# Patient Record
Sex: MALE | Race: WHITE | NOT HISPANIC OR LATINO | URBAN - METROPOLITAN AREA
[De-identification: names, ages, dates, MRNs, and addresses within clinical notes are randomized per-mention and may not be internally consistent; named-entity substitution may affect disease eponyms.]

---

## 2022-01-31 ENCOUNTER — INPATIENT (INPATIENT)
Facility: HOSPITAL | Age: 67
LOS: 6 days | Discharge: NURSING, REHAB OR LONG-TERM CARE | DRG: 177 | End: 2022-02-07
Attending: INTERNAL MEDICINE | Admitting: INTERNAL MEDICINE
Payer: MEDICARE

## 2022-01-31 VITALS
RESPIRATION RATE: 18 BRPM | TEMPERATURE: 99 F | HEIGHT: 74 IN | OXYGEN SATURATION: 90 % | SYSTOLIC BLOOD PRESSURE: 105 MMHG | DIASTOLIC BLOOD PRESSURE: 63 MMHG | WEIGHT: 223.11 LBS | HEART RATE: 84 BPM

## 2022-01-31 DIAGNOSIS — U07.1 COVID-19: ICD-10-CM

## 2022-01-31 DIAGNOSIS — N40.0 BENIGN PROSTATIC HYPERPLASIA WITHOUT LOWER URINARY TRACT SYMPTOMS: ICD-10-CM

## 2022-01-31 DIAGNOSIS — I73.9 PERIPHERAL VASCULAR DISEASE, UNSPECIFIED: ICD-10-CM

## 2022-01-31 DIAGNOSIS — J96.01 ACUTE RESPIRATORY FAILURE WITH HYPOXIA: ICD-10-CM

## 2022-01-31 DIAGNOSIS — J44.9 CHRONIC OBSTRUCTIVE PULMONARY DISEASE, UNSPECIFIED: ICD-10-CM

## 2022-01-31 DIAGNOSIS — I48.20 CHRONIC ATRIAL FIBRILLATION, UNSPECIFIED: ICD-10-CM

## 2022-01-31 DIAGNOSIS — E87.1 HYPO-OSMOLALITY AND HYPONATREMIA: ICD-10-CM

## 2022-01-31 DIAGNOSIS — E11.9 TYPE 2 DIABETES MELLITUS WITHOUT COMPLICATIONS: ICD-10-CM

## 2022-01-31 DIAGNOSIS — Z29.9 ENCOUNTER FOR PROPHYLACTIC MEASURES, UNSPECIFIED: ICD-10-CM

## 2022-01-31 DIAGNOSIS — K21.9 GASTRO-ESOPHAGEAL REFLUX DISEASE WITHOUT ESOPHAGITIS: ICD-10-CM

## 2022-01-31 LAB
ALBUMIN SERPL ELPH-MCNC: 2.9 G/DL — LOW (ref 3.5–5)
ALP SERPL-CCNC: 84 U/L — SIGNIFICANT CHANGE UP (ref 40–120)
ALT FLD-CCNC: 14 U/L DA — SIGNIFICANT CHANGE UP (ref 10–60)
ANION GAP SERPL CALC-SCNC: 0 MMOL/L — LOW (ref 5–17)
ANION GAP SERPL CALC-SCNC: 0 MMOL/L — LOW (ref 5–17)
ANION GAP SERPL CALC-SCNC: 2 MMOL/L — LOW (ref 5–17)
AST SERPL-CCNC: 26 U/L — SIGNIFICANT CHANGE UP (ref 10–40)
BASE EXCESS BLDV CALC-SCNC: 10.3 MMOL/L — SIGNIFICANT CHANGE UP
BASOPHILS # BLD AUTO: 0 K/UL — SIGNIFICANT CHANGE UP (ref 0–0.2)
BASOPHILS # BLD AUTO: 0.01 K/UL — SIGNIFICANT CHANGE UP (ref 0–0.2)
BASOPHILS NFR BLD AUTO: 0 % — SIGNIFICANT CHANGE UP (ref 0–2)
BASOPHILS NFR BLD AUTO: 0.2 % — SIGNIFICANT CHANGE UP (ref 0–2)
BILIRUB SERPL-MCNC: 0.4 MG/DL — SIGNIFICANT CHANGE UP (ref 0.2–1.2)
BLOOD GAS COMMENTS, VENOUS: SIGNIFICANT CHANGE UP
BUN SERPL-MCNC: 20 MG/DL — HIGH (ref 7–18)
BUN SERPL-MCNC: 20 MG/DL — HIGH (ref 7–18)
BUN SERPL-MCNC: 22 MG/DL — HIGH (ref 7–18)
CALCIUM SERPL-MCNC: 7.6 MG/DL — LOW (ref 8.4–10.5)
CALCIUM SERPL-MCNC: 8.3 MG/DL — LOW (ref 8.4–10.5)
CALCIUM SERPL-MCNC: 9 MG/DL — SIGNIFICANT CHANGE UP (ref 8.4–10.5)
CHLORIDE SERPL-SCNC: 101 MMOL/L — SIGNIFICANT CHANGE UP (ref 96–108)
CHLORIDE SERPL-SCNC: 102 MMOL/L — SIGNIFICANT CHANGE UP (ref 96–108)
CHLORIDE SERPL-SCNC: 96 MMOL/L — SIGNIFICANT CHANGE UP (ref 96–108)
CK SERPL-CCNC: 47 U/L — SIGNIFICANT CHANGE UP (ref 35–232)
CO2 SERPL-SCNC: 31 MMOL/L — SIGNIFICANT CHANGE UP (ref 22–31)
CO2 SERPL-SCNC: 34 MMOL/L — HIGH (ref 22–31)
CO2 SERPL-SCNC: 35 MMOL/L — HIGH (ref 22–31)
CREAT SERPL-MCNC: 0.9 MG/DL — SIGNIFICANT CHANGE UP (ref 0.5–1.3)
CREAT SERPL-MCNC: 1.01 MG/DL — SIGNIFICANT CHANGE UP (ref 0.5–1.3)
CREAT SERPL-MCNC: 1.15 MG/DL — SIGNIFICANT CHANGE UP (ref 0.5–1.3)
D DIMER BLD IA.RAPID-MCNC: 409 NG/ML DDU — HIGH
EOSINOPHIL # BLD AUTO: 0 K/UL — SIGNIFICANT CHANGE UP (ref 0–0.5)
EOSINOPHIL # BLD AUTO: 0.14 K/UL — SIGNIFICANT CHANGE UP (ref 0–0.5)
EOSINOPHIL NFR BLD AUTO: 0 % — SIGNIFICANT CHANGE UP (ref 0–6)
EOSINOPHIL NFR BLD AUTO: 2.2 % — SIGNIFICANT CHANGE UP (ref 0–6)
GLUCOSE SERPL-MCNC: 152 MG/DL — HIGH (ref 70–99)
GLUCOSE SERPL-MCNC: 156 MG/DL — HIGH (ref 70–99)
GLUCOSE SERPL-MCNC: 172 MG/DL — HIGH (ref 70–99)
HCO3 BLDV-SCNC: 35 MMOL/L — HIGH (ref 22–29)
HCT VFR BLD CALC: 56.3 % — HIGH (ref 39–50)
HCT VFR BLD CALC: 57.2 % — CRITICAL HIGH (ref 39–50)
HGB BLD-MCNC: 17.7 G/DL — HIGH (ref 13–17)
HGB BLD-MCNC: 18.1 G/DL — HIGH (ref 13–17)
IMM GRANULOCYTES NFR BLD AUTO: 0.5 % — SIGNIFICANT CHANGE UP (ref 0–1.5)
INR BLD: 1.87 RATIO — HIGH (ref 0.88–1.16)
LDH SERPL L TO P-CCNC: 269 U/L — HIGH (ref 120–225)
LYMPHOCYTES # BLD AUTO: 0.47 K/UL — LOW (ref 1–3.3)
LYMPHOCYTES # BLD AUTO: 0.5 K/UL — LOW (ref 1–3.3)
LYMPHOCYTES # BLD AUTO: 7 % — LOW (ref 13–44)
LYMPHOCYTES # BLD AUTO: 7.7 % — LOW (ref 13–44)
MANUAL SMEAR VERIFICATION: SIGNIFICANT CHANGE UP
MCHC RBC-ENTMCNC: 28.1 PG — SIGNIFICANT CHANGE UP (ref 27–34)
MCHC RBC-ENTMCNC: 28.6 PG — SIGNIFICANT CHANGE UP (ref 27–34)
MCHC RBC-ENTMCNC: 30.9 GM/DL — LOW (ref 32–36)
MCHC RBC-ENTMCNC: 32.1 GM/DL — SIGNIFICANT CHANGE UP (ref 32–36)
MCV RBC AUTO: 89.1 FL — SIGNIFICANT CHANGE UP (ref 80–100)
MCV RBC AUTO: 90.8 FL — SIGNIFICANT CHANGE UP (ref 80–100)
MONOCYTES # BLD AUTO: 0.25 K/UL — SIGNIFICANT CHANGE UP (ref 0–0.9)
MONOCYTES # BLD AUTO: 0.34 K/UL — SIGNIFICANT CHANGE UP (ref 0–0.9)
MONOCYTES NFR BLD AUTO: 3.9 % — SIGNIFICANT CHANGE UP (ref 2–14)
MONOCYTES NFR BLD AUTO: 5 % — SIGNIFICANT CHANGE UP (ref 2–14)
NEUTROPHILS # BLD AUTO: 5.54 K/UL — SIGNIFICANT CHANGE UP (ref 1.8–7.4)
NEUTROPHILS # BLD AUTO: 5.96 K/UL — SIGNIFICANT CHANGE UP (ref 1.8–7.4)
NEUTROPHILS NFR BLD AUTO: 85.5 % — HIGH (ref 43–77)
NEUTROPHILS NFR BLD AUTO: 88 % — HIGH (ref 43–77)
NRBC # BLD: 0 /100 WBCS — SIGNIFICANT CHANGE UP (ref 0–0)
NRBC # BLD: 0 /100 — SIGNIFICANT CHANGE UP (ref 0–0)
NT-PROBNP SERPL-SCNC: 193 PG/ML — HIGH (ref 0–125)
OSMOLALITY SERPL: 298 MOSMOL/KG — SIGNIFICANT CHANGE UP (ref 280–301)
PCO2 BLDV: 46 MMHG — SIGNIFICANT CHANGE UP (ref 42–55)
PH BLDV: 7.49 — HIGH (ref 7.32–7.43)
PLAT MORPH BLD: NORMAL — SIGNIFICANT CHANGE UP
PLATELET # BLD AUTO: 154 K/UL — SIGNIFICANT CHANGE UP (ref 150–400)
PLATELET # BLD AUTO: 154 K/UL — SIGNIFICANT CHANGE UP (ref 150–400)
PO2 BLDV: 159 MMHG — SIGNIFICANT CHANGE UP
POTASSIUM SERPL-MCNC: 4.8 MMOL/L — SIGNIFICANT CHANGE UP (ref 3.5–5.3)
POTASSIUM SERPL-MCNC: 6 MMOL/L — HIGH (ref 3.5–5.3)
POTASSIUM SERPL-MCNC: SIGNIFICANT CHANGE UP MMOL/L (ref 3.5–5.3)
POTASSIUM SERPL-SCNC: 4.8 MMOL/L — SIGNIFICANT CHANGE UP (ref 3.5–5.3)
POTASSIUM SERPL-SCNC: 6 MMOL/L — HIGH (ref 3.5–5.3)
POTASSIUM SERPL-SCNC: SIGNIFICANT CHANGE UP MMOL/L (ref 3.5–5.3)
PROT SERPL-MCNC: 9.2 G/DL — HIGH (ref 6–8.3)
PROTHROM AB SERPL-ACNC: 21.6 SEC — HIGH (ref 10.6–13.6)
RBC # BLD: 6.3 M/UL — HIGH (ref 4.2–5.8)
RBC # BLD: 6.32 M/UL — HIGH (ref 4.2–5.8)
RBC # FLD: 14.8 % — HIGH (ref 10.3–14.5)
RBC # FLD: 15.1 % — HIGH (ref 10.3–14.5)
RBC BLD AUTO: NORMAL — SIGNIFICANT CHANGE UP
SAO2 % BLDV: 99.4 % — SIGNIFICANT CHANGE UP
SARS-COV-2 RNA SPEC QL NAA+PROBE: DETECTED
SODIUM SERPL-SCNC: 132 MMOL/L — LOW (ref 135–145)
SODIUM SERPL-SCNC: 133 MMOL/L — LOW (ref 135–145)
SODIUM SERPL-SCNC: 136 MMOL/L — SIGNIFICANT CHANGE UP (ref 135–145)
TROPONIN I, HIGH SENSITIVITY RESULT: 11.4 NG/L — SIGNIFICANT CHANGE UP
WBC # BLD: 6.47 K/UL — SIGNIFICANT CHANGE UP (ref 3.8–10.5)
WBC # BLD: 6.77 K/UL — SIGNIFICANT CHANGE UP (ref 3.8–10.5)
WBC # FLD AUTO: 6.47 K/UL — SIGNIFICANT CHANGE UP (ref 3.8–10.5)
WBC # FLD AUTO: 6.77 K/UL — SIGNIFICANT CHANGE UP (ref 3.8–10.5)

## 2022-01-31 PROCEDURE — 93010 ELECTROCARDIOGRAM REPORT: CPT

## 2022-01-31 PROCEDURE — 99285 EMERGENCY DEPT VISIT HI MDM: CPT

## 2022-01-31 PROCEDURE — 71045 X-RAY EXAM CHEST 1 VIEW: CPT | Mod: 26

## 2022-01-31 RX ORDER — SODIUM CHLORIDE 9 MG/ML
1000 INJECTION INTRAMUSCULAR; INTRAVENOUS; SUBCUTANEOUS
Refills: 0 | Status: DISCONTINUED | OUTPATIENT
Start: 2022-01-31 | End: 2022-02-01

## 2022-01-31 RX ORDER — DEXAMETHASONE 0.5 MG/5ML
6 ELIXIR ORAL DAILY
Refills: 0 | Status: DISCONTINUED | OUTPATIENT
Start: 2022-01-31 | End: 2022-02-07

## 2022-01-31 RX ORDER — BACLOFEN 100 %
5 POWDER (GRAM) MISCELLANEOUS
Refills: 0 | Status: DISCONTINUED | OUTPATIENT
Start: 2022-01-31 | End: 2022-02-07

## 2022-01-31 RX ORDER — REMDESIVIR 5 MG/ML
200 INJECTION INTRAVENOUS EVERY 24 HOURS
Refills: 0 | Status: COMPLETED | OUTPATIENT
Start: 2022-01-31 | End: 2022-02-01

## 2022-01-31 RX ORDER — METOPROLOL TARTRATE 50 MG
25 TABLET ORAL
Refills: 0 | Status: DISCONTINUED | OUTPATIENT
Start: 2022-01-31 | End: 2022-02-05

## 2022-01-31 RX ORDER — TAMSULOSIN HYDROCHLORIDE 0.4 MG/1
0.4 CAPSULE ORAL AT BEDTIME
Refills: 0 | Status: DISCONTINUED | OUTPATIENT
Start: 2022-01-31 | End: 2022-02-07

## 2022-01-31 RX ORDER — TIOTROPIUM BROMIDE 18 UG/1
1 CAPSULE ORAL; RESPIRATORY (INHALATION) DAILY
Refills: 0 | Status: DISCONTINUED | OUTPATIENT
Start: 2022-01-31 | End: 2022-01-31

## 2022-01-31 RX ORDER — OXYCODONE AND ACETAMINOPHEN 5; 325 MG/1; MG/1
1 TABLET ORAL ONCE
Refills: 0 | Status: DISCONTINUED | OUTPATIENT
Start: 2022-01-31 | End: 2022-01-31

## 2022-01-31 RX ORDER — ENOXAPARIN SODIUM 100 MG/ML
100 INJECTION SUBCUTANEOUS
Refills: 0 | Status: DISCONTINUED | OUTPATIENT
Start: 2022-01-31 | End: 2022-02-07

## 2022-01-31 RX ORDER — FAMOTIDINE 10 MG/ML
20 INJECTION INTRAVENOUS
Refills: 0 | Status: DISCONTINUED | OUTPATIENT
Start: 2022-01-31 | End: 2022-02-07

## 2022-01-31 RX ORDER — THIAMINE MONONITRATE (VIT B1) 100 MG
100 TABLET ORAL DAILY
Refills: 0 | Status: DISCONTINUED | OUTPATIENT
Start: 2022-01-31 | End: 2022-02-07

## 2022-01-31 RX ORDER — POLYETHYLENE GLYCOL 3350 17 G/17G
17 POWDER, FOR SOLUTION ORAL DAILY
Refills: 0 | Status: DISCONTINUED | OUTPATIENT
Start: 2022-01-31 | End: 2022-02-07

## 2022-01-31 RX ORDER — NIFEDIPINE 30 MG
60 TABLET, EXTENDED RELEASE 24 HR ORAL DAILY
Refills: 0 | Status: DISCONTINUED | OUTPATIENT
Start: 2022-01-31 | End: 2022-02-05

## 2022-01-31 RX ORDER — REMDESIVIR 5 MG/ML
INJECTION INTRAVENOUS
Refills: 0 | Status: COMPLETED | OUTPATIENT
Start: 2022-01-31 | End: 2022-02-05

## 2022-01-31 RX ORDER — ALBUTEROL 90 UG/1
2 AEROSOL, METERED ORAL EVERY 6 HOURS
Refills: 0 | Status: DISCONTINUED | OUTPATIENT
Start: 2022-01-31 | End: 2022-02-07

## 2022-01-31 RX ORDER — ATORVASTATIN CALCIUM 80 MG/1
10 TABLET, FILM COATED ORAL AT BEDTIME
Refills: 0 | Status: DISCONTINUED | OUTPATIENT
Start: 2022-01-31 | End: 2022-02-07

## 2022-01-31 RX ORDER — CLOPIDOGREL BISULFATE 75 MG/1
75 TABLET, FILM COATED ORAL DAILY
Refills: 0 | Status: DISCONTINUED | OUTPATIENT
Start: 2022-01-31 | End: 2022-02-07

## 2022-01-31 RX ORDER — SENNA PLUS 8.6 MG/1
2 TABLET ORAL AT BEDTIME
Refills: 0 | Status: DISCONTINUED | OUTPATIENT
Start: 2022-01-31 | End: 2022-02-07

## 2022-01-31 RX ORDER — FUROSEMIDE 40 MG
40 TABLET ORAL DAILY
Refills: 0 | Status: DISCONTINUED | OUTPATIENT
Start: 2022-01-31 | End: 2022-02-04

## 2022-01-31 RX ORDER — APIXABAN 2.5 MG/1
5 TABLET, FILM COATED ORAL EVERY 12 HOURS
Refills: 0 | Status: DISCONTINUED | OUTPATIENT
Start: 2022-01-31 | End: 2022-01-31

## 2022-01-31 RX ADMIN — TAMSULOSIN HYDROCHLORIDE 0.4 MILLIGRAM(S): 0.4 CAPSULE ORAL at 23:34

## 2022-01-31 RX ADMIN — SODIUM CHLORIDE 100 MILLILITER(S): 9 INJECTION INTRAMUSCULAR; INTRAVENOUS; SUBCUTANEOUS at 23:34

## 2022-01-31 RX ADMIN — ATORVASTATIN CALCIUM 10 MILLIGRAM(S): 80 TABLET, FILM COATED ORAL at 23:32

## 2022-01-31 RX ADMIN — Medication 100 MILLIGRAM(S): at 18:56

## 2022-01-31 RX ADMIN — OXYCODONE AND ACETAMINOPHEN 1 TABLET(S): 5; 325 TABLET ORAL at 22:33

## 2022-01-31 RX ADMIN — OXYCODONE AND ACETAMINOPHEN 1 TABLET(S): 5; 325 TABLET ORAL at 18:57

## 2022-01-31 NOTE — H&P ADULT - PROBLEM SELECTOR PLAN 5
Pt with Hx of COPD on atrovent PRN at home  c/w PRN inhalation treatment Pt with Hx of COPD on atrovent PRN at home  Albuterol prn

## 2022-01-31 NOTE — ED PROVIDER NOTE - OBJECTIVE STATEMENT
67 y.o male with history of COPD and diabetes presents with low O2 in the setting of a covid infection. Patient was covid vaccinated + booster. He was diagnosed with covid in the past week and is now complaining of cough and fever. He was sent into the ED from his nursing home for an O2 saturation of 85% @ room air. 67 y.o male with history of COPD and diabetes presents with low O2 in the setting of a covid infection. Patient was covid vaccinated + booster. He was diagnosed with covid in the past week and is now complaining of cough and fever. He was sent into the ED from his nursing home for an O2 saturation of 85% @ room air. Received 60 mg of prednisone today at NH.

## 2022-01-31 NOTE — H&P ADULT - PROBLEM SELECTOR PLAN 4
Pt with chronic AF on eliquis and metoprolol  EKG, show sinus tach with PVCs  c/w home meds Pt with chronic AF on eliquis and metoprolol  EKG, show sinus tach with PVCs  Hold eliquis in light of COVID PNA  FD lovenox

## 2022-01-31 NOTE — H&P ADULT - HISTORY OF PRESENT ILLNESS
Pt is a 66 yo vaccinated M from [facility] with PMHx of DM, HTN, HLD, AF on eliquis, PVD presenting with hypoxia. Patient tested +ve for COVID three days ago, since then has experienced progressively worsening shortness of breath, productive cough, and fever. According to NH papers, patient was saturating 76% on RA. In the ED patient saturating 90% on 6L NC. Patient received 60mg of prednisone at NH. Patient also complains of chronic leg pain. He denies chest pain, N/V/D, headache, hemoptysis.

## 2022-01-31 NOTE — H&P ADULT - PROBLEM SELECTOR PLAN 3
Pt with Na 132, baseline 135  With increased Hbg, Hct  Patient appears dry  s/p 1L bolus in ED    NS @ 100 cc/h

## 2022-01-31 NOTE — H&P ADULT - NSHPPHYSICALEXAM_GEN_ALL_CORE
Vital Signs Last 24 Hrs  T(C): 36.4 (31 Jan 2022 16:34), Max: 37.4 (31 Jan 2022 13:54)  T(F): 97.5 (31 Jan 2022 16:34), Max: 99.3 (31 Jan 2022 13:54)  HR: 76 (31 Jan 2022 16:34) (76 - 84)  BP: 115/78 (31 Jan 2022 16:34) (105/63 - 115/78)  BP(mean): --  RR: 16 (31 Jan 2022 16:34) (16 - 18)  SpO2: 97% (31 Jan 2022 16:34) (90% - 97%)    GENERAL: NAD, speaks in full sentences, no signs of respiratory distress  HEAD:  Atraumatic, Normocephalic  EYES: EOMI, PERRLA, conjunctiva and sclera clear  NECK: Supple, No JVD  CHEST/LUNG: decreased breath sounds and mild crackles, mild wheezing b/l; No accessory muscles used  HEART: Regular rate and rhythm; No murmurs;   ABDOMEN: Soft, Nontender, Nondistended; Bowel sounds present; No guarding  EXTREMITIES:  2+ Peripheral Pulses, Dark discoloration of b/l LE  PSYCH:  Normal Affect  NEUROLOGY: non-focal, AAO X 3. Strength is 5/5. no sensory loss.  SKIN: No rashes or lesions Vital Signs Last 24 Hrs  T(C): 36.4 (31 Jan 2022 16:34), Max: 37.4 (31 Jan 2022 13:54)  T(F): 97.5 (31 Jan 2022 16:34), Max: 99.3 (31 Jan 2022 13:54)  HR: 76 (31 Jan 2022 16:34) (76 - 84)  BP: 115/78 (31 Jan 2022 16:34) (105/63 - 115/78)  BP(mean): --  RR: 16 (31 Jan 2022 16:34) (16 - 18)  SpO2: 97% (31 Jan 2022 16:34) (90% - 97%)    GENERAL: NAD, speaks in full sentences, no signs of respiratory distress  HEAD:  Atraumatic, Normocephalic  EYES: EOMI, PERRLA, conjunctiva and sclera clear  NECK: Supple, No JVD  CHEST/LUNG: decreased breath sounds and mild crackles, mild wheezing b/l; No accessory muscles used  HEART: Regular rate and rhythm; No murmurs;   ABDOMEN: Soft, Nontender, Nondistended; Bowel sounds present; No guarding  EXTREMITIES:  2+ Peripheral Pulses, Dark discoloration of b/l LE, no edema, no erythema  PSYCH:  Normal Affect  NEUROLOGY: non-focal, AAO X 3. Strength is 5/5. no sensory loss.  SKIN: No rashes or lesions

## 2022-01-31 NOTE — ED ADULT NURSE NOTE - HAVE YOU HAD A FIRST COVID-19 BOOSTER?
Anesthesia Post Evaluation    Patient: Cesario Bailey    Procedure(s) Performed: Procedure(s) (LRB):  LAPAROTOMY, EXPLORATORY (Bilateral)  REMOVAL, DRAIN (N/A)    Final Anesthesia Type: general  Patient location during evaluation: ICU  Patient participation: Yes- Able to Participate  Level of consciousness: awake and alert, awake and oriented  Post-procedure vital signs: reviewed and stable  Pain management: adequate  Airway patency: patent  PONV status at discharge: No PONV  Anesthetic complications: no      Cardiovascular status: blood pressure returned to baseline, stable and hemodynamically stable  Respiratory status: unassisted, spontaneous ventilation and room air  Hydration status: euvolemic  Follow-up not needed.          Vitals Value Taken Time   /68 5/1/2019 10:17 AM   Temp 36.4 °C (97.6 °F) 5/1/2019 10:00 AM   Pulse 66 5/1/2019 10:22 AM   Resp 17 5/1/2019 10:22 AM   SpO2 98 % 5/1/2019 10:22 AM   Vitals shown include unvalidated device data.      Event Time     Out of Recovery 05/01/2019 10:50:41          Pain/Madiha Score: Pain Rating Prior to Med Admin: 4 (5/1/2019 10:12 AM)  Pain Rating Post Med Admin: 4 (5/1/2019 10:12 AM)  Madiha Score: 10 (5/1/2019 10:00 AM)        
Yes

## 2022-01-31 NOTE — ED PROVIDER NOTE - CLINICAL SUMMARY MEDICAL DECISION MAKING FREE TEXT BOX
67 y.o male presents with fever, cough, and hypoxia in the setting of a covid infection. Hypoxic to 85% on RA and sating low 90s on 6L, but with no respiratory distress. Patient received 60 Prednisone at Nursing Home. Will admit for further care.

## 2022-01-31 NOTE — ED PROVIDER NOTE - CARE PLAN
Principal Discharge DX:	2019 novel coronavirus disease (COVID-19)  Secondary Diagnosis:	Acute respiratory failure with hypoxia   1

## 2022-01-31 NOTE — H&P ADULT - ASSESSMENT
Pt is a 68 yo vaccinated M from [facility] with PMHx of DM, HTN, HLD, AF on eliquis, PVD presenting with hypoxia. Found to be COVID +ve, saturating 90% on 6L in the ED. Pt is a 66 yo vaccinated M from [facility] with PMHx of DM, HTN, HLD, AF on eliquis, PVD presenting with hypoxia. Found to be COVID +ve, saturating 90% on 6L in the ED. Admitted for AHRF, will start dexamethasone, remdezivir

## 2022-01-31 NOTE — H&P ADULT - NSICDXPASTMEDICALHX_GEN_ALL_CORE_FT
PAST MEDICAL HISTORY:  BPH (benign prostatic hyperplasia)     Chronic atrial fibrillation     COPD (chronic obstructive pulmonary disease)     Diabetes     GERD (gastroesophageal reflux disease)     MDD (major depressive disorder)     PVD (peripheral vascular disease)

## 2022-01-31 NOTE — ED ADULT NURSE NOTE - OBJECTIVE STATEMENT
difficulty breathing with thick sputum x4-5days denies nausea/vomiting. patient sent from nursing home for difficulty breathing with low O2 sat. Patient on 3L nasal cannula, O2 sat 90% patient reports thick sputum x4-5days denies nausea/vomiting.

## 2022-01-31 NOTE — H&P ADULT - PROBLEM SELECTOR PLAN 1
Patient tested +ve for COVID 3 days prior to admission with progressively worsening SOB and cough  Sating 76% at NH, 90% on 6L in ED  CXR shows b/l infiltrates  AB.49 | 46 | 159 | 35  AST/ALT wnl  EKG shows sinus tach    Dexamethasone  Remdezivir x 5 days  Trend COVID markers  Hold eliquis, FD lovenox Patient tested +ve for COVID 3 days prior to admission with progressively worsening SOB and cough  Sating 76% at NH, 90% on 6L in ED  CXR shows b/l infiltrates  AB.49 | 46 | 159 | 35  AST/ALT wnl  Trop -ve, EKG shows sinus tach    Dexamethasone 6mg x 10 days  Remdezivir x 5 days  c/w O2 supplementation  Trend COVID markers  Hold eliquis, FD lovenox Patient tested +ve for COVID 3 days prior to admission with progressively worsening SOB and cough  Vaccinated x 3  Sating 76% at NH, 90% on 6L in ED  CXR shows b/l infiltrates  AB.49 | 46 | 159 | 35  AST/ALT wnl  Trop -ve, EKG shows sinus tach    Dexamethasone 6mg x 10 days  Remdezivir x 5 days  c/w O2 supplementation  Trend COVID markers  Hold eliquis, FD lovenox

## 2022-02-01 LAB
A1C WITH ESTIMATED AVERAGE GLUCOSE RESULT: 6.7 % — HIGH (ref 4–5.6)
ALBUMIN SERPL ELPH-MCNC: 2.2 G/DL — LOW (ref 3.5–5)
ALP SERPL-CCNC: 68 U/L — SIGNIFICANT CHANGE UP (ref 40–120)
ALT FLD-CCNC: 11 U/L DA — SIGNIFICANT CHANGE UP (ref 10–60)
ANION GAP SERPL CALC-SCNC: 3 MMOL/L — LOW (ref 5–17)
APPEARANCE UR: CLEAR — SIGNIFICANT CHANGE UP
AST SERPL-CCNC: 14 U/L — SIGNIFICANT CHANGE UP (ref 10–40)
BILIRUB SERPL-MCNC: 0.2 MG/DL — SIGNIFICANT CHANGE UP (ref 0.2–1.2)
BILIRUB UR-MCNC: NEGATIVE — SIGNIFICANT CHANGE UP
BUN SERPL-MCNC: 18 MG/DL — SIGNIFICANT CHANGE UP (ref 7–18)
CALCIUM SERPL-MCNC: 7.8 MG/DL — LOW (ref 8.4–10.5)
CHLORIDE SERPL-SCNC: 103 MMOL/L — SIGNIFICANT CHANGE UP (ref 96–108)
CK SERPL-CCNC: 37 U/L — SIGNIFICANT CHANGE UP (ref 35–232)
CO2 SERPL-SCNC: 32 MMOL/L — HIGH (ref 22–31)
COLOR SPEC: YELLOW — SIGNIFICANT CHANGE UP
CREAT SERPL-MCNC: 0.69 MG/DL — SIGNIFICANT CHANGE UP (ref 0.5–1.3)
CRP SERPL-MCNC: 141 MG/L — HIGH
DIFF PNL FLD: NEGATIVE — SIGNIFICANT CHANGE UP
EPI CELLS # UR: ABNORMAL /HPF
ESTIMATED AVERAGE GLUCOSE: 146 MG/DL — HIGH (ref 68–114)
FERRITIN SERPL-MCNC: 51 NG/ML — SIGNIFICANT CHANGE UP (ref 30–400)
FERRITIN SERPL-MCNC: 57 NG/ML — SIGNIFICANT CHANGE UP (ref 30–400)
GLUCOSE BLDC GLUCOMTR-MCNC: 156 MG/DL — HIGH (ref 70–99)
GLUCOSE SERPL-MCNC: 119 MG/DL — HIGH (ref 70–99)
GLUCOSE UR QL: NEGATIVE — SIGNIFICANT CHANGE UP
HCT VFR BLD CALC: 52.4 % — HIGH (ref 39–50)
HCV AB S/CO SERPL IA: 0.11 S/CO — SIGNIFICANT CHANGE UP (ref 0–0.99)
HCV AB SERPL-IMP: SIGNIFICANT CHANGE UP
HGB BLD-MCNC: 16.5 G/DL — SIGNIFICANT CHANGE UP (ref 13–17)
INR BLD: 1.43 RATIO — HIGH (ref 0.88–1.16)
KETONES UR-MCNC: NEGATIVE — SIGNIFICANT CHANGE UP
LDH SERPL L TO P-CCNC: 164 U/L — SIGNIFICANT CHANGE UP (ref 120–225)
LEUKOCYTE ESTERASE UR-ACNC: NEGATIVE — SIGNIFICANT CHANGE UP
MCHC RBC-ENTMCNC: 28.4 PG — SIGNIFICANT CHANGE UP (ref 27–34)
MCHC RBC-ENTMCNC: 31.5 GM/DL — LOW (ref 32–36)
MCV RBC AUTO: 90.3 FL — SIGNIFICANT CHANGE UP (ref 80–100)
NITRITE UR-MCNC: NEGATIVE — SIGNIFICANT CHANGE UP
NRBC # BLD: 0 /100 WBCS — SIGNIFICANT CHANGE UP (ref 0–0)
PH UR: 5 — SIGNIFICANT CHANGE UP (ref 5–8)
PLATELET # BLD AUTO: 130 K/UL — LOW (ref 150–400)
POTASSIUM SERPL-MCNC: 4.7 MMOL/L — SIGNIFICANT CHANGE UP (ref 3.5–5.3)
POTASSIUM SERPL-SCNC: 4.7 MMOL/L — SIGNIFICANT CHANGE UP (ref 3.5–5.3)
PROCALCITONIN SERPL-MCNC: 0.03 NG/ML — SIGNIFICANT CHANGE UP (ref 0.02–0.1)
PROCALCITONIN SERPL-MCNC: 0.04 NG/ML — SIGNIFICANT CHANGE UP (ref 0.02–0.1)
PROCALCITONIN SERPL-MCNC: 0.05 NG/ML — SIGNIFICANT CHANGE UP (ref 0.02–0.1)
PROT SERPL-MCNC: 8 G/DL — SIGNIFICANT CHANGE UP (ref 6–8.3)
PROT UR-MCNC: 30 MG/DL
PROTHROM AB SERPL-ACNC: 16.7 SEC — HIGH (ref 10.6–13.6)
RBC # BLD: 5.8 M/UL — SIGNIFICANT CHANGE UP (ref 4.2–5.8)
RBC # FLD: 14.8 % — HIGH (ref 10.3–14.5)
RBC CASTS # UR COMP ASSIST: SIGNIFICANT CHANGE UP /HPF (ref 0–2)
SODIUM SERPL-SCNC: 138 MMOL/L — SIGNIFICANT CHANGE UP (ref 135–145)
SP GR SPEC: 1.02 — SIGNIFICANT CHANGE UP (ref 1.01–1.02)
UROBILINOGEN FLD QL: NEGATIVE — SIGNIFICANT CHANGE UP
WBC # BLD: 4.81 K/UL — SIGNIFICANT CHANGE UP (ref 3.8–10.5)
WBC # FLD AUTO: 4.81 K/UL — SIGNIFICANT CHANGE UP (ref 3.8–10.5)
WBC UR QL: SIGNIFICANT CHANGE UP /HPF (ref 0–5)

## 2022-02-01 RX ORDER — DEXTROSE 50 % IN WATER 50 %
15 SYRINGE (ML) INTRAVENOUS ONCE
Refills: 0 | Status: DISCONTINUED | OUTPATIENT
Start: 2022-02-01 | End: 2022-02-07

## 2022-02-01 RX ORDER — DEXTROSE 50 % IN WATER 50 %
12.5 SYRINGE (ML) INTRAVENOUS ONCE
Refills: 0 | Status: DISCONTINUED | OUTPATIENT
Start: 2022-02-01 | End: 2022-02-07

## 2022-02-01 RX ORDER — SODIUM CHLORIDE 9 MG/ML
1000 INJECTION, SOLUTION INTRAVENOUS
Refills: 0 | Status: DISCONTINUED | OUTPATIENT
Start: 2022-02-01 | End: 2022-02-07

## 2022-02-01 RX ORDER — GLUCAGON INJECTION, SOLUTION 0.5 MG/.1ML
1 INJECTION, SOLUTION SUBCUTANEOUS ONCE
Refills: 0 | Status: DISCONTINUED | OUTPATIENT
Start: 2022-02-01 | End: 2022-02-07

## 2022-02-01 RX ORDER — DEXTROSE 50 % IN WATER 50 %
25 SYRINGE (ML) INTRAVENOUS ONCE
Refills: 0 | Status: DISCONTINUED | OUTPATIENT
Start: 2022-02-01 | End: 2022-02-07

## 2022-02-01 RX ORDER — INSULIN LISPRO 100/ML
VIAL (ML) SUBCUTANEOUS
Refills: 0 | Status: DISCONTINUED | OUTPATIENT
Start: 2022-02-01 | End: 2022-02-07

## 2022-02-01 RX ORDER — REMDESIVIR 5 MG/ML
100 INJECTION INTRAVENOUS EVERY 24 HOURS
Refills: 0 | Status: COMPLETED | OUTPATIENT
Start: 2022-02-02 | End: 2022-02-05

## 2022-02-01 RX ORDER — OXYCODONE AND ACETAMINOPHEN 5; 325 MG/1; MG/1
1 TABLET ORAL EVERY 4 HOURS
Refills: 0 | Status: DISCONTINUED | OUTPATIENT
Start: 2022-02-01 | End: 2022-02-07

## 2022-02-01 RX ADMIN — Medication 25 MILLIGRAM(S): at 06:22

## 2022-02-01 RX ADMIN — FAMOTIDINE 20 MILLIGRAM(S): 10 INJECTION INTRAVENOUS at 18:27

## 2022-02-01 RX ADMIN — ATORVASTATIN CALCIUM 10 MILLIGRAM(S): 80 TABLET, FILM COATED ORAL at 22:49

## 2022-02-01 RX ADMIN — OXYCODONE AND ACETAMINOPHEN 1 TABLET(S): 5; 325 TABLET ORAL at 06:22

## 2022-02-01 RX ADMIN — FAMOTIDINE 20 MILLIGRAM(S): 10 INJECTION INTRAVENOUS at 06:22

## 2022-02-01 RX ADMIN — REMDESIVIR 500 MILLIGRAM(S): 5 INJECTION INTRAVENOUS at 00:04

## 2022-02-01 RX ADMIN — Medication 25 MILLIGRAM(S): at 18:27

## 2022-02-01 RX ADMIN — OXYCODONE AND ACETAMINOPHEN 1 TABLET(S): 5; 325 TABLET ORAL at 13:45

## 2022-02-01 RX ADMIN — SENNA PLUS 2 TABLET(S): 8.6 TABLET ORAL at 22:49

## 2022-02-01 RX ADMIN — Medication 60 MILLIGRAM(S): at 06:22

## 2022-02-01 RX ADMIN — ENOXAPARIN SODIUM 100 MILLIGRAM(S): 100 INJECTION SUBCUTANEOUS at 18:27

## 2022-02-01 RX ADMIN — TAMSULOSIN HYDROCHLORIDE 0.4 MILLIGRAM(S): 0.4 CAPSULE ORAL at 22:49

## 2022-02-01 RX ADMIN — OXYCODONE AND ACETAMINOPHEN 1 TABLET(S): 5; 325 TABLET ORAL at 06:04

## 2022-02-01 RX ADMIN — Medication 100 MILLIGRAM(S): at 12:45

## 2022-02-01 RX ADMIN — Medication 5 MILLIGRAM(S): at 12:44

## 2022-02-01 RX ADMIN — Medication 100 MILLIGRAM(S): at 22:49

## 2022-02-01 RX ADMIN — OXYCODONE AND ACETAMINOPHEN 1 TABLET(S): 5; 325 TABLET ORAL at 19:10

## 2022-02-01 RX ADMIN — OXYCODONE AND ACETAMINOPHEN 1 TABLET(S): 5; 325 TABLET ORAL at 18:40

## 2022-02-01 RX ADMIN — Medication 100 MILLIGRAM(S): at 06:22

## 2022-02-01 RX ADMIN — Medication 5 MILLIGRAM(S): at 18:39

## 2022-02-01 RX ADMIN — Medication 100 MILLIGRAM(S): at 15:09

## 2022-02-01 RX ADMIN — OXYCODONE AND ACETAMINOPHEN 1 TABLET(S): 5; 325 TABLET ORAL at 22:49

## 2022-02-01 RX ADMIN — Medication 40 MILLIGRAM(S): at 06:22

## 2022-02-01 RX ADMIN — Medication 6 MILLIGRAM(S): at 06:21

## 2022-02-01 RX ADMIN — Medication 100 MILLIGRAM(S): at 00:03

## 2022-02-01 RX ADMIN — OXYCODONE AND ACETAMINOPHEN 1 TABLET(S): 5; 325 TABLET ORAL at 01:33

## 2022-02-01 RX ADMIN — CLOPIDOGREL BISULFATE 75 MILLIGRAM(S): 75 TABLET, FILM COATED ORAL at 12:44

## 2022-02-01 RX ADMIN — OXYCODONE AND ACETAMINOPHEN 1 TABLET(S): 5; 325 TABLET ORAL at 12:45

## 2022-02-01 RX ADMIN — POLYETHYLENE GLYCOL 3350 17 GRAM(S): 17 POWDER, FOR SOLUTION ORAL at 12:45

## 2022-02-01 RX ADMIN — ENOXAPARIN SODIUM 100 MILLIGRAM(S): 100 INJECTION SUBCUTANEOUS at 06:22

## 2022-02-01 NOTE — PATIENT PROFILE ADULT - FALL HARM RISK - HARM RISK INTERVENTIONS

## 2022-02-01 NOTE — PROGRESS NOTE ADULT - PROBLEM SELECTOR PLAN 9
Hx of BPH, with hx of retention requiring yuan  - 2/1 Bladder trkb=[id number].  Pt straight cathed.  Refusing yuan at this time  - Continue to monitor urine output.   - C/w Flomax Hx of BPH, with hx of retention requiring yuan  - 2/1 Bladder yfrr=9931qm.  Pt straight cathed.  Refusing yuan at this time  - Continue to monitor urine output.   - C/w Flomax  - 2/1 UA negative.  Urine culture pending-f/u results

## 2022-02-01 NOTE — PROGRESS NOTE ADULT - PROBLEM SELECTOR PLAN 2
- C/w Remdesivir D1/5  - C/w DexamethasoneD1/10   - C/w oxygen supplementation  - Monitor oxygen saturation and sari oxygen as tolerated - C/w Remdesivir, D1/5  - C/w Dexamethasone, D1/10   - C/w oxygen supplementation  - Monitor oxygen saturation and sari oxygen as tolerated - C/w Remdesivir, D1/5  - C/w Dexamethasone, D1/10   - C/w oxygen supplementation  - Monitor oxygen saturation and sari oxygen as tolerated  - Inflammatory markers noted today  - Continue to monitor inflammatory markers q3days

## 2022-02-01 NOTE — PROGRESS NOTE ADULT - SUBJECTIVE AND OBJECTIVE BOX
NP Note discussed with  primary attending    Patient is a 67y old  Male who presents with a chief complaint of Hypoxia (2022 18:30)      INTERVAL HPI/OVERNIGHT EVENTS: Pt denies HA or dizziness.  Reports SOB, but states he's breathing a little bit better.  Reports he's has not urinated since 10AM on Mon, .  Reports he feels the urge to urinate but cannot.  Reports he does not want a yuan.  Requests straight cath.  States, "this happened before and they straight cathed me and then I was able to go on my own."     MEDICATIONS  (STANDING):  atorvastatin 10 milliGRAM(s) Oral at bedtime  baclofen 5 milliGRAM(s) Oral two times a day  clopidogrel Tablet 75 milliGRAM(s) Oral daily  dexAMETHasone     Tablet 6 milliGRAM(s) Oral daily  dextrose 40% Gel 15 Gram(s) Oral once  dextrose 5%. 1000 milliLiter(s) (50 mL/Hr) IV Continuous <Continuous>  dextrose 5%. 1000 milliLiter(s) (100 mL/Hr) IV Continuous <Continuous>  dextrose 50% Injectable 25 Gram(s) IV Push once  dextrose 50% Injectable 12.5 Gram(s) IV Push once  dextrose 50% Injectable 25 Gram(s) IV Push once  enoxaparin Injectable 100 milliGRAM(s) SubCutaneous two times a day  famotidine    Tablet 20 milliGRAM(s) Oral two times a day  furosemide    Tablet 40 milliGRAM(s) Oral daily  glucagon  Injectable 1 milliGRAM(s) IntraMuscular once  insulin lispro (ADMELOG) corrective regimen sliding scale   SubCutaneous three times a day before meals  metoprolol tartrate 25 milliGRAM(s) Oral two times a day  NIFEdipine XL 60 milliGRAM(s) Oral daily  polyethylene glycol 3350 17 Gram(s) Oral daily  pregabalin 100 milliGRAM(s) Oral three times a day  remdesivir  IVPB   IV Intermittent   senna 2 Tablet(s) Oral at bedtime  tamsulosin 0.4 milliGRAM(s) Oral at bedtime  thiamine 100 milliGRAM(s) Oral daily    MEDICATIONS  (PRN):  ALBUTerol    90 MICROgram(s) HFA Inhaler 2 Puff(s) Inhalation every 6 hours PRN Shortness of Breath  oxycodone    5 mG/acetaminophen 325 mG 1 Tablet(s) Oral every 4 hours PRN Moderate Pain (4 - 6)      __________________________________________________  REVIEW OF SYSTEMS:    CONSTITUTIONAL: No fever  EYES: No acute visual disturbances  NECK: No pain or stiffness  RESPIRATORY: No cough; (+) Shortness of breath  CARDIOVASCULAR: No chest pain, no palpitations  GASTROINTESTINAL: No pain. No nausea or vomiting.  No diarrhea   NEUROLOGICAL: No headache or numbness, no tremors  MUSCULOSKELETAL: No joint pain, no muscle pain  GENITOURINARY: No dysuria, no frequency, no hesitancy  PSYCHIATRY: No depression , no anxiety  ALL OTHER  ROS negative        Vital Signs Last 24 Hrs  T(C): 36.7 (2022 12:59), Max: 36.7 (2022 12:59)  T(F): 98 (2022 12:59), Max: 98 (2022 12:59)  HR: 65 (2022 08:00) (65 - 81)  BP: 115/69 (2022 12:59) (110/68 - 141/68)  RR: 17 (2022 12:59) (16 - 18)  SpO2: 95% (2022 12:59) (92% - 97%)    ________________________________________________  PHYSICAL EXAM:  GENERAL: NAD  HEENT: Normocephalic;  conjunctivae and sclerae clear; moist mucous membranes;   NECK : supple  CHEST/LUNG: Decreased air movement  HEART: S1 S2  regular; no murmurs, gallops or rubs  ABDOMEN: Soft, Nontender, (+) Distended; Bowel sounds present x 4 quad  EXTREMITIES: No cyanosis; no edema; no calf tenderness  SKIN: Warm and dry; no rash  NERVOUS SYSTEM:  Awake and alert; Oriented  to place, person and time ; no new deficits    _________________________________________________  LABS:                        16.5   4.81  )-----------( 130      ( 2022 05:53 )             52.4         138  |  103  |  18  ----------------------------<  119<H>  4.7   |  32<H>  |  0.69    Ca    7.8<L>      2022 05:53    TPro  8.0  /  Alb  2.2<L>  /  TBili  0.2  /  DBili  x   /  AST  14  /  ALT  11  /  AlkPhos  68      PT/INR - ( 2022 05:53 )   PT: 16.7 sec;   INR: 1.43 ratio           Urinalysis Basic - ( 2022 12:05 )    Color: Yellow / Appearance: Clear / S.020 / pH: x  Gluc: x / Ketone: Negative  / Bili: Negative / Urobili: Negative   Blood: x / Protein: 30 mg/dL / Nitrite: Negative   Leuk Esterase: Negative / RBC: 0-2 /HPF / WBC 0-2 /HPF   Sq Epi: x / Non Sq Epi: Occasional /HPF / Bacteria: x      CAPILLARY BLOOD GLUCOSE      POCT Blood Glucose.: 155 mg/dL (2022 15:49)  POCT Blood Glucose.: 160 mg/dL (2022 15:44)        RADIOLOGY & ADDITIONAL TESTS:    Imaging Personally Reviewed:  YES    Consultant(s) Notes Reviewed:   YES    Care Discussed with Consultants :     Plan of care was discussed with patient and /or primary care giver; all questions and concerns were addressed and care was aligned with patient's wishes.     NP Note discussed with  primary attending    Patient is a 67y old  Male who presents with a chief complaint of Hypoxia (2022 18:30)      INTERVAL HPI/OVERNIGHT EVENTS: Pt denies HA or dizziness.  Reports SOB, but states he's breathing a little bit better.  Reports he has not urinated since 10AM on Mon, .  Reports he feels the urge to urinate but cannot.  Reports he does not want a yuan.  Requests straight cath.  States, "this happened before and they straight cathed me and then I was able to go on my own."     MEDICATIONS  (STANDING):  atorvastatin 10 milliGRAM(s) Oral at bedtime  baclofen 5 milliGRAM(s) Oral two times a day  clopidogrel Tablet 75 milliGRAM(s) Oral daily  dexAMETHasone     Tablet 6 milliGRAM(s) Oral daily  dextrose 40% Gel 15 Gram(s) Oral once  dextrose 5%. 1000 milliLiter(s) (50 mL/Hr) IV Continuous <Continuous>  dextrose 5%. 1000 milliLiter(s) (100 mL/Hr) IV Continuous <Continuous>  dextrose 50% Injectable 25 Gram(s) IV Push once  dextrose 50% Injectable 12.5 Gram(s) IV Push once  dextrose 50% Injectable 25 Gram(s) IV Push once  enoxaparin Injectable 100 milliGRAM(s) SubCutaneous two times a day  famotidine    Tablet 20 milliGRAM(s) Oral two times a day  furosemide    Tablet 40 milliGRAM(s) Oral daily  glucagon  Injectable 1 milliGRAM(s) IntraMuscular once  insulin lispro (ADMELOG) corrective regimen sliding scale   SubCutaneous three times a day before meals  metoprolol tartrate 25 milliGRAM(s) Oral two times a day  NIFEdipine XL 60 milliGRAM(s) Oral daily  polyethylene glycol 3350 17 Gram(s) Oral daily  pregabalin 100 milliGRAM(s) Oral three times a day  remdesivir  IVPB   IV Intermittent   senna 2 Tablet(s) Oral at bedtime  tamsulosin 0.4 milliGRAM(s) Oral at bedtime  thiamine 100 milliGRAM(s) Oral daily    MEDICATIONS  (PRN):  ALBUTerol    90 MICROgram(s) HFA Inhaler 2 Puff(s) Inhalation every 6 hours PRN Shortness of Breath  oxycodone    5 mG/acetaminophen 325 mG 1 Tablet(s) Oral every 4 hours PRN Moderate Pain (4 - 6)      __________________________________________________  REVIEW OF SYSTEMS:    CONSTITUTIONAL: No fever  EYES: No acute visual disturbances  NECK: No pain or stiffness  RESPIRATORY: No cough; (+) Shortness of breath  CARDIOVASCULAR: No chest pain, no palpitations  GASTROINTESTINAL: No pain. No nausea or vomiting.  No diarrhea   NEUROLOGICAL: No headache or numbness, no tremors  MUSCULOSKELETAL: No joint pain, no muscle pain  GENITOURINARY: No dysuria, no frequency, no hesitancy  PSYCHIATRY: No depression , no anxiety  ALL OTHER  ROS negative        Vital Signs Last 24 Hrs  T(C): 36.7 (2022 12:59), Max: 36.7 (2022 12:59)  T(F): 98 (2022 12:59), Max: 98 (2022 12:59)  HR: 65 (2022 08:00) (65 - 81)  BP: 115/69 (2022 12:59) (110/68 - 141/68)  RR: 17 (2022 12:59) (16 - 18)  SpO2: 95% (2022 12:59) (92% - 97%)    ________________________________________________  PHYSICAL EXAM:  GENERAL: NAD  HEENT: Normocephalic;  conjunctivae and sclerae clear; moist mucous membranes;   NECK : supple  CHEST/LUNG: Decreased air movement  HEART: S1 S2  regular; no murmurs, gallops or rubs  ABDOMEN: Soft, Nontender, (+) Distended; Bowel sounds present x 4 quad  EXTREMITIES: No cyanosis; no edema; no calf tenderness  SKIN: Warm and dry; no rash  NERVOUS SYSTEM:  Awake and alert; Oriented  to place, person and time ; no new deficits    _________________________________________________  LABS:                        16.5   4.81  )-----------( 130      ( 2022 05:53 )             52.4         138  |  103  |  18  ----------------------------<  119<H>  4.7   |  32<H>  |  0.69    Ca    7.8<L>      2022 05:53    TPro  8.0  /  Alb  2.2<L>  /  TBili  0.2  /  DBili  x   /  AST  14  /  ALT  11  /  AlkPhos  68      PT/INR - ( 2022 05:53 )   PT: 16.7 sec;   INR: 1.43 ratio           Urinalysis Basic - ( 2022 12:05 )    Color: Yellow / Appearance: Clear / S.020 / pH: x  Gluc: x / Ketone: Negative  / Bili: Negative / Urobili: Negative   Blood: x / Protein: 30 mg/dL / Nitrite: Negative   Leuk Esterase: Negative / RBC: 0-2 /HPF / WBC 0-2 /HPF   Sq Epi: x / Non Sq Epi: Occasional /HPF / Bacteria: x      CAPILLARY BLOOD GLUCOSE      POCT Blood Glucose.: 155 mg/dL (2022 15:49)  POCT Blood Glucose.: 160 mg/dL (2022 15:44)        RADIOLOGY & ADDITIONAL TESTS:    Imaging Personally Reviewed:  YES    Consultant(s) Notes Reviewed:   YES    Care Discussed with Consultants :     Plan of care was discussed with patient and /or primary care giver; all questions and concerns were addressed and care was aligned with patient's wishes.

## 2022-02-01 NOTE — PROGRESS NOTE ADULT - PROBLEM SELECTOR PLAN 1
Patient tested +ve for COVID 3 days prior to admission with progressively worsening SOB and cough  Vaccinated x 3  - 1/31 CXR bilateral infiltrates-scattered mild infiltration in the right mid lower lung field and slight   left base infiltrate  - Currently on 4L NC , saturating 95%  - C/w oxygen supplementation   - Monitor oxygen saturation and sari oxygen as tolerated

## 2022-02-01 NOTE — PATIENT PROFILE ADULT - BRAND OF COVID-19 VACCINATION
patient doesn't remember what date he took the booster, only remember is sometimes last year. and he also doesn't know which brand he took/Pfizer dose 1, 2, and 3

## 2022-02-01 NOTE — PROGRESS NOTE ADULT - PROBLEM SELECTOR PLAN 4
H/o AFib home med: Eliquis and Metoprolol  EKG, show sinus tach with PVCs  - Holding Eliquis since on full dose Lovenox

## 2022-02-01 NOTE — PATIENT PROFILE ADULT - NSPROGENOTHERPROVIDER_GEN_A_NUR
24 y/o male with the above documented history and HPI who on exam appears well and comfortable. VSs noted, neck supple, breathing c/ ease extremities s/ asymmetry, skin s/ rash and neurologically intact. Appropriate screening studies obtained. There is nothing clinically evident to suggest any emergent process. There is no clinical indication for any additional emergent diagnostic investigation or intervention. The patient has been discharged with appropriate instruction, results and follow-up.
none

## 2022-02-02 LAB
ANION GAP SERPL CALC-SCNC: 4 MMOL/L — LOW (ref 5–17)
BUN SERPL-MCNC: 22 MG/DL — HIGH (ref 7–18)
CALCIUM SERPL-MCNC: 8.2 MG/DL — LOW (ref 8.4–10.5)
CHLORIDE SERPL-SCNC: 105 MMOL/L — SIGNIFICANT CHANGE UP (ref 96–108)
CO2 SERPL-SCNC: 30 MMOL/L — SIGNIFICANT CHANGE UP (ref 22–31)
CREAT SERPL-MCNC: 0.64 MG/DL — SIGNIFICANT CHANGE UP (ref 0.5–1.3)
CULTURE RESULTS: NO GROWTH — SIGNIFICANT CHANGE UP
GLUCOSE BLDC GLUCOMTR-MCNC: 105 MG/DL — HIGH (ref 70–99)
GLUCOSE BLDC GLUCOMTR-MCNC: 158 MG/DL — HIGH (ref 70–99)
GLUCOSE BLDC GLUCOMTR-MCNC: 201 MG/DL — HIGH (ref 70–99)
GLUCOSE BLDC GLUCOMTR-MCNC: 208 MG/DL — HIGH (ref 70–99)
GLUCOSE SERPL-MCNC: 114 MG/DL — HIGH (ref 70–99)
HCT VFR BLD CALC: 51.5 % — HIGH (ref 39–50)
HGB BLD-MCNC: 16.3 G/DL — SIGNIFICANT CHANGE UP (ref 13–17)
MCHC RBC-ENTMCNC: 28.2 PG — SIGNIFICANT CHANGE UP (ref 27–34)
MCHC RBC-ENTMCNC: 31.7 GM/DL — LOW (ref 32–36)
MCV RBC AUTO: 89.1 FL — SIGNIFICANT CHANGE UP (ref 80–100)
MRSA PCR RESULT.: SIGNIFICANT CHANGE UP
NRBC # BLD: 0 /100 WBCS — SIGNIFICANT CHANGE UP (ref 0–0)
PLATELET # BLD AUTO: 173 K/UL — SIGNIFICANT CHANGE UP (ref 150–400)
POTASSIUM SERPL-MCNC: 4.6 MMOL/L — SIGNIFICANT CHANGE UP (ref 3.5–5.3)
POTASSIUM SERPL-SCNC: 4.6 MMOL/L — SIGNIFICANT CHANGE UP (ref 3.5–5.3)
RBC # BLD: 5.78 M/UL — SIGNIFICANT CHANGE UP (ref 4.2–5.8)
RBC # FLD: 14.5 % — SIGNIFICANT CHANGE UP (ref 10.3–14.5)
S AUREUS DNA NOSE QL NAA+PROBE: SIGNIFICANT CHANGE UP
SODIUM SERPL-SCNC: 139 MMOL/L — SIGNIFICANT CHANGE UP (ref 135–145)
SPECIMEN SOURCE: SIGNIFICANT CHANGE UP
WBC # BLD: 6.27 K/UL — SIGNIFICANT CHANGE UP (ref 3.8–10.5)
WBC # FLD AUTO: 6.27 K/UL — SIGNIFICANT CHANGE UP (ref 3.8–10.5)

## 2022-02-02 RX ADMIN — FAMOTIDINE 20 MILLIGRAM(S): 10 INJECTION INTRAVENOUS at 07:04

## 2022-02-02 RX ADMIN — Medication 2: at 17:49

## 2022-02-02 RX ADMIN — ENOXAPARIN SODIUM 100 MILLIGRAM(S): 100 INJECTION SUBCUTANEOUS at 17:49

## 2022-02-02 RX ADMIN — Medication 100 MILLIGRAM(S): at 13:18

## 2022-02-02 RX ADMIN — Medication 100 MILLIGRAM(S): at 07:04

## 2022-02-02 RX ADMIN — SENNA PLUS 2 TABLET(S): 8.6 TABLET ORAL at 21:38

## 2022-02-02 RX ADMIN — TAMSULOSIN HYDROCHLORIDE 0.4 MILLIGRAM(S): 0.4 CAPSULE ORAL at 21:38

## 2022-02-02 RX ADMIN — Medication 25 MILLIGRAM(S): at 17:49

## 2022-02-02 RX ADMIN — OXYCODONE AND ACETAMINOPHEN 1 TABLET(S): 5; 325 TABLET ORAL at 18:22

## 2022-02-02 RX ADMIN — Medication 5 MILLIGRAM(S): at 07:05

## 2022-02-02 RX ADMIN — OXYCODONE AND ACETAMINOPHEN 1 TABLET(S): 5; 325 TABLET ORAL at 21:38

## 2022-02-02 RX ADMIN — Medication 5 MILLIGRAM(S): at 17:49

## 2022-02-02 RX ADMIN — OXYCODONE AND ACETAMINOPHEN 1 TABLET(S): 5; 325 TABLET ORAL at 14:06

## 2022-02-02 RX ADMIN — OXYCODONE AND ACETAMINOPHEN 1 TABLET(S): 5; 325 TABLET ORAL at 13:36

## 2022-02-02 RX ADMIN — Medication 40 MILLIGRAM(S): at 07:05

## 2022-02-02 RX ADMIN — REMDESIVIR 500 MILLIGRAM(S): 5 INJECTION INTRAVENOUS at 02:48

## 2022-02-02 RX ADMIN — CLOPIDOGREL BISULFATE 75 MILLIGRAM(S): 75 TABLET, FILM COATED ORAL at 12:46

## 2022-02-02 RX ADMIN — OXYCODONE AND ACETAMINOPHEN 1 TABLET(S): 5; 325 TABLET ORAL at 17:52

## 2022-02-02 RX ADMIN — FAMOTIDINE 20 MILLIGRAM(S): 10 INJECTION INTRAVENOUS at 17:49

## 2022-02-02 RX ADMIN — OXYCODONE AND ACETAMINOPHEN 1 TABLET(S): 5; 325 TABLET ORAL at 22:52

## 2022-02-02 RX ADMIN — OXYCODONE AND ACETAMINOPHEN 1 TABLET(S): 5; 325 TABLET ORAL at 07:06

## 2022-02-02 RX ADMIN — Medication 100 MILLIGRAM(S): at 21:39

## 2022-02-02 RX ADMIN — Medication 6 MILLIGRAM(S): at 07:05

## 2022-02-02 RX ADMIN — ATORVASTATIN CALCIUM 10 MILLIGRAM(S): 80 TABLET, FILM COATED ORAL at 21:38

## 2022-02-02 RX ADMIN — ENOXAPARIN SODIUM 100 MILLIGRAM(S): 100 INJECTION SUBCUTANEOUS at 07:05

## 2022-02-02 RX ADMIN — OXYCODONE AND ACETAMINOPHEN 1 TABLET(S): 5; 325 TABLET ORAL at 03:34

## 2022-02-02 NOTE — PROGRESS NOTE ADULT - PROBLEM SELECTOR PLAN 9
Pt with Hx of BPH, with hx of retention requiring yuan  No active complaints  c/w flomax Pt with Hx of BPH  c/w flomax

## 2022-02-02 NOTE — PROGRESS NOTE ADULT - PROBLEM SELECTOR PLAN 10
Plan: RISK                                                Points  [] Previous VTE                                           3  [] Thrombophilia                                       2  [] Lower limb paralysis                              2   [] Current Cancer                                       2   [x] Immobilization > 24 hrs                        1  [] ICU/CCU stay > 24 hours                      1  [x] Age > 60                                                  1    Lovenox for DVT  Pepcid for GI

## 2022-02-02 NOTE — PROGRESS NOTE ADULT - PROBLEM SELECTOR PLAN 1
pt p/w initial covid + on 1/28 with sob and cough, vaccinated x3  Sating 76% at NH, 90% on 6L in ED  CXR shows b/l infiltrates  Dexamethasone 6mg x 10 days until 2/10  Remdezivir x 5 days until 2/5  currently on 4L NC maintaining oxygen saturation 92-93%  Trend COVID markers  eliquis on hold   continue full dose lovenox 100 mg BID

## 2022-02-02 NOTE — PROGRESS NOTE ADULT - PROBLEM SELECTOR PLAN 4
Pt with chronic AF on eliquis and metoprolol  continue metoprolol  heart rate controlled  continue FD lovenox

## 2022-02-02 NOTE — PROGRESS NOTE ADULT - SUBJECTIVE AND OBJECTIVE BOX
Patient is a 67y old  Male who presents with a chief complaint of Hypoxia (2022 15:15)    OVERNIGHT EVENTS: no acute changes    REVIEW OF SYSTEMS:  CONSTITUTIONAL: No fever, chills  NECK: No pain or stiffness  RESPIRATORY: No cough, SOB  CARDIOVASCULAR: No chest pain, palpitations  GASTROINTESTINAL: No abdominal pain. No nausea, vomiting, or diarrhea  GENITOURINARY: No dysuria  NEUROLOGICAL: No HA  SKIN: No itching, burning, rashes, or lesions   MUSCULOSKELETAL: No joint pain or swelling; No muscle, back, or extremity pain    T(C): 35.9 (22 @ 14:29), Max: 36.3 (22 @ 20:59)  HR: 61 (22 @ 14:29) (61 - 79)  BP: 118/74 (22 @ 14:29) (99/64 - 118/74)  RR: 18 (22 @ 14:29) (18 - 19)  SpO2: 92% (22 @ 14:29) (92% - 95%)  Wt(kg): --Vital Signs Last 24 Hrs  T(C): 35.9 (2022 14:29), Max: 36.3 (2022 20:59)  T(F): 96.7 (2022 14:29), Max: 97.3 (2022 20:59)  HR: 61 (2022 14:29) (61 - 79)  BP: 118/74 (2022 14:29) (99/64 - 118/74)  BP(mean): --  RR: 18 (2022 14:29) (18 - 19)  SpO2: 92% (2022 14:29) (92% - 95%)    MEDICATIONS  (STANDING):  atorvastatin 10 milliGRAM(s) Oral at bedtime  baclofen 5 milliGRAM(s) Oral two times a day  clopidogrel Tablet 75 milliGRAM(s) Oral daily  dexAMETHasone     Tablet 6 milliGRAM(s) Oral daily  dextrose 40% Gel 15 Gram(s) Oral once  dextrose 5%. 1000 milliLiter(s) (50 mL/Hr) IV Continuous <Continuous>  dextrose 5%. 1000 milliLiter(s) (100 mL/Hr) IV Continuous <Continuous>  dextrose 50% Injectable 25 Gram(s) IV Push once  dextrose 50% Injectable 12.5 Gram(s) IV Push once  dextrose 50% Injectable 25 Gram(s) IV Push once  enoxaparin Injectable 100 milliGRAM(s) SubCutaneous two times a day  famotidine    Tablet 20 milliGRAM(s) Oral two times a day  furosemide    Tablet 40 milliGRAM(s) Oral daily  glucagon  Injectable 1 milliGRAM(s) IntraMuscular once  insulin lispro (ADMELOG) corrective regimen sliding scale   SubCutaneous three times a day before meals  metoprolol tartrate 25 milliGRAM(s) Oral two times a day  NIFEdipine XL 60 milliGRAM(s) Oral daily  polyethylene glycol 3350 17 Gram(s) Oral daily  pregabalin 100 milliGRAM(s) Oral three times a day  remdesivir  IVPB   IV Intermittent   remdesivir  IVPB 100 milliGRAM(s) IV Intermittent every 24 hours  senna 2 Tablet(s) Oral at bedtime  tamsulosin 0.4 milliGRAM(s) Oral at bedtime  thiamine 100 milliGRAM(s) Oral daily    MEDICATIONS  (PRN):  ALBUTerol    90 MICROgram(s) HFA Inhaler 2 Puff(s) Inhalation every 6 hours PRN Shortness of Breath  oxycodone    5 mG/acetaminophen 325 mG 1 Tablet(s) Oral every 4 hours PRN Moderate Pain (4 - 6)    PHYSICAL EXAM:  GENERAL: well-appearing, NAD  EYES: clear conjunctiva  ENMT: Moist mucous membranes  NECK: Supple, No JVD  CHEST/LUNG: +nasal canalClear to auscultation bilaterally; No rales, rhonchi, wheezing, or rubs  HEART: S1, S2, Regular rate and rhythm  ABDOMEN: Soft, Nontender, Nondistended; Bowel sounds present  NEURO: Alert & Oriented X3  EXTREMITIES: No LE edema, no calf tenderness  LYMPH: No lymphadenopathy noted  SKIN: No rashes or lesions    Consultant(s) Notes Reviewed:  [x ] YES  [ ] NO  Care Discussed with Consultants/Other Providers [ x] YES  [ ] NO    LABS:                        16.3   6.27  )-----------( 173      ( 2022 06:48 )             51.5     02-02    139  |  105  |  22<H>  ----------------------------<  114<H>  4.6   |  30  |  0.64    Ca    8.2<L>      2022 06:48    TPro  8.0  /  Alb  2.2<L>  /  TBili  0.2  /  DBili  x   /  AST  14  /  ALT  11  /  AlkPhos  68      PT/INR - ( 2022 05:53 )   PT: 16.7 sec;   INR: 1.43 ratio           CAPILLARY BLOOD GLUCOSE      POCT Blood Glucose.: 158 mg/dL (2022 12:16)  POCT Blood Glucose.: 105 mg/dL (2022 08:15)  POCT Blood Glucose.: 156 mg/dL (2022 21:08)        Urinalysis Basic - ( 2022 12:05 )    Color: Yellow / Appearance: Clear / S.020 / pH: x  Gluc: x / Ketone: Negative  / Bili: Negative / Urobili: Negative   Blood: x / Protein: 30 mg/dL / Nitrite: Negative   Leuk Esterase: Negative / RBC: 0-2 /HPF / WBC 0-2 /HPF   Sq Epi: x / Non Sq Epi: Occasional /HPF / Bacteria: x        RADIOLOGY & ADDITIONAL TESTS:    Imaging Personally Reviewed:  [ ] YES  [ ] NO   Patient is a 67y old  Male who presents with a chief complaint of Hypoxia (2022 15:15)    OVERNIGHT EVENTS: no acute changes    REVIEW OF SYSTEMS:  CONSTITUTIONAL: No fever, chills  NECK: No pain or stiffness  RESPIRATORY: No cough, SOB  CARDIOVASCULAR: No chest pain, palpitations  GASTROINTESTINAL: No abdominal pain. No nausea, vomiting, or diarrhea  GENITOURINARY: No dysuria  NEUROLOGICAL: No HA  SKIN: No itching, burning, rashes, or lesions   MUSCULOSKELETAL: No joint pain or swelling; No muscle, back, or extremity pain    T(C): 35.9 (22 @ 14:29), Max: 36.3 (22 @ 20:59)  HR: 61 (22 @ 14:29) (61 - 79)  BP: 118/74 (22 @ 14:29) (99/64 - 118/74)  RR: 18 (22 @ 14:29) (18 - 19)  SpO2: 92% (22 @ 14:29) (92% - 95%)  Wt(kg): --Vital Signs Last 24 Hrs  T(C): 35.9 (2022 14:29), Max: 36.3 (2022 20:59)  T(F): 96.7 (2022 14:29), Max: 97.3 (2022 20:59)  HR: 61 (2022 14:29) (61 - 79)  BP: 118/74 (2022 14:29) (99/64 - 118/74)  BP(mean): --  RR: 18 (2022 14:29) (18 - 19)  SpO2: 92% (2022 14:29) (92% - 95%)    MEDICATIONS  (STANDING):  atorvastatin 10 milliGRAM(s) Oral at bedtime  baclofen 5 milliGRAM(s) Oral two times a day  clopidogrel Tablet 75 milliGRAM(s) Oral daily  dexAMETHasone     Tablet 6 milliGRAM(s) Oral daily  dextrose 40% Gel 15 Gram(s) Oral once  dextrose 5%. 1000 milliLiter(s) (50 mL/Hr) IV Continuous <Continuous>  dextrose 5%. 1000 milliLiter(s) (100 mL/Hr) IV Continuous <Continuous>  dextrose 50% Injectable 25 Gram(s) IV Push once  dextrose 50% Injectable 12.5 Gram(s) IV Push once  dextrose 50% Injectable 25 Gram(s) IV Push once  enoxaparin Injectable 100 milliGRAM(s) SubCutaneous two times a day  famotidine    Tablet 20 milliGRAM(s) Oral two times a day  furosemide    Tablet 40 milliGRAM(s) Oral daily  glucagon  Injectable 1 milliGRAM(s) IntraMuscular once  insulin lispro (ADMELOG) corrective regimen sliding scale   SubCutaneous three times a day before meals  metoprolol tartrate 25 milliGRAM(s) Oral two times a day  NIFEdipine XL 60 milliGRAM(s) Oral daily  polyethylene glycol 3350 17 Gram(s) Oral daily  pregabalin 100 milliGRAM(s) Oral three times a day  remdesivir  IVPB   IV Intermittent   remdesivir  IVPB 100 milliGRAM(s) IV Intermittent every 24 hours  senna 2 Tablet(s) Oral at bedtime  tamsulosin 0.4 milliGRAM(s) Oral at bedtime  thiamine 100 milliGRAM(s) Oral daily    MEDICATIONS  (PRN):  ALBUTerol    90 MICROgram(s) HFA Inhaler 2 Puff(s) Inhalation every 6 hours PRN Shortness of Breath  oxycodone    5 mG/acetaminophen 325 mG 1 Tablet(s) Oral every 4 hours PRN Moderate Pain (4 - 6)    PHYSICAL EXAM:  GENERAL: well-appearing, NAD  EYES: clear conjunctiva  ENMT: Moist mucous membranes  NECK: Supple, No JVD  CHEST/LUNG: +nasal canal. Clear to auscultation bilaterally; No rales, rhonchi, wheezing, or rubs  HEART: S1, S2, Regular rate and rhythm  ABDOMEN: Soft, Nontender, Nondistended; Bowel sounds present  NEURO: Alert & Oriented X3  EXTREMITIES: No LE edema, no calf tenderness  SKIN: No rashes or lesions    Consultant(s) Notes Reviewed:  [x ] YES  [ ] NO  Care Discussed with Consultants/Other Providers [ x] YES  [ ] NO    LABS:                        16.3   6.27  )-----------( 173      ( 2022 06:48 )             51.5     02-02    139  |  105  |  22<H>  ----------------------------<  114<H>  4.6   |  30  |  0.64    Ca    8.2<L>      2022 06:48    TPro  8.0  /  Alb  2.2<L>  /  TBili  0.2  /  DBili  x   /  AST  14  /  ALT  11  /  AlkPhos  68      PT/INR - ( 2022 05:53 )   PT: 16.7 sec;   INR: 1.43 ratio      < from: Xray Chest 1 View- PORTABLE-Urgent (22 @ 15:21) >    ACC: [id number] EXAM:  XR CHEST PORTABLE URGENT 1V                          PROCEDURE DATE:  2022          INTERPRETATION:  AP semierect chest on 2022 at 3:15 PM.   Patient is short of breath with cough and fever.    COMPARISON: None available.    Heart magnified by technique.    Scattered mild infiltration in the right mid lower lung field and slight   left base infiltrate are seen. Covid pneumonia not excluded.    IMPRESSION: Bilateral infiltrates as above.    --- End of Report ---            FREDDY KIRK MD; Attending Radiologist  This document has been electronically signed. 2022  3:24PM    < end of copied text >       CAPILLARY BLOOD GLUCOSE  POCT Blood Glucose.: 158 mg/dL (2022 12:16)  POCT Blood Glucose.: 105 mg/dL (2022 08:15)  POCT Blood Glucose.: 156 mg/dL (2022 21:08)    Urinalysis Basic - ( 2022 12:05 )  Color: Yellow / Appearance: Clear / S.020 / pH: x  Gluc: x / Ketone: Negative  / Bili: Negative / Urobili: Negative   Blood: x / Protein: 30 mg/dL / Nitrite: Negative   Leuk Esterase: Negative / RBC: 0-2 /HPF / WBC 0-2 /HPF   Sq Epi: x / Non Sq Epi: Occasional /HPF / Bacteria: x    RADIOLOGY & ADDITIONAL TESTS:      Imaging Personally Reviewed:  [ ] YES  [ ] NO

## 2022-02-02 NOTE — ADVANCED PRACTICE NURSE CONSULT - RECOMMEDATIONS
-Clean all wounds with normal saline and apply skin prep to the surrounding skin  -Frequent toileting  -Apply a Foam dressing to the Coccyx area Q 72hrs PRN  -Elevate/float the patients heels using heel protectors and reposition the patient Q 2hrs using wedges or pillows

## 2022-02-03 ENCOUNTER — TRANSCRIPTION ENCOUNTER (OUTPATIENT)
Age: 67
End: 2022-02-03

## 2022-02-03 DIAGNOSIS — Z02.9 ENCOUNTER FOR ADMINISTRATIVE EXAMINATIONS, UNSPECIFIED: ICD-10-CM

## 2022-02-03 LAB
ALBUMIN SERPL ELPH-MCNC: 2.3 G/DL — LOW (ref 3.5–5)
ALP SERPL-CCNC: 55 U/L — SIGNIFICANT CHANGE UP (ref 40–120)
ALT FLD-CCNC: 16 U/L DA — SIGNIFICANT CHANGE UP (ref 10–60)
ANION GAP SERPL CALC-SCNC: 2 MMOL/L — LOW (ref 5–17)
APTT BLD: 41.4 SEC — HIGH (ref 27.5–35.5)
AST SERPL-CCNC: 17 U/L — SIGNIFICANT CHANGE UP (ref 10–40)
BILIRUB SERPL-MCNC: 0.3 MG/DL — SIGNIFICANT CHANGE UP (ref 0.2–1.2)
BUN SERPL-MCNC: 18 MG/DL — SIGNIFICANT CHANGE UP (ref 7–18)
CALCIUM SERPL-MCNC: 8.3 MG/DL — LOW (ref 8.4–10.5)
CHLORIDE SERPL-SCNC: 104 MMOL/L — SIGNIFICANT CHANGE UP (ref 96–108)
CO2 SERPL-SCNC: 32 MMOL/L — HIGH (ref 22–31)
CREAT SERPL-MCNC: 0.62 MG/DL — SIGNIFICANT CHANGE UP (ref 0.5–1.3)
D DIMER BLD IA.RAPID-MCNC: <150 NG/ML DDU — SIGNIFICANT CHANGE UP
GLUCOSE BLDC GLUCOMTR-MCNC: 104 MG/DL — HIGH (ref 70–99)
GLUCOSE BLDC GLUCOMTR-MCNC: 123 MG/DL — HIGH (ref 70–99)
GLUCOSE BLDC GLUCOMTR-MCNC: 140 MG/DL — HIGH (ref 70–99)
GLUCOSE BLDC GLUCOMTR-MCNC: 143 MG/DL — HIGH (ref 70–99)
GLUCOSE SERPL-MCNC: 108 MG/DL — HIGH (ref 70–99)
HCT VFR BLD CALC: 50.9 % — HIGH (ref 39–50)
HGB BLD-MCNC: 16.1 G/DL — SIGNIFICANT CHANGE UP (ref 13–17)
INR BLD: 1.22 RATIO — HIGH (ref 0.88–1.16)
MCHC RBC-ENTMCNC: 28.1 PG — SIGNIFICANT CHANGE UP (ref 27–34)
MCHC RBC-ENTMCNC: 31.6 GM/DL — LOW (ref 32–36)
MCV RBC AUTO: 89 FL — SIGNIFICANT CHANGE UP (ref 80–100)
NRBC # BLD: 0 /100 WBCS — SIGNIFICANT CHANGE UP (ref 0–0)
PLATELET # BLD AUTO: 185 K/UL — SIGNIFICANT CHANGE UP (ref 150–400)
POTASSIUM SERPL-MCNC: 4.1 MMOL/L — SIGNIFICANT CHANGE UP (ref 3.5–5.3)
POTASSIUM SERPL-SCNC: 4.1 MMOL/L — SIGNIFICANT CHANGE UP (ref 3.5–5.3)
PROT SERPL-MCNC: 7 G/DL — SIGNIFICANT CHANGE UP (ref 6–8.3)
PROTHROM AB SERPL-ACNC: 14.4 SEC — HIGH (ref 10.6–13.6)
RBC # BLD: 5.72 M/UL — SIGNIFICANT CHANGE UP (ref 4.2–5.8)
RBC # FLD: 14.7 % — HIGH (ref 10.3–14.5)
SODIUM SERPL-SCNC: 138 MMOL/L — SIGNIFICANT CHANGE UP (ref 135–145)
WBC # BLD: 6.29 K/UL — SIGNIFICANT CHANGE UP (ref 3.8–10.5)
WBC # FLD AUTO: 6.29 K/UL — SIGNIFICANT CHANGE UP (ref 3.8–10.5)

## 2022-02-03 RX ADMIN — Medication 100 MILLIGRAM(S): at 23:05

## 2022-02-03 RX ADMIN — OXYCODONE AND ACETAMINOPHEN 1 TABLET(S): 5; 325 TABLET ORAL at 23:05

## 2022-02-03 RX ADMIN — Medication 100 MILLIGRAM(S): at 12:02

## 2022-02-03 RX ADMIN — OXYCODONE AND ACETAMINOPHEN 1 TABLET(S): 5; 325 TABLET ORAL at 12:02

## 2022-02-03 RX ADMIN — OXYCODONE AND ACETAMINOPHEN 1 TABLET(S): 5; 325 TABLET ORAL at 06:41

## 2022-02-03 RX ADMIN — Medication 40 MILLIGRAM(S): at 06:42

## 2022-02-03 RX ADMIN — OXYCODONE AND ACETAMINOPHEN 1 TABLET(S): 5; 325 TABLET ORAL at 18:11

## 2022-02-03 RX ADMIN — SENNA PLUS 2 TABLET(S): 8.6 TABLET ORAL at 23:08

## 2022-02-03 RX ADMIN — Medication 5 MILLIGRAM(S): at 06:42

## 2022-02-03 RX ADMIN — Medication 5 MILLIGRAM(S): at 18:02

## 2022-02-03 RX ADMIN — FAMOTIDINE 20 MILLIGRAM(S): 10 INJECTION INTRAVENOUS at 06:42

## 2022-02-03 RX ADMIN — OXYCODONE AND ACETAMINOPHEN 1 TABLET(S): 5; 325 TABLET ORAL at 19:11

## 2022-02-03 RX ADMIN — TAMSULOSIN HYDROCHLORIDE 0.4 MILLIGRAM(S): 0.4 CAPSULE ORAL at 23:22

## 2022-02-03 RX ADMIN — POLYETHYLENE GLYCOL 3350 17 GRAM(S): 17 POWDER, FOR SOLUTION ORAL at 12:03

## 2022-02-03 RX ADMIN — OXYCODONE AND ACETAMINOPHEN 1 TABLET(S): 5; 325 TABLET ORAL at 04:39

## 2022-02-03 RX ADMIN — Medication 6 MILLIGRAM(S): at 06:42

## 2022-02-03 RX ADMIN — ENOXAPARIN SODIUM 100 MILLIGRAM(S): 100 INJECTION SUBCUTANEOUS at 18:38

## 2022-02-03 RX ADMIN — FAMOTIDINE 20 MILLIGRAM(S): 10 INJECTION INTRAVENOUS at 18:03

## 2022-02-03 RX ADMIN — Medication 100 MILLIGRAM(S): at 13:27

## 2022-02-03 RX ADMIN — REMDESIVIR 500 MILLIGRAM(S): 5 INJECTION INTRAVENOUS at 00:14

## 2022-02-03 RX ADMIN — Medication 100 MILLIGRAM(S): at 06:42

## 2022-02-03 RX ADMIN — OXYCODONE AND ACETAMINOPHEN 1 TABLET(S): 5; 325 TABLET ORAL at 13:02

## 2022-02-03 RX ADMIN — ATORVASTATIN CALCIUM 10 MILLIGRAM(S): 80 TABLET, FILM COATED ORAL at 23:23

## 2022-02-03 RX ADMIN — ENOXAPARIN SODIUM 100 MILLIGRAM(S): 100 INJECTION SUBCUTANEOUS at 06:43

## 2022-02-03 RX ADMIN — CLOPIDOGREL BISULFATE 75 MILLIGRAM(S): 75 TABLET, FILM COATED ORAL at 12:02

## 2022-02-03 NOTE — CONSULT NOTE ADULT - SUBJECTIVE AND OBJECTIVE BOX
PULMONARY CONSULT NOTE      IERNE MALHOTRA  MRN-[id number]    Patient is a 67y old  Male who presents with a chief complaint of Hypoxia (2022 16:38)  Hx chart lab and xrays reviewed  Pt examined,    HISTORY OF PRESENT ILLNESS: Pt is a 68 yo vaccinated M from [facility] with PMHx of DM, HTN, HLD, AF on eliquis, PVD presenting with hypoxia. Patient tested +ve for COVID three days ago, since then has experienced progressively worsening shortness of breath, productive cough, and fever. According to NH papers, patient was saturating 76% on RA. In the ED patient saturating 90% on 6L NC. Patient received 60mg of prednisone at NH. Patient also complains of chronic leg pain. He denies chest pain, N/V/D, headache, hemoptysis.    Home Medications:  atorvastatin 10 mg oral tablet: 1 tab(s) orally once a day (2022 18:57)  Atrovent 500 mcg/2.5 mL inhalation solution: 2.5 milliliter(s) inhaled 4 times a day, As Needed for SOB (2022 18:57)  baclofen 5 mg oral tablet: 1 tab(s) orally 2 times a day (2022 18:57)  clopidogrel 75 mg oral tablet: 1 tab(s) orally once a day (2022 18:57)  Eliquis 5 mg oral tablet: 1 tab(s) orally 2 times a day (2022 18:57)  Flomax 0.4 mg oral capsule: 1 cap(s) orally once a day (2022 18:57)  Hydrocort 2.5% topical cream: Apply topically to affected area 2 times a day (2022 18:57)  Lasix 40 mg oral tablet: 1 tab(s) orally once a day (2022 18:57)  Lyrica 100 mg oral capsule: 1 cap(s) orally 3 times a day (2022 18:57)  Metoprolol Tartrate 25 mg oral tablet: 1 tab(s) orally 2 times a day (2022 18:57)  MiraLax oral powder for reconstitution: 17 gram(s) orally once a day, As Needed (2022 18:57)  NIFEdipine 60 mg oral tablet, extended release: 1 tab(s) orally once a day (2022 18:57)  Pepcid 20 mg oral tablet: 1 tab(s) orally 2 times a day (2022 18:57)  Percocet 5/325 oral tablet: 1 tab(s) orally every 4 hours (2022 18:57)  Salonpas 0.025%-1.25% topical film: Apply topically to both legs once a day (at bedtime) (2022 18:57)  senna 8.6 mg oral tablet: 1 tab(s) orally once a day (at bedtime) (2022 18:57)  thiamine 100 mg oral tablet: 1 tab(s) orally once a day (2022 18:57)      MEDICATIONS  (STANDING):  atorvastatin 10 milliGRAM(s) Oral at bedtime  baclofen 5 milliGRAM(s) Oral two times a day  clopidogrel Tablet 75 milliGRAM(s) Oral daily  dexAMETHasone     Tablet 6 milliGRAM(s) Oral daily  glucagon  Injectable 1 milliGRAM(s) IntraMuscular once  insulin lispro (ADMELOG) corrective regimen sliding scale   SubCutaneous three times a day before meals  metoprolol tartrate 25 milliGRAM(s) Oral two times a day  NIFEdipine XL 60 milliGRAM(s) Oral daily  polyethylene glycol 3350 17 Gram(s) Oral daily  pregabalin 100 milliGRAM(s) Oral three times a day  remdesivir  IVPB   IV Intermittent   remdesivir  IVPB 100 milliGRAM(s) IV Intermittent every 24 hours  senna 2 Tablet(s) Oral at bedtime  tamsulosin 0.4 milliGRAM(s) Oral at bedtime  thiamine 100 milliGRAM(s) Oral daily      Allergies    morphine (Unknown)  penicillin (Unknown)  shellfish (Unknown)    Intolerances        PAST MEDICAL & SURGICAL HISTORY:  COPD (chronic obstructive pulmonary disease)  Diabetes  PVD (peripheral vascular disease)  Chronic atrial fibrillation  GERD (gastroesophageal reflux disease)  MDD (major depressive disorder)  BPH (benign prostatic hyperplasia)        FAMILY HISTORY:  HTN --   DM --  IHD--   Asthma--  COPD --    SOCIAL HISTORY SMOKING --   ETOH --    DRUGS--    REVIEW OF SYSTEMS:  CONSTITUTIONAL: No fever, weight loss, or fatigue   EYES: No eye pain, visual disturbances, or discharge  ENT:  No difficulty hearing, tinnitus, vertigo; No sinus or throat pain  NECK: No pain or stiffness   RESPIRATORY:  cough++   wheezing--   chills--   hemoptysis --   Shortness of Breath-  CARDIOVASCULAR: No chest pain, palpitations, passing out, dizziness, or leg swelling  GASTROINTESTINAL: No abdominal or epigastric pain. No nausea, vomiting,   GENITOURINARY: No dysuria, frequency, hematuria, or incontinence  SKIN: No itching, burning, rashes, or lesions   LYMPH Nodes: No enlarged glands  ENDOCRINE: No heat or cold intolerance; No hair loss  MUSCULOSKELETAL: No joint pain or swelling; No muscle, back, or extremity pain  ALLERGY AND IMMUNOLOGIC: No hives or eczema      Vital Signs Last 24 Hrs  T(C): 35.1 (2022 05:20), Max: 35.9 (2022 14:29)  T(F): 95.2 (2022 05:20), Max: 96.7 (2022 14:29)  HR: 50 (2022 09:00) (47 - 70)  BP: 130/76 (2022 05:20) (118/74 - 130/76)  BP(mean): --  RR: 18 (2022 09:00) (18 - 19)  SpO2: 93% (2022 09:00) (92% - 99%)  I&O's Detail    2022 07:01  -  2022 07:00  --------------------------------------------------------  IN:  Total IN: 0 mL    OUT:    Voided (mL): 900 mL  Total OUT: 900 mL    Total NET: -900 mL          PHYSICAL EXAMINATION:    GENERAL: The patient is a well-developed, well-nourished in no apparent distress.   SKIN: No rashes ecchymoses or cyanosis  HEENT: Head is normocephalic and atraumatic. Extraocular muscles are intact. Mucous membranes are moist.   Neck supple LN not felt, JVP not increased  Thyroid not enlarged  Lymphatic: No lymphadenopathy  Cardiovascular:  S1 S2  heard ,RSR , JVP not increased , systolic  murmur at apex, No  gallop or rub  Respiratory:  Symmetrical chest wall movements Breathing vesicular , Percussion note normal no dulness   with   rales at basis, Rt >> Lt, no  wheeze  ABDOMEN:  Soft, Non-tender, obese,  No  hepatosplenomegaly ,BS positive		  Extremities: Normal range of motion, No clubbing, cyanosis or edema , No calf tenderness  Vascular: Peripheral pulses palpable 1+ bilaterally  CNS: Alert and responsive, sleepy  Mood and affect appropriate  Cranial nerves intact  sensory intact  motor Power5/5,   DTR 2+  Babinski neg        LABS:                        16.1   6.29  )-----------( 185      ( 2022 06:17 )             50.9         138  |  104  |  18  ----------------------------<  108<H>  4.1   |  32<H>  |  0.62    Ca    8.3<L>      2022 06:17    TPro  7.0  /  Alb  2.3<L>  /  TBili  0.3  /  DBili  x   /  AST  17  /  ALT  16  /  AlkPhos  55      PT/INR - ( 2022 06:17 )   PT: 14.4 sec;   INR: 1.22 ratio         PTT - ( 2022 06:17 )  PTT:41.4 sec  Urinalysis Basic - ( 2022 12:05 )    Color: Yellow / Appearance: Clear / S.020 / pH: x  Gluc: x / Ketone: Negative  / Bili: Negative / Urobili: Negative   Blood: x / Protein: 30 mg/dL / Nitrite: Negative   Leuk Esterase: Negative / RBC: 0-2 /HPF / WBC 0-2 /HPF   Sq Epi: x / Non Sq Epi: Occasional /HPF / Bacteria: x              D-Dimer Assay, Quantitative: <150 ng/mL DDU (22 @ 06:17)    Serum Pro-Brain Natriuretic Peptide: 193 pg/mL (22 @ 15:50)      Procalcitonin, Serum: 0.04 ng/mL (22 @ 09:07)  Procalcitonin, Serum: 0.03 ng/mL (22 @ 05:19)  Procalcitonin, Serum: 0.05 ng/mL (22 @ 00:33)    TSH  MICROBIOLOGY:    Culture - Urine (collected 22 @ 18:35)  Source: Clean Catch Clean Catch (Midstream)  Final Report (22 @ 21:39):    No growth        RADIOLOGY & ADDITIONAL STUDIES:    CXR:< from: Xray Chest 1 View- PORTABLE-Urgent (22 @ 15:21) >  Heart magnified by technique.    Scattered mild infiltration in the right mid lower lung field and slight   left base infiltrate are seen. Covid pneumonia not excluded.          ekg; NSR, RAD,   PULMONARY CONSULT NOTE      IRENE MALHOTRA  MRN-[id number]    Patient is a 67y old  Male who presents with a chief complaint of Hypoxia (2022 16:38)  Hx chart lab and xrays reviewed  Pt examined,    HISTORY OF PRESENT ILLNESS: Pt is a 68 yo vaccinated M from  [facility] with PMHx of DM, HTN, HLD, AF on  eliquis, PVD presenting with hypoxia. Patient tested +ve for COVID three days ago, since then has experienced progressively worsening shortness of breath, productive cough, and fever. According to NH papers, patient was saturating 76% on RA. In the ED patient saturating 90% on 6L NC. Patient received 60mg of prednisone at NH. Patient also complains of chronic leg pain. He denies chest pain, N/V/D, headache, hemoptysis.        Home Medications:  atorvastatin 10 mg oral tablet: 1 tab(s) orally once a day (2022 18:57)  Atrovent 500 mcg/2.5 mL inhalation solution: 2.5 milliliter(s) inhaled 4 times a day, As Needed for SOB (2022 18:57)  baclofen 5 mg oral tablet: 1 tab(s) orally 2 times a day (2022 18:57)  clopidogrel 75 mg oral tablet: 1 tab(s) orally once a day (2022 18:57)  Eliquis 5 mg oral tablet: 1 tab(s) orally 2 times a day (2022 18:57)  Flomax 0.4 mg oral capsule: 1 cap(s) orally once a day (2022 18:57)  Hydrocort 2.5% topical cream: Apply topically to affected area 2 times a day (2022 18:57)  Lasix 40 mg oral tablet: 1 tab(s) orally once a day (2022 18:57)  Lyrica 100 mg oral capsule: 1 cap(s) orally 3 times a day (2022 18:57)  Metoprolol Tartrate 25 mg oral tablet: 1 tab(s) orally 2 times a day (2022 18:57)  MiraLax oral powder for reconstitution: 17 gram(s) orally once a day, As Needed (2022 18:57)  NIFEdipine 60 mg oral tablet, extended release: 1 tab(s) orally once a day (2022 18:57)  Pepcid 20 mg oral tablet: 1 tab(s) orally 2 times a day (2022 18:57)  Percocet 5/325 oral tablet: 1 tab(s) orally every 4 hours (2022 18:57)  Salonpas 0.025%-1.25% topical film: Apply topically to both legs once a day (at bedtime) (2022 18:57)  senna 8.6 mg oral tablet: 1 tab(s) orally once a day (at bedtime) (2022 18:57)  thiamine 100 mg oral tablet: 1 tab(s) orally once a day (2022 18:57)      MEDICATIONS  (STANDING):  atorvastatin 10 milliGRAM(s) Oral at bedtime  baclofen 5 milliGRAM(s) Oral two times a day  clopidogrel Tablet 75 milliGRAM(s) Oral daily  dexAMETHasone     Tablet 6 milliGRAM(s) Oral daily  glucagon  Injectable 1 milliGRAM(s) IntraMuscular once  insulin lispro (ADMELOG) corrective regimen sliding scale   SubCutaneous three times a day before meals  metoprolol tartrate 25 milliGRAM(s) Oral two times a day  NIFEdipine XL 60 milliGRAM(s) Oral daily  polyethylene glycol 3350 17 Gram(s) Oral daily  remdesivir  IVPB 100 milliGRAM(s) IV Intermittent every 24 hours  senna 2 Tablet(s) Oral at bedtime  tamsulosin 0.4 milliGRAM(s) Oral at bedtime  thiamine 100 milliGRAM(s) Oral daily      Allergies    morphine (Unknown)  penicillin (Unknown)  shellfish (Unknown)    Intolerances        PAST MEDICAL & SURGICAL HISTORY:  COPD (chronic obstructive pulmonary disease)  Diabetes  PVD (peripheral vascular disease)  Chronic atrial fibrillation  GERD (gastroesophageal reflux disease)  MDD (major depressive disorder)  BPH (benign prostatic hyperplasia)        FAMILY HISTORY:  HTN --   DM --  IHD--   Asthma--  COPD --    SOCIAL HISTORY SMOKING 40 pack years, now 3/day   ETOHsocial   DRUGS--    REVIEW OF SYSTEMS:  CONSTITUTIONAL: No fever, weight loss, or fatigue   EYES: No eye pain, visual disturbances, or discharge  ENT:  No difficulty hearing, tinnitus, vertigo; No sinus or throat pain  NECK: No pain or stiffness   RESPIRATORY:  cough++   wheezing--   chills--   hemoptysis --   Shortness of Breath--  CARDIOVASCULAR: No chest pain, palpitations, passing out, dizziness, or leg swelling  GASTROINTESTINAL: No abdominal or epigastric pain. No nausea, vomiting,   GENITOURINARY: No dysuria, frequency, hematuria, or incontinence  SKIN: No itching, burning, rashes, or lesions   LYMPH Nodes: No enlarged glands  ENDOCRINE: No heat or cold intolerance; No hair loss  MUSCULOSKELETAL: No joint pain or swelling; No muscle, back, or extremity pain  ALLERGY AND IMMUNOLOGIC: No hives or eczema      Vital Signs Last 24 Hrs  T(C): 35.1 (2022 05:20), Max: 35.9 (2022 14:29)  T(F): 95.2 (2022 05:20), Max: 96.7 (2022 14:29)  HR: 50 (2022 09:00) (47 - 70)  BP: 130/76 (2022 05:20) (118/74 - 130/76)  BP(mean): --  RR: 18 (2022 09:00) (18 - 19)  SpO2: 93% (2022 09:00) (92% - 99%)  I&O's Detail    2022 07:01  -  2022 07:00  --------------------------------------------------------  IN:  Total IN: 0 mL    OUT:    Voided (mL): 900 mL  Total OUT: 900 mL    Total NET: -900 mL          PHYSICAL EXAMINATION:    GENERAL: The patient is a well-developed, well-nourished in no apparent distress.   SKIN: No rashes ecchymoses or cyanosis  HEENT: Head is normocephalic and atraumatic. Extraocular muscles are intact. Mucous membranes are moist.   Neck supple LN not felt, JVP not increased  Thyroid not enlarged  Lymphatic: No lymphadenopathy  Cardiovascular:  S1 S2  heard ,RSR , JVP not increased , systolic  murmur at apex, No  gallop or rub  Respiratory:  Symmetrical chest wall movements Breathing vesicular , Percussion note normal no dulness   with   rales at basis, Rt >> Lt, no  wheeze  ABDOMEN:  Soft, Non-tender, obese,  No  hepatosplenomegaly ,BS positive		  Extremities: Normal range of motion, No clubbing, cyanosis or edema , No calf tenderness  Vascular: Peripheral pulses palpable 1+ bilaterally  CNS: Alert and responsive, sleepy  Mood and affect appropriate  Cranial nerves intact  sensory intact  motor Power5/5,   DTR 2+  Babinski neg        LABS:                        16.1   6.29  )-----------( 185      ( 2022 06:17 )             50.9         138  |  104  |  18  ----------------------------<  108<H>  4.1   |  32<H>  |  0.62    Ca    8.3<L>      2022 06:17    TPro  7.0  /  Alb  2.3<L>  /  TBili  0.3  /  DBili  x   /  AST  17  /  ALT  16  /  AlkPhos  55      PT/INR - ( 2022 06:17 )   PT: 14.4 sec;   INR: 1.22 ratio         PTT - ( 2022 06:17 )  PTT:41.4 sec  Urinalysis Basic - ( 2022 12:05 )    Color: Yellow / Appearance: Clear / S.020 / pH: x  Gluc: x / Ketone: Negative  / Bili: Negative / Urobili: Negative   Blood: x / Protein: 30 mg/dL / Nitrite: Negative   Leuk Esterase: Negative / RBC: 0-2 /HPF / WBC 0-2 /HPF   Sq Epi: x / Non Sq Epi: Occasional /HPF / Bacteria: x              D-Dimer Assay, Quantitative: <150 ng/mL DDU (22 @ 06:17)    Serum Pro-Brain Natriuretic Peptide: 193 pg/mL (22 @ 15:50)      Procalcitonin, Serum: 0.04 ng/mL (22 @ 09:07)  Procalcitonin, Serum: 0.03 ng/mL (22 @ 05:19)  Procalcitonin, Serum: 0.05 ng/mL (22 @ 00:33)      MICROBIOLOGY:    Culture - Urine (collected 22 @ 18:35)  Source: Clean Catch Clean Catch (Midstream)  Final Report (22 @ 21:39):   No growth        RADIOLOGY & ADDITIONAL STUDIES:    CXR:< from: Xray Chest 1 View- PORTABLE-Urgent (22 @ 15:21) >  Heart magnified by technique.    Scattered mild infiltration in the right mid lower lung field and slight   left base infiltrate are seen. Covid pneumonia not excluded.          ekg; NSR, RAD,

## 2022-02-03 NOTE — DISCHARGE NOTE PROVIDER - CONDITIONS AT DISCHARGE
Case has been discussed w/ Attending Dr. Sharma directing pt's care and medically cleared for discharge.

## 2022-02-03 NOTE — DISCHARGE NOTE PROVIDER - HOSPITAL COURSE
68 y/o male, vaccinated, from [facility] with PMHx of DM, HTN, HLD, AF on eliquis, PVD presenting with hypoxia. Pt initially tested positive on 1/28 and since then has experienced progressively worsening shortness of breath, productive cough, and fever. According to NH papers, patient was saturating 76% on RA. In the ED patient saturating 90% on 6L NC. CXr with bilat infiltrates. Admitted for acute hypoxic respiratory failure 2/2 covid pna, on remdesivir and decadron. Pulm following    -----updated 3/3/2022------------------- Patient is a 67 year old male, vaccinated, from [facility] with PMHx of DM, HTN, HLD, AF on eliquis, PVD. COVID positive since 1/28. Patient presented with hypoxia, experienced progressively worsening shortness of breath, productive cough, and fever. According to NH papers, patient was saturating 76% on RA. In the ED patient saturating 90% on 6L NC. CXray with bilat infiltrates. Patient admitted to medicine for acute hypoxic respiratory failure secondary to COVID Pneumonia, Patient started on Remdesivir( 1/31, 2/2-2/5) and dexamethasone( 1/31-2/10/2022). Pulmonology Dr Ocampo consulted. Recommended Outpatient PFT in 3 weeks.  Follow up X ray on 2/4/2022 revealed no acute changes. Patient with a history of Afib , on Eliquis at home , Eliquis held as patient on full dose of Lovenox.  On admission noted to be hyponatremic , Given 1 L fluid bolus in the ER. Hyponatremia resolved. c/o Urinary retention( Hx of BPH) , bladder scan showed 1000 cc, refused yuan , s/p straight cath, on Flomax.     Given patient's improved clinical status and current hemodynamic stability, decision was made to discharge.  Please refer to patient's complete medical chart with documents for a full hospital course, for this is only a brief summary.    INCOMPLETE   Patient is a 67 year old male, vaccinated, from [facility] with PMHx of DM, HTN, HLD, AF on eliquis, PVD. COVID positive since 1/28. Patient presented with hypoxia, experienced progressively worsening shortness of breath, productive cough, and fever. According to NH papers, patient was saturating 76% on RA. In the ED patient saturating 90% on 6L NC. CXray with bilat infiltrates. Patient admitted to medicine for acute hypoxic respiratory failure secondary to COVID Pneumonia, Patient started on Remdesivir( 1/31, 2/2-2/5) and dexamethasone( 1/31-2/10/2022). Pulmonology Dr Ocampo consulted. Recommended Outpatient PFT in 3 weeks.  Follow up X ray on 2/4/2022 revealed no acute changes. Patient with a history of Afib , on Eliquis at home , Eliquis held as patient on full dose of Lovenox.  On admission noted to be hyponatremic , Given 1 L fluid bolus in the ER. Hyponatremia resolved. c/o Urinary retention( Hx of BPH) , bladder scan showed 1000 cc, refused yuan , s/p straight cath, on Flomax. This  got resolved and since then voiding freely.    Given patient's improved clinical status and current hemodynamic stability, decision was made to discharge.  Please refer to patient's complete medical chart with documents for a full hospital course, for this is only a brief summary.    INCOMPLETE   Patient is a 67 year old male, vaccinated, from [facility] with PMHx of DM, HTN, HLD, AF on eliquis, PVD. COVID positive since 1/28. Patient presented with hypoxia, experienced progressively worsening shortness of breath, productive cough, and fever. According to NH papers, patient was saturating 76% on RA. In the ED patient saturating 90% on 6L NC. CXray with bilat infiltrates. Patient admitted to medicine for acute hypoxic respiratory failure secondary to COVID Pneumonia, Patient started on Remdesivir( 1/31, 2/2-2/5) and dexamethasone( 1/31-2/10/2022). Pulmonology Dr Ocampo consulted. Recommended Outpatient PFT in 3 weeks.  Follow up X ray on 2/4/2022 revealed no acute changes. Patient with a history of Afib , on Eliquis at home , Eliquis held as patient on full dose of Lovenox.  On admission noted to be hyponatremic , Given 1 L fluid bolus in the ER. Hyponatremia resolved. c/o Urinary retention( Hx of BPH) , bladder scan showed 1000 cc, refused yuan , s/p straight cath, on Flomax. This  got resolved and since then voiding freely.    Given patient's improved clinical status and current hemodynamic stability, decision was made to discharge.  Please refer to patient's complete medical chart with documents for a full hospital course, for this is only a brief summary.

## 2022-02-03 NOTE — PROGRESS NOTE ADULT - PROBLEM SELECTOR PLAN 4
Pt with chronic AF on eliquis and metoprolol, rated controlled  C/w metoprolol  ON full dose Lovenox

## 2022-02-03 NOTE — PROGRESS NOTE ADULT - PROBLEM SELECTOR PLAN 1
pt p/w initial covid + on 1/28 with sob and cough, vaccinated x3  Saturating 93% 2L now  CXR shows b/l infiltrates  C/w Dexamethasone 6mg x 10 days until 2/10  C/w Remdezivir x 5 days until 2/5  C/w full dose lovenox 100 mg BID  Dimmer less than 150  Repeat chest x-ray today  Pulm Dr Ocampo Consulted

## 2022-02-03 NOTE — DISCHARGE NOTE PROVIDER - NSDCMRMEDTOKEN_GEN_ALL_CORE_FT
atorvastatin 10 mg oral tablet: 1 tab(s) orally once a day  Atrovent 500 mcg/2.5 mL inhalation solution: 2.5 milliliter(s) inhaled 4 times a day, As Needed for SOB  baclofen 5 mg oral tablet: 1 tab(s) orally 2 times a day  clopidogrel 75 mg oral tablet: 1 tab(s) orally once a day  Eliquis 5 mg oral tablet: 1 tab(s) orally 2 times a day  Flomax 0.4 mg oral capsule: 1 cap(s) orally once a day  Hydrocort 2.5% topical cream: Apply topically to affected area 2 times a day  Lasix 40 mg oral tablet: 1 tab(s) orally once a day  Lyrica 100 mg oral capsule: 1 cap(s) orally 3 times a day  Metoprolol Tartrate 25 mg oral tablet: 1 tab(s) orally 2 times a day  MiraLax oral powder for reconstitution: 17 gram(s) orally once a day, As Needed  NIFEdipine 60 mg oral tablet, extended release: 1 tab(s) orally once a day  Pepcid 20 mg oral tablet: 1 tab(s) orally 2 times a day  Percocet 5/325 oral tablet: 1 tab(s) orally every 4 hours  Salonpas 0.025%-1.25% topical film: Apply topically to both legs once a day (at bedtime)  senna 8.6 mg oral tablet: 1 tab(s) orally once a day (at bedtime)  thiamine 100 mg oral tablet: 1 tab(s) orally once a day   albuterol 90 mcg/inh inhalation aerosol: 2 puff(s) inhaled every 6 hours, As needed, Shortness of Breath  atorvastatin 10 mg oral tablet: 1 tab(s) orally once a day  Atrovent 500 mcg/2.5 mL inhalation solution: 2.5 milliliter(s) inhaled 4 times a day, As Needed for SOB- Not administered in hospital 2/2 COVID status.  baclofen 5 mg oral tablet: 1 tab(s) orally 2 times a day  dexamethasone 6 mg oral tablet: 1 tab(s) orally once a day- to complete on 2/10/2022.   Eliquis 5 mg oral tablet: 1 tab(s) orally 2 times a day  Flomax 0.4 mg oral capsule: 1 cap(s) orally once a day  insulin lispro 100 units/mL injectable solution: injectable 3 times a day - Sliding scale   Lyrica 100 mg oral capsule: 1 cap(s) orally 3 times a day  Metoprolol Tartrate 25 mg oral tablet: 1 tab(s) orally 2 times a day  MiraLax oral powder for reconstitution: 17 gram(s) orally once a day, As Needed  NIFEdipine 60 mg oral tablet, extended release: 1 tab(s) orally once a day  Pepcid 20 mg oral tablet: 1 tab(s) orally 2 times a day  Percocet 5/325 oral tablet: 1 tab(s) orally every 4 hours  senna 8.6 mg oral tablet: 1 tab(s) orally once a day (at bedtime)  thiamine 100 mg oral tablet: 1 tab(s) orally once a day

## 2022-02-03 NOTE — DISCHARGE NOTE PROVIDER - CARE PROVIDER_API CALL
Nasir Sharma)  Cardiology  [address] [place] [address]  Phone: [phone]  Fax: [phone]  Follow Up Time: 1 week   Nasir Sharma)  Cardiology  [address] [place] [address]  Phone: [phone]  Fax: [phone]  Follow Up Time: 1 week    Flavio Ocampo)  Internal Medicine; Pulmonary Disease  [address] [place] [address]  Phone: [phone]  Fax: [phone]  Follow Up Time: 1 week

## 2022-02-03 NOTE — DISCHARGE NOTE PROVIDER - PROVIDER TOKENS
PROVIDER:[TOKEN:[[id number]],FOLLOWUP:[1 week]] PROVIDER:[TOKEN:[[id number]],FOLLOWUP:[1 week]],PROVIDER:[TOKEN:[[id number]],FOLLOWUP:[1 week]]

## 2022-02-03 NOTE — CONSULT NOTE ADULT - ASSESSMENT
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

## 2022-02-03 NOTE — CONSULT NOTE ADULT - TIME BILLING
I have examined pt personally Hx chart lab and xrays reviewed and pt discussed with NP and Dr Sharma

## 2022-02-03 NOTE — PROGRESS NOTE ADULT - SUBJECTIVE AND OBJECTIVE BOX
Patient is a 67y old  Male who presents with a chief complaint of Hypoxia (2022 16:38)      INTERVAL HPI/OVERNIGHT EVENTS: NO acute events overnight    REVIEW OF SYSTEMS:  CONSTITUTIONAL: No fever, chills  ENMT:  No difficulty hearing, no change in vision  NECK: No pain or stiffness  RESPIRATORY: No cough, SOB  CARDIOVASCULAR: No chest pain, palpitations  GASTROINTESTINAL: No abdominal pain. No nausea, vomiting, or diarrhea  GENITOURINARY: No dysuria  NEUROLOGICAL: No HA  SKIN: No itching, burning, rashes, or lesions   LYMPH NODES: No enlarged glands  ENDOCRINE: No heat or cold intolerance; No hair loss  MUSCULOSKELETAL: No joint pain or swelling; No muscle, back, or extremity pain  PSYCHIATRIC: No depression, anxiety  HEME/LYMPH: No easy bruising, or bleeding gums    T(C): 35.1 (22 @ 05:20), Max: 35.9 (22 @ 14:29)  HR: 50 (22 @ 09:00) (47 - 70)  BP: 130/76 (22 @ 05:20) (118/74 - 130/76)  RR: 18 (22 @ 09:00) (18 - 19)  SpO2: 93% (22 @ 09:00) (92% - 99%)  Wt(kg): --Vital Signs Last 24 Hrs  T(C): 35.1 (2022 05:20), Max: 35.9 (2022 14:29)  T(F): 95.2 (2022 05:20), Max: 96.7 (2022 14:29)  HR: 50 (2022 09:00) (47 - 70)  BP: 130/76 (2022 05:20) (118/74 - 130/76)  BP(mean): --  RR: 18 (2022 09:00) (18 - 19)  SpO2: 93% (2022 09:00) (92% - 99%)    PHYSICAL EXAM:  GENERAL: NAD  EYES: clear conjunctiva; EOMI  ENMT: Moist mucous membranes  NECK: Supple, No JVD, Normal thyroid  CHEST/LUNG: diminished breath sounds  bilat  HEART: S1, S2, Regular rate  ABDOMEN: Soft, Nontender, Nondistended; Bowel sounds present  NEURO: Alert & Oriented X3  EXTREMITIES: No LE edema, no calf tenderness  SKIN: No rashes or lesions    Consultant(s) Notes Reviewed:  [x ] YES  [ ] NO  Care Discussed with Consultants/Other Providers [ x] YES  [ ] NO    LABS:                        16.1   6.29  )-----------( 185      ( 2022 06:17 )             50.9     -    138  |  104  |  18  ----------------------------<  108<H>  4.1   |  32<H>  |  0.62    Ca    8.3<L>      2022 06:17    TPro  7.0  /  Alb  2.3<L>  /  TBili  0.3  /  DBili  x   /  AST  17  /  ALT  16  /  AlkPhos  55  02-03    PT/INR - ( 2022 06:17 )   PT: 14.4 sec;   INR: 1.22 ratio         PTT - ( 2022 06:17 )  PTT:41.4 sec  CAPILLARY BLOOD GLUCOSE      POCT Blood Glucose.: 104 mg/dL (2022 07:36)  POCT Blood Glucose.: 201 mg/dL (2022 21:19)  POCT Blood Glucose.: 208 mg/dL (2022 17:38)  POCT Blood Glucose.: 158 mg/dL (2022 12:16)        Urinalysis Basic - ( 2022 12:05 )    Color: Yellow / Appearance: Clear / S.020 / pH: x  Gluc: x / Ketone: Negative  / Bili: Negative / Urobili: Negative   Blood: x / Protein: 30 mg/dL / Nitrite: Negative   Leuk Esterase: Negative / RBC: 0-2 /HPF / WBC 0-2 /HPF   Sq Epi: x / Non Sq Epi: Occasional /HPF / Bacteria: x        RADIOLOGY & ADDITIONAL TESTS:    Imaging Personally Reviewed:  [x] YES  [ ] NO  < from: Xray Chest 1 View- PORTABLE-Urgent (22 @ 15:21) >    ACC: [id number] EXAM:  XR CHEST PORTABLE URGENT 1V                          PROCEDURE DATE:  2022          INTERPRETATION:  AP semierect chest on 2022 at 3:15 PM.   Patient is short of breath with cough and fever.    COMPARISON: None available.    Heart magnified by technique.    Scattered mild infiltration in the right mid lower lung field and slight   left base infiltrate are seen. Covid pneumonia not excluded.    IMPRESSION: Bilateral infiltrates as above.    --- End of Report ---            FREDDY KIRK MD; Attending Radiologist  This document has been electronically signed. 2022  3:24PM    < end of copied text >

## 2022-02-03 NOTE — DISCHARGE NOTE PROVIDER - NSDCCPCAREPLAN_GEN_ALL_CORE_FT
PRINCIPAL DISCHARGE DIAGNOSIS  Diagnosis: Acute respiratory failure with hypoxia  Assessment and Plan of Treatment: You were found to have pneumonia due to Covid 19. You required oxygen. You were treated with remdesivir and dexamethisone.  Please maintain quarentine for 14 days. Seek immediate medical attention if you develop shortness of breath. See additional instructions below.        SECONDARY DISCHARGE DIAGNOSES  Diagnosis: Pneumonia due to COVID-19 virus  Assessment and Plan of Treatment: CORONAVIRUS INSTRUCTIONS:   Based on your current clinical status and stability, it has been determined that you no longer need hospitalization and can recover while remaining in self-quarantine at home. You should follow the prevention steps below until a healthcare provider or local or state health department says you can return to your normal activities.   1. You should restrict activities outside your home, except for getting medical care.   2. Do not go to work, school, or public areas.   3. Avoid using public transportation, ride-sharing, or taxis.   4. Separate yourself from other people and animals in your home as much as possible.  When you are around other people (e.g., sharing a room or vehicle) you should wear a facemask.  5. Wash your hands often with soap and water for at least 20 seconds, especially after blowing your nose, coughing, or sneezing; going to the bathroom; and before eating or preparing food.  6. Cover your mouth and nose with a tissue when you cough or sneeze. Throw used tissues in a lined trash can. Immediately wash your hands with soap and water for at least 20 seconds  7. High touch surfaces include counters, tabletops, doorknobs, bathroom fixtures, toilets, phones, keyboards, tablets, and bedside tables.  8. Avoid sharing dishes, drinking glasses, cups, eating utensils, towels, or bedding with other people or pets in your home. After using these items, they should be washed thoroughly with soap and water.  You are strongly advised to seek prompt medical attention if your illness worsens or you develop new symptoms like fever or difficulty breathing.   Please call  [phone] to speak with your discharging physician if you have any questions.      Diagnosis: Chronic atrial fibrillation  Assessment and Plan of Treatment: Atrial fibrillation is the most common heart rhythm problem & has the risk of stroke & heart attack  It helps if you control your blood pressure, not drink more than 1-2 alcohol drinks per day, cut down on caffeine, getting treatment for over active thyroid gland, & getting exercise  Call your doctor if you feel your heart racing or beating unusually, chest tightness or pain, lightheaded, faint, shortness of breath especially with exercise  It is important to take your heart medication as prescribed  You may be on anticoagulation which is very important to take as directed - you may need blood work to monitor drug levels      Diagnosis: COPD (chronic obstructive pulmonary disease)  Assessment and Plan of Treatment: Call your Health Care provider upon arrival home to make a follow up appointment within one week.  Take all inhalers as prescribed by your Health Care Provider.  Take steroids as prescribed by your Health Care Provider.  If your cough increases infrequency and severity and/or you have shortness of breath or increased shortness of breath call your Health Care Provider.  If you develop fever, chills, night sweats, malaise, and/or change in mental status call your Health care Provider.  Nutrition is very important.  Eat small frequent meals.  Increase your activity as tolerated.  Do not stay in bed all day      Diagnosis: Acute respiratory failure with hypoxia  Assessment and Plan of Treatment:      PRINCIPAL DISCHARGE DIAGNOSIS  Diagnosis: Acute respiratory failure with hypoxia  Assessment and Plan of Treatment: You were found to have pneumonia due to Covid 19. You required oxygen. You were treated with remdesivir and dexamethasone.  Your chest X ray showed bilateral infiltrates . You required oxygen supplementation via nasal canula, you were able to be weaned off successfully. Dr Ocampo from pulmonology was consulted. You are recommended to do a PFT as an outpatient in 3 weeks ....        SECONDARY DISCHARGE DIAGNOSES  Diagnosis: Pneumonia due to COVID-19 virus  Assessment and Plan of Treatment: CORONAVIRUS INSTRUCTIONS:   Based on your current clinical status and stability, it has been determined that you no longer need hospitalization and can recover while remaining in self-quarantine at home. You should follow the prevention steps below until a healthcare provider or local or state health department says you can return to your normal activities.   1. You should restrict activities outside your home, except for getting medical care.   2. Do not go to work, school, or public areas.   3. Avoid using public transportation, ride-sharing, or taxis.   4. Separate yourself from other people and animals in your home as much as possible.  When you are around other people (e.g., sharing a room or vehicle) you should wear a facemask.  5. Wash your hands often with soap and water for at least 20 seconds, especially after blowing your nose, coughing, or sneezing; going to the bathroom; and before eating or preparing food.  6. Cover your mouth and nose with a tissue when you cough or sneeze. Throw used tissues in a lined trash can. Immediately wash your hands with soap and water for at least 20 seconds  7. High touch surfaces include counters, tabletops, doorknobs, bathroom fixtures, toilets, phones, keyboards, tablets, and bedside tables.  8. Avoid sharing dishes, drinking glasses, cups, eating utensils, towels, or bedding with other people or pets in your home. After using these items, they should be washed thoroughly with soap and water.  You are strongly advised to seek prompt medical attention if your illness worsens or you develop new symptoms like fever or difficulty breathing.   Please call   to speak with your discharging physician if you have any questions.      Diagnosis: Acute respiratory failure with hypoxia  Assessment and Plan of Treatment:     Diagnosis: COPD (chronic obstructive pulmonary disease)  Assessment and Plan of Treatment: Call your Health Care provider upon arrival home to make a follow up appointment within one week.  Take all inhalers as prescribed by your Health Care Provider.  Take steroids as prescribed by your Health Care Provider.  If your cough increases infrequency and severity and/or you have shortness of breath or increased shortness of breath call your Health Care Provider.  If you develop fever, chills, night sweats, malaise, and/or change in mental status call your Health care Provider.  Nutrition is very important.  Eat small frequent meals.  Increase your activity as tolerated.  Do not stay in bed all day      Diagnosis: Chronic atrial fibrillation  Assessment and Plan of Treatment: When you were in the hospital Elikaceyis was held as you were being treated with full dose of Lovenox  Atrial fibrillation is the most common heart rhythm problem & has the risk of stroke & heart attack  It helps if you control your blood pressure, not drink more than 1-2 alcohol drinks per day, cut down on caffeine, getting treatment for over active thyroid gland, & getting exercise  Call your doctor if you feel your heart racing or beating unusually, chest tightness or pain, lightheaded, faint, shortness of breath especially with exercise  It is important to take your heart medication as prescribed  You may be on anticoagulation which is very important to take as directed - you may need blood work to monitor drug levels      Diagnosis: BPH (benign prostatic hyperplasia)  Assessment and Plan of Treatment: You had complaints of unable to urinate. A bladder scan showed you were retaining >1000 ml of urine , you refused yuan, you were straight cath'd and continued Flomax medication   You have an enlarged prostate gland which gets bigger as men get older - it is a very common problem and has nothing to do with prostate cancer  Call your doctor if you are urinating more frequently, have trouble starting to urinate, have weak stream, urine leaking or dribbling, and feeling as though bladder is not empty after urination  Your doctor will monitor your prostate with a rectal exam as well as urine or blood testing  You can help yourself by reducing the amount of fluid you drink before going to bed, limiting the amount of alcohol & caffeine you drink   Avoid cold & allergy medication that contain decongestants or antihistamines which make BPH symptoms worse  You can also "double void" by waiting a moment after urinating & trying again  Take your medication as prescribed - one medication helps to relax the muscle around the urethra and the other medication you may take prevents the prostate from growing more or even shrinking the prostate       PRINCIPAL DISCHARGE DIAGNOSIS  Diagnosis: Acute respiratory failure with hypoxia  Assessment and Plan of Treatment: You were found to have pneumonia due to Covid 19. You required oxygen. You were treated with remdesivir and dexamethasone.  Your chest X ray showed bilateral infiltrates . You required oxygen supplementation via nasal canula, you were able to be weaned off successfully. Dr Ocampo from pulmonology was consulted. You are recommended to do a PFT as an outpatient in 3 weeks ....        SECONDARY DISCHARGE DIAGNOSES  Diagnosis: Acute respiratory failure with hypoxia  Assessment and Plan of Treatment:     Diagnosis: Pneumonia due to COVID-19 virus  Assessment and Plan of Treatment: CORONAVIRUS INSTRUCTIONS:   Based on your current clinical status and stability, it has been determined that you no longer need hospitalization and can recover while remaining in self-quarantine at home. You should follow the prevention steps below until a healthcare provider or local or state health department says you can return to your normal activities.   1. You should restrict activities outside your home, except for getting medical care.   2. Do not go to work, school, or public areas.   3. Avoid using public transportation, ride-sharing, or taxis.   4. Separate yourself from other people and animals in your home as much as possible.  When you are around other people (e.g., sharing a room or vehicle) you should wear a facemask.  5. Wash your hands often with soap and water for at least 20 seconds, especially after blowing your nose, coughing, or sneezing; going to the bathroom; and before eating or preparing food.  6. Cover your mouth and nose with a tissue when you cough or sneeze. Throw used tissues in a lined trash can. Immediately wash your hands with soap and water for at least 20 seconds  7. High touch surfaces include counters, tabletops, doorknobs, bathroom fixtures, toilets, phones, keyboards, tablets, and bedside tables.  8. Avoid sharing dishes, drinking glasses, cups, eating utensils, towels, or bedding with other people or pets in your home. After using these items, they should be washed thoroughly with soap and water.  You are strongly advised to seek prompt medical attention if your illness worsens or you develop new symptoms like fever or difficulty breathing.   Please call  [phone] to speak with your discharging physician if you have any questions.      Diagnosis: COPD (chronic obstructive pulmonary disease)  Assessment and Plan of Treatment: Call your Health Care provider upon arrival home to make a follow up appointment within one week.  Take all inhalers as prescribed by your Health Care Provider.  Take steroids as prescribed by your Health Care Provider.  If your cough increases infrequency and severity and/or you have shortness of breath or increased shortness of breath call your Health Care Provider.  If you develop fever, chills, night sweats, malaise, and/or change in mental status call your Health care Provider.  Nutrition is very important.  Eat small frequent meals.  Increase your activity as tolerated.  Do not stay in bed all day      Diagnosis: Chronic atrial fibrillation  Assessment and Plan of Treatment: Continue taking your Eliquis as instructed and report any changes in your condition suggesting of bleeding.  Report symptoms such as black or bloody stool, bloody nose or bloody urine, headaches with nausea or vomiting, visual changes.  Atrial fibrillation is the most common heart rhythm problem & has the risk of stroke & heart attack  It helps if you control your blood pressure, not drink more than 1-2 alcohol drinks per day, cut down on caffeine, getting treatment for over active thyroid gland, & getting exercise  Call your doctor if you feel your heart racing or beating unusually, chest tightness or pain, lightheaded, faint, shortness of breath especially with exercise  It is important to take your heart medication as prescribed      Diagnosis: BPH (benign prostatic hyperplasia)  Assessment and Plan of Treatment: You had complaints of unable to urinate. A bladder scan showed you were retaining >1000 ml of urine , you refused yuan, you were straight cath'd and continued Flomax medication   You have an enlarged prostate gland which gets bigger as men get older - it is a very common problem and has nothing to do with prostate cancer  Call your doctor if you are urinating more frequently, have trouble starting to urinate, have weak stream, urine leaking or dribbling, and feeling as though bladder is not empty after urination  Your doctor will monitor your prostate with a rectal exam as well as urine or blood testing  You can help yourself by reducing the amount of fluid you drink before going to bed, limiting the amount of alcohol & caffeine you drink   Avoid cold & allergy medication that contain decongestants or antihistamines which make BPH symptoms worse  You can also "double void" by waiting a moment after urinating & trying again  Take your medication as prescribed - one medication helps to relax the muscle around the urethra and the other medication you may take prevents the prostate from growing more or even shrinking the prostate

## 2022-02-04 LAB
ALBUMIN SERPL ELPH-MCNC: 2.4 G/DL — LOW (ref 3.5–5)
ALP SERPL-CCNC: 54 U/L — SIGNIFICANT CHANGE UP (ref 40–120)
ALT FLD-CCNC: 36 U/L DA — SIGNIFICANT CHANGE UP (ref 10–60)
ANION GAP SERPL CALC-SCNC: 5 MMOL/L — SIGNIFICANT CHANGE UP (ref 5–17)
AST SERPL-CCNC: 32 U/L — SIGNIFICANT CHANGE UP (ref 10–40)
BILIRUB SERPL-MCNC: 0.4 MG/DL — SIGNIFICANT CHANGE UP (ref 0.2–1.2)
BUN SERPL-MCNC: 20 MG/DL — HIGH (ref 7–18)
CALCIUM SERPL-MCNC: 8.3 MG/DL — LOW (ref 8.4–10.5)
CHLORIDE SERPL-SCNC: 103 MMOL/L — SIGNIFICANT CHANGE UP (ref 96–108)
CO2 SERPL-SCNC: 30 MMOL/L — SIGNIFICANT CHANGE UP (ref 22–31)
CREAT SERPL-MCNC: 0.66 MG/DL — SIGNIFICANT CHANGE UP (ref 0.5–1.3)
D DIMER BLD IA.RAPID-MCNC: <150 NG/ML DDU — SIGNIFICANT CHANGE UP
GLUCOSE BLDC GLUCOMTR-MCNC: 111 MG/DL — HIGH (ref 70–99)
GLUCOSE BLDC GLUCOMTR-MCNC: 111 MG/DL — HIGH (ref 70–99)
GLUCOSE BLDC GLUCOMTR-MCNC: 126 MG/DL — HIGH (ref 70–99)
GLUCOSE BLDC GLUCOMTR-MCNC: 154 MG/DL — HIGH (ref 70–99)
GLUCOSE BLDC GLUCOMTR-MCNC: 191 MG/DL — HIGH (ref 70–99)
GLUCOSE SERPL-MCNC: 100 MG/DL — HIGH (ref 70–99)
HCT VFR BLD CALC: 50 % — SIGNIFICANT CHANGE UP (ref 39–50)
HGB BLD-MCNC: 16.2 G/DL — SIGNIFICANT CHANGE UP (ref 13–17)
MCHC RBC-ENTMCNC: 28.4 PG — SIGNIFICANT CHANGE UP (ref 27–34)
MCHC RBC-ENTMCNC: 32.4 GM/DL — SIGNIFICANT CHANGE UP (ref 32–36)
MCV RBC AUTO: 87.6 FL — SIGNIFICANT CHANGE UP (ref 80–100)
NRBC # BLD: 0 /100 WBCS — SIGNIFICANT CHANGE UP (ref 0–0)
PLATELET # BLD AUTO: 170 K/UL — SIGNIFICANT CHANGE UP (ref 150–400)
POTASSIUM SERPL-MCNC: 4.3 MMOL/L — SIGNIFICANT CHANGE UP (ref 3.5–5.3)
POTASSIUM SERPL-SCNC: 4.3 MMOL/L — SIGNIFICANT CHANGE UP (ref 3.5–5.3)
PROT SERPL-MCNC: 7.1 G/DL — SIGNIFICANT CHANGE UP (ref 6–8.3)
RBC # BLD: 5.71 M/UL — SIGNIFICANT CHANGE UP (ref 4.2–5.8)
RBC # FLD: 14.9 % — HIGH (ref 10.3–14.5)
SODIUM SERPL-SCNC: 138 MMOL/L — SIGNIFICANT CHANGE UP (ref 135–145)
WBC # BLD: 5.05 K/UL — SIGNIFICANT CHANGE UP (ref 3.8–10.5)
WBC # FLD AUTO: 5.05 K/UL — SIGNIFICANT CHANGE UP (ref 3.8–10.5)

## 2022-02-04 PROCEDURE — 71045 X-RAY EXAM CHEST 1 VIEW: CPT | Mod: 26

## 2022-02-04 RX ORDER — ACETAMINOPHEN 500 MG
650 TABLET ORAL EVERY 6 HOURS
Refills: 0 | Status: DISCONTINUED | OUTPATIENT
Start: 2022-02-04 | End: 2022-02-07

## 2022-02-04 RX ADMIN — OXYCODONE AND ACETAMINOPHEN 1 TABLET(S): 5; 325 TABLET ORAL at 04:34

## 2022-02-04 RX ADMIN — Medication 5 MILLIGRAM(S): at 06:07

## 2022-02-04 RX ADMIN — CLOPIDOGREL BISULFATE 75 MILLIGRAM(S): 75 TABLET, FILM COATED ORAL at 12:31

## 2022-02-04 RX ADMIN — OXYCODONE AND ACETAMINOPHEN 1 TABLET(S): 5; 325 TABLET ORAL at 17:53

## 2022-02-04 RX ADMIN — FAMOTIDINE 20 MILLIGRAM(S): 10 INJECTION INTRAVENOUS at 06:08

## 2022-02-04 RX ADMIN — ENOXAPARIN SODIUM 100 MILLIGRAM(S): 100 INJECTION SUBCUTANEOUS at 06:09

## 2022-02-04 RX ADMIN — OXYCODONE AND ACETAMINOPHEN 1 TABLET(S): 5; 325 TABLET ORAL at 00:46

## 2022-02-04 RX ADMIN — FAMOTIDINE 20 MILLIGRAM(S): 10 INJECTION INTRAVENOUS at 17:39

## 2022-02-04 RX ADMIN — Medication 6 MILLIGRAM(S): at 06:29

## 2022-02-04 RX ADMIN — OXYCODONE AND ACETAMINOPHEN 1 TABLET(S): 5; 325 TABLET ORAL at 10:07

## 2022-02-04 RX ADMIN — SENNA PLUS 2 TABLET(S): 8.6 TABLET ORAL at 22:19

## 2022-02-04 RX ADMIN — OXYCODONE AND ACETAMINOPHEN 1 TABLET(S): 5; 325 TABLET ORAL at 22:19

## 2022-02-04 RX ADMIN — Medication 1: at 12:03

## 2022-02-04 RX ADMIN — Medication 100 MILLIGRAM(S): at 12:31

## 2022-02-04 RX ADMIN — Medication 25 MILLIGRAM(S): at 17:34

## 2022-02-04 RX ADMIN — OXYCODONE AND ACETAMINOPHEN 1 TABLET(S): 5; 325 TABLET ORAL at 05:29

## 2022-02-04 RX ADMIN — REMDESIVIR 500 MILLIGRAM(S): 5 INJECTION INTRAVENOUS at 00:47

## 2022-02-04 RX ADMIN — OXYCODONE AND ACETAMINOPHEN 1 TABLET(S): 5; 325 TABLET ORAL at 22:56

## 2022-02-04 RX ADMIN — Medication 5 MILLIGRAM(S): at 17:39

## 2022-02-04 RX ADMIN — Medication 100 MILLIGRAM(S): at 13:34

## 2022-02-04 RX ADMIN — Medication 100 MILLIGRAM(S): at 22:19

## 2022-02-04 RX ADMIN — Medication 40 MILLIGRAM(S): at 06:08

## 2022-02-04 RX ADMIN — Medication 100 MILLIGRAM(S): at 06:08

## 2022-02-04 RX ADMIN — ENOXAPARIN SODIUM 100 MILLIGRAM(S): 100 INJECTION SUBCUTANEOUS at 17:33

## 2022-02-04 RX ADMIN — OXYCODONE AND ACETAMINOPHEN 1 TABLET(S): 5; 325 TABLET ORAL at 18:31

## 2022-02-04 RX ADMIN — ATORVASTATIN CALCIUM 10 MILLIGRAM(S): 80 TABLET, FILM COATED ORAL at 22:19

## 2022-02-04 RX ADMIN — TAMSULOSIN HYDROCHLORIDE 0.4 MILLIGRAM(S): 0.4 CAPSULE ORAL at 22:19

## 2022-02-04 RX ADMIN — OXYCODONE AND ACETAMINOPHEN 1 TABLET(S): 5; 325 TABLET ORAL at 11:08

## 2022-02-04 RX ADMIN — POLYETHYLENE GLYCOL 3350 17 GRAM(S): 17 POWDER, FOR SOLUTION ORAL at 12:31

## 2022-02-04 NOTE — PROGRESS NOTE ADULT - SUBJECTIVE AND OBJECTIVE BOX
NP Note discussed with  Primary Attending    Patient is a 67y old  Male who presents with a chief complaint of Hypoxia (04 Feb 2022 13:31)      INTERVAL HPI/OVERNIGHT EVENTS: Patient seen and examined at bedside.     MEDICATIONS  (STANDING):  atorvastatin 10 milliGRAM(s) Oral at bedtime  baclofen 5 milliGRAM(s) Oral two times a day  clopidogrel Tablet 75 milliGRAM(s) Oral daily  dexAMETHasone     Tablet 6 milliGRAM(s) Oral daily  dextrose 40% Gel 15 Gram(s) Oral once  dextrose 5%. 1000 milliLiter(s) (50 mL/Hr) IV Continuous <Continuous>  dextrose 5%. 1000 milliLiter(s) (100 mL/Hr) IV Continuous <Continuous>  dextrose 50% Injectable 25 Gram(s) IV Push once  dextrose 50% Injectable 12.5 Gram(s) IV Push once  dextrose 50% Injectable 25 Gram(s) IV Push once  enoxaparin Injectable 100 milliGRAM(s) SubCutaneous two times a day  famotidine    Tablet 20 milliGRAM(s) Oral two times a day  glucagon  Injectable 1 milliGRAM(s) IntraMuscular once  insulin lispro (ADMELOG) corrective regimen sliding scale   SubCutaneous three times a day before meals  metoprolol tartrate 25 milliGRAM(s) Oral two times a day  NIFEdipine XL 60 milliGRAM(s) Oral daily  polyethylene glycol 3350 17 Gram(s) Oral daily  pregabalin 100 milliGRAM(s) Oral three times a day  remdesivir  IVPB   IV Intermittent   remdesivir  IVPB 100 milliGRAM(s) IV Intermittent every 24 hours  senna 2 Tablet(s) Oral at bedtime  tamsulosin 0.4 milliGRAM(s) Oral at bedtime  thiamine 100 milliGRAM(s) Oral daily    MEDICATIONS  (PRN):  acetaminophen     Tablet .. 650 milliGRAM(s) Oral every 6 hours PRN Temp greater or equal to 38C (100.4F), Mild Pain (1 - 3)  ALBUTerol    90 MICROgram(s) HFA Inhaler 2 Puff(s) Inhalation every 6 hours PRN Shortness of Breath  oxycodone    5 mG/acetaminophen 325 mG 1 Tablet(s) Oral every 4 hours PRN Moderate Pain (4 - 6)      __________________________________________________  REVIEW OF SYSTEMS:    CONSTITUTIONAL: No fever,   EYES: no acute visual disturbances  NECK: No pain or stiffness  RESPIRATORY: No cough; No shortness of breath  CARDIOVASCULAR: No chest pain, no palpitations  GASTROINTESTINAL: No pain. No nausea or vomiting; No diarrhea   NEUROLOGICAL: No headache or numbness, no tremors  MUSCULOSKELETAL: upper and lower extremities neuropathic pain, No joint pain, no muscle pain  GENITOURINARY: no dysuria, no frequency, no hesitancy  PSYCHIATRY: no depression , no anxiety  ALL OTHER  ROS negative        Vital Signs Last 24 Hrs  T(C): 36.5 (04 Feb 2022 11:54), Max: 36.5 (04 Feb 2022 11:54)  T(F): 97.7 (04 Feb 2022 11:54), Max: 97.7 (04 Feb 2022 11:54)  HR: 56 (04 Feb 2022 11:54) (55 - 56)  BP: 122/72 (04 Feb 2022 11:54) (114/68 - 134/85)  BP(mean): --  RR: 18 (04 Feb 2022 11:54) (18 - 18)  SpO2: 94% (04 Feb 2022 11:54) (94% - 96%)    ________________________________________________  PHYSICAL EXAM:  GENERAL: NAD  HEENT: Normocephalic;  conjunctivae and sclerae clear; moist mucous membranes;   NECK : supple  CHEST/LUNG: clear not adventitious breath sounds  HEART: S1 S2  regular; no murmurs, gallops or rubs  ABDOMEN: Soft, Nontender, Nondistended; Bowel sounds present  EXTREMITIES: no cyanosis; no edema; no calf tenderness  SKIN: warm and dry; no rash  NERVOUS SYSTEM:  Awake and alert; Oriented  to place, person and time ; no new deficits    _________________________________________________  LABS:                        16.2   5.05  )-----------( 170      ( 04 Feb 2022 06:58 )             50.0     02-04    138  |  103  |  20<H>  ----------------------------<  100<H>  4.3   |  30  |  0.66    Ca    8.3<L>      04 Feb 2022 06:58    TPro  7.1  /  Alb  2.4<L>  /  TBili  0.4  /  DBili  x   /  AST  32  /  ALT  36  /  AlkPhos  54  02-04    PT/INR - ( 03 Feb 2022 06:17 )   PT: 14.4 sec;   INR: 1.22 ratio         PTT - ( 03 Feb 2022 06:17 )  PTT:41.4 sec    CAPILLARY BLOOD GLUCOSE      POCT Blood Glucose.: 111 mg/dL (04 Feb 2022 17:23)  POCT Blood Glucose.: 154 mg/dL (04 Feb 2022 11:31)  POCT Blood Glucose.: 111 mg/dL (04 Feb 2022 08:10)  POCT Blood Glucose.: 140 mg/dL (03 Feb 2022 20:35)    RADIOLOGY & ADDITIONAL TESTS:  < from: Xray Chest 1 View- PORTABLE-Routine (Xray Chest 1 View- PORTABLE-Routine .) (02.04.22 @ 09:24) >    ACC: [id number] EXAM:  XR CHEST PORTABLE ROUTINE 1V                          PROCEDURE DATE:  02/04/2022          INTERPRETATION:  Short of breath.    AP chest. Prior dated 1/31/2022.    IMPRESSION: No change heart mediastinum. Bibasilar increased markings   similar to prior. Prominent left hilum . consider chest CT for additional   evaluation. No pleural effusion or pneumothorax.    --- End of Report ---    < end of copied text >    Imaging  Reviewed:  YES    Consultant(s) Notes Reviewed:   YES      Plan of care was discussed with patient and /or primary care giver; all questions and concerns were addressed

## 2022-02-04 NOTE — PROGRESS NOTE ADULT - SUBJECTIVE AND OBJECTIVE BOX
PULMONARY  progress note    IRENE MALHOTRA  [id number]    Patient is a 67y old  Male who presents with a chief complaint of Hypoxia (03 Feb 2022 18:29)  Feels better, Admits to smoking 1PPPd x 40 yrs , lately 3-4 cigarettes / day      MEDICATIONS  (STANDING):  atorvastatin 10 milliGRAM(s) Oral at bedtime  baclofen 5 milliGRAM(s) Oral two times a day  clopidogrel Tablet 75 milliGRAM(s) Oral daily  dexAMETHasone     Tablet 6 milliGRAM(s) Oral daily  glucagon  Injectable 1 milliGRAM(s) IntraMuscular once  insulin lispro (ADMELOG) corrective regimen sliding scale   SubCutaneous three times a day before meals  metoprolol tartrate 25 milliGRAM(s) Oral two times a day  NIFEdipine XL 60 milliGRAM(s) Oral daily  polyethylene glycol 3350 17 Gram(s) Oral daily  pregabalin 100 milliGRAM(s) Oral three times a day  remdesivir  IVPB   IV Intermittent   remdesivir  IVPB 100 milliGRAM(s) IV Intermittent every 24 hours  senna 2 Tablet(s) Oral at bedtime  tamsulosin 0.4 milliGRAM(s) Oral at bedtime  thiamine 100 milliGRAM(s) Oral daily    Allergies    morphine (Unknown)  penicillin (Unknown)  shellfish (Unknown)            PAST MEDICAL & SURGICAL HISTORY:  COPD (chronic obstructive pulmonary disease)    Diabetes    PVD (peripheral vascular disease)    Chronic atrial fibrillation    GERD (gastroesophageal reflux disease)    MDD (major depressive disorder)    BPH (benign prostatic hyperplasia)             REVIEW OF SYSTEMS:  CONSTITUTIONAL: No fever, weight loss, or fatigue   EYES: No eye pain, visual disturbances, or discharge  ENT:  No difficulty hearing, tinnitus, vertigo; No sinus or throat pain  NECK: No pain or stiffness or nodes  RESPIRATORY:  cough --  wheezing --  chills--   hemoptysis--  Shortness of Breath--  CARDIOVASCULAR: No chest pain, palpitations, passing out, dizziness, or leg swelling  GASTROINTESTINAL: No abdominal or epigastric pain. No nausea, vomiting,   SKIN: No itching, burning, rashes, or lesions   LYMPH Nodes: No enlarged glands  ENDOCRINE: No heat or cold intolerance; No hair loss  MUSCULOSKELETAL: No joint pain or swelling; No muscle, back, or extremity pain  PSYCHIATRIC: No depression, anxiety, mood swings, or difficulty sleeping  HEME/LYMPH: No easy bruising, or bleeding gums  ALLERGY AND IMMUNOLOGIC: No hives or eczema        Vital Signs Last 24 Hrs  T(C): 36.2 (04 Feb 2022 05:10), Max: 36.4 (03 Feb 2022 14:32)  T(F): 97.1 (04 Feb 2022 05:10), Max: 97.6 (03 Feb 2022 14:32)  HR: 55 (04 Feb 2022 05:10) (50 - 55)  BP: 134/85 (04 Feb 2022 05:10) (114/68 - 134/85)  BP(mean): --  RR: 18 (04 Feb 2022 05:10) (18 - 18)  SpO2: 96% (04 Feb 2022 05:10) (94% - 96%)  I&O's Detail      PHYSICAL EXAMINATION:    GENERAL: The patient is a well-developed, well-nourished in no apparent distress.   SKIN no rash ecchymoses or bruises  HEENT: Head is normocephalic and atraumatic  EVELYN , Extraocular muscles are intact. Mucous membranes  moist.   Neck supple ,No LN felt JVP not increased  Thyroid not enlarged  Cardiovascular:  S1 S2 heard, RSR, No JVD , systolic  murmur at apex, No gallop or rub  Respiratory: Chest wall symmetrical with good air entry ,Percussion note normal,    Lungs vesicular breathing with  fine rt basilar  rales , no  wheeze	  ABDOMEN:  Soft, Non-tender,  no hepatomegaly or splenomegaly BS positive	  Extremities: Normal range of motion, No clubbing, cyanosis or edema  Vascular: Peripheral pulses palpable 2+ bilaterally  CNS:  Alert and oriented x 3   Cranial nerves intact  sensory intact  motor power 5/5  dtr 2+  Babinski neg        LABS:                        16.2   5.05  )-----------( 170      ( 04 Feb 2022 06:58 )             50.0     02-04    138  |  103  |  20<H>  ----------------------------<  100<H>  4.3   |  30  |  0.66    Ca    8.3<L>      04 Feb 2022 06:58    TPro  7.1  /  Alb  2.4<L>  /  TBili  0.4  /  DBili  x   /  AST  32  /  ALT  36  /  AlkPhos  54  02-04    PT/INR - ( 03 Feb 2022 06:17 )   PT: 14.4 sec;   INR: 1.22 ratio         PTT - ( 03 Feb 2022 06:17 )  PTT:41.4 sec      D-Dimer Assay, Quantitative: <150 ng/mL DDU (02-04-22 @ 06:58)    Ferritin, Serum: 57 ng/mL (02-01-22 @ 04:43)  Ferritin, Serum: 51 ng/mL (02-01-22 @ 00:31)      MICROBIOLOGY:    Culture - Urine (collected 02-01-22 @ 18:35)  Source: Clean Catch Clean Catch (Midstream)  Final Report (02-02-22 @ 21:39):    No growth

## 2022-02-04 NOTE — DIETITIAN INITIAL EVALUATION ADULT. - PROBLEM SELECTOR PLAN 4
Pt with chronic AF on eliquis and metoprolol  EKG, show sinus tach with PVCs  Hold eliquis in light of COVID PNA  FD lovenox

## 2022-02-04 NOTE — DIETITIAN INITIAL EVALUATION ADULT. - PERTINENT MEDS FT
MEDICATIONS:  acetaminophen     Tablet .. 650 every 6 hours PRN  ALBUTerol    90 MICROgram(s) HFA Inhaler 2 every 6 hours PRN  atorvastatin 10 at bedtime  baclofen 5 two times a day  clopidogrel Tablet 75 daily  dexAMETHasone     Tablet 6 daily  dextrose 40% Gel 15 once  dextrose 5%. 1000 <Continuous>  dextrose 5%. 1000 <Continuous>  dextrose 50% Injectable 25 once  dextrose 50% Injectable 12.5 once  dextrose 50% Injectable 25 once  enoxaparin Injectable 100 two times a day  famotidine    Tablet 20 two times a day  glucagon  Injectable 1 once  insulin lispro (ADMELOG) corrective regimen sliding scale  three times a day before meals  metoprolol tartrate 25 two times a day  NIFEdipine XL 60 daily  oxycodone    5 mG/acetaminophen 325 mG 1 every 4 hours PRN  polyethylene glycol 3350 17 daily  pregabalin 100 three times a day  remdesivir  IVPB 100 every 24 hours  remdesivir  IVPB    senna 2 at bedtime  tamsulosin 0.4 at bedtime  thiamine 100 daily

## 2022-02-04 NOTE — PROGRESS NOTE ADULT - PROBLEM SELECTOR PLAN 4
Pt with chronic AF on eliquis and metoprolol, rated controlled  C/w metoprolol  ON full dose Lovenox chronic AF on eliquis and metoprolol, rated controlled  continue metoprolol and Lovenox

## 2022-02-04 NOTE — DIETITIAN INITIAL EVALUATION ADULT. - PROBLEM SELECTOR PLAN 1
Patient tested +ve for COVID 3 days prior to admission with progressively worsening SOB and cough  Vaccinated x 3  Sating 76% at NH, 90% on 6L in ED  CXR shows b/l infiltrates  AB.49 | 46 | 159 | 35  AST/ALT wnl  Trop -ve, EKG shows sinus tach    Dexamethasone 6mg x 10 days  Remdezivir x 5 days  c/w O2 supplementation  Trend COVID markers  Hold eliquis, FD lovenox

## 2022-02-04 NOTE — PROGRESS NOTE ADULT - PROBLEM SELECTOR PLAN 7
Pt with Hx of DM, not on meds  HgA1C 6.7  fingerstick ACHS  insulin sliding scale Pt with Hx of DM, not on meds  HgA1C 6.7  ACCU check ACHS  insulin sliding scale

## 2022-02-04 NOTE — PROGRESS NOTE ADULT - PROBLEM SELECTOR PLAN 5
Pt with Hx of COPD on atrovent PRN at home  C/w Albuterol prn  C/w oxygen supplementation Hx of COPD on Atrovent PRN at home  not in exacerbation  C/w Albuterol prn  C/w oxygen supplementation

## 2022-02-04 NOTE — PROGRESS NOTE ADULT - PROBLEM SELECTOR PLAN 1
pt p/w initial covid + on 1/28 with sob and cough, vaccinated x3  Saturating 93% 2L now  CXR shows b/l infiltrates  C/w Dexamethasone 6mg x 10 days until 2/10  C/w Remdezivir x 5 days until 2/5  C/w full dose lovenox 100 mg BID  Dimmer less than 150  Repeat chest x-ray today  Pulm Dr Ocampo Consulted pt p/w initial covid + on 1/28 with sob and cough, vaccinated x3  SpO2 96% 2L NC  CXR shows b/l infiltrates  C/w Dexamethasone 6mg x 10 days until 2/10  C/w Remdezivir x 5 days until 2/5  C/w full dose lovenox 100 mg BID  Dimmer less than 150  Repeat chest x-ray today  Pulm Dr Ocampo Consulted

## 2022-02-04 NOTE — DIETITIAN INITIAL EVALUATION ADULT. - OTHER INFO
Pt  is on Airborne Isolation. Pt with Covid +. Observed Pt via Glass Door, D/W Pt via Phone. Pt reports Good Appetite.  Food Choices obtained. F & N Dept Notified. Per Pt Ht 6 feet 2 inches.  Lb. Pt is Allergic to Sea food. Pt Requesting for  extra food Offered Nutrient dense snacks likes sandwiches  , he agreed to try.. Pt denies H/O DM. A1c 6.7. Pt is On diabetic diet . Pt is from NH Diet  2-3 gm na Low fat Low chol Diet.

## 2022-02-04 NOTE — PROGRESS NOTE ADULT - PROBLEM SELECTOR PLAN 11
F/u chest ray  Pending clinical improvement.  trend inflammatory marker  F/u covid PCR on 2/6 COVID PCR on 2/6  LTC [facility] possible monday

## 2022-02-04 NOTE — DIETITIAN INITIAL EVALUATION ADULT. - PERTINENT LABORATORY DATA
02-04 Na138 mmol/L Glu 100 mg/dL<H> K+ 4.3 mmol/L Cr  0.66 mg/dL BUN 20 mg/dL<H>     02-04 Alb 2.4 g/dL<L>        02-01-22 @ 08:57 HgbA1C 6.7 [4.0 - 5.6]

## 2022-02-05 LAB
GLUCOSE BLDC GLUCOMTR-MCNC: 113 MG/DL — HIGH (ref 70–99)
GLUCOSE BLDC GLUCOMTR-MCNC: 154 MG/DL — HIGH (ref 70–99)
GLUCOSE BLDC GLUCOMTR-MCNC: 155 MG/DL — HIGH (ref 70–99)
GLUCOSE BLDC GLUCOMTR-MCNC: 167 MG/DL — HIGH (ref 70–99)

## 2022-02-05 RX ORDER — METOPROLOL TARTRATE 50 MG
25 TABLET ORAL
Refills: 0 | Status: DISCONTINUED | OUTPATIENT
Start: 2022-02-05 | End: 2022-02-07

## 2022-02-05 RX ORDER — NIFEDIPINE 30 MG
60 TABLET, EXTENDED RELEASE 24 HR ORAL DAILY
Refills: 0 | Status: DISCONTINUED | OUTPATIENT
Start: 2022-02-05 | End: 2022-02-07

## 2022-02-05 RX ORDER — REMDESIVIR 5 MG/ML
100 INJECTION INTRAVENOUS EVERY 24 HOURS
Refills: 0 | Status: COMPLETED | OUTPATIENT
Start: 2022-02-05 | End: 2022-02-05

## 2022-02-05 RX ADMIN — TAMSULOSIN HYDROCHLORIDE 0.4 MILLIGRAM(S): 0.4 CAPSULE ORAL at 21:03

## 2022-02-05 RX ADMIN — OXYCODONE AND ACETAMINOPHEN 1 TABLET(S): 5; 325 TABLET ORAL at 02:35

## 2022-02-05 RX ADMIN — ATORVASTATIN CALCIUM 10 MILLIGRAM(S): 80 TABLET, FILM COATED ORAL at 21:03

## 2022-02-05 RX ADMIN — Medication 100 MILLIGRAM(S): at 21:02

## 2022-02-05 RX ADMIN — FAMOTIDINE 20 MILLIGRAM(S): 10 INJECTION INTRAVENOUS at 06:31

## 2022-02-05 RX ADMIN — Medication 100 MILLIGRAM(S): at 12:06

## 2022-02-05 RX ADMIN — CLOPIDOGREL BISULFATE 75 MILLIGRAM(S): 75 TABLET, FILM COATED ORAL at 12:06

## 2022-02-05 RX ADMIN — REMDESIVIR 500 MILLIGRAM(S): 5 INJECTION INTRAVENOUS at 20:02

## 2022-02-05 RX ADMIN — Medication 5 MILLIGRAM(S): at 06:32

## 2022-02-05 RX ADMIN — Medication 100 MILLIGRAM(S): at 14:46

## 2022-02-05 RX ADMIN — SENNA PLUS 2 TABLET(S): 8.6 TABLET ORAL at 21:02

## 2022-02-05 RX ADMIN — ENOXAPARIN SODIUM 100 MILLIGRAM(S): 100 INJECTION SUBCUTANEOUS at 06:33

## 2022-02-05 RX ADMIN — ENOXAPARIN SODIUM 100 MILLIGRAM(S): 100 INJECTION SUBCUTANEOUS at 18:23

## 2022-02-05 RX ADMIN — OXYCODONE AND ACETAMINOPHEN 1 TABLET(S): 5; 325 TABLET ORAL at 03:00

## 2022-02-05 RX ADMIN — OXYCODONE AND ACETAMINOPHEN 1 TABLET(S): 5; 325 TABLET ORAL at 21:02

## 2022-02-05 RX ADMIN — OXYCODONE AND ACETAMINOPHEN 1 TABLET(S): 5; 325 TABLET ORAL at 13:30

## 2022-02-05 RX ADMIN — Medication 100 MILLIGRAM(S): at 06:37

## 2022-02-05 RX ADMIN — OXYCODONE AND ACETAMINOPHEN 1 TABLET(S): 5; 325 TABLET ORAL at 20:25

## 2022-02-05 RX ADMIN — Medication 5 MILLIGRAM(S): at 18:24

## 2022-02-05 RX ADMIN — Medication 1: at 12:23

## 2022-02-05 RX ADMIN — Medication 6 MILLIGRAM(S): at 06:32

## 2022-02-05 RX ADMIN — FAMOTIDINE 20 MILLIGRAM(S): 10 INJECTION INTRAVENOUS at 18:24

## 2022-02-05 RX ADMIN — OXYCODONE AND ACETAMINOPHEN 1 TABLET(S): 5; 325 TABLET ORAL at 12:26

## 2022-02-05 RX ADMIN — Medication 1: at 17:32

## 2022-02-05 NOTE — CHART NOTE - NSCHARTNOTEFT_GEN_A_CORE
Patient without IV access but needs to receive last dose of Remdesivir. Once patient amenable to placement of PIV, attempted x3 to place ultrasound guided peripheral IV without success. Contacting ICU for assistance with placement of IV.
Assessment:   67yMalePatient is a 67y old  Male who presents with a chief complaint of Hypoxia (04 Feb 2022 18:22)      Factors impacting intake: [ ] none [ ] nausea  [ ] vomiting [ ] diarrhea [ ] constipation  [ ]chewing problems [ ] swallowing issues  [x ] other: asked by diet tech To see patient concerning diabetic diet. unable to interview patient face to face as has covid-19. attempted to contact patient On extension in room but No reply . patient seen by dietitian On (2/4/22) and food preferences were taken. will follow as needed.   Diet Presciption: Diet, Regular:   Consistent Carbohydrate {Evening Snacks}  DASH/TLC {Sodium & Cholesterol Restricted} (01-31-22 @ 19:50)    Intake:     Current Weight: Weight (kg): 101.2 (01-31 @ 14:11)  % Weight Change    Pertinent Medications: MEDICATIONS  (STANDING):  atorvastatin 10 milliGRAM(s) Oral at bedtime  baclofen 5 milliGRAM(s) Oral two times a day  clopidogrel Tablet 75 milliGRAM(s) Oral daily  dexAMETHasone     Tablet 6 milliGRAM(s) Oral daily  dextrose 40% Gel 15 Gram(s) Oral once  dextrose 5%. 1000 milliLiter(s) (50 mL/Hr) IV Continuous <Continuous>  dextrose 5%. 1000 milliLiter(s) (100 mL/Hr) IV Continuous <Continuous>  dextrose 50% Injectable 25 Gram(s) IV Push once  dextrose 50% Injectable 12.5 Gram(s) IV Push once  dextrose 50% Injectable 25 Gram(s) IV Push once  enoxaparin Injectable 100 milliGRAM(s) SubCutaneous two times a day  famotidine    Tablet 20 milliGRAM(s) Oral two times a day  glucagon  Injectable 1 milliGRAM(s) IntraMuscular once  insulin lispro (ADMELOG) corrective regimen sliding scale   SubCutaneous three times a day before meals  metoprolol tartrate 25 milliGRAM(s) Oral two times a day  NIFEdipine XL 60 milliGRAM(s) Oral daily  polyethylene glycol 3350 17 Gram(s) Oral daily  pregabalin 100 milliGRAM(s) Oral three times a day  senna 2 Tablet(s) Oral at bedtime  tamsulosin 0.4 milliGRAM(s) Oral at bedtime  thiamine 100 milliGRAM(s) Oral daily    MEDICATIONS  (PRN):  acetaminophen     Tablet .. 650 milliGRAM(s) Oral every 6 hours PRN Temp greater or equal to 38C (100.4F), Mild Pain (1 - 3)  ALBUTerol    90 MICROgram(s) HFA Inhaler 2 Puff(s) Inhalation every 6 hours PRN Shortness of Breath  oxycodone    5 mG/acetaminophen 325 mG 1 Tablet(s) Oral every 4 hours PRN Moderate Pain (4 - 6)    Pertinent Labs: 02-04 Na138 mmol/L Glu 100 mg/dL<H> K+ 4.3 mmol/L Cr  0.66 mg/dL BUN 20 mg/dL<H> 02-04 Alb 2.4 g/dL<L>     CAPILLARY BLOOD GLUCOSE      POCT Blood Glucose.: 167 mg/dL (05 Feb 2022 11:54)  POCT Blood Glucose.: 113 mg/dL (05 Feb 2022 08:21)  POCT Blood Glucose.: 126 mg/dL (04 Feb 2022 22:25)  POCT Blood Glucose.: 111 mg/dL (04 Feb 2022 17:23)    Skin:     Estimated Needs:   [ ] no change since previous assessment  [ ] recalculated:     Previous Nutrition Diagnosis:   [ ] Inadequate Energy Intake [ ]Inadequate Oral Intake [ ] Excessive Energy Intake   [ ] Underweight [ ] Increased Nutrient Needs [ ] Overweight/Obesity   [ ] Altered GI Function [ ] Unintended Weight Loss [ ] Food & Nutrition Related Knowledge Deficit [ ] Malnutrition     Nutrition Diagnosis is [ ] ongoing  [ ] resolved [ ] not applicable     New Nutrition Diagnosis: [ ] not applicable       Interventions:   Recommend  [ ] Change Diet To:  [ ] Nutrition Supplement  [ ] Nutrition Support  [ ] Other:     Monitoring and Evaluation:   [ ] PO intake [ x ] Tolerance to diet prescription [ x ] weights [ x ] labs[ x ] follow up per protocol  [ ] other:

## 2022-02-05 NOTE — PROCEDURE NOTE - ADDITIONAL PROCEDURE DETAILS
20g 6cm extended dwell IV placed under ultrasound guidance. Confirmed via ultrasound and bubble study.

## 2022-02-05 NOTE — PROGRESS NOTE ADULT - PROBLEM SELECTOR PLAN 5
Hx of COPD on Atrovent PRN at home  not in exacerbation  C/w Albuterol prn  C/w oxygen supplementation

## 2022-02-05 NOTE — PROGRESS NOTE ADULT - PROBLEM SELECTOR PLAN 1
pt p/w initial covid + on 1/28 with sob and cough, vaccinated x3  SpO2 96% 2L NC  CXR shows b/l infiltrates  C/w Dexamethasone 6mg x 10 days until 2/10  C/w Remdezivir x 5 days until 2/5  C/w full dose lovenox 100 mg BID  Dimmer less than 150  Repeat chest x-ray today  Pulm Dr Ocampo Consulted

## 2022-02-05 NOTE — PROGRESS NOTE ADULT - SUBJECTIVE AND OBJECTIVE BOX
DATE OF SERVICE:  2/5/2022  Patient was seen and examined on  2/5/2022   .Interim events noted.Consultant notes ,Labs,Telemetry reviewed by me    PRESENTING CC: hypoxia    HPI and HOSPITAL COURSE: HPI:  Pt is a 68 yo vaccinated M from [facility] with PMHx of DM, HTN, HLD, AF on eliquis, PVD presenting with hypoxia. Patient tested +ve for COVID three days ago, since then has experienced progressively worsening shortness of breath, productive cough, and fever. According to NH papers, patient was saturating 76% on RA. In the ED patient saturating 90% on 6L NC. Patient received 60mg of prednisone at NH. Patient also complains of chronic leg pain. He denies chest pain, N/V/D, headache, hemoptysis. (31 Jan 2022 18:30)      INTERIM EVENTS:Patient seen at bedside interim events noted.      PMH -reviewed admission note, no change since admission  Heart Failure: Acute [ ] Chronic [ ] Acute on Chronic [ ] Diastolic [ ] Systolic [ ] Combined Systolic and Diastolic[ ]  DILCIA[ ]  ATN[ ]  CKD I [ ] CKDII [ ] CKD III [ ] CKD IV [ ] CKD V [ ] ESRD[ ]  HTN[ ] CVA[ ] DM[ ] COPD[ ] COVID[ ] AF[ ]  PPM[ ] ICD[ ]    MEDICATIONS  (STANDING):  atorvastatin 10 milliGRAM(s) Oral at bedtime  baclofen 5 milliGRAM(s) Oral two times a day  clopidogrel Tablet 75 milliGRAM(s) Oral daily  dexAMETHasone     Tablet 6 milliGRAM(s) Oral daily  dextrose 40% Gel 15 Gram(s) Oral once  dextrose 5%. 1000 milliLiter(s) (50 mL/Hr) IV Continuous <Continuous>  dextrose 5%. 1000 milliLiter(s) (100 mL/Hr) IV Continuous <Continuous>  dextrose 50% Injectable 25 Gram(s) IV Push once  dextrose 50% Injectable 12.5 Gram(s) IV Push once  dextrose 50% Injectable 25 Gram(s) IV Push once  enoxaparin Injectable 100 milliGRAM(s) SubCutaneous two times a day  famotidine    Tablet 20 milliGRAM(s) Oral two times a day  glucagon  Injectable 1 milliGRAM(s) IntraMuscular once  insulin lispro (ADMELOG) corrective regimen sliding scale   SubCutaneous three times a day before meals  metoprolol tartrate 25 milliGRAM(s) Oral two times a day  NIFEdipine XL 60 milliGRAM(s) Oral daily  polyethylene glycol 3350 17 Gram(s) Oral daily  pregabalin 100 milliGRAM(s) Oral three times a day  senna 2 Tablet(s) Oral at bedtime  tamsulosin 0.4 milliGRAM(s) Oral at bedtime  thiamine 100 milliGRAM(s) Oral daily    MEDICATIONS  (PRN):  acetaminophen     Tablet .. 650 milliGRAM(s) Oral every 6 hours PRN Temp greater or equal to 38C (100.4F), Mild Pain (1 - 3)  ALBUTerol    90 MICROgram(s) HFA Inhaler 2 Puff(s) Inhalation every 6 hours PRN Shortness of Breath  oxycodone    5 mG/acetaminophen 325 mG 1 Tablet(s) Oral every 4 hours PRN Moderate Pain (4 - 6)            REVIEW OF SYSTEMS:  Constitutional: [ ] fever, [ ]weight loss,  [ ]fatigue  Eyes: [ ] visual changes  Respiratory: [ ]shortness of breath;  [ ] cough, [ ]wheezing, [ ]chills, [ ]hemoptysis  Cardiovascular: [ ] chest pain, [ ]palpitations, [ ]dizziness,  [ ]leg swelling[ ]orthopnea[ ]PND  Gastrointestinal: [ ] abdominal pain, [ ]nausea, [ ]vomiting,  [ ]diarrhea [ ]Constipation [ ]Melena  Genitourinary: [ ] dysuria, [ ] hematuria [ ]Cardoza  Neurologic: [ ] headaches [ ] tremors[ ]weakness [ ]Paralysis Right[ ] Left[ ]  Skin: [ ] itching, [ ]burning, [ ] rashes  Endocrine: [ ] heat or cold intolerance  Musculoskeletal: [ ] joint pain or swelling; [ ] muscle, back, or extremity pain  Psychiatric: [ ] depression, [ ]anxiety, [ ]mood swings, or [ ]difficulty sleeping  Hematologic: [ ] easy bruising, [ ] bleeding gums    [ ] All remaining systems negative except as per above.   [ ]Unable to obtain.  [x] No change in ROS since admission      Vital Signs Last 24 Hrs  T(C): 36.2 (05 Feb 2022 14:10), Max: 36.5 (04 Feb 2022 21:55)  T(F): 97.1 (05 Feb 2022 14:10), Max: 97.7 (04 Feb 2022 21:55)  HR: 50 (05 Feb 2022 18:36) (50 - 58)  BP: 124/75 (05 Feb 2022 18:36) (114/68 - 131/70)  BP(mean): --  RR: 22 (05 Feb 2022 14:10) (19 - 22)  SpO2: 97% (05 Feb 2022 14:10) (95% - 97%)  I&O's Summary      PHYSICAL EXAM:  General: No acute distress BMI-  HEENT: EOMI, PERRL  Neck: Supple, [ ] JVD  Lungs: Equal air entry bilaterally; [ ] rales [ ] wheezing [ ] rhonchi  Heart: Regular rate and rhythm; [ ] murmur   /6 [ ] systolic [ ] diastolic [ ] radiation[ ] rubs [ ]  gallops  Abdomen: Nontender, bowel sounds present  Extremities: No clubbing, cyanosis, [ ] edema [ ]Pulses  equal and intact  Nervous system:  Alert & Oriented X3, no focal deficits  Psychiatric: Normal affect  Skin: No rashes or lesions    LABS:  02-04    138  |  103  |  20<H>  ----------------------------<  100<H>  4.3   |  30  |  0.66    Ca    8.3<L>      04 Feb 2022 06:58    TPro  7.1  /  Alb  2.4<L>  /  TBili  0.4  /  DBili  x   /  AST  32  /  ALT  36  /  AlkPhos  54  02-04    Creatinine Trend: 0.66<--, 0.62<--, 0.64<--, 0.69<--, 1.01<--, 0.90<--                        16.2   5.05  )-----------( 170      ( 04 Feb 2022 06:58 )             50.0         Cardiac Enzymes:

## 2022-02-06 LAB
ALBUMIN SERPL ELPH-MCNC: 2.4 G/DL — LOW (ref 3.5–5)
ALP SERPL-CCNC: 54 U/L — SIGNIFICANT CHANGE UP (ref 40–120)
ALT FLD-CCNC: 29 U/L DA — SIGNIFICANT CHANGE UP (ref 10–60)
ANION GAP SERPL CALC-SCNC: 5 MMOL/L — SIGNIFICANT CHANGE UP (ref 5–17)
AST SERPL-CCNC: 15 U/L — SIGNIFICANT CHANGE UP (ref 10–40)
BILIRUB SERPL-MCNC: 0.3 MG/DL — SIGNIFICANT CHANGE UP (ref 0.2–1.2)
BUN SERPL-MCNC: 17 MG/DL — SIGNIFICANT CHANGE UP (ref 7–18)
CALCIUM SERPL-MCNC: 8.2 MG/DL — LOW (ref 8.4–10.5)
CHLORIDE SERPL-SCNC: 103 MMOL/L — SIGNIFICANT CHANGE UP (ref 96–108)
CO2 SERPL-SCNC: 29 MMOL/L — SIGNIFICANT CHANGE UP (ref 22–31)
CREAT SERPL-MCNC: 0.63 MG/DL — SIGNIFICANT CHANGE UP (ref 0.5–1.3)
GLUCOSE BLDC GLUCOMTR-MCNC: 124 MG/DL — HIGH (ref 70–99)
GLUCOSE BLDC GLUCOMTR-MCNC: 126 MG/DL — HIGH (ref 70–99)
GLUCOSE BLDC GLUCOMTR-MCNC: 134 MG/DL — HIGH (ref 70–99)
GLUCOSE BLDC GLUCOMTR-MCNC: 145 MG/DL — HIGH (ref 70–99)
GLUCOSE SERPL-MCNC: 130 MG/DL — HIGH (ref 70–99)
HCT VFR BLD CALC: 49.6 % — SIGNIFICANT CHANGE UP (ref 39–50)
HGB BLD-MCNC: 16 G/DL — SIGNIFICANT CHANGE UP (ref 13–17)
MAGNESIUM SERPL-MCNC: 1.9 MG/DL — SIGNIFICANT CHANGE UP (ref 1.6–2.6)
MCHC RBC-ENTMCNC: 28.3 PG — SIGNIFICANT CHANGE UP (ref 27–34)
MCHC RBC-ENTMCNC: 32.3 GM/DL — SIGNIFICANT CHANGE UP (ref 32–36)
MCV RBC AUTO: 87.6 FL — SIGNIFICANT CHANGE UP (ref 80–100)
NRBC # BLD: 0 /100 WBCS — SIGNIFICANT CHANGE UP (ref 0–0)
PHOSPHATE SERPL-MCNC: 3.7 MG/DL — SIGNIFICANT CHANGE UP (ref 2.5–4.5)
PLATELET # BLD AUTO: 173 K/UL — SIGNIFICANT CHANGE UP (ref 150–400)
POTASSIUM SERPL-MCNC: 4.2 MMOL/L — SIGNIFICANT CHANGE UP (ref 3.5–5.3)
POTASSIUM SERPL-SCNC: 4.2 MMOL/L — SIGNIFICANT CHANGE UP (ref 3.5–5.3)
PROT SERPL-MCNC: 6.9 G/DL — SIGNIFICANT CHANGE UP (ref 6–8.3)
RBC # BLD: 5.66 M/UL — SIGNIFICANT CHANGE UP (ref 4.2–5.8)
RBC # FLD: 14.8 % — HIGH (ref 10.3–14.5)
SARS-COV-2 RNA SPEC QL NAA+PROBE: DETECTED
SODIUM SERPL-SCNC: 137 MMOL/L — SIGNIFICANT CHANGE UP (ref 135–145)
WBC # BLD: 5.03 K/UL — SIGNIFICANT CHANGE UP (ref 3.8–10.5)
WBC # FLD AUTO: 5.03 K/UL — SIGNIFICANT CHANGE UP (ref 3.8–10.5)

## 2022-02-06 RX ADMIN — TAMSULOSIN HYDROCHLORIDE 0.4 MILLIGRAM(S): 0.4 CAPSULE ORAL at 21:44

## 2022-02-06 RX ADMIN — OXYCODONE AND ACETAMINOPHEN 1 TABLET(S): 5; 325 TABLET ORAL at 18:19

## 2022-02-06 RX ADMIN — Medication 5 MILLIGRAM(S): at 05:52

## 2022-02-06 RX ADMIN — Medication 100 MILLIGRAM(S): at 21:44

## 2022-02-06 RX ADMIN — Medication 6 MILLIGRAM(S): at 05:51

## 2022-02-06 RX ADMIN — CLOPIDOGREL BISULFATE 75 MILLIGRAM(S): 75 TABLET, FILM COATED ORAL at 13:00

## 2022-02-06 RX ADMIN — OXYCODONE AND ACETAMINOPHEN 1 TABLET(S): 5; 325 TABLET ORAL at 17:48

## 2022-02-06 RX ADMIN — OXYCODONE AND ACETAMINOPHEN 1 TABLET(S): 5; 325 TABLET ORAL at 01:49

## 2022-02-06 RX ADMIN — OXYCODONE AND ACETAMINOPHEN 1 TABLET(S): 5; 325 TABLET ORAL at 11:38

## 2022-02-06 RX ADMIN — Medication 60 MILLIGRAM(S): at 05:51

## 2022-02-06 RX ADMIN — OXYCODONE AND ACETAMINOPHEN 1 TABLET(S): 5; 325 TABLET ORAL at 02:45

## 2022-02-06 RX ADMIN — OXYCODONE AND ACETAMINOPHEN 1 TABLET(S): 5; 325 TABLET ORAL at 21:49

## 2022-02-06 RX ADMIN — FAMOTIDINE 20 MILLIGRAM(S): 10 INJECTION INTRAVENOUS at 05:52

## 2022-02-06 RX ADMIN — POLYETHYLENE GLYCOL 3350 17 GRAM(S): 17 POWDER, FOR SOLUTION ORAL at 13:00

## 2022-02-06 RX ADMIN — Medication 100 MILLIGRAM(S): at 05:51

## 2022-02-06 RX ADMIN — OXYCODONE AND ACETAMINOPHEN 1 TABLET(S): 5; 325 TABLET ORAL at 06:16

## 2022-02-06 RX ADMIN — ATORVASTATIN CALCIUM 10 MILLIGRAM(S): 80 TABLET, FILM COATED ORAL at 21:45

## 2022-02-06 RX ADMIN — FAMOTIDINE 20 MILLIGRAM(S): 10 INJECTION INTRAVENOUS at 17:49

## 2022-02-06 RX ADMIN — OXYCODONE AND ACETAMINOPHEN 1 TABLET(S): 5; 325 TABLET ORAL at 05:50

## 2022-02-06 RX ADMIN — Medication 100 MILLIGRAM(S): at 13:00

## 2022-02-06 RX ADMIN — SENNA PLUS 2 TABLET(S): 8.6 TABLET ORAL at 21:44

## 2022-02-06 RX ADMIN — Medication 5 MILLIGRAM(S): at 17:48

## 2022-02-06 RX ADMIN — OXYCODONE AND ACETAMINOPHEN 1 TABLET(S): 5; 325 TABLET ORAL at 11:08

## 2022-02-06 RX ADMIN — ENOXAPARIN SODIUM 100 MILLIGRAM(S): 100 INJECTION SUBCUTANEOUS at 17:49

## 2022-02-06 RX ADMIN — Medication 100 MILLIGRAM(S): at 13:03

## 2022-02-06 RX ADMIN — OXYCODONE AND ACETAMINOPHEN 1 TABLET(S): 5; 325 TABLET ORAL at 22:10

## 2022-02-06 RX ADMIN — ENOXAPARIN SODIUM 100 MILLIGRAM(S): 100 INJECTION SUBCUTANEOUS at 05:52

## 2022-02-06 NOTE — PROGRESS NOTE ADULT - SUBJECTIVE AND OBJECTIVE BOX
DATE OF SERVICE:  2/6/2022  Patient was seen and examined on  2/6/2022   .Interim events noted.Consultant notes ,Labs,Telemetry reviewed by me    PRESENTING CC: juan    HPI and HOSPITAL COURSE: HPI:  Pt is a 68 yo vaccinated M from [facility] with PMHx of DM, HTN, HLD, AF on eliquis, PVD presenting with hypoxia. Patient tested +ve for COVID three days ago, since then has experienced progressively worsening shortness of breath, productive cough, and fever. According to NH papers, patient was saturating 76% on RA. In the ED patient saturating 90% on 6L NC. Patient received 60mg of prednisone at NH. Patient also complains of chronic leg pain. He denies chest pain, N/V/D, headache, hemoptysis. (31 Jan 2022 18:30)      INTERIM EVENTS:Patient seen at bedside interim events noted.      PMH -reviewed admission note, no change since admission  Heart Failure: Acute [ ] Chronic [ ] Acute on Chronic [ ] Diastolic [ ] Systolic [ ] Combined Systolic and Diastolic[ ]  DILCIA[ ]  ATN[ ]  CKD I [ ] CKDII [ ] CKD III [ ] CKD IV [ ] CKD V [ ] ESRD[ ]  HTN[ ] CVA[ ] DM[ ] COPD[ ] COVID[ ] AF[ ]  PPM[ ] ICD[ ]    MEDICATIONS  (STANDING):  atorvastatin 10 milliGRAM(s) Oral at bedtime  baclofen 5 milliGRAM(s) Oral two times a day  clopidogrel Tablet 75 milliGRAM(s) Oral daily  dexAMETHasone     Tablet 6 milliGRAM(s) Oral daily  dextrose 40% Gel 15 Gram(s) Oral once  dextrose 5%. 1000 milliLiter(s) (50 mL/Hr) IV Continuous <Continuous>  dextrose 5%. 1000 milliLiter(s) (100 mL/Hr) IV Continuous <Continuous>  dextrose 50% Injectable 25 Gram(s) IV Push once  dextrose 50% Injectable 12.5 Gram(s) IV Push once  dextrose 50% Injectable 25 Gram(s) IV Push once  enoxaparin Injectable 100 milliGRAM(s) SubCutaneous two times a day  famotidine    Tablet 20 milliGRAM(s) Oral two times a day  glucagon  Injectable 1 milliGRAM(s) IntraMuscular once  insulin lispro (ADMELOG) corrective regimen sliding scale   SubCutaneous three times a day before meals  metoprolol tartrate 25 milliGRAM(s) Oral two times a day  NIFEdipine XL 60 milliGRAM(s) Oral daily  polyethylene glycol 3350 17 Gram(s) Oral daily  pregabalin 100 milliGRAM(s) Oral three times a day  senna 2 Tablet(s) Oral at bedtime  tamsulosin 0.4 milliGRAM(s) Oral at bedtime  thiamine 100 milliGRAM(s) Oral daily    MEDICATIONS  (PRN):  acetaminophen     Tablet .. 650 milliGRAM(s) Oral every 6 hours PRN Temp greater or equal to 38C (100.4F), Mild Pain (1 - 3)  ALBUTerol    90 MICROgram(s) HFA Inhaler 2 Puff(s) Inhalation every 6 hours PRN Shortness of Breath  oxycodone    5 mG/acetaminophen 325 mG 1 Tablet(s) Oral every 4 hours PRN Moderate Pain (4 - 6)            REVIEW OF SYSTEMS:  Constitutional: [ ] fever, [ ]weight loss,  [ ]fatigue  Eyes: [ ] visual changes  Respiratory: [ ]shortness of breath;  [ ] cough, [ ]wheezing, [ ]chills, [ ]hemoptysis  Cardiovascular: [ ] chest pain, [ ]palpitations, [ ]dizziness,  [ ]leg swelling[ ]orthopnea[ ]PND  Gastrointestinal: [ ] abdominal pain, [ ]nausea, [ ]vomiting,  [ ]diarrhea [ ]Constipation [ ]Melena  Genitourinary: [ ] dysuria, [ ] hematuria [ ]Cardoza  Neurologic: [ ] headaches [ ] tremors[ ]weakness [ ]Paralysis Right[ ] Left[ ]  Skin: [ ] itching, [ ]burning, [ ] rashes  Endocrine: [ ] heat or cold intolerance  Musculoskeletal: [ ] joint pain or swelling; [ ] muscle, back, or extremity pain  Psychiatric: [ ] depression, [ ]anxiety, [ ]mood swings, or [ ]difficulty sleeping  Hematologic: [ ] easy bruising, [ ] bleeding gums    [ ] All remaining systems negative except as per above.   [ ]Unable to obtain.  [x] No change in ROS since admission      Vital Signs Last 24 Hrs  T(C): 36.4 (06 Feb 2022 05:12), Max: 36.4 (06 Feb 2022 05:12)  T(F): 97.6 (06 Feb 2022 05:12), Max: 97.6 (06 Feb 2022 05:12)  HR: 51 (06 Feb 2022 05:12) (46 - 55)  BP: 138/76 (06 Feb 2022 05:12) (114/68 - 138/76)  BP(mean): --  RR: 18 (06 Feb 2022 05:12) (18 - 22)  SpO2: 94% (06 Feb 2022 05:12) (94% - 97%)  I&O's Summary      PHYSICAL EXAM:  General: No acute distress BMI-  HEENT: EOMI, PERRL  Neck: Supple, [ ] JVD  Lungs: Equal air entry bilaterally; [ ] rales [ ] wheezing [ ] rhonchi  Heart: Regular rate and rhythm; [ ] murmur   /6 [ ] systolic [ ] diastolic [ ] radiation[ ] rubs [ ]  gallops  Abdomen: Nontender, bowel sounds present  Extremities: No clubbing, cyanosis, [ ] edema [ ]Pulses  equal and intact  Nervous system:  Alert & Oriented X3, no focal deficits  Psychiatric: Normal affect  Skin: No rashes or lesions    LABS:  02-06    137  |  103  |  17  ----------------------------<  130<H>  4.2   |  29  |  0.63    Ca    8.2<L>      06 Feb 2022 06:44  Phos  3.7     02-06  Mg     1.9     02-06    TPro  6.9  /  Alb  2.4<L>  /  TBili  0.3  /  DBili  x   /  AST  15  /  ALT  29  /  AlkPhos  54  02-06    Creatinine Trend: 0.63<--, 0.66<--, 0.62<--, 0.64<--, 0.69<--, 1.01<--                        16.0   5.03  )-----------( 173      ( 06 Feb 2022 06:44 )             49.6         Cardiac Enzymes:

## 2022-02-06 NOTE — PROGRESS NOTE ADULT - SUBJECTIVE AND OBJECTIVE BOX
PULMONARY  progress note    IRENE MALHOTRA  MRN-[id number]    Patient is a 67y old  Male who presents with a chief complaint of Hypoxia (06 Feb 2022 11:01)  Feels better, no sob, Cough+      MEDICATIONS  (STANDING):  atorvastatin 10 milliGRAM(s) Oral at bedtime  baclofen 5 milliGRAM(s) Oral two times a day  clopidogrel Tablet 75 milliGRAM(s) Oral daily  dexAMETHasone     Tablet 6 milliGRAM(s) Oral daily  dextrose 40% Gel 15 Gram(s) Oral once  dextrose 5%. 1000 milliLiter(s) (50 mL/Hr) IV Continuous <Continuous>  dextrose 5%. 1000 milliLiter(s) (100 mL/Hr) IV Continuous <Continuous>  dextrose 50% Injectable 25 Gram(s) IV Push once  dextrose 50% Injectable 12.5 Gram(s) IV Push once  dextrose 50% Injectable 25 Gram(s) IV Push once  enoxaparin Injectable 100 milliGRAM(s) SubCutaneous two times a day  famotidine    Tablet 20 milliGRAM(s) Oral two times a day  glucagon  Injectable 1 milliGRAM(s) IntraMuscular once  insulin lispro (ADMELOG) corrective regimen sliding scale   SubCutaneous three times a day before meals  metoprolol tartrate 25 milliGRAM(s) Oral two times a day  NIFEdipine XL 60 milliGRAM(s) Oral daily  polyethylene glycol 3350 17 Gram(s) Oral daily  pregabalin 100 milliGRAM(s) Oral three times a day  senna 2 Tablet(s) Oral at bedtime  tamsulosin 0.4 milliGRAM(s) Oral at bedtime  thiamine 100 milliGRAM(s) Oral daily      MEDICATIONS  (PRN):  acetaminophen     Tablet .. 650 milliGRAM(s) Oral every 6 hours PRN Temp greater or equal to 38C (100.4F), Mild Pain (1 - 3)  ALBUTerol    90 MICROgram(s) HFA Inhaler 2 Puff(s) Inhalation every 6 hours PRN Shortness of Breath  oxycodone    5 mG/acetaminophen 325 mG 1 Tablet(s) Oral every 4 hours PRN Moderate Pain (4 - 6)      Allergies    morphine (Unknown)  penicillin (Unknown)  shellfish (Unknown)        PAST MEDICAL & SURGICAL HISTORY:  COPD (chronic obstructive pulmonary disease)    Diabetes    PVD (peripheral vascular disease)    Chronic atrial fibrillation    GERD (gastroesophageal reflux disease)    MDD (major depressive disorder)    BPH (benign prostatic hyperplasia)             REVIEW OF SYSTEMS:  CONSTITUTIONAL: No fever, weight loss, or fatigue   EYES: No eye pain, visual disturbances, or discharge  ENT:  No difficulty hearing, tinnitus, vertigo; No sinus or throat pain  NECK: No pain or stiffness or nodes  RESPIRATORY:  cough+   wheezing--   chills--   hemoptysis--  Shortness of Breath--  CARDIOVASCULAR: No chest pain, palpitations, passing out, dizziness, or leg swelling  GASTROINTESTINAL: No abdominal or epigastric pain. No nausea, vomiting, or hematemesis;   GENITOURINARY: No dysuria, frequency, hematuria, or incontinence  NEUROLOGICAL: No headaches, memory loss, loss of strength, numbness, or tremors  SKIN: No itching, burning, rashes, or lesions   LYMPH Nodes: No enlarged glands  ENDOCRINE: No heat or cold intolerance; No hair loss  ALLERGY AND IMMUNOLOGIC: No hives or eczema        Vital Signs Last 24 Hrs  T(C): 36.6 (06 Feb 2022 12:41), Max: 36.6 (06 Feb 2022 12:41)  T(F): 97.8 (06 Feb 2022 12:41), Max: 97.8 (06 Feb 2022 12:41)  HR: 61 (06 Feb 2022 12:41) (46 - 61)  BP: 111/66 (06 Feb 2022 12:41) (111/66 - 138/76)  BP(mean): --  RR: 18 (06 Feb 2022 12:41) (18 - 20)  SpO2: 94% (06 Feb 2022 12:41) (94% - 96%)  I&O's Detail      PHYSICAL EXAMINATION:    GENERAL: The patient is a well-developed, well-nourished in no apparent distress.   SKIN no rash ecchymoses or bruises  HEENT: Head is normocephalic and atraumatic  EVELYN , Extraocular muscles are intact.   Neck supple ,No LN felt JVP not increased  Thyroid not enlarged  Cardiovascular:  S1 S2 heard, RSR, No JVD , systolic  murmur at apex, No gallop or rub  Respiratory: Chest wall symmetrical with good air entry ,Percussion note normal,    Lungs vesicular breathing with rt basilar   rales , no   wheeze	  ABDOMEN:  Soft, Non-tender, no hepatomegaly or splenomegaly BS positive	  Extremities: Normal range of motion, No clubbing, cyanosis or edema  Vascular: Peripheral pulses palpable 2+ bilaterally  CNS:  Alert and oriented x 3   Cranial nerves intact  sensory intact  motor power5/5  dtr 2+  Babinski neg        LABS:                        16.0   5.03  )-----------( 173      ( 06 Feb 2022 06:44 )             49.6     02-06    137  |  103  |  17  ----------------------------<  130<H>  4.2   |  29  |  0.63    Ca    8.2<L>      06 Feb 2022 06:44  Phos  3.7     02-06  Mg     1.9     02-06    TPro  6.9  /  Alb  2.4<L>  /  TBili  0.3  /  DBili  x   /  AST  15  /  ALT  29  /  AlkPhos  54  02-06  Ferritin, Serum: 57 ng/mL (02-01-22 @ 04:43)  Ferritin, Serum: 51 ng/mL (02-01-22 @ 00:31)      MICROBIOLOGY:    Culture - Urine (collected 02-01-22 @ 18:35)  Source: Clean Catch Clean Catch (Midstream)  Final Report (02-02-22 @ 21:39):  No growth

## 2022-02-07 ENCOUNTER — TRANSCRIPTION ENCOUNTER (OUTPATIENT)
Age: 67
End: 2022-02-07

## 2022-02-07 VITALS — OXYGEN SATURATION: 96 % | DIASTOLIC BLOOD PRESSURE: 73 MMHG | SYSTOLIC BLOOD PRESSURE: 120 MMHG | HEART RATE: 60 BPM

## 2022-02-07 LAB
ALBUMIN SERPL ELPH-MCNC: 2.4 G/DL — LOW (ref 3.5–5)
ALP SERPL-CCNC: 51 U/L — SIGNIFICANT CHANGE UP (ref 40–120)
ALT FLD-CCNC: 29 U/L DA — SIGNIFICANT CHANGE UP (ref 10–60)
ANION GAP SERPL CALC-SCNC: 5 MMOL/L — SIGNIFICANT CHANGE UP (ref 5–17)
AST SERPL-CCNC: 16 U/L — SIGNIFICANT CHANGE UP (ref 10–40)
BASOPHILS # BLD AUTO: 0 K/UL — SIGNIFICANT CHANGE UP (ref 0–0.2)
BASOPHILS NFR BLD AUTO: 0 % — SIGNIFICANT CHANGE UP (ref 0–2)
BILIRUB SERPL-MCNC: 0.4 MG/DL — SIGNIFICANT CHANGE UP (ref 0.2–1.2)
BUN SERPL-MCNC: 16 MG/DL — SIGNIFICANT CHANGE UP (ref 7–18)
CALCIUM SERPL-MCNC: 8.4 MG/DL — SIGNIFICANT CHANGE UP (ref 8.4–10.5)
CHLORIDE SERPL-SCNC: 103 MMOL/L — SIGNIFICANT CHANGE UP (ref 96–108)
CO2 SERPL-SCNC: 26 MMOL/L — SIGNIFICANT CHANGE UP (ref 22–31)
CREAT SERPL-MCNC: 0.68 MG/DL — SIGNIFICANT CHANGE UP (ref 0.5–1.3)
EOSINOPHIL # BLD AUTO: 0.04 K/UL — SIGNIFICANT CHANGE UP (ref 0–0.5)
EOSINOPHIL NFR BLD AUTO: 0.7 % — SIGNIFICANT CHANGE UP (ref 0–6)
GLUCOSE BLDC GLUCOMTR-MCNC: 137 MG/DL — HIGH (ref 70–99)
GLUCOSE BLDC GLUCOMTR-MCNC: 144 MG/DL — HIGH (ref 70–99)
GLUCOSE SERPL-MCNC: 132 MG/DL — HIGH (ref 70–99)
HCT VFR BLD CALC: 51.1 % — HIGH (ref 39–50)
HGB BLD-MCNC: 16.7 G/DL — SIGNIFICANT CHANGE UP (ref 13–17)
IMM GRANULOCYTES NFR BLD AUTO: 0.4 % — SIGNIFICANT CHANGE UP (ref 0–1.5)
LYMPHOCYTES # BLD AUTO: 0.95 K/UL — LOW (ref 1–3.3)
LYMPHOCYTES # BLD AUTO: 17.3 % — SIGNIFICANT CHANGE UP (ref 13–44)
MAGNESIUM SERPL-MCNC: 2 MG/DL — SIGNIFICANT CHANGE UP (ref 1.6–2.6)
MCHC RBC-ENTMCNC: 28.5 PG — SIGNIFICANT CHANGE UP (ref 27–34)
MCHC RBC-ENTMCNC: 32.7 GM/DL — SIGNIFICANT CHANGE UP (ref 32–36)
MCV RBC AUTO: 87.2 FL — SIGNIFICANT CHANGE UP (ref 80–100)
MONOCYTES # BLD AUTO: 0.73 K/UL — SIGNIFICANT CHANGE UP (ref 0–0.9)
MONOCYTES NFR BLD AUTO: 13.3 % — SIGNIFICANT CHANGE UP (ref 2–14)
NEUTROPHILS # BLD AUTO: 3.74 K/UL — SIGNIFICANT CHANGE UP (ref 1.8–7.4)
NEUTROPHILS NFR BLD AUTO: 68.3 % — SIGNIFICANT CHANGE UP (ref 43–77)
NRBC # BLD: 0 /100 WBCS — SIGNIFICANT CHANGE UP (ref 0–0)
PHOSPHATE SERPL-MCNC: 3.8 MG/DL — SIGNIFICANT CHANGE UP (ref 2.5–4.5)
PLATELET # BLD AUTO: 188 K/UL — SIGNIFICANT CHANGE UP (ref 150–400)
POTASSIUM SERPL-MCNC: 4.5 MMOL/L — SIGNIFICANT CHANGE UP (ref 3.5–5.3)
POTASSIUM SERPL-SCNC: 4.5 MMOL/L — SIGNIFICANT CHANGE UP (ref 3.5–5.3)
PROT SERPL-MCNC: 7.1 G/DL — SIGNIFICANT CHANGE UP (ref 6–8.3)
RBC # BLD: 5.86 M/UL — HIGH (ref 4.2–5.8)
RBC # FLD: 15 % — HIGH (ref 10.3–14.5)
SODIUM SERPL-SCNC: 134 MMOL/L — LOW (ref 135–145)
WBC # BLD: 5.48 K/UL — SIGNIFICANT CHANGE UP (ref 3.8–10.5)
WBC # FLD AUTO: 5.48 K/UL — SIGNIFICANT CHANGE UP (ref 3.8–10.5)

## 2022-02-07 PROCEDURE — 83036 HEMOGLOBIN GLYCOSYLATED A1C: CPT

## 2022-02-07 PROCEDURE — 83735 ASSAY OF MAGNESIUM: CPT

## 2022-02-07 PROCEDURE — 84100 ASSAY OF PHOSPHORUS: CPT

## 2022-02-07 PROCEDURE — 83615 LACTATE (LD) (LDH) ENZYME: CPT

## 2022-02-07 PROCEDURE — 87641 MR-STAPH DNA AMP PROBE: CPT

## 2022-02-07 PROCEDURE — 87086 URINE CULTURE/COLONY COUNT: CPT

## 2022-02-07 PROCEDURE — 82550 ASSAY OF CK (CPK): CPT

## 2022-02-07 PROCEDURE — 93005 ELECTROCARDIOGRAM TRACING: CPT

## 2022-02-07 PROCEDURE — 99285 EMERGENCY DEPT VISIT HI MDM: CPT | Mod: 25

## 2022-02-07 PROCEDURE — 71045 X-RAY EXAM CHEST 1 VIEW: CPT

## 2022-02-07 PROCEDURE — 82728 ASSAY OF FERRITIN: CPT

## 2022-02-07 PROCEDURE — 80053 COMPREHEN METABOLIC PANEL: CPT

## 2022-02-07 PROCEDURE — 36415 COLL VENOUS BLD VENIPUNCTURE: CPT

## 2022-02-07 PROCEDURE — 80048 BASIC METABOLIC PNL TOTAL CA: CPT

## 2022-02-07 PROCEDURE — 85027 COMPLETE CBC AUTOMATED: CPT

## 2022-02-07 PROCEDURE — 87635 SARS-COV-2 COVID-19 AMP PRB: CPT

## 2022-02-07 PROCEDURE — 86140 C-REACTIVE PROTEIN: CPT

## 2022-02-07 PROCEDURE — 83930 ASSAY OF BLOOD OSMOLALITY: CPT

## 2022-02-07 PROCEDURE — 81001 URINALYSIS AUTO W/SCOPE: CPT

## 2022-02-07 PROCEDURE — 85025 COMPLETE CBC W/AUTO DIFF WBC: CPT

## 2022-02-07 PROCEDURE — 85730 THROMBOPLASTIN TIME PARTIAL: CPT

## 2022-02-07 PROCEDURE — 82962 GLUCOSE BLOOD TEST: CPT

## 2022-02-07 PROCEDURE — 85379 FIBRIN DEGRADATION QUANT: CPT

## 2022-02-07 PROCEDURE — 87640 STAPH A DNA AMP PROBE: CPT

## 2022-02-07 PROCEDURE — 83880 ASSAY OF NATRIURETIC PEPTIDE: CPT

## 2022-02-07 PROCEDURE — 84484 ASSAY OF TROPONIN QUANT: CPT

## 2022-02-07 PROCEDURE — 86803 HEPATITIS C AB TEST: CPT

## 2022-02-07 PROCEDURE — 85610 PROTHROMBIN TIME: CPT

## 2022-02-07 PROCEDURE — 82803 BLOOD GASES ANY COMBINATION: CPT

## 2022-02-07 PROCEDURE — 84145 PROCALCITONIN (PCT): CPT

## 2022-02-07 RX ORDER — ALBUTEROL 90 UG/1
2 AEROSOL, METERED ORAL
Qty: 0 | Refills: 0 | DISCHARGE
Start: 2022-02-07

## 2022-02-07 RX ORDER — INSULIN LISPRO 100/ML
0 VIAL (ML) SUBCUTANEOUS
Qty: 0 | Refills: 0 | DISCHARGE
Start: 2022-02-07

## 2022-02-07 RX ORDER — DEXAMETHASONE 0.5 MG/5ML
1 ELIXIR ORAL
Qty: 0 | Refills: 0 | DISCHARGE
Start: 2022-02-07

## 2022-02-07 RX ADMIN — Medication 100 MILLIGRAM(S): at 13:06

## 2022-02-07 RX ADMIN — OXYCODONE AND ACETAMINOPHEN 1 TABLET(S): 5; 325 TABLET ORAL at 09:24

## 2022-02-07 RX ADMIN — FAMOTIDINE 20 MILLIGRAM(S): 10 INJECTION INTRAVENOUS at 05:50

## 2022-02-07 RX ADMIN — Medication 60 MILLIGRAM(S): at 05:50

## 2022-02-07 RX ADMIN — ENOXAPARIN SODIUM 100 MILLIGRAM(S): 100 INJECTION SUBCUTANEOUS at 17:05

## 2022-02-07 RX ADMIN — POLYETHYLENE GLYCOL 3350 17 GRAM(S): 17 POWDER, FOR SOLUTION ORAL at 11:02

## 2022-02-07 RX ADMIN — OXYCODONE AND ACETAMINOPHEN 1 TABLET(S): 5; 325 TABLET ORAL at 03:28

## 2022-02-07 RX ADMIN — ENOXAPARIN SODIUM 100 MILLIGRAM(S): 100 INJECTION SUBCUTANEOUS at 05:54

## 2022-02-07 RX ADMIN — OXYCODONE AND ACETAMINOPHEN 1 TABLET(S): 5; 325 TABLET ORAL at 18:59

## 2022-02-07 RX ADMIN — CLOPIDOGREL BISULFATE 75 MILLIGRAM(S): 75 TABLET, FILM COATED ORAL at 11:02

## 2022-02-07 RX ADMIN — OXYCODONE AND ACETAMINOPHEN 1 TABLET(S): 5; 325 TABLET ORAL at 17:40

## 2022-02-07 RX ADMIN — Medication 5 MILLIGRAM(S): at 05:50

## 2022-02-07 RX ADMIN — OXYCODONE AND ACETAMINOPHEN 1 TABLET(S): 5; 325 TABLET ORAL at 04:31

## 2022-02-07 RX ADMIN — Medication 100 MILLIGRAM(S): at 05:50

## 2022-02-07 RX ADMIN — Medication 25 MILLIGRAM(S): at 17:05

## 2022-02-07 RX ADMIN — Medication 100 MILLIGRAM(S): at 11:02

## 2022-02-07 RX ADMIN — FAMOTIDINE 20 MILLIGRAM(S): 10 INJECTION INTRAVENOUS at 17:05

## 2022-02-07 RX ADMIN — Medication 5 MILLIGRAM(S): at 17:05

## 2022-02-07 RX ADMIN — Medication 6 MILLIGRAM(S): at 05:50

## 2022-02-07 RX ADMIN — OXYCODONE AND ACETAMINOPHEN 1 TABLET(S): 5; 325 TABLET ORAL at 10:30

## 2022-02-07 NOTE — PROGRESS NOTE ADULT - SUBJECTIVE AND OBJECTIVE BOX
PULMONARY  progress note    IRENE MALHOTRA  MRN-[id number]    Patient is a 67y old  Male who presents with a chief complaint of Hypoxia (06 Feb 2022 18:07)  Slight cough, no sob, Feels good   Anxious to go home      MEDICATIONS  (STANDING):  atorvastatin 10 milliGRAM(s) Oral at bedtime  baclofen 5 milliGRAM(s) Oral two times a day  clopidogrel Tablet 75 milliGRAM(s) Oral daily  dexAMETHasone     Tablet 6 milliGRAM(s) Oral daily  dextrose 40% Gel 15 Gram(s) Oral once    enoxaparin Injectable 100 milliGRAM(s) SubCutaneous two times a day  famotidine    Tablet 20 milliGRAM(s) Oral two times a day  glucagon  Injectable 1 milliGRAM(s) IntraMuscular once  insulin lispro (ADMELOG) corrective regimen sliding scale     metoprolol tartrate 25 milliGRAM(s) Oral two times a day  NIFEdipine XL 60 milliGRAM(s) Oral daily  polyethylene glycol 3350 17 Gram(s) Oral daily  pregabalin 100 milliGRAM(s) Oral three times a day  senna 2 Tablet(s) Oral at bedtime  tamsulosin 0.4 milliGRAM(s) Oral at bedtime  thiamine 100 milliGRAM(s) Oral daily          Allergies    morphine (Unknown)  penicillin (Unknown)  shellfish (Unknown)            PAST MEDICAL & SURGICAL HISTORY:  COPD (chronic obstructive pulmonary disease)  Diabetes  PVD (peripheral vascular disease)  Chronic atrial fibrillation  GERD (gastroesophageal reflux disease)  MDD (major depressive disorder)  BPH (benign prostatic hyperplasia)             REVIEW OF SYSTEMS:  CONSTITUTIONAL: No fever, weight loss, or fatigue   EYES: No eye pain, visual disturbances, or discharge  ENT:  No difficulty hearing, tinnitus, vertigo; No sinus or throat pain  NECK: No pain or stiffness or nodes  RESPIRATORY:  cough+   wheezing--   chills--   hemoptysis--  Shortness of Breath--  CARDIOVASCULAR: No chest pain, palpitations, passing out, dizziness, or leg swelling  GASTROINTESTINAL: No abdominal or epigastric pain.   GENITOURINARY: No dysuria, frequency, hematuria, or incontinence  NEUROLOGICAL: No headaches, memory loss, loss of strength, numbness, or tremors  SKIN: No itching, burning, rashes, or lesions   LYMPH Nodes: No enlarged glands  ENDOCRINE: No heat or cold intolerance; No hair loss  HEME/LYMPH: No easy bruising, or bleeding gums  ALLERGY AND IMMUNOLOGIC: No hives or eczema        Vital Signs Last 24 Hrs  T(C): 36.4 (07 Feb 2022 05:32), Max: 36.6 (06 Feb 2022 12:41)  T(F): 97.6 (07 Feb 2022 05:32), Max: 97.8 (06 Feb 2022 12:41)  HR: 58 (07 Feb 2022 05:32) (56 - 61)  BP: 102/70 (07 Feb 2022 05:32) (102/70 - 121/71)  BP(mean): --  RR: 18 (07 Feb 2022 05:32) (18 - 19)  SpO2: 95% (07 Feb 2022 05:32) (94% - 97%)  I&O's Detail    07 Feb 2022 07:01  -  07 Feb 2022 10:32  --------------------------------------------------------  IN:  Total IN: 0 mL    OUT:    Voided (mL): 300 mL  Total OUT: 300 mL    Total NET: -300 mL          PHYSICAL EXAMINATION:    GENERAL: The patient is a well-developed, well-nourished in no apparent distress.   SKIN no rash ecchymoses or bruises  HEENT: Head is normocephalic and atraumatic  EVELYN , Extraocular muscles are intact.   Neck supple ,No LN felt JVP not increased  Thyroid not enlarged  Cardiovascular:  S1 S2 heard, RSR, No JVD , systolic  murmur at apex, No gallop or rub  Respiratory: Chest wall symmetrical with good air entry ,Percussion note normal,    Lungs vesicular breathing with  rt basilar fine  rales, no    wheeze	  ABDOMEN:  Soft, Non-tender, obese,   no hepatomegaly or splenomegaly BS positive	  Extremities: Normal range of motion, No clubbing, cyanosis or edema  Vascular: Peripheral pulses palpable 2+ bilaterally  CNS:  Alert and oriented x 3   Cranial nerves intact  sensory intact  motor power 5/5  dtr 2+  Babinski neg        LABS:                        16.7   5.48  )-----------( 188      ( 07 Feb 2022 07:22 )             51.1     02-07    134<L>  |  103  |  16  ----------------------------<  132<H>  4.5   |  26  |  0.68    Ca    8.4      07 Feb 2022 07:22  Phos  3.8     02-07  Mg     2.0     02-07    TPro  7.1  /  Alb  2.4<L>  /  TBili  0.4  /  DBili  x   /  AST  16  /  ALT  29  /  AlkPhos  51  02-07    Ferritin, Serum: 57 ng/mL (02-01-22 @ 04:43)  Ferritin, Serum: 51 ng/mL (02-01-22 @ 00:31)  Covid 19 + 2 days ago    MICROBIOLOGY:    Culture - Urine (collected 02-01-22 @ 18:35)  Source: Clean Catch Clean Catch (Midstream)  Final Report (02-02-22 @ 21:39):  No growth          RADIOLOGY & ADDITIONAL STUDIES:    CXR: < from: Xray Chest 1 View- PORTABLE-Routine (Xray Chest 1 View- PORTABLE-Routine .) (02.04.22 @ 09:24) >  No change heart mediastinum. Bibasilar increased markings   similar to prior. Prominent left hilum , No pleural effusion or pneumothorax.

## 2022-02-07 NOTE — DISCHARGE NOTE NURSING/CASE MANAGEMENT/SOCIAL WORK - NSDCPEFALRISK_GEN_ALL_CORE
For information on Fall & Injury Prevention, visit: [email or web address] OR  [email or web address] OR  https://www.cdc.gov/steadi/patient.html

## 2022-02-07 NOTE — PROGRESS NOTE ADULT - SUBJECTIVE AND OBJECTIVE BOX
DATE OF SERVICE:  2/7/2022  Patient was seen and examined on  2/7/2022   .Interim events noted.Consultant notes ,Labs,Telemetry reviewed by me    PRESENTING CC: hypoxia    HPI and HOSPITAL COURSE: HPI:  Pt is a 68 yo vaccinated M from [facility] with PMHx of DM, HTN, HLD, AF on eliquis, PVD presenting with hypoxia. Patient tested +ve for COVID three days ago, since then has experienced progressively worsening shortness of breath, productive cough, and fever. According to NH papers, patient was saturating 76% on RA. In the ED patient saturating 90% on 6L NC. Patient received 60mg of prednisone at NH. Patient also complains of chronic leg pain. He denies chest pain, N/V/D, headache, hemoptysis. (31 Jan 2022 18:30)      INTERIM EVENTS:Patient seen at bedside interim events noted.      PMH -reviewed admission note, no change since admission  Heart Failure: Acute [ ] Chronic [ ] Acute on Chronic [ ] Diastolic [ ] Systolic [ ] Combined Systolic and Diastolic[ ]  DILCIA[ ]  ATN[ ]  CKD I [ ] CKDII [ ] CKD III [ ] CKD IV [ ] CKD V [ ] ESRD[ ]  HTN[ ] CVA[ ] DM[ ] COPD[ ] COVID[ ] AF[ ]  PPM[ ] ICD[ ]    MEDICATIONS  (STANDING):  atorvastatin 10 milliGRAM(s) Oral at bedtime  baclofen 5 milliGRAM(s) Oral two times a day  clopidogrel Tablet 75 milliGRAM(s) Oral daily  dexAMETHasone     Tablet 6 milliGRAM(s) Oral daily  dextrose 40% Gel 15 Gram(s) Oral once  dextrose 5%. 1000 milliLiter(s) (50 mL/Hr) IV Continuous <Continuous>  dextrose 5%. 1000 milliLiter(s) (100 mL/Hr) IV Continuous <Continuous>  dextrose 50% Injectable 25 Gram(s) IV Push once  dextrose 50% Injectable 12.5 Gram(s) IV Push once  dextrose 50% Injectable 25 Gram(s) IV Push once  enoxaparin Injectable 100 milliGRAM(s) SubCutaneous two times a day  famotidine    Tablet 20 milliGRAM(s) Oral two times a day  glucagon  Injectable 1 milliGRAM(s) IntraMuscular once  insulin lispro (ADMELOG) corrective regimen sliding scale   SubCutaneous three times a day before meals  metoprolol tartrate 25 milliGRAM(s) Oral two times a day  NIFEdipine XL 60 milliGRAM(s) Oral daily  polyethylene glycol 3350 17 Gram(s) Oral daily  pregabalin 100 milliGRAM(s) Oral three times a day  senna 2 Tablet(s) Oral at bedtime  tamsulosin 0.4 milliGRAM(s) Oral at bedtime  thiamine 100 milliGRAM(s) Oral daily    MEDICATIONS  (PRN):  acetaminophen     Tablet .. 650 milliGRAM(s) Oral every 6 hours PRN Temp greater or equal to 38C (100.4F), Mild Pain (1 - 3)  ALBUTerol    90 MICROgram(s) HFA Inhaler 2 Puff(s) Inhalation every 6 hours PRN Shortness of Breath  oxycodone    5 mG/acetaminophen 325 mG 1 Tablet(s) Oral every 4 hours PRN Moderate Pain (4 - 6)            REVIEW OF SYSTEMS:  Constitutional: [ ] fever, [ ]weight loss,  [ ]fatigue  Eyes: [ ] visual changes  Respiratory: [ ]shortness of breath;  [ ] cough, [ ]wheezing, [ ]chills, [ ]hemoptysis  Cardiovascular: [ ] chest pain, [ ]palpitations, [ ]dizziness,  [ ]leg swelling[ ]orthopnea[ ]PND  Gastrointestinal: [ ] abdominal pain, [ ]nausea, [ ]vomiting,  [ ]diarrhea [ ]Constipation [ ]Melena  Genitourinary: [ ] dysuria, [ ] hematuria [ ]Cardoza  Neurologic: [ ] headaches [ ] tremors[ ]weakness [ ]Paralysis Right[ ] Left[ ]  Skin: [ ] itching, [ ]burning, [ ] rashes  Endocrine: [ ] heat or cold intolerance  Musculoskeletal: [ ] joint pain or swelling; [ ] muscle, back, or extremity pain  Psychiatric: [ ] depression, [ ]anxiety, [ ]mood swings, or [ ]difficulty sleeping  Hematologic: [ ] easy bruising, [ ] bleeding gums    [ ] All remaining systems negative except as per above.   [ ]Unable to obtain.  [x] No change in ROS since admission      Vital Signs Last 24 Hrs  T(C): 36.1 (07 Feb 2022 12:30), Max: 36.4 (07 Feb 2022 05:32)  T(F): 97 (07 Feb 2022 12:30), Max: 97.6 (07 Feb 2022 05:32)  HR: 55 (07 Feb 2022 12:30) (55 - 58)  BP: 115/66 (07 Feb 2022 12:30) (102/70 - 121/71)  BP(mean): --  RR: 18 (07 Feb 2022 12:30) (18 - 19)  SpO2: 95% (07 Feb 2022 12:30) (95% - 97%)  I&O's Summary    07 Feb 2022 07:01  -  07 Feb 2022 17:02  --------------------------------------------------------  IN: 0 mL / OUT: 300 mL / NET: -300 mL        PHYSICAL EXAM:  General: No acute distress BMI-  HEENT: EOMI, PERRL  Neck: Supple, [ ] JVD  Lungs: Equal air entry bilaterally; [ ] rales [ ] wheezing [ ] rhonchi  Heart: Regular rate and rhythm; [ ] murmur   /6 [ ] systolic [ ] diastolic [ ] radiation[ ] rubs [ ]  gallops  Abdomen: Nontender, bowel sounds present  Extremities: No clubbing, cyanosis, [ ] edema [ ]Pulses  equal and intact  Nervous system:  Alert & Oriented X3, no focal deficits  Psychiatric: Normal affect  Skin: No rashes or lesions    LABS:  02-07    134<L>  |  103  |  16  ----------------------------<  132<H>  4.5   |  26  |  0.68    Ca    8.4      07 Feb 2022 07:22  Phos  3.8     02-07  Mg     2.0     02-07    TPro  7.1  /  Alb  2.4<L>  /  TBili  0.4  /  DBili  x   /  AST  16  /  ALT  29  /  AlkPhos  51  02-07    Creatinine Trend: 0.68<--, 0.63<--, 0.66<--, 0.62<--, 0.64<--, 0.69<--                        16.7   5.48  )-----------( 188      ( 07 Feb 2022 07:22 )             51.1         Cardiac Enzymes:

## 2022-02-07 NOTE — PROGRESS NOTE ADULT - TIME BILLING
- Review of records, telemetry, vital signs and daily labs.   - General and cardiovascular physical examination.  - Generation of cardiovascular treatment plan.  - Coordination of care.      Patient was seen and examined by me on 2/5/2022,interim events noted,labs and radiology studies reviewed.  Nasir Sharma MD,FACC.  [address] [place], [place] [id number].  [phone]
I have examined pt personally Hx chart lab and xrays reviewed and pt discussed with NP and Dr Sharma
- Review of records, telemetry, vital signs and daily labs.   - General and cardiovascular physical examination.  - Generation of cardiovascular treatment plan.  - Coordination of care.      Patient was seen and examined by me on 2/6/2022,interim events noted,labs and radiology studies reviewed.  Nasir Sharma MD,FACC.  [address] [place], [place] [id number].  [phone]
- Review of records, telemetry, vital signs and daily labs.   - General and cardiovascular physical examination.  - Generation of cardiovascular treatment plan.  - Coordination of care.      Patient was seen and examined by me on 2/7/2022,interim events noted,labs and radiology studies reviewed.  Nasir Sharma MD,FACC.  [address] [place], [place] [id number].  [phone]
- Review of records, telemetry, vital signs and daily labs.   - General and cardiovascular physical examination.  - Generation of cardiovascular treatment plan.  - Coordination of care.      Patient was seen and examined by me on 2/2/2022,interim events noted,labs and radiology studies reviewed.  Nasir Sharma MD,FACC.  [address] [place], [place] [id number].  [phone]
- Review of records, telemetry, vital signs and daily labs.   - General and cardiovascular physical examination.  - Generation of cardiovascular treatment plan.  - Coordination of care.      Patient was seen and examined by me on 2/3/2022,interim events noted,labs and radiology studies reviewed.  Nasir Sharma MD,FACC.  [address] [place], [place] [id number].  [phone]
- Review of records, telemetry, vital signs and daily labs.   - General and cardiovascular physical examination.  - Generation of cardiovascular treatment plan.  - Coordination of care.      Patient was seen and examined by me on 2/1/2022,interim events noted,labs and radiology studies reviewed.  Nasir Sharma MD,FACC.  [address] [place], [place] [id number].  [phone]
- Review of records, telemetry, vital signs and daily labs.   - General and cardiovascular physical examination.  - Generation of cardiovascular treatment plan.  - Coordination of care.      Patient was seen and examined by me on 2/4/2022,interim events noted,labs and radiology studies reviewed.  Nasir Sharma MD,FACC.  [address] [place], [place] [id number].  [phone]

## 2022-02-07 NOTE — PROGRESS NOTE ADULT - PROBLEM SELECTOR PLAN 3
resolved
Appeared dry on admission  - S/p 1L bolus & IV hydration  - IVF DC'd today  - Continue to monitor sodium and treat accordingly  - BMP in AM-f/u results
resolved

## 2022-02-07 NOTE — PROGRESS NOTE ADULT - ASSESSMENT
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
Patient is a 67 year old male, vaccinated, from [facility] with PMHx of DM, HTN, HLD, AF on eliquis, PVD. COVID positive since 1/28. Patient presented with hypoxia, experienced progressively worsening shortness of breath, productive cough, and fever. According to NH papers, patient was saturating 76% on RA. In the ED patient saturating 90% on 6L NC. CXray with bilat infiltrates. Patient admitted to medicine for acute hypoxic respiratory failure secondary to COVID Pneumonia, Patient started on Remdesivir and dexamethasone. Pulmonology consulted.    
68 y/o male, vaccinated, from [facility] with PMHx of DM, HTN, HLD, AF on eliquis, PVD presenting with hypoxia. Pt initially tested positive on 1/28 and since then has experienced progressively worsening shortness of breath, productive cough, and fever. According to NH papers, patient was saturating 76% on RA. In the ED patient saturating 90% on 6L NC. Admitted for acute hypoxic respiratory failure 2/2 covid pna, on remdesivir and decadron.   
Patient is a 67 year old male, vaccinated, from [facility] with PMHx of DM, HTN, HLD, AF on eliquis, PVD. COVID positive since 1/28. Patient presented with hypoxia, experienced progressively worsening shortness of breath, productive cough, and fever. According to NH papers, patient was saturating 76% on RA. In the ED patient saturating 90% on 6L NC. CXray with bilat infiltrates. Patient admitted to medicine for acute hypoxic respiratory failure secondary to COVID Pneumonia, Patient started on Remdesivir and dexamethasone. Pulmonology consulted.    
Patient is a 67 year old male, vaccinated, from [facility] with PMHx of DM, HTN, HLD, AF on eliquis, PVD. COVID positive since 1/28. Patient presented with hypoxia, experienced progressively worsening shortness of breath, productive cough, and fever. According to NH papers, patient was saturating 76% on RA. In the ED patient saturating 90% on 6L NC. CXray with bilat infiltrates. Patient admitted to medicine for acute hypoxic respiratory failure secondary to COVID Pneumonia, Patient started on Remdesivir and dexamethasone. Pulmonology consulted.    
Patient is a 67 year old male, vaccinated, from [facility] with PMHx of DM, HTN, HLD, AF on eliquis, PVD. COVID positive since 1/28. Patient presented with hypoxia, experienced progressively worsening shortness of breath, productive cough, and fever. According to NH papers, patient was saturating 76% on RA. In the ED patient saturating 90% on 6L NC. CXray with bilat infiltrates. Patient admitted to medicine for acute hypoxic respiratory failure secondary to COVID Pneumonia, Patient started on Remdesivir and dexamethasone. Pulmonology consulted.    
68 y/o male, vaccinated, from [facility] with PMHx of DM, HTN, HLD, AF on eliquis, PVD presenting with hypoxia. Pt initially tested positive on 1/28 and since then has experienced progressively worsening shortness of breath, productive cough, and fever. According to NH papers, patient was saturating 76% on RA. In the ED patient saturating 90% on 6L NC. CXr with bilat infiltrates. Admitted for acute hypoxic respiratory failure 2/2 covid pna, on remdesivir and decadron. Pulm following  
66yo vaccinated Male from [facility] with PMHx of DM, HTN, HLD, AF on eliquis, PVD.   Presented with hypoxia.   Admitted for Acute Hypoxic Respiratory Failure 2/2 COVID Pneumonia

## 2022-02-07 NOTE — DISCHARGE NOTE NURSING/CASE MANAGEMENT/SOCIAL WORK - NSDCPEPTCAREGIVEDUMATLIST _GEN_ALL_CORE
Diabetes/Influenza Vaccination/Coronavirus/COVID19 Diabetes/Influenza Vaccination/Apixaban/Eliquis/Coronavirus/COVID19

## 2022-02-07 NOTE — PROGRESS NOTE ADULT - PROVIDER SPECIALTY LIST ADULT
Internal Medicine
Pulmonology
Internal Medicine
Pulmonology
Pulmonology
Internal Medicine

## 2022-02-07 NOTE — DISCHARGE NOTE NURSING/CASE MANAGEMENT/SOCIAL WORK - PATIENT PORTAL LINK FT
You can access the FollowMyHealth Patient Portal offered by [facility] by registering at the following website: [email or web address]. By joining [facility]’s FollowMyHealth portal, you will also be able to view your health information using other applications (apps) compatible with our system.

## 2022-02-08 LAB — GLUCOSE BLDC GLUCOMTR-MCNC: 127 MG/DL — HIGH (ref 70–99)

## 2022-05-26 NOTE — PROCEDURE NOTE - PROCEDURE DATE TIME, MLM
76 y.o. WM Not as well as wants to be Feels like reflux is worse Will be there more constant than not Has been religiously good about diet Has even cut out ETOH Cut out chocolate There all the time and periodically got worse Also seeing cardiologist b/c had fainting spell Had heart monitor for 2 weeks Also problem moving bowels - more than difficult than prior - using stool softeners Mostly in chest -- from neck down -- only 10% in throat
05-Feb-2022 18:00

## 2022-12-08 ENCOUNTER — INPATIENT (INPATIENT)
Facility: HOSPITAL | Age: 67
LOS: 12 days | Discharge: NURSING, REHAB OR LONG-TERM CARE | DRG: 445 | End: 2022-12-21
Attending: INTERNAL MEDICINE | Admitting: INTERNAL MEDICINE
Payer: MEDICARE

## 2022-12-08 VITALS
RESPIRATION RATE: 17 BRPM | SYSTOLIC BLOOD PRESSURE: 124 MMHG | DIASTOLIC BLOOD PRESSURE: 88 MMHG | HEART RATE: 98 BPM | OXYGEN SATURATION: 98 % | WEIGHT: 125 LBS | TEMPERATURE: 98 F

## 2022-12-08 DIAGNOSIS — R79.89 OTHER SPECIFIED ABNORMAL FINDINGS OF BLOOD CHEMISTRY: ICD-10-CM

## 2022-12-08 DIAGNOSIS — R11.10 VOMITING, UNSPECIFIED: ICD-10-CM

## 2022-12-08 DIAGNOSIS — I48.20 CHRONIC ATRIAL FIBRILLATION, UNSPECIFIED: ICD-10-CM

## 2022-12-08 DIAGNOSIS — E11.9 TYPE 2 DIABETES MELLITUS WITHOUT COMPLICATIONS: ICD-10-CM

## 2022-12-08 DIAGNOSIS — I10 ESSENTIAL (PRIMARY) HYPERTENSION: ICD-10-CM

## 2022-12-08 DIAGNOSIS — Z29.9 ENCOUNTER FOR PROPHYLACTIC MEASURES, UNSPECIFIED: ICD-10-CM

## 2022-12-08 DIAGNOSIS — E78.5 HYPERLIPIDEMIA, UNSPECIFIED: ICD-10-CM

## 2022-12-08 LAB
ALBUMIN SERPL ELPH-MCNC: 3 G/DL — LOW (ref 3.5–5)
ALP SERPL-CCNC: 328 U/L — HIGH (ref 40–120)
ALT FLD-CCNC: 130 U/L DA — HIGH (ref 10–60)
ANION GAP SERPL CALC-SCNC: 9 MMOL/L — SIGNIFICANT CHANGE UP (ref 5–17)
AST SERPL-CCNC: 274 U/L — HIGH (ref 10–40)
BASOPHILS # BLD AUTO: 0.04 K/UL — SIGNIFICANT CHANGE UP (ref 0–0.2)
BASOPHILS NFR BLD AUTO: 0.5 % — SIGNIFICANT CHANGE UP (ref 0–2)
BILIRUB SERPL-MCNC: 1.2 MG/DL — SIGNIFICANT CHANGE UP (ref 0.2–1.2)
BUN SERPL-MCNC: 14 MG/DL — SIGNIFICANT CHANGE UP (ref 7–18)
CALCIUM SERPL-MCNC: 9 MG/DL — SIGNIFICANT CHANGE UP (ref 8.4–10.5)
CHLORIDE SERPL-SCNC: 94 MMOL/L — LOW (ref 96–108)
CK SERPL-CCNC: 43 U/L — SIGNIFICANT CHANGE UP (ref 35–232)
CO2 SERPL-SCNC: 33 MMOL/L — HIGH (ref 22–31)
CREAT SERPL-MCNC: 0.78 MG/DL — SIGNIFICANT CHANGE UP (ref 0.5–1.3)
EGFR: 97 ML/MIN/1.73M2 — SIGNIFICANT CHANGE UP
EOSINOPHIL # BLD AUTO: 0.01 K/UL — SIGNIFICANT CHANGE UP (ref 0–0.5)
EOSINOPHIL NFR BLD AUTO: 0.1 % — SIGNIFICANT CHANGE UP (ref 0–6)
ETHANOL SERPL-MCNC: <3 MG/DL — SIGNIFICANT CHANGE UP (ref 0–10)
GLUCOSE SERPL-MCNC: 138 MG/DL — HIGH (ref 70–99)
HCT VFR BLD CALC: 47.5 % — SIGNIFICANT CHANGE UP (ref 39–50)
HGB BLD-MCNC: 16.1 G/DL — SIGNIFICANT CHANGE UP (ref 13–17)
IMM GRANULOCYTES NFR BLD AUTO: 0.4 % — SIGNIFICANT CHANGE UP (ref 0–0.9)
LIDOCAIN IGE QN: 114 U/L — SIGNIFICANT CHANGE UP (ref 73–393)
LYMPHOCYTES # BLD AUTO: 0.84 K/UL — LOW (ref 1–3.3)
LYMPHOCYTES # BLD AUTO: 10.9 % — LOW (ref 13–44)
MAGNESIUM SERPL-MCNC: 1.4 MG/DL — LOW (ref 1.6–2.6)
MCHC RBC-ENTMCNC: 31.8 PG — SIGNIFICANT CHANGE UP (ref 27–34)
MCHC RBC-ENTMCNC: 33.9 GM/DL — SIGNIFICANT CHANGE UP (ref 32–36)
MCV RBC AUTO: 93.9 FL — SIGNIFICANT CHANGE UP (ref 80–100)
MONOCYTES # BLD AUTO: 0.6 K/UL — SIGNIFICANT CHANGE UP (ref 0–0.9)
MONOCYTES NFR BLD AUTO: 7.8 % — SIGNIFICANT CHANGE UP (ref 2–14)
NEUTROPHILS # BLD AUTO: 6.2 K/UL — SIGNIFICANT CHANGE UP (ref 1.8–7.4)
NEUTROPHILS NFR BLD AUTO: 80.3 % — HIGH (ref 43–77)
NRBC # BLD: 0 /100 WBCS — SIGNIFICANT CHANGE UP (ref 0–0)
PLATELET # BLD AUTO: 208 K/UL — SIGNIFICANT CHANGE UP (ref 150–400)
POTASSIUM SERPL-MCNC: 4.2 MMOL/L — SIGNIFICANT CHANGE UP (ref 3.5–5.3)
POTASSIUM SERPL-SCNC: 4.2 MMOL/L — SIGNIFICANT CHANGE UP (ref 3.5–5.3)
PROT SERPL-MCNC: 9 G/DL — HIGH (ref 6–8.3)
RBC # BLD: 5.06 M/UL — SIGNIFICANT CHANGE UP (ref 4.2–5.8)
RBC # FLD: 16.4 % — HIGH (ref 10.3–14.5)
SARS-COV-2 RNA SPEC QL NAA+PROBE: SIGNIFICANT CHANGE UP
SODIUM SERPL-SCNC: 136 MMOL/L — SIGNIFICANT CHANGE UP (ref 135–145)
TROPONIN I, HIGH SENSITIVITY RESULT: 27.7 NG/L — SIGNIFICANT CHANGE UP
WBC # BLD: 7.72 K/UL — SIGNIFICANT CHANGE UP (ref 3.8–10.5)
WBC # FLD AUTO: 7.72 K/UL — SIGNIFICANT CHANGE UP (ref 3.8–10.5)

## 2022-12-08 PROCEDURE — 99284 EMERGENCY DEPT VISIT MOD MDM: CPT

## 2022-12-08 PROCEDURE — 74177 CT ABD & PELVIS W/CONTRAST: CPT | Mod: 26,MA

## 2022-12-08 RX ORDER — SODIUM CHLORIDE 9 MG/ML
1000 INJECTION INTRAMUSCULAR; INTRAVENOUS; SUBCUTANEOUS ONCE
Refills: 0 | Status: COMPLETED | OUTPATIENT
Start: 2022-12-08 | End: 2022-12-08

## 2022-12-08 RX ORDER — ONDANSETRON 8 MG/1
4 TABLET, FILM COATED ORAL ONCE
Refills: 0 | Status: COMPLETED | OUTPATIENT
Start: 2022-12-08 | End: 2022-12-09

## 2022-12-08 RX ORDER — NIFEDIPINE 30 MG
60 TABLET, EXTENDED RELEASE 24 HR ORAL DAILY
Refills: 0 | Status: DISCONTINUED | OUTPATIENT
Start: 2022-12-08 | End: 2022-12-21

## 2022-12-08 RX ORDER — ATORVASTATIN CALCIUM 80 MG/1
10 TABLET, FILM COATED ORAL AT BEDTIME
Refills: 0 | Status: DISCONTINUED | OUTPATIENT
Start: 2022-12-08 | End: 2022-12-21

## 2022-12-08 RX ORDER — OXYCODONE AND ACETAMINOPHEN 5; 325 MG/1; MG/1
1 TABLET ORAL EVERY 4 HOURS
Refills: 0 | Status: DISCONTINUED | OUTPATIENT
Start: 2022-12-08 | End: 2022-12-15

## 2022-12-08 RX ORDER — OXYCODONE AND ACETAMINOPHEN 5; 325 MG/1; MG/1
1 TABLET ORAL ONCE
Refills: 0 | Status: DISCONTINUED | OUTPATIENT
Start: 2022-12-08 | End: 2022-12-08

## 2022-12-08 RX ORDER — TAMSULOSIN HYDROCHLORIDE 0.4 MG/1
0.4 CAPSULE ORAL AT BEDTIME
Refills: 0 | Status: DISCONTINUED | OUTPATIENT
Start: 2022-12-08 | End: 2022-12-21

## 2022-12-08 RX ORDER — SODIUM CHLORIDE 9 MG/ML
1000 INJECTION INTRAMUSCULAR; INTRAVENOUS; SUBCUTANEOUS
Refills: 0 | Status: DISCONTINUED | OUTPATIENT
Start: 2022-12-08 | End: 2022-12-21

## 2022-12-08 RX ORDER — ONDANSETRON 8 MG/1
4 TABLET, FILM COATED ORAL ONCE
Refills: 0 | Status: COMPLETED | OUTPATIENT
Start: 2022-12-08 | End: 2022-12-08

## 2022-12-08 RX ORDER — BACLOFEN 100 %
5 POWDER (GRAM) MISCELLANEOUS EVERY 12 HOURS
Refills: 0 | Status: DISCONTINUED | OUTPATIENT
Start: 2022-12-08 | End: 2022-12-21

## 2022-12-08 RX ORDER — POLYETHYLENE GLYCOL 3350 17 G/17G
17 POWDER, FOR SOLUTION ORAL DAILY
Refills: 0 | Status: DISCONTINUED | OUTPATIENT
Start: 2022-12-08 | End: 2022-12-21

## 2022-12-08 RX ORDER — ENOXAPARIN SODIUM 100 MG/ML
55 INJECTION SUBCUTANEOUS EVERY 12 HOURS
Refills: 0 | Status: DISCONTINUED | OUTPATIENT
Start: 2022-12-08 | End: 2022-12-13

## 2022-12-08 RX ORDER — FAMOTIDINE 10 MG/ML
20 INJECTION INTRAVENOUS
Refills: 0 | Status: DISCONTINUED | OUTPATIENT
Start: 2022-12-08 | End: 2022-12-21

## 2022-12-08 RX ORDER — METOPROLOL TARTRATE 50 MG
25 TABLET ORAL
Refills: 0 | Status: DISCONTINUED | OUTPATIENT
Start: 2022-12-08 | End: 2022-12-21

## 2022-12-08 RX ORDER — SENNA PLUS 8.6 MG/1
2 TABLET ORAL AT BEDTIME
Refills: 0 | Status: DISCONTINUED | OUTPATIENT
Start: 2022-12-08 | End: 2022-12-21

## 2022-12-08 RX ADMIN — ATORVASTATIN CALCIUM 10 MILLIGRAM(S): 80 TABLET, FILM COATED ORAL at 22:10

## 2022-12-08 RX ADMIN — SENNA PLUS 2 TABLET(S): 8.6 TABLET ORAL at 22:10

## 2022-12-08 RX ADMIN — Medication 100 MILLIGRAM(S): at 22:29

## 2022-12-08 RX ADMIN — OXYCODONE AND ACETAMINOPHEN 1 TABLET(S): 5; 325 TABLET ORAL at 14:44

## 2022-12-08 RX ADMIN — ONDANSETRON 4 MILLIGRAM(S): 8 TABLET, FILM COATED ORAL at 21:09

## 2022-12-08 RX ADMIN — SODIUM CHLORIDE 1000 MILLILITER(S): 9 INJECTION INTRAMUSCULAR; INTRAVENOUS; SUBCUTANEOUS at 12:08

## 2022-12-08 RX ADMIN — Medication 100 MILLIGRAM(S): at 16:24

## 2022-12-08 RX ADMIN — SODIUM CHLORIDE 1000 MILLILITER(S): 9 INJECTION INTRAMUSCULAR; INTRAVENOUS; SUBCUTANEOUS at 13:28

## 2022-12-08 RX ADMIN — TAMSULOSIN HYDROCHLORIDE 0.4 MILLIGRAM(S): 0.4 CAPSULE ORAL at 22:09

## 2022-12-08 RX ADMIN — ENOXAPARIN SODIUM 55 MILLIGRAM(S): 100 INJECTION SUBCUTANEOUS at 22:10

## 2022-12-08 RX ADMIN — OXYCODONE AND ACETAMINOPHEN 1 TABLET(S): 5; 325 TABLET ORAL at 22:10

## 2022-12-08 RX ADMIN — ONDANSETRON 4 MILLIGRAM(S): 8 TABLET, FILM COATED ORAL at 12:08

## 2022-12-08 NOTE — ED PROVIDER NOTE - CLINICAL SUMMARY MEDICAL DECISION MAKING FREE TEXT BOX
Patient with vomiting. Labs reveal elevated LFTs. Will do ultrasound and get fluids, antiemetics and reassess.

## 2022-12-08 NOTE — ED PROVIDER NOTE - OBJECTIVE STATEMENT
67 year old male presents to the ED from nursing home with complaints of vomiting after eating hamburger at the nursing home. Reports he cannot stop vomiting and denies any abdominal pain.

## 2022-12-08 NOTE — PATIENT PROFILE ADULT - FALL HARM RISK - HARM RISK INTERVENTIONS

## 2022-12-08 NOTE — H&P ADULT - ASSESSMENT
67 year old male, vaccinated, from [facility] with PMHx of DM, HTN, HLD, AF on eliquis, PVD, presents with vomiting, found to have elevated LFts, and CT scan findings suggestive of possible Choledocholithiasis, admitted for further management

## 2022-12-08 NOTE — H&P ADULT - PROBLEM SELECTOR PLAN 7
IMPROVE VTE Individual Risk Assessment          RISK                                                          Points  [  ] Previous VTE                                                3  [  ] Thrombophilia                                             2  [  ] Lower limb paralysis                                   2        (unable to hold up >15 seconds)    [  ] Current Cancer                                             2         (within 6 months)  [ x ] Immobilization > 24 hrs                              1  [  ] ICU/CCU stay > 24 hours                             1  [ x ] Age > 60                                                         1    IMPROVE VTE Score:         [    2     ]      On Eliquis IMPROVE VTE Individual Risk Assessment          RISK                                                          Points  [  ] Previous VTE                                                3  [  ] Thrombophilia                                             2  [  ] Lower limb paralysis                                   2        (unable to hold up >15 seconds)    [  ] Current Cancer                                             2         (within 6 months)  [ x ] Immobilization > 24 hrs                              1  [  ] ICU/CCU stay > 24 hours                             1  [ x ] Age > 60                                                         1    IMPROVE VTE Score:         [    2     ]      On Lovenox 40mg IMPROVE VTE Individual Risk Assessment          RISK                                                          Points  [  ] Previous VTE                                                3  [  ] Thrombophilia                                             2  [  ] Lower limb paralysis                                   2        (unable to hold up >15 seconds)    [  ] Current Cancer                                             2         (within 6 months)  [ x ] Immobilization > 24 hrs                              1  [  ] ICU/CCU stay > 24 hours                             1  [ x ] Age > 60                                                         1    IMPROVE VTE Score:         [    2     ]      On Lovenox

## 2022-12-08 NOTE — ED PROVIDER NOTE - PROGRESS NOTE DETAILS
Patient ct negative for surgical abd. no peritoneal on exam. po challenged patient, still vomiting, unable to tolerate po. admitted for po intolerance

## 2022-12-08 NOTE — H&P ADULT - PROBLEM SELECTOR PLAN 1
-p/w vomiting for one day, denies abdominal pain   -Afebrile, no leukocytosis  -Bili WNL, , ,   -Lipase neg  -CT A/P showed: Cholecystectomy. Dilated CBD up to 1.7 cm abrupt cut off of the distal CBD and  mild pancreatic duct dilatation.   -Concern for Choledocholithiasis vs papillary process   -c/w IVF  - Zofran PRN  -Consulted GI Dr. Villanueva

## 2022-12-08 NOTE — H&P ADULT - NSHPLABSRESULTS_GEN_ALL_CORE
LABS:                        16.1   7.72  )-----------( 208      ( 08 Dec 2022 11:56 )             47.5     12-08    136  |  94<L>  |  14  ----------------------------<  138<H>  4.2   |  33<H>  |  0.78    Ca    9.0      08 Dec 2022 11:56    TPro  9.0<H>  /  Alb  3.0<L>  /  TBili  1.2  /  DBili  x   /  AST  274<H>  /  ALT  130<H>  /  AlkPhos  328<H>  12-08        LIVER FUNCTIONS - ( 08 Dec 2022 11:56 )  Alb: 3.0 g/dL / Pro: 9.0 g/dL / ALK PHOS: 328 U/L / ALT: 130 U/L DA / AST: 274 U/L / GGT: x           Lipase, Serum: 114 U/L (12-08-22 @ 11:56)

## 2022-12-08 NOTE — H&P ADULT - NSHPREVIEWOFSYSTEMS_GEN_ALL_CORE
REVIEW OF SYSTEMS:    CONSTITUTIONAL: No weakness, fevers or chills  EYES/ENT: No visual changes;  No vertigo or throat pain   NECK: No pain or stiffness  RESPIRATORY: No cough, wheezing, hemoptysis; No shortness of breath  CARDIOVASCULAR: No chest pain or palpitations  GASTROINTESTINAL: No abdominal or epigastric pain. + nausea, vomiting, no hematemesis; No diarrhea or constipation. No melena or hematochezia.  GENITOURINARY: No dysuria, frequency or hematuria  NEUROLOGICAL: No numbness or weakness  SKIN: No itching, burning, rashes, or lesions   All other review of systems is negative unless indicated above.

## 2022-12-08 NOTE — H&P ADULT - HISTORY OF PRESENT ILLNESS
67 year old male, vaccinated, from [facility] with PMHx of DM, HTN, HLD, AF on eliquis, PVD, presents with vomiting. Patient states that last night he ate a burger, and started to vomit shortly after. Patient states he is unable to eat since then due to feeling nauseous and continues to vomit each time he tries to eat. Patient denies any recent sick contact.  Patient also denies any associated abdominal pain, fever, chills, SOB, chest pain, diarrhea, hematemesis or melena.     In the ED:  Afebrile, hemodynamically stable  No leukocytosis  Bili WNL, , ,   CT A/P showed: Cholecystectomy +Dilated CBD up to 1.7 cm abrupt cut off of the distal CBD and  mild pancreatic duct dilatation.

## 2022-12-08 NOTE — H&P ADULT - NSHPPHYSICALEXAM_GEN_ALL_CORE
PHYSICAL EXAMINATION:  GENERAL: NAD  HEAD:  Atraumatic, Normocephalic  EYES:  conjunctiva and sclera clear  NECK: Supple, No JVD, Normal thyroid  CHEST/LUNG: Clear to auscultation. Clear to percussion bilaterally; No rales, rhonchi, wheezing, or rubs  HEART: Regular rate and rhythm; No murmurs, rubs, or gallops  ABDOMEN: Soft, Nontender, Nondistended; Bowel sounds present  NERVOUS SYSTEM:  Alert & Oriented X3,    EXTREMITIES:  2+ Peripheral Pulses, No clubbing, cyanosis, or edema  SKIN: warm dry Normal rate, regular rhythm.  Heart sounds S1, S2.  No murmurs, rubs or gallops.

## 2022-12-08 NOTE — H&P ADULT - PROBLEM SELECTOR PLAN 3
pt takes metoprolol 25mg BID and Eliquis  c/w home meds pt takes metoprolol 25mg BID and Eliquis  Will start Lovenox as pt may need intervention   c/w home meds

## 2022-12-09 DIAGNOSIS — R33.9 RETENTION OF URINE, UNSPECIFIED: ICD-10-CM

## 2022-12-09 DIAGNOSIS — Z02.9 ENCOUNTER FOR ADMINISTRATIVE EXAMINATIONS, UNSPECIFIED: ICD-10-CM

## 2022-12-09 LAB
A1C WITH ESTIMATED AVERAGE GLUCOSE RESULT: 5.5 % — SIGNIFICANT CHANGE UP (ref 4–5.6)
ALBUMIN SERPL ELPH-MCNC: 2.3 G/DL — LOW (ref 3.5–5)
ALP SERPL-CCNC: 222 U/L — HIGH (ref 40–120)
ALT FLD-CCNC: 83 U/L DA — HIGH (ref 10–60)
ANION GAP SERPL CALC-SCNC: 8 MMOL/L — SIGNIFICANT CHANGE UP (ref 5–17)
APPEARANCE UR: CLEAR — SIGNIFICANT CHANGE UP
AST SERPL-CCNC: 174 U/L — HIGH (ref 10–40)
BASOPHILS # BLD AUTO: 0.03 K/UL — SIGNIFICANT CHANGE UP (ref 0–0.2)
BASOPHILS NFR BLD AUTO: 0.6 % — SIGNIFICANT CHANGE UP (ref 0–2)
BILIRUB SERPL-MCNC: 0.8 MG/DL — SIGNIFICANT CHANGE UP (ref 0.2–1.2)
BILIRUB UR-MCNC: ABNORMAL
BUN SERPL-MCNC: 8 MG/DL — SIGNIFICANT CHANGE UP (ref 7–18)
CALCIUM SERPL-MCNC: 8.1 MG/DL — LOW (ref 8.4–10.5)
CHLORIDE SERPL-SCNC: 97 MMOL/L — SIGNIFICANT CHANGE UP (ref 96–108)
CO2 SERPL-SCNC: 30 MMOL/L — SIGNIFICANT CHANGE UP (ref 22–31)
COLOR SPEC: YELLOW — SIGNIFICANT CHANGE UP
CREAT SERPL-MCNC: 0.51 MG/DL — SIGNIFICANT CHANGE UP (ref 0.5–1.3)
DIFF PNL FLD: ABNORMAL
EGFR: 110 ML/MIN/1.73M2 — SIGNIFICANT CHANGE UP
EOSINOPHIL # BLD AUTO: 0.01 K/UL — SIGNIFICANT CHANGE UP (ref 0–0.5)
EOSINOPHIL NFR BLD AUTO: 0.2 % — SIGNIFICANT CHANGE UP (ref 0–6)
ESTIMATED AVERAGE GLUCOSE: 111 MG/DL — SIGNIFICANT CHANGE UP (ref 68–114)
GLUCOSE BLDC GLUCOMTR-MCNC: 104 MG/DL — HIGH (ref 70–99)
GLUCOSE BLDC GLUCOMTR-MCNC: 113 MG/DL — HIGH (ref 70–99)
GLUCOSE BLDC GLUCOMTR-MCNC: 94 MG/DL — SIGNIFICANT CHANGE UP (ref 70–99)
GLUCOSE SERPL-MCNC: 108 MG/DL — HIGH (ref 70–99)
GLUCOSE UR QL: NEGATIVE — SIGNIFICANT CHANGE UP
HCT VFR BLD CALC: 40.3 % — SIGNIFICANT CHANGE UP (ref 39–50)
HGB BLD-MCNC: 13.3 G/DL — SIGNIFICANT CHANGE UP (ref 13–17)
IMM GRANULOCYTES NFR BLD AUTO: 0.4 % — SIGNIFICANT CHANGE UP (ref 0–0.9)
KETONES UR-MCNC: ABNORMAL
LEUKOCYTE ESTERASE UR-ACNC: ABNORMAL
LYMPHOCYTES # BLD AUTO: 1.12 K/UL — SIGNIFICANT CHANGE UP (ref 1–3.3)
LYMPHOCYTES # BLD AUTO: 21.1 % — SIGNIFICANT CHANGE UP (ref 13–44)
MAGNESIUM SERPL-MCNC: 1.5 MG/DL — LOW (ref 1.6–2.6)
MCHC RBC-ENTMCNC: 31.7 PG — SIGNIFICANT CHANGE UP (ref 27–34)
MCHC RBC-ENTMCNC: 33 GM/DL — SIGNIFICANT CHANGE UP (ref 32–36)
MCV RBC AUTO: 96.2 FL — SIGNIFICANT CHANGE UP (ref 80–100)
MONOCYTES # BLD AUTO: 0.63 K/UL — SIGNIFICANT CHANGE UP (ref 0–0.9)
MONOCYTES NFR BLD AUTO: 11.9 % — SIGNIFICANT CHANGE UP (ref 2–14)
NEUTROPHILS # BLD AUTO: 3.49 K/UL — SIGNIFICANT CHANGE UP (ref 1.8–7.4)
NEUTROPHILS NFR BLD AUTO: 65.8 % — SIGNIFICANT CHANGE UP (ref 43–77)
NITRITE UR-MCNC: NEGATIVE — SIGNIFICANT CHANGE UP
NRBC # BLD: 0 /100 WBCS — SIGNIFICANT CHANGE UP (ref 0–0)
PH UR: 6.5 — SIGNIFICANT CHANGE UP (ref 5–8)
PHOSPHATE SERPL-MCNC: 3.1 MG/DL — SIGNIFICANT CHANGE UP (ref 2.5–4.5)
PLATELET # BLD AUTO: 140 K/UL — LOW (ref 150–400)
POTASSIUM SERPL-MCNC: 3.9 MMOL/L — SIGNIFICANT CHANGE UP (ref 3.5–5.3)
POTASSIUM SERPL-SCNC: 3.9 MMOL/L — SIGNIFICANT CHANGE UP (ref 3.5–5.3)
PROT SERPL-MCNC: 6.8 G/DL — SIGNIFICANT CHANGE UP (ref 6–8.3)
PROT UR-MCNC: 30 MG/DL
RBC # BLD: 4.19 M/UL — LOW (ref 4.2–5.8)
RBC # FLD: 16.8 % — HIGH (ref 10.3–14.5)
SODIUM SERPL-SCNC: 135 MMOL/L — SIGNIFICANT CHANGE UP (ref 135–145)
SP GR SPEC: 1.01 — SIGNIFICANT CHANGE UP (ref 1.01–1.02)
UROBILINOGEN FLD QL: 8
WBC # BLD: 5.3 K/UL — SIGNIFICANT CHANGE UP (ref 3.8–10.5)
WBC # FLD AUTO: 5.3 K/UL — SIGNIFICANT CHANGE UP (ref 3.8–10.5)

## 2022-12-09 PROCEDURE — 99232 SBSQ HOSP IP/OBS MODERATE 35: CPT

## 2022-12-09 RX ORDER — GLUCAGON INJECTION, SOLUTION 0.5 MG/.1ML
1 INJECTION, SOLUTION SUBCUTANEOUS ONCE
Refills: 0 | Status: DISCONTINUED | OUTPATIENT
Start: 2022-12-09 | End: 2022-12-21

## 2022-12-09 RX ORDER — ONDANSETRON 8 MG/1
4 TABLET, FILM COATED ORAL ONCE
Refills: 0 | Status: COMPLETED | OUTPATIENT
Start: 2022-12-09 | End: 2022-12-10

## 2022-12-09 RX ORDER — SODIUM CHLORIDE 9 MG/ML
1000 INJECTION, SOLUTION INTRAVENOUS
Refills: 0 | Status: DISCONTINUED | OUTPATIENT
Start: 2022-12-09 | End: 2022-12-21

## 2022-12-09 RX ORDER — MAGNESIUM OXIDE 400 MG ORAL TABLET 241.3 MG
400 TABLET ORAL
Refills: 0 | Status: COMPLETED | OUTPATIENT
Start: 2022-12-09 | End: 2022-12-10

## 2022-12-09 RX ORDER — DEXTROSE 50 % IN WATER 50 %
25 SYRINGE (ML) INTRAVENOUS ONCE
Refills: 0 | Status: DISCONTINUED | OUTPATIENT
Start: 2022-12-09 | End: 2022-12-21

## 2022-12-09 RX ORDER — DEXTROSE 50 % IN WATER 50 %
15 SYRINGE (ML) INTRAVENOUS ONCE
Refills: 0 | Status: DISCONTINUED | OUTPATIENT
Start: 2022-12-09 | End: 2022-12-21

## 2022-12-09 RX ORDER — INSULIN LISPRO 100/ML
VIAL (ML) SUBCUTANEOUS
Refills: 0 | Status: DISCONTINUED | OUTPATIENT
Start: 2022-12-09 | End: 2022-12-21

## 2022-12-09 RX ORDER — DEXTROSE 50 % IN WATER 50 %
12.5 SYRINGE (ML) INTRAVENOUS ONCE
Refills: 0 | Status: DISCONTINUED | OUTPATIENT
Start: 2022-12-09 | End: 2022-12-21

## 2022-12-09 RX ADMIN — OXYCODONE AND ACETAMINOPHEN 1 TABLET(S): 5; 325 TABLET ORAL at 11:38

## 2022-12-09 RX ADMIN — Medication 100 MILLIGRAM(S): at 06:52

## 2022-12-09 RX ADMIN — SENNA PLUS 2 TABLET(S): 8.6 TABLET ORAL at 21:31

## 2022-12-09 RX ADMIN — ATORVASTATIN CALCIUM 10 MILLIGRAM(S): 80 TABLET, FILM COATED ORAL at 21:31

## 2022-12-09 RX ADMIN — OXYCODONE AND ACETAMINOPHEN 1 TABLET(S): 5; 325 TABLET ORAL at 22:04

## 2022-12-09 RX ADMIN — FAMOTIDINE 20 MILLIGRAM(S): 10 INJECTION INTRAVENOUS at 06:51

## 2022-12-09 RX ADMIN — OXYCODONE AND ACETAMINOPHEN 1 TABLET(S): 5; 325 TABLET ORAL at 02:58

## 2022-12-09 RX ADMIN — Medication 25 MILLIGRAM(S): at 18:20

## 2022-12-09 RX ADMIN — OXYCODONE AND ACETAMINOPHEN 1 TABLET(S): 5; 325 TABLET ORAL at 08:08

## 2022-12-09 RX ADMIN — OXYCODONE AND ACETAMINOPHEN 1 TABLET(S): 5; 325 TABLET ORAL at 06:52

## 2022-12-09 RX ADMIN — MAGNESIUM OXIDE 400 MG ORAL TABLET 400 MILLIGRAM(S): 241.3 TABLET ORAL at 13:25

## 2022-12-09 RX ADMIN — Medication 5 MILLIGRAM(S): at 06:52

## 2022-12-09 RX ADMIN — ENOXAPARIN SODIUM 55 MILLIGRAM(S): 100 INJECTION SUBCUTANEOUS at 10:36

## 2022-12-09 RX ADMIN — ONDANSETRON 4 MILLIGRAM(S): 8 TABLET, FILM COATED ORAL at 06:25

## 2022-12-09 RX ADMIN — Medication 25 MILLIGRAM(S): at 06:51

## 2022-12-09 RX ADMIN — FAMOTIDINE 20 MILLIGRAM(S): 10 INJECTION INTRAVENOUS at 18:20

## 2022-12-09 RX ADMIN — OXYCODONE AND ACETAMINOPHEN 1 TABLET(S): 5; 325 TABLET ORAL at 14:48

## 2022-12-09 RX ADMIN — TAMSULOSIN HYDROCHLORIDE 0.4 MILLIGRAM(S): 0.4 CAPSULE ORAL at 22:48

## 2022-12-09 RX ADMIN — ENOXAPARIN SODIUM 55 MILLIGRAM(S): 100 INJECTION SUBCUTANEOUS at 21:31

## 2022-12-09 RX ADMIN — OXYCODONE AND ACETAMINOPHEN 1 TABLET(S): 5; 325 TABLET ORAL at 22:00

## 2022-12-09 RX ADMIN — Medication 100 MILLIGRAM(S): at 15:35

## 2022-12-09 RX ADMIN — Medication 60 MILLIGRAM(S): at 06:52

## 2022-12-09 RX ADMIN — OXYCODONE AND ACETAMINOPHEN 1 TABLET(S): 5; 325 TABLET ORAL at 16:14

## 2022-12-09 RX ADMIN — Medication 100 MILLIGRAM(S): at 21:32

## 2022-12-09 RX ADMIN — OXYCODONE AND ACETAMINOPHEN 1 TABLET(S): 5; 325 TABLET ORAL at 10:37

## 2022-12-09 RX ADMIN — Medication 5 MILLIGRAM(S): at 18:21

## 2022-12-09 RX ADMIN — OXYCODONE AND ACETAMINOPHEN 1 TABLET(S): 5; 325 TABLET ORAL at 02:24

## 2022-12-09 RX ADMIN — OXYCODONE AND ACETAMINOPHEN 1 TABLET(S): 5; 325 TABLET ORAL at 18:20

## 2022-12-09 NOTE — CHART NOTE - NSCHARTNOTEFT_GEN_A_CORE
Paged by RN, pt with complaints of difficulty urinating. Noted with abdominal distension, bladder scan was done which revealed approximately 700 ml of urine in the bladder.     Plan:    -Straight cath x 1  -Repeat bladder scan in 6 hours

## 2022-12-09 NOTE — CONSULT NOTE ADULT - NEGATIVE OPHTHALMOLOGIC SYMPTOMS
no diplopia/no photophobia/no lacrimation R/no blurred vision L/no blurred vision R/no discharge L/no discharge R/no pain L/no pain R/no irritation L/no irritation R/no loss of vision L/no loss of vision R/no scleral injection L/no scleral injection R

## 2022-12-09 NOTE — CONSULT NOTE ADULT - ASSESSMENT
1. Abdominal pain and vomiting  2. Dilated CBD  3. R/o CBD stone  4. Dilated pancreatic duct    Suggestions:    1. Follow up LFT's  2. MRI / MRCP of abdomen  3. Protonix daily  4. Avoid NSAID  5. Zofran PRN  6. DVT prophylaxis

## 2022-12-09 NOTE — PROGRESS NOTE ADULT - TIME BILLING
- Review of records, telemetry, vital signs and daily labs.   - General and cardiovascular physical examination.  - Generation of cardiovascular treatment plan.  - Coordination of care.      Patient was seen and examined by me on 12/9/22,interim events noted,labs and radiology studies reviewed.  Nasir Sharma MD,FACC.  [address] [place], [place] [id number].  [phone]

## 2022-12-09 NOTE — PROGRESS NOTE ADULT - ASSESSMENT
Patient is a 67 year old male, vaccinated, from [facility] ([place] with PMHx of DM, HTN, HLD, AF on Eliquis, PVD, presents with vomiting, found to have elevated LFts. CT scan findings shows dilated CBD up to 1.7 cm with abrupt cut off of the distal CBD and mild pancreatic duct dilatation, suggestive of possible Choledocholithiasis, admitted for further management. GI Dr. Villanueva consulted. Of note, pt also noted with urinary retention, bladder scan x2 with urine > 500ml each time. Pt was straight cathed x2. F/U repeat bladder scan.

## 2022-12-09 NOTE — CONSULT NOTE ADULT - GENERAL
details… [As Noted in HPI] : as noted in HPI [Fever] : no fever [Chills] : no chills [Eye Pain] : no eye pain [Red Eyes] : eyes not red [Nosebleeds] : no nosebleeds [Chest Pain] : no chest pain [Shortness Of Breath] : no shortness of breath [Abdominal Pain] : no abdominal pain [Dysuria] : no dysuria [Confused] : no confusion [Suicidal] : not suicidal [Muscle Weakness] : no muscle weakness [Easy Bleeding] : no tendency for easy bleeding

## 2022-12-09 NOTE — CONSULT NOTE ADULT - NEGATIVE ENMT SYMPTOMS
no hearing difficulty/no ear pain/no tinnitus/no vertigo/no sinus symptoms/no nasal congestion/no nasal discharge/no nasal obstruction/no nose bleeds/no gum bleeding/no dry mouth/no dysphagia

## 2022-12-09 NOTE — PROGRESS NOTE ADULT - ASSESSMENT
Patient is a 67 year old male, vaccinated, from [facility] with PMHx of DM, HTN, HLD, AF on Eliquis, PVD, presents with vomiting, found to have elevated LFts. CT scan findings shows dilated CBD up to 1.7 cm with abrupt cut off of the distal CBD and mild pancreatic duct dilatation, suggestive of possible Choledocholithiasis, admitted for further management. GI Dr. Villanueva consulted. Of note, pt also noted with urinary retention, bladder scan x2 with urine > 500ml each time. Pt was straight cathed x2. F/U repeat bladder scan.

## 2022-12-09 NOTE — CONSULT NOTE ADULT - RESPIRATORY
clear to auscultation bilaterally/no wheezes/no rales/no rhonchi/no use of accessory muscles/no subcutaneous emphysema/airway patent/breath sounds equal/no dull ness or hyperresonance to percussion

## 2022-12-09 NOTE — PROGRESS NOTE ADULT - SUBJECTIVE AND OBJECTIVE BOX
NP Note discussed with  primary attending    Patient is a 67y old  Male who presents with a chief complaint of     INTERVAL HPI/OVERNIGHT EVENTS: no new complaints    MEDICATIONS  (STANDING):  atorvastatin 10 milliGRAM(s) Oral at bedtime  baclofen 5 milliGRAM(s) Oral every 12 hours  dextrose 5%. 1000 milliLiter(s) (50 mL/Hr) IV Continuous <Continuous>  dextrose 5%. 1000 milliLiter(s) (100 mL/Hr) IV Continuous <Continuous>  dextrose 50% Injectable 25 Gram(s) IV Push once  dextrose 50% Injectable 12.5 Gram(s) IV Push once  dextrose 50% Injectable 25 Gram(s) IV Push once  enoxaparin Injectable 55 milliGRAM(s) SubCutaneous every 12 hours  famotidine    Tablet 20 milliGRAM(s) Oral two times a day  glucagon  Injectable 1 milliGRAM(s) IntraMuscular once  insulin lispro (ADMELOG) corrective regimen sliding scale   SubCutaneous three times a day before meals  magnesium oxide 400 milliGRAM(s) Oral three times a day with meals  metoprolol tartrate 25 milliGRAM(s) Oral two times a day  NIFEdipine XL 60 milliGRAM(s) Oral daily  oxycodone    5 mG/acetaminophen 325 mG 1 Tablet(s) Oral every 4 hours  polyethylene glycol 3350 17 Gram(s) Oral daily  pregabalin 100 milliGRAM(s) Oral three times a day  senna 2 Tablet(s) Oral at bedtime  sodium chloride 0.9%. 1000 milliLiter(s) (80 mL/Hr) IV Continuous <Continuous>  tamsulosin 0.4 milliGRAM(s) Oral at bedtime    MEDICATIONS  (PRN):  dextrose Oral Gel 15 Gram(s) Oral once PRN Blood Glucose LESS THAN 70 milliGRAM(s)/deciliter      __________________________________________________  REVIEW OF SYSTEMS:    CONSTITUTIONAL: No fever,   EYES: no acute visual disturbances  NECK: No pain or stiffness  RESPIRATORY: No cough; No shortness of breath  CARDIOVASCULAR: No chest pain, no palpitations  GASTROINTESTINAL: No pain. No nausea or vomiting; No diarrhea   NEUROLOGICAL: No headache or numbness, no tremors  MUSCULOSKELETAL: No joint pain, no muscle pain  GENITOURINARY: no dysuria, no frequency, no hesitancy  PSYCHIATRY: no depression , no anxiety  ALL OTHER  ROS negative        Vital Signs Last 24 Hrs  T(C): 36.4 (09 Dec 2022 05:22), Max: 36.8 (08 Dec 2022 23:20)  T(F): 97.6 (09 Dec 2022 05:22), Max: 98.2 (08 Dec 2022 23:20)  HR: 84 (09 Dec 2022 05:22) (77 - 90)  BP: 134/75 (09 Dec 2022 05:22) (121/73 - 134/75)  BP(mean): --  RR: 16 (09 Dec 2022 05:22) (16 - 18)  SpO2: 100% (09 Dec 2022 05:22) (96% - 100%)    Parameters below as of 09 Dec 2022 05:22  Patient On (Oxygen Delivery Method): nasal cannula        ________________________________________________  PHYSICAL EXAM:  GENERAL: NAD  HEENT: Normocephalic;  conjunctivae and sclerae clear; moist mucous membranes;   NECK : supple  CHEST/LUNG: Clear to ausculitation bilaterally with good air entry   HEART: S1 S2  regular; no murmurs, gallops or rubs  ABDOMEN: Soft, Nontender, Nondistended; Bowel sounds present  EXTREMITIES: no cyanosis; no edema; no calf tenderness  SKIN: warm and dry; no rash  NERVOUS SYSTEM:  Awake and alert; Oriented  to place, person and time ; no new deficits    _________________________________________________  LABS:                        13.3   5.30  )-----------( 140      ( 09 Dec 2022 06:00 )             40.3         135  |  97  |  8   ----------------------------<  108<H>  3.9   |  30  |  0.51    Ca    8.1<L>      09 Dec 2022 06:00  Phos  3.1       Mg     1.5         TPro  6.8  /  Alb  2.3<L>  /  TBili  0.8  /  DBili  x   /  AST  174<H>  /  ALT  83<H>  /  AlkPhos  222<H>        Urinalysis Basic - ( 09 Dec 2022 02:46 )    Color: Yellow / Appearance: Clear / S.010 / pH: x  Gluc: x / Ketone: Small  / Bili: Small / Urobili: 8   Blood: x / Protein: 30 mg/dL / Nitrite: Negative   Leuk Esterase: Small / RBC: 2-5 /HPF / WBC 3-5 /HPF   Sq Epi: x / Non Sq Epi: Occasional /HPF / Bacteria: Trace /HPF      CAPILLARY BLOOD GLUCOSE      POCT Blood Glucose.: 113 mg/dL (09 Dec 2022 11:31)        RADIOLOGY & ADDITIONAL TESTS:  < from: CT Abdomen and Pelvis w/ IV Cont (22 @ 17:20) >    IMPRESSION:  No evidence of bowel obstruction.    Dilated CBD up to 1.7 cm which can be seen with cholecystectomy, however   there is abrupt cut off ofthe distal CBD and  mild pancreatic duct   dilatation. Choledocholithiasis or a papillary process cannot be entirely   excluded. Correlate with liver function tests. If there is clinical   concern, consider MRI MRCP with contrast for further evaluation.    Hepatic steatosis with a focal area of increased hypoattenuation which   likely represents pronounced focal fatty changes, less likely underlying   mass. This finding could also be further evaluated on MRI/MRCP as   recommended above.    Non complicated colonic diverticulosis, no diverticulitis.    < end of copied text >    Imaging Personally Reviewed:  YES    Consultant(s) Notes Reviewed:   YES  Care Discussed with Consultants :     Plan of care was discussed with patient and /or primary care giver; all questions and concerns were addressed and care was aligned with patient's wishes.

## 2022-12-09 NOTE — PROGRESS NOTE ADULT - SUBJECTIVE AND OBJECTIVE BOX
PATIENT SEEN AND EXAMINED ON :- 12/9/22  DATE OF SERVICE:   12/9/22          Interim events noted,Labs ,Radiological studies and Cardiology tests reviewed .       HOSPITAL COURSE: HPI:  67 year old male, vaccinated, from [facility] with PMHx of DM, HTN, HLD, AF on eliquis, PVD, presents with vomiting. Patient states that last night he ate a burger, and started to vomit shortly after. Patient states he is unable to eat since then due to feeling nauseous and continues to vomit each time he tries to eat. Patient denies any recent sick contact.  Patient also denies any associated abdominal pain, fever, chills, SOB, chest pain, diarrhea, hematemesis or melena.     In the ED:  Afebrile, hemodynamically stable  No leukocytosis  Bili WNL, , ,   CT A/P showed: Cholecystectomy +Dilated CBD up to 1.7 cm abrupt cut off of the distal CBD and  mild pancreatic duct dilatation.      (08 Dec 2022 19:05)      INTERIM EVENTS:Patient seen at bedside ,interim events noted.      PMH -reviewed admission note, no change since admission  HEART FAILURE: Acute[ ]Chronic[ ] Systolic[ ] Diastolic[ ] Combined Systolic and Diastolic[ ]  CAD[ ] CABG[ ] PCI[ ]  DEVICES[ ] PPM[ ] ICD[ ] ILR[ ]  ATRIAL FIBRILLATION[ ] Paroxysmal[ ] Permanent[ ] CHADS2-[  ]  DILCIA[ ] CKD1[ ] CKD2[ ] CKD3[ ] CKD4[ ] ESRD[ ]  COPD[ ] HTN[ ]   DM[ ] Type1[ ] Type 2[ ]   CVA[ ] Paresis[ ]    AMBULATION: Assisted[ ] Cane/walker[ ] Independent[ ]    MEDICATIONS  (STANDING):  atorvastatin 10 milliGRAM(s) Oral at bedtime  baclofen 5 milliGRAM(s) Oral every 12 hours  dextrose 5%. 1000 milliLiter(s) (50 mL/Hr) IV Continuous <Continuous>  dextrose 5%. 1000 milliLiter(s) (100 mL/Hr) IV Continuous <Continuous>  dextrose 50% Injectable 25 Gram(s) IV Push once  dextrose 50% Injectable 12.5 Gram(s) IV Push once  dextrose 50% Injectable 25 Gram(s) IV Push once  enoxaparin Injectable 55 milliGRAM(s) SubCutaneous every 12 hours  famotidine    Tablet 20 milliGRAM(s) Oral two times a day  glucagon  Injectable 1 milliGRAM(s) IntraMuscular once  insulin lispro (ADMELOG) corrective regimen sliding scale   SubCutaneous three times a day before meals  magnesium oxide 400 milliGRAM(s) Oral three times a day with meals  metoprolol tartrate 25 milliGRAM(s) Oral two times a day  NIFEdipine XL 60 milliGRAM(s) Oral daily  oxycodone    5 mG/acetaminophen 325 mG 1 Tablet(s) Oral every 4 hours  polyethylene glycol 3350 17 Gram(s) Oral daily  pregabalin 100 milliGRAM(s) Oral three times a day  senna 2 Tablet(s) Oral at bedtime  sodium chloride 0.9%. 1000 milliLiter(s) (80 mL/Hr) IV Continuous <Continuous>  tamsulosin 0.4 milliGRAM(s) Oral at bedtime    MEDICATIONS  (PRN):  dextrose Oral Gel 15 Gram(s) Oral once PRN Blood Glucose LESS THAN 70 milliGRAM(s)/deciliter            REVIEW OF SYSTEMS:  Constitutional: [ ] fever, [ ]weight loss,  [ ]fatigue [ ]weight gain  Eyes: [ ] visual changes  Respiratory: [ ]shortness of breath;  [ ] cough, [ ]wheezing, [ ]chills, [ ]hemoptysis  Cardiovascular: [ ] chest pain, [ ]palpitations, [ ]dizziness,  [ ]leg swelling[ ]orthopnea[ ]PND  Gastrointestinal: [ ] abdominal pain, [ ]nausea, [ ]vomiting,  [ ]diarrhea [ ]Constipation [ ]Melena  Genitourinary: [ ] dysuria, [ ] hematuria [ ]C[place]za  Neurologic: [ ] headaches [ ] tremors[ ]weakness [ ]Paralysis Right[ ] Left[ ]  Skin: [ ] itching, [ ]burning, [ ] rashes  Endocrine: [ ] heat or cold intolerance  Musculoskeletal: [ ] joint pain or swelling; [ ] muscle, back, or extremity pain  Psychiatric: [ ] depression, [ ]anxiety, [ ]mood swings, or [ ]difficulty sleeping  Hematologic: [ ] easy bruising, [ ] bleeding gums    [ ] All remaining systems negative except as per above.   [ ]Unable to obtain.  [x] No change in ROS since admission      Vital Signs Last 24 Hrs  T(C): 36.9 (09 Dec 2022 19:56), Max: 36.9 (09 Dec 2022 19:56)  T(F): 98.4 (09 Dec 2022 19:56), Max: 98.4 (09 Dec 2022 19:56)  HR: 58 (09 Dec 2022 19:56) (58 - 91)  BP: 112/69 (09 Dec 2022 19:56) (104/65 - 134/75)  BP(mean): 74 (09 Dec 2022 13:00) (74 - 74)  RR: 18 (09 Dec 2022 19:56) (16 - 18)  SpO2: 100% (09 Dec 2022 19:56) (96% - 100%)    Parameters below as of 09 Dec 2022 19:56  Patient On (Oxygen Delivery Method): nasal cannula  O2 Flow (L/min): 2    I&O's Summary    08 Dec 2022 07:01  -  09 Dec 2022 07:00  --------------------------------------------------------  IN: 0 mL / OUT: 700 mL / NET: -700 mL    09 Dec 2022 07:01  -  09 Dec 2022 20:17  --------------------------------------------------------  IN: 0 mL / OUT: 1050 mL / NET: -1050 mL        PHYSICAL EXAM:  General: No acute distress BMI-  HEENT: EOMI, PERRL  Neck: Supple, [ ] JVD  Lungs: Equal air entry bilaterally; [ ] rales [ ] wheezing [ ] rhonchi  Heart: Regular rate and rhythm; [x ] murmur   2/6 [ x] systolic [ ] diastolic [ ] radiation[ ] rubs [ ]  gallops  Abdomen: Nontender, bowel sounds present  Extremities: No clubbing, cyanosis, [ ] edema [ ]Pulses  equal and intact  Nervous system:  Alert & Oriented X3, no focal deficits  Psychiatric: Normal affect  Skin: No rashes or lesions    LABS:  12-09    135  |  97  |  8   ----------------------------<  108<H>  3.9   |  30  |  0.51    Ca    8.1<L>      09 Dec 2022 06:00  Phos  3.1     12-09  Mg     1.5     12-09    TPro  6.8  /  Alb  2.3<L>  /  TBili  0.8  /  DBili  x   /  AST  174<H>  /  ALT  83<H>  /  AlkPhos  222<H>  12-09    Creatinine Trend: 0.51<--, 0.78<--                        13.3   5.30  )-----------( 140      ( 09 Dec 2022 06:00 )             40.3

## 2022-12-09 NOTE — PROGRESS NOTE ADULT - PROBLEM SELECTOR PLAN 2
Pt noted with difficulty urinating  + Abdominal distention  Bladder scanned with 700 ml urine  Repeat bladder scan 500 ml urine  F/U with repeat bladder scan

## 2022-12-09 NOTE — CONSULT NOTE ADULT - SUBJECTIVE AND OBJECTIVE BOX
[  ] STAT REQUEST              [ X ] ROUTINE REQUEST    Patient is a 67 year old male with vomiting. GI consulted to evaluate.        HPI:  67 year old male, vaccinated, from [facility] with past medical history significant for DM, HTN, HLD, AFib on eliquis, PVD, presents with vomiting. Patient states that last night he ate a burger, and started to vomit shortly after. Patient states he is unable to eat since then due to feeling nauseous and continues to vomit each time he tries to eat. Patient denies any recent sick contact.  Patient also denies any associated abdominal pain, fever, chills, SOB, chest pain, diarrhea, hematemesis or melena.        PAIN MANAGEMENT:  Pain Scale:                0 /10  Pain Location:      Prior Colonoscopy:  Unknown    PAST MEDICAL HISTORY  COPD (chronic obstructive pulmonary disease)    Diabetes    PVD (peripheral vascular disease)    Chronic atrial fibrillation    GERD (gastroesophageal reflux disease)    MDD (major depressive disorder)    BPH (benign prostatic hyperplasia)        PAST SURGICAL HISTORY  Cholecystectomy      Allergies    morphine (Unknown)  penicillin (Unknown)  shellfish (Unknown)    Intolerances  None         MEDICATIONS  (STANDING):  atorvastatin 10 milliGRAM(s) Oral at bedtime  baclofen 5 milliGRAM(s) Oral every 12 hours  dextrose 5%. 1000 milliLiter(s) (50 mL/Hr) IV Continuous <Continuous>  dextrose 5%. 1000 milliLiter(s) (100 mL/Hr) IV Continuous <Continuous>  dextrose 50% Injectable 25 Gram(s) IV Push once  dextrose 50% Injectable 12.5 Gram(s) IV Push once  dextrose 50% Injectable 25 Gram(s) IV Push once  enoxaparin Injectable 55 milliGRAM(s) SubCutaneous every 12 hours  famotidine    Tablet 20 milliGRAM(s) Oral two times a day  glucagon  Injectable 1 milliGRAM(s) IntraMuscular once  insulin lispro (ADMELOG) corrective regimen sliding scale   SubCutaneous three times a day before meals  magnesium oxide 400 milliGRAM(s) Oral three times a day with meals  metoprolol tartrate 25 milliGRAM(s) Oral two times a day  NIFEdipine XL 60 milliGRAM(s) Oral daily  oxycodone    5 mG/acetaminophen 325 mG 1 Tablet(s) Oral every 4 hours  polyethylene glycol 3350 17 Gram(s) Oral daily  pregabalin 100 milliGRAM(s) Oral three times a day  senna 2 Tablet(s) Oral at bedtime  sodium chloride 0.9%. 1000 milliLiter(s) (80 mL/Hr) IV Continuous <Continuous>  tamsulosin 0.4 milliGRAM(s) Oral at bedtime    MEDICATIONS  (PRN):  dextrose Oral Gel 15 Gram(s) Oral once PRN Blood Glucose LESS THAN 70 milliGRAM(s)/deciliter      SOCIAL HISTORY  Advanced Directives:       [ X ] Full Code       [  ] DNR  Marital Status:         [  ] M      [ X ] S      [  ] D       [  ] W  Children:       [ X ] Yes      [  ] No  Occupation:        [  ] Employed       [ X ] Unemployed       [  ] Retired  Diet:       [X  ] Regular       [  ] PEG feeding          [  ] NG tube feeding  Drug Use:           [ X ] Patient denied          [  ] Yes  Alcohol:           [ X ] No             [  ] Yes (socially)         [  ] Yes (chronic)  Tobacco:           [  ] Yes           [ X ] No      FAMILY HISTORY  [ X ] Heart Disease            [X  ] Diabetes             [ X ] HTN             [  ] Colon Cancer             [  ] Stomach Cancer              [  ] Pancreatic Cancer      VITAL SIGNS   Vital Signs Last 24 Hrs  T(C): 36.4 (09 Dec 2022 13:00), Max: 36.8 (08 Dec 2022 23:20)  T(F): 97.5 (09 Dec 2022 13:00), Max: 98.2 (08 Dec 2022 23:20)  HR: 91 (09 Dec 2022 18:19) (60 - 91)  BP: 117/77 (09 Dec 2022 18:19) (104/65 - 134/75)  BP(mean): 74 (09 Dec 2022 13:00) (74 - 74)  RR: 17 (09 Dec 2022 13:00) (16 - 18)  SpO2: 100% (09 Dec 2022 13:00) (96% - 100%)  Parameters below as of 09 Dec 2022 13:00  Patient On (Oxygen Delivery Method): room air        CBC Full  -  ( 09 Dec 2022 06:00 )  WBC Count : 5.30 K/uL  RBC Count : 4.19 M/uL  Hemoglobin : 13.3 g/dL  Hematocrit : 40.3 %  Platelet Count - Automated : 140 K/uL  Mean Cell Volume : 96.2 fl  Mean Cell Hemoglobin : 31.7 pg  Mean Cell Hemoglobin Concentration : 33.0 gm/dL  Auto Neutrophil # : 3.49 K/uL  Auto Lymphocyte # : 1.12 K/uL  Auto Monocyte # : 0.63 K/uL  Auto Eosinophil # : 0.01 K/uL  Auto Basophil # : 0.03 K/uL  Auto Neutrophil % : 65.8 %  Auto Lymphocyte % : 21.1 %  Auto Monocyte % : 11.9 %  Auto Eosinophil % : 0.2 %  Auto Basophil % : 0.6 %      12-09    135  |  97  |  8   ----------------------------<  108<H>  3.9   |  30  |  0.51    Ca    8.1<L>      09 Dec 2022 06:00  Phos  3.1     12-09  Mg     1.5     12-09    TPro  6.8  /  Alb  2.3<L>  /  TBili  0.8  /  DBili  x   /  AST  174<H>  /  ALT  83<H>  /  AlkPhos  222<H>  12-09     Urinalysis (12.09.22 @ 02:46)   pH Urine: 6.5   Glucose Qualitative, Urine: Negative   Blood, Urine: Trace   Color: Yellow   Urine Appearance: Clear   Bilirubin: Small   Ketone - Urine: Small   Specific Gravity: 1.010   Protein, Urine: 30 mg/dL   Urobilinogen: 8   Nitrite: Negative   Leukocyte Esterase Concentration: Small      ECG    Ventricular Rate 118 BPM    Atrial Rate 118 BPM    P-R Interval 192 ms    QRS Duration 68 ms    Q-T Interval 348 ms    QTC Calculation(Bazett) 487 ms    P Axis 55 degrees    R Axis 76 degrees    T Axis 59 degrees    Diagnosis Line *** Poor data quality, interpretation may be adversely affected  Sinus tachycardia  Anteroseptal infarct , age undetermined  Nonspecific T wave abnormality  Abnormal ECG         RADIOLOGY/IMAGING                ACC: [id number] EXAM:  CT ABDOMEN AND PELVIS IC                          PROCEDURE DATE:  12/08/2022          INTERPRETATION:  CLINICAL INFORMATION: 67-year-old male patient with   vomiting    COMPARISON: None.    CONTRAST/COMPLICATIONS:  IV Contrast: Omnipaque 350  90 cc administered   10 cc discarded  Oral Contrast: NONE  Complications: None reported at time of study completion    PROCEDURE:  CT of the Abdomen and Pelvis was performed.  Sagittal and coronal reformats were performed.    FINDINGS:  LOWER CHEST: Bibasilar atelectatic changes. Coronary artery   calcifications. Aortic root calcifications. Normal-sized heart.    LIVER: Severe hepatic steatosis. Ill-defined focal hypodensity in   subcapsular location of segment 5 anteriorly measuring approximately 1.8   x 2.5 x 1.7 cm likely represents prominent focal fatty changes.  BILE DUCTS: No intrahepatic biliary duct dilatation. The CBD is dilated   to 1.8 cm with abrupt cut off of the distal CBD proximal to the papilla.  GALLBLADDER: Cholecystectomy.  SPLEEN: Within normal limits.  PANCREAS: Mild diffuse pancreatic parenchymal atrophy. Dilated proximal   pancreatic duct measuring 6 mm.  ADRENALS: Within normal limits.  KIDNEYS/URETERS: No hydronephrosis or nephroureterolithiasis. Symmetric   enhancement. No enhancing renal masses. Subcentimeter left midpole   cortical cyst.    BLADDER: Within normal limits.  REPRODUCTIVE ORGANS: Prostate within normal limits.    BOWEL: No acute appendicitis. Pancolonic diverticulosis, most prominent   in the sigmoid and descending colon without acute inflammation. No   evidence of a normal bowel wall thickening. The remainder of the colon is   collapsed. Small bowel loops of normal caliber.No bowel obstruction.  PERITONEUM: No ascites.  VESSELS: Atherosclerotic changes. Fusiform dilatation of the celiac axis.   Patent celiac axis and SMA with mild atherosclerotic changes. Patent   renal arteries. Patent hepatic veins, portal vein, SMV and splenic vein.  RETROPERITONEUM/LYMPH NODES: No lymphadenopathy.  ABDOMINAL WALL: Within normal limits.  BONES: Degenerative changes. Osteopenia.    IMPRESSION:  No evidence of bowel obstruction.    Dilated CBD up to 1.7 cm which can be seen with cholecystectomy, however   there is abrupt cut off ofthe distal CBD and  mild pancreatic duct   dilatation. Choledocholithiasis or a papillary process cannot be entirely   excluded. Correlate with liver function tests. If there is clinical   concern, consider MRI MRCP with contrast for further evaluation.    Hepatic steatosis with a focal area of increased hypoattenuation which   likely represents pronounced focal fatty changes, less likely underlying   mass. This finding could also be further evaluated on MRI/MRCP as   recommended above.    Non complicated colonic diverticulosis, no diverticulitis.

## 2022-12-10 LAB
ALBUMIN SERPL ELPH-MCNC: 2.2 G/DL — LOW (ref 3.5–5)
ALP SERPL-CCNC: 214 U/L — HIGH (ref 40–120)
ALT FLD-CCNC: 73 U/L DA — HIGH (ref 10–60)
ANION GAP SERPL CALC-SCNC: 7 MMOL/L — SIGNIFICANT CHANGE UP (ref 5–17)
AST SERPL-CCNC: 150 U/L — HIGH (ref 10–40)
BILIRUB SERPL-MCNC: 0.8 MG/DL — SIGNIFICANT CHANGE UP (ref 0.2–1.2)
BUN SERPL-MCNC: 7 MG/DL — SIGNIFICANT CHANGE UP (ref 7–18)
CALCIUM SERPL-MCNC: 8.5 MG/DL — SIGNIFICANT CHANGE UP (ref 8.4–10.5)
CHLORIDE SERPL-SCNC: 96 MMOL/L — SIGNIFICANT CHANGE UP (ref 96–108)
CO2 SERPL-SCNC: 31 MMOL/L — SIGNIFICANT CHANGE UP (ref 22–31)
CREAT SERPL-MCNC: 0.53 MG/DL — SIGNIFICANT CHANGE UP (ref 0.5–1.3)
EGFR: 109 ML/MIN/1.73M2 — SIGNIFICANT CHANGE UP
GLUCOSE BLDC GLUCOMTR-MCNC: 103 MG/DL — HIGH (ref 70–99)
GLUCOSE BLDC GLUCOMTR-MCNC: 105 MG/DL — HIGH (ref 70–99)
GLUCOSE BLDC GLUCOMTR-MCNC: 112 MG/DL — HIGH (ref 70–99)
GLUCOSE BLDC GLUCOMTR-MCNC: 93 MG/DL — SIGNIFICANT CHANGE UP (ref 70–99)
GLUCOSE SERPL-MCNC: 100 MG/DL — HIGH (ref 70–99)
HCT VFR BLD CALC: 42.9 % — SIGNIFICANT CHANGE UP (ref 39–50)
HGB BLD-MCNC: 14.3 G/DL — SIGNIFICANT CHANGE UP (ref 13–17)
MCHC RBC-ENTMCNC: 31.8 PG — SIGNIFICANT CHANGE UP (ref 27–34)
MCHC RBC-ENTMCNC: 33.3 GM/DL — SIGNIFICANT CHANGE UP (ref 32–36)
MCV RBC AUTO: 95.3 FL — SIGNIFICANT CHANGE UP (ref 80–100)
NRBC # BLD: 0 /100 WBCS — SIGNIFICANT CHANGE UP (ref 0–0)
PLATELET # BLD AUTO: 143 K/UL — LOW (ref 150–400)
POTASSIUM SERPL-MCNC: 4.2 MMOL/L — SIGNIFICANT CHANGE UP (ref 3.5–5.3)
POTASSIUM SERPL-SCNC: 4.2 MMOL/L — SIGNIFICANT CHANGE UP (ref 3.5–5.3)
PROT SERPL-MCNC: 7.2 G/DL — SIGNIFICANT CHANGE UP (ref 6–8.3)
RBC # BLD: 4.5 M/UL — SIGNIFICANT CHANGE UP (ref 4.2–5.8)
RBC # FLD: 16.4 % — HIGH (ref 10.3–14.5)
SODIUM SERPL-SCNC: 134 MMOL/L — LOW (ref 135–145)
WBC # BLD: 5.06 K/UL — SIGNIFICANT CHANGE UP (ref 3.8–10.5)
WBC # FLD AUTO: 5.06 K/UL — SIGNIFICANT CHANGE UP (ref 3.8–10.5)

## 2022-12-10 RX ORDER — ONDANSETRON 8 MG/1
4 TABLET, FILM COATED ORAL ONCE
Refills: 0 | Status: COMPLETED | OUTPATIENT
Start: 2022-12-10 | End: 2022-12-10

## 2022-12-10 RX ADMIN — OXYCODONE AND ACETAMINOPHEN 1 TABLET(S): 5; 325 TABLET ORAL at 10:07

## 2022-12-10 RX ADMIN — OXYCODONE AND ACETAMINOPHEN 1 TABLET(S): 5; 325 TABLET ORAL at 18:02

## 2022-12-10 RX ADMIN — ATORVASTATIN CALCIUM 10 MILLIGRAM(S): 80 TABLET, FILM COATED ORAL at 22:10

## 2022-12-10 RX ADMIN — MAGNESIUM OXIDE 400 MG ORAL TABLET 400 MILLIGRAM(S): 241.3 TABLET ORAL at 10:07

## 2022-12-10 RX ADMIN — FAMOTIDINE 20 MILLIGRAM(S): 10 INJECTION INTRAVENOUS at 17:17

## 2022-12-10 RX ADMIN — TAMSULOSIN HYDROCHLORIDE 0.4 MILLIGRAM(S): 0.4 CAPSULE ORAL at 22:10

## 2022-12-10 RX ADMIN — Medication 100 MILLIGRAM(S): at 13:41

## 2022-12-10 RX ADMIN — Medication 100 MILLIGRAM(S): at 06:11

## 2022-12-10 RX ADMIN — SENNA PLUS 2 TABLET(S): 8.6 TABLET ORAL at 22:10

## 2022-12-10 RX ADMIN — FAMOTIDINE 20 MILLIGRAM(S): 10 INJECTION INTRAVENOUS at 06:11

## 2022-12-10 RX ADMIN — OXYCODONE AND ACETAMINOPHEN 1 TABLET(S): 5; 325 TABLET ORAL at 22:09

## 2022-12-10 RX ADMIN — OXYCODONE AND ACETAMINOPHEN 1 TABLET(S): 5; 325 TABLET ORAL at 06:15

## 2022-12-10 RX ADMIN — OXYCODONE AND ACETAMINOPHEN 1 TABLET(S): 5; 325 TABLET ORAL at 17:17

## 2022-12-10 RX ADMIN — ENOXAPARIN SODIUM 55 MILLIGRAM(S): 100 INJECTION SUBCUTANEOUS at 22:10

## 2022-12-10 RX ADMIN — OXYCODONE AND ACETAMINOPHEN 1 TABLET(S): 5; 325 TABLET ORAL at 06:11

## 2022-12-10 RX ADMIN — ONDANSETRON 4 MILLIGRAM(S): 8 TABLET, FILM COATED ORAL at 00:12

## 2022-12-10 RX ADMIN — OXYCODONE AND ACETAMINOPHEN 1 TABLET(S): 5; 325 TABLET ORAL at 13:41

## 2022-12-10 RX ADMIN — Medication 5 MILLIGRAM(S): at 17:16

## 2022-12-10 RX ADMIN — OXYCODONE AND ACETAMINOPHEN 1 TABLET(S): 5; 325 TABLET ORAL at 03:43

## 2022-12-10 RX ADMIN — OXYCODONE AND ACETAMINOPHEN 1 TABLET(S): 5; 325 TABLET ORAL at 04:04

## 2022-12-10 RX ADMIN — Medication 100 MILLIGRAM(S): at 22:10

## 2022-12-10 RX ADMIN — Medication 60 MILLIGRAM(S): at 06:12

## 2022-12-10 RX ADMIN — OXYCODONE AND ACETAMINOPHEN 1 TABLET(S): 5; 325 TABLET ORAL at 14:25

## 2022-12-10 RX ADMIN — ENOXAPARIN SODIUM 55 MILLIGRAM(S): 100 INJECTION SUBCUTANEOUS at 10:08

## 2022-12-10 RX ADMIN — OXYCODONE AND ACETAMINOPHEN 1 TABLET(S): 5; 325 TABLET ORAL at 10:40

## 2022-12-10 RX ADMIN — Medication 25 MILLIGRAM(S): at 06:12

## 2022-12-10 RX ADMIN — ONDANSETRON 4 MILLIGRAM(S): 8 TABLET, FILM COATED ORAL at 22:09

## 2022-12-10 RX ADMIN — Medication 5 MILLIGRAM(S): at 06:11

## 2022-12-10 RX ADMIN — OXYCODONE AND ACETAMINOPHEN 1 TABLET(S): 5; 325 TABLET ORAL at 22:32

## 2022-12-10 NOTE — PROGRESS NOTE ADULT - SUBJECTIVE AND OBJECTIVE BOX
[   ] ICU                                          [   ] CCU                                      [ X  ] Medical Floor      Patient is a 67 year old male with vomiting. GI consulted to evaluate.         67 year old male, vaccinated, from [facility] with past medical history significant for DM, HTN, HLD, AFib on eliquis, PVD, presents with vomiting. Patient states that last night he ate a burger, and started to vomit shortly after. Patient states he is unable to eat since then due to feeling nauseous and continues to vomit each time he tries to eat. Patient denies any recent sick contact.  Patient also denies any associated abdominal pain, fever, chills, SOB, chest pain, diarrhea, hematemesis or melena.     Patient is comfortable. No new complaints reported, No abdominal pain, N/V, hematemesis, hematochezia, melena, fever, chills, chest pain, SOB, cough or diarrhea reported.       PAIN MANAGEMENT:  Pain Scale:                0 /10  Pain Location:      Prior Colonoscopy:  Unknown    PAST MEDICAL HISTORY  COPD (chronic obstructive pulmonary disease)    Diabetes    PVD (peripheral vascular disease)    Chronic atrial fibrillation    GERD (gastroesophageal reflux disease)    MDD (major depressive disorder)    BPH (benign prostatic hyperplasia)        PAST SURGICAL HISTORY  Cholecystectomy      Allergies    morphine (Unknown)  penicillin (Unknown)  shellfish (Unknown)    Intolerances  None         MEDICATIONS  (STANDING):  atorvastatin 10 milliGRAM(s) Oral at bedtime  baclofen 5 milliGRAM(s) Oral every 12 hours  dextrose 5%. 1000 milliLiter(s) (50 mL/Hr) IV Continuous <Continuous>  dextrose 5%. 1000 milliLiter(s) (100 mL/Hr) IV Continuous <Continuous>  dextrose 50% Injectable 25 Gram(s) IV Push once  dextrose 50% Injectable 12.5 Gram(s) IV Push once  dextrose 50% Injectable 25 Gram(s) IV Push once  enoxaparin Injectable 55 milliGRAM(s) SubCutaneous every 12 hours  famotidine    Tablet 20 milliGRAM(s) Oral two times a day  glucagon  Injectable 1 milliGRAM(s) IntraMuscular once  insulin lispro (ADMELOG) corrective regimen sliding scale   SubCutaneous three times a day before meals  magnesium oxide 400 milliGRAM(s) Oral three times a day with meals  metoprolol tartrate 25 milliGRAM(s) Oral two times a day  NIFEdipine XL 60 milliGRAM(s) Oral daily  oxycodone    5 mG/acetaminophen 325 mG 1 Tablet(s) Oral every 4 hours  polyethylene glycol 3350 17 Gram(s) Oral daily  pregabalin 100 milliGRAM(s) Oral three times a day  senna 2 Tablet(s) Oral at bedtime  sodium chloride 0.9%. 1000 milliLiter(s) (80 mL/Hr) IV Continuous <Continuous>  tamsulosin 0.4 milliGRAM(s) Oral at bedtime    MEDICATIONS  (PRN):  dextrose Oral Gel 15 Gram(s) Oral once PRN Blood Glucose LESS THAN 70 milliGRAM(s)/deciliter      SOCIAL HISTORY  Advanced Directives:       [ X ] Full Code       [  ] DNR  Marital Status:         [  ] M      [ X ] S      [  ] D       [  ] W  Children:       [ X ] Yes      [  ] No  Occupation:        [  ] Employed       [ X ] Unemployed       [  ] Retired  Diet:       [X  ] Regular       [  ] PEG feeding          [  ] NG tube feeding  Drug Use:           [ X ] Patient denied          [  ] Yes  Alcohol:           [ X ] No             [  ] Yes (socially)         [  ] Yes (chronic)  Tobacco:           [  ] Yes           [ X ] No      FAMILY HISTORY  [ X ] Heart Disease            [X  ] Diabetes             [ X ] HTN             [  ] Colon Cancer             [  ] Stomach Cancer              [  ] Pancreatic Cancer        VITALS  Vital Signs Last 24 Hrs  T(C): 36.8 (10 Dec 2022 05:20), Max: 36.9 (09 Dec 2022 19:56)  T(F): 98.2 (10 Dec 2022 05:20), Max: 98.4 (09 Dec 2022 19:56)  HR: 81 (10 Dec 2022 05:20) (58 - 91)  BP: 124/76 (10 Dec 2022 05:20) (112/69 - 124/76)   RR: 18 (10 Dec 2022 05:20) (18 - 18)  SpO2: 95% (10 Dec 2022 05:20) (95% - 100%)  Parameters below as of 10 Dec 2022 05:20  Patient On (Oxygen Delivery Method): nasal cannula  O2 Flow (L/min): 2       MEDICATIONS  (STANDING):  atorvastatin 10 milliGRAM(s) Oral at bedtime  baclofen 5 milliGRAM(s) Oral every 12 hours  dextrose 5%. 1000 milliLiter(s) (50 mL/Hr) IV Continuous <Continuous>  dextrose 5%. 1000 milliLiter(s) (100 mL/Hr) IV Continuous <Continuous>  dextrose 50% Injectable 25 Gram(s) IV Push once  dextrose 50% Injectable 12.5 Gram(s) IV Push once  dextrose 50% Injectable 25 Gram(s) IV Push once  enoxaparin Injectable 55 milliGRAM(s) SubCutaneous every 12 hours  famotidine    Tablet 20 milliGRAM(s) Oral two times a day  glucagon  Injectable 1 milliGRAM(s) IntraMuscular once  insulin lispro (ADMELOG) corrective regimen sliding scale   SubCutaneous three times a day before meals  metoprolol tartrate 25 milliGRAM(s) Oral two times a day  NIFEdipine XL 60 milliGRAM(s) Oral daily  oxycodone    5 mG/acetaminophen 325 mG 1 Tablet(s) Oral every 4 hours  polyethylene glycol 3350 17 Gram(s) Oral daily  pregabalin 100 milliGRAM(s) Oral three times a day  senna 2 Tablet(s) Oral at bedtime  sodium chloride 0.9%. 1000 milliLiter(s) (80 mL/Hr) IV Continuous <Continuous>  tamsulosin 0.4 milliGRAM(s) Oral at bedtime    MEDICATIONS  (PRN):  dextrose Oral Gel 15 Gram(s) Oral once PRN Blood Glucose LESS THAN 70 milliGRAM(s)/deciliter                            14.3   5.06  )-----------( 143      ( 10 Dec 2022 04:40 )             42.9       12-10    134<L>  |  96  |  7   ----------------------------<  100<H>  4.2   |  31  |  0.53    Ca    8.5      10 Dec 2022 04:40  Phos  3.1     12-09  Mg     1.5     12-09    TPro  7.2  /  Alb  2.2<L>  /  TBili  0.8  /  DBili  x   /  AST  150<H>  /  ALT  73<H>  /  AlkPhos  214<H>  12-10

## 2022-12-10 NOTE — PROGRESS NOTE ADULT - ASSESSMENT
Patient is a 67 year old male, vaccinated, from [facility] with PMHx of DM, HTN, HLD, AF on Eliquis, PVD, presents with vomiting, found to have elevated LFts. CT scan findings shows dilated CBD up to 1.7 cm with abrupt cut off of the distal CBD and mild pancreatic duct dilatation, suggestive of possible Choledocholithiasis, admitted for further management. GI Dr. Villanueva consulted. Of note, pt also noted with urinary retention, bladder scan x2 with urine > 500ml each time. Pt was straight cathed x2. F/U repeat bladder scan.

## 2022-12-10 NOTE — PROGRESS NOTE ADULT - TIME BILLING
- Review of records, telemetry, vital signs and daily labs.   - General and cardiovascular physical examination.  - Generation of cardiovascular treatment plan.  - Coordination of care.      Patient was seen and examined by me on 12/10/22,interim events noted,labs and radiology studies reviewed.  Nasir Sharma MD,FACC.  [address] [place], [place] [id number].  [phone]

## 2022-12-10 NOTE — PROGRESS NOTE ADULT - ASSESSMENT
1. Abdominal pain and vomiting  2. Dilated CBD  3. R/o CBD stone  4. Dilated pancreatic duct  5. Steatohepatitis    Suggestions:    1. Follow up LFT's  2. ERCP  3. Protonix daily  4. Avoid NSAID  5. Zofran PRN  6. DVT prophylaxis

## 2022-12-10 NOTE — PROGRESS NOTE ADULT - SUBJECTIVE AND OBJECTIVE BOX
PATIENT SEEN AND EXAMINED ON :- 12/10/22  DATE OF SERVICE:             Interim events noted,Labs ,Radiological studies and Cardiology tests reviewed .  12/10/22         HOSPITAL COURSE: HPI:  67 year old male, vaccinated, from [facility] with PMHx of DM, HTN, HLD, AF on eliquis, PVD, presents with vomiting. Patient states that last night he ate a burger, and started to vomit shortly after. Patient states he is unable to eat since then due to feeling nauseous and continues to vomit each time he tries to eat. Patient denies any recent sick contact.  Patient also denies any associated abdominal pain, fever, chills, SOB, chest pain, diarrhea, hematemesis or melena.     In the ED:  Afebrile, hemodynamically stable  No leukocytosis  Bili WNL, , ,   CT A/P showed: Cholecystectomy +Dilated CBD up to 1.7 cm abrupt cut off of the distal CBD and  mild pancreatic duct dilatation.      (08 Dec 2022 19:05)      INTERIM EVENTS:Patient seen at bedside ,interim events noted.      PMH -reviewed admission note, no change since admission  HEART FAILURE: Acute[ ]Chronic[ ] Systolic[ ] Diastolic[ ] Combined Systolic and Diastolic[ ]  CAD[ ] CABG[ ] PCI[ ]  DEVICES[ ] PPM[ ] ICD[ ] ILR[ ]  ATRIAL FIBRILLATION[ ] Paroxysmal[ ] Permanent[ ] CHADS2-[  ]  DILCIA[ ] CKD1[ ] CKD2[ ] CKD3[ ] CKD4[ ] ESRD[ ]  COPD[ ] HTN[ ]   DM[ ] Type1[ ] Type 2[ ]   CVA[ ] Paresis[ ]    AMBULATION: Assisted[ ] Cane/walker[ ] Independent[ ]    MEDICATIONS  (STANDING):  atorvastatin 10 milliGRAM(s) Oral at bedtime  baclofen 5 milliGRAM(s) Oral every 12 hours  dextrose 5%. 1000 milliLiter(s) (50 mL/Hr) IV Continuous <Continuous>  dextrose 5%. 1000 milliLiter(s) (100 mL/Hr) IV Continuous <Continuous>  dextrose 50% Injectable 12.5 Gram(s) IV Push once  dextrose 50% Injectable 25 Gram(s) IV Push once  dextrose 50% Injectable 25 Gram(s) IV Push once  enoxaparin Injectable 55 milliGRAM(s) SubCutaneous every 12 hours  famotidine    Tablet 20 milliGRAM(s) Oral two times a day  glucagon  Injectable 1 milliGRAM(s) IntraMuscular once  insulin lispro (ADMELOG) corrective regimen sliding scale   SubCutaneous three times a day before meals  metoprolol tartrate 25 milliGRAM(s) Oral two times a day  NIFEdipine XL 60 milliGRAM(s) Oral daily  oxycodone    5 mG/acetaminophen 325 mG 1 Tablet(s) Oral every 4 hours  polyethylene glycol 3350 17 Gram(s) Oral daily  pregabalin 100 milliGRAM(s) Oral three times a day  senna 2 Tablet(s) Oral at bedtime  sodium chloride 0.9%. 1000 milliLiter(s) (80 mL/Hr) IV Continuous <Continuous>  tamsulosin 0.4 milliGRAM(s) Oral at bedtime    MEDICATIONS  (PRN):  dextrose Oral Gel 15 Gram(s) Oral once PRN Blood Glucose LESS THAN 70 milliGRAM(s)/deciliter            REVIEW OF SYSTEMS:  Constitutional: [ ] fever, [ ]weight loss,  [ ]fatigue [ ]weight gain  Eyes: [ ] visual changes  Respiratory: [ ]shortness of breath;  [ ] cough, [ ]wheezing, [ ]chills, [ ]hemoptysis  Cardiovascular: [ ] chest pain, [ ]palpitations, [ ]dizziness,  [ ]leg swelling[ ]orthopnea[ ]PND  Gastrointestinal: [ ] abdominal pain, [ ]nausea, [ ]vomiting,  [ ]diarrhea [ ]Constipation [ ]Melena  Genitourinary: [ ] dysuria, [ ] hematuria [ ]Cardoza  Neurologic: [ ] headaches [ ] tremors[ ]weakness [ ]Paralysis Right[ ] Left[ ]  Skin: [ ] itching, [ ]burning, [ ] rashes  Endocrine: [ ] heat or cold intolerance  Musculoskeletal: [ ] joint pain or swelling; [ ] muscle, back, or extremity pain  Psychiatric: [ ] depression, [ ]anxiety, [ ]mood swings, or [ ]difficulty sleeping  Hematologic: [ ] easy bruising, [ ] bleeding gums    [ ] All remaining systems negative except as per above.   [ ]Unable to obtain.  [x] No change in ROS since admission      Vital Signs Last 24 Hrs  T(C): 36.8 (10 Dec 2022 17:12), Max: 36.9 (10 Dec 2022 13:15)  T(F): 98.3 (10 Dec 2022 17:12), Max: 98.5 (10 Dec 2022 13:15)  HR: 66 (10 Dec 2022 17:12) (66 - 81)  BP: 102/65 (10 Dec 2022 17:12) (102/65 - 124/76)  BP(mean): --  RR: 18 (10 Dec 2022 17:12) (18 - 18)  SpO2: 100% (10 Dec 2022 17:12) (95% - 100%)    Parameters below as of 10 Dec 2022 17:12  Patient On (Oxygen Delivery Method): nasal cannula  O2 Flow (L/min): 2    I&O's Summary    09 Dec 2022 07:01  -  10 Dec 2022 07:00  --------------------------------------------------------  IN: 0 mL / OUT: 1450 mL / NET: -1450 mL        PHYSICAL EXAM:  General: No acute distress BMI-  HEENT: EOMI, PERRL  Neck: Supple, [ ] JVD  Lungs: Equal air entry bilaterally; [ ] rales [ ] wheezing [ ] rhonchi  Heart: Regular rate and rhythm; [x ] murmur   2/6 [ x] systolic [ ] diastolic [ ] radiation[ ] rubs [ ]  gallops  Abdomen: Nontender, bowel sounds present  Extremities: No clubbing, cyanosis, [ ] edema [ ]Pulses  equal and intact  Nervous system:  Alert & Oriented X3, no focal deficits  Psychiatric: Normal affect  Skin: No rashes or lesions    LABS:  12-10    134<L>  |  96  |  7   ----------------------------<  100<H>  4.2   |  31  |  0.53    Ca    8.5      10 Dec 2022 04:40  Phos  3.1     12-09  Mg     1.5     12-09    TPro  7.2  /  Alb  2.2<L>  /  TBili  0.8  /  DBili  x   /  AST  150<H>  /  ALT  73<H>  /  AlkPhos  214<H>  12-10    Creatinine Trend: 0.53<--, 0.51<--, 0.78<--                        14.3   5.06  )-----------( 143      ( 10 Dec 2022 04:40 )             42.9

## 2022-12-11 LAB
ALBUMIN SERPL ELPH-MCNC: 2.5 G/DL — LOW (ref 3.5–5)
ALP SERPL-CCNC: 240 U/L — HIGH (ref 40–120)
ALT FLD-CCNC: 84 U/L DA — HIGH (ref 10–60)
ANION GAP SERPL CALC-SCNC: 10 MMOL/L — SIGNIFICANT CHANGE UP (ref 5–17)
AST SERPL-CCNC: 169 U/L — HIGH (ref 10–40)
BILIRUB SERPL-MCNC: 1.1 MG/DL — SIGNIFICANT CHANGE UP (ref 0.2–1.2)
BUN SERPL-MCNC: 10 MG/DL — SIGNIFICANT CHANGE UP (ref 7–18)
CALCIUM SERPL-MCNC: 8.6 MG/DL — SIGNIFICANT CHANGE UP (ref 8.4–10.5)
CHLORIDE SERPL-SCNC: 91 MMOL/L — LOW (ref 96–108)
CO2 SERPL-SCNC: 30 MMOL/L — SIGNIFICANT CHANGE UP (ref 22–31)
CREAT SERPL-MCNC: 0.62 MG/DL — SIGNIFICANT CHANGE UP (ref 0.5–1.3)
CULTURE RESULTS: SIGNIFICANT CHANGE UP
EGFR: 104 ML/MIN/1.73M2 — SIGNIFICANT CHANGE UP
GLUCOSE BLDC GLUCOMTR-MCNC: 100 MG/DL — HIGH (ref 70–99)
GLUCOSE BLDC GLUCOMTR-MCNC: 102 MG/DL — HIGH (ref 70–99)
GLUCOSE BLDC GLUCOMTR-MCNC: 95 MG/DL — SIGNIFICANT CHANGE UP (ref 70–99)
GLUCOSE BLDC GLUCOMTR-MCNC: 98 MG/DL — SIGNIFICANT CHANGE UP (ref 70–99)
GLUCOSE SERPL-MCNC: 104 MG/DL — HIGH (ref 70–99)
HCT VFR BLD CALC: 44.6 % — SIGNIFICANT CHANGE UP (ref 39–50)
HGB BLD-MCNC: 14.9 G/DL — SIGNIFICANT CHANGE UP (ref 13–17)
MCHC RBC-ENTMCNC: 31.7 PG — SIGNIFICANT CHANGE UP (ref 27–34)
MCHC RBC-ENTMCNC: 33.4 GM/DL — SIGNIFICANT CHANGE UP (ref 32–36)
MCV RBC AUTO: 94.9 FL — SIGNIFICANT CHANGE UP (ref 80–100)
NRBC # BLD: 0 /100 WBCS — SIGNIFICANT CHANGE UP (ref 0–0)
PLATELET # BLD AUTO: 159 K/UL — SIGNIFICANT CHANGE UP (ref 150–400)
POTASSIUM SERPL-MCNC: 4.3 MMOL/L — SIGNIFICANT CHANGE UP (ref 3.5–5.3)
POTASSIUM SERPL-SCNC: 4.3 MMOL/L — SIGNIFICANT CHANGE UP (ref 3.5–5.3)
PROT SERPL-MCNC: 7.3 G/DL — SIGNIFICANT CHANGE UP (ref 6–8.3)
RBC # BLD: 4.7 M/UL — SIGNIFICANT CHANGE UP (ref 4.2–5.8)
RBC # FLD: 16.3 % — HIGH (ref 10.3–14.5)
SODIUM SERPL-SCNC: 131 MMOL/L — LOW (ref 135–145)
SPECIMEN SOURCE: SIGNIFICANT CHANGE UP
WBC # BLD: 8.75 K/UL — SIGNIFICANT CHANGE UP (ref 3.8–10.5)
WBC # FLD AUTO: 8.75 K/UL — SIGNIFICANT CHANGE UP (ref 3.8–10.5)

## 2022-12-11 RX ORDER — ONDANSETRON 8 MG/1
4 TABLET, FILM COATED ORAL EVERY 6 HOURS
Refills: 0 | Status: DISCONTINUED | OUTPATIENT
Start: 2022-12-11 | End: 2022-12-21

## 2022-12-11 RX ADMIN — SENNA PLUS 2 TABLET(S): 8.6 TABLET ORAL at 21:08

## 2022-12-11 RX ADMIN — OXYCODONE AND ACETAMINOPHEN 1 TABLET(S): 5; 325 TABLET ORAL at 06:12

## 2022-12-11 RX ADMIN — OXYCODONE AND ACETAMINOPHEN 1 TABLET(S): 5; 325 TABLET ORAL at 02:00

## 2022-12-11 RX ADMIN — Medication 25 MILLIGRAM(S): at 17:31

## 2022-12-11 RX ADMIN — ONDANSETRON 4 MILLIGRAM(S): 8 TABLET, FILM COATED ORAL at 21:08

## 2022-12-11 RX ADMIN — ENOXAPARIN SODIUM 55 MILLIGRAM(S): 100 INJECTION SUBCUTANEOUS at 21:06

## 2022-12-11 RX ADMIN — OXYCODONE AND ACETAMINOPHEN 1 TABLET(S): 5; 325 TABLET ORAL at 14:00

## 2022-12-11 RX ADMIN — OXYCODONE AND ACETAMINOPHEN 1 TABLET(S): 5; 325 TABLET ORAL at 21:11

## 2022-12-11 RX ADMIN — ATORVASTATIN CALCIUM 10 MILLIGRAM(S): 80 TABLET, FILM COATED ORAL at 21:07

## 2022-12-11 RX ADMIN — Medication 100 MILLIGRAM(S): at 06:12

## 2022-12-11 RX ADMIN — OXYCODONE AND ACETAMINOPHEN 1 TABLET(S): 5; 325 TABLET ORAL at 10:45

## 2022-12-11 RX ADMIN — OXYCODONE AND ACETAMINOPHEN 1 TABLET(S): 5; 325 TABLET ORAL at 17:30

## 2022-12-11 RX ADMIN — OXYCODONE AND ACETAMINOPHEN 1 TABLET(S): 5; 325 TABLET ORAL at 18:00

## 2022-12-11 RX ADMIN — FAMOTIDINE 20 MILLIGRAM(S): 10 INJECTION INTRAVENOUS at 06:13

## 2022-12-11 RX ADMIN — FAMOTIDINE 20 MILLIGRAM(S): 10 INJECTION INTRAVENOUS at 17:31

## 2022-12-11 RX ADMIN — Medication 5 MILLIGRAM(S): at 06:13

## 2022-12-11 RX ADMIN — Medication 25 MILLIGRAM(S): at 06:13

## 2022-12-11 RX ADMIN — OXYCODONE AND ACETAMINOPHEN 1 TABLET(S): 5; 325 TABLET ORAL at 13:23

## 2022-12-11 RX ADMIN — ENOXAPARIN SODIUM 55 MILLIGRAM(S): 100 INJECTION SUBCUTANEOUS at 08:21

## 2022-12-11 RX ADMIN — OXYCODONE AND ACETAMINOPHEN 1 TABLET(S): 5; 325 TABLET ORAL at 10:15

## 2022-12-11 RX ADMIN — Medication 100 MILLIGRAM(S): at 21:11

## 2022-12-11 RX ADMIN — Medication 100 MILLIGRAM(S): at 13:23

## 2022-12-11 RX ADMIN — ONDANSETRON 4 MILLIGRAM(S): 8 TABLET, FILM COATED ORAL at 12:16

## 2022-12-11 RX ADMIN — OXYCODONE AND ACETAMINOPHEN 1 TABLET(S): 5; 325 TABLET ORAL at 06:00

## 2022-12-11 RX ADMIN — OXYCODONE AND ACETAMINOPHEN 1 TABLET(S): 5; 325 TABLET ORAL at 02:17

## 2022-12-11 RX ADMIN — Medication 60 MILLIGRAM(S): at 06:14

## 2022-12-11 RX ADMIN — Medication 5 MILLIGRAM(S): at 17:31

## 2022-12-11 RX ADMIN — TAMSULOSIN HYDROCHLORIDE 0.4 MILLIGRAM(S): 0.4 CAPSULE ORAL at 21:07

## 2022-12-11 NOTE — PROGRESS NOTE ADULT - ASSESSMENT
Patient is a 67 year old male, vaccinated, from [facility] with PMHx of DM, HTN, HLD, AF on Eliquis, PVD, presents with vomiting, found to have elevated LFts. CT scan findings shows dilated CBD up to 1.7 cm with abrupt cut off of the distal CBD and mild pancreatic duct dilatation, suggestive of possible Choledocholithiasis, admitted for further management. GI Dr. Villanueva consulted. Of note, pt also noted with urinary retention, bladder scan x2 with urine > 500ml each time. Pt was straight cathed x2. F/U repeat bladder scan.

## 2022-12-11 NOTE — PROGRESS NOTE ADULT - SUBJECTIVE AND OBJECTIVE BOX
[   ] ICU                                          [   ] CCU                                      [ X  ] Medical Floor        Patient is a 67 year old male with vomiting. GI consulted to evaluate.         67 year old male, vaccinated, from [facility] with past medical history significant for DM, HTN, HLD, AFib on eliquis, PVD, presents with vomiting. Patient states that last night he ate a burger, and started to vomit shortly after. Patient states he is unable to eat since then due to feeling nauseous and continues to vomit each time he tries to eat. Patient denies any recent sick contact.  Patient also denies any associated abdominal pain, fever, chills, SOB, chest pain, diarrhea, hematemesis or melena.     Patient is comfortable. No new complaints reported, No abdominal pain, N/V, hematemesis, hematochezia, melena, fever, chills, chest pain, SOB, cough or diarrhea reported.       PAIN MANAGEMENT:  Pain Scale:                0 /10  Pain Location:      Prior Colonoscopy:  Unknown    PAST MEDICAL HISTORY  COPD (chronic obstructive pulmonary disease)    Diabetes    PVD (peripheral vascular disease)    Chronic atrial fibrillation    GERD (gastroesophageal reflux disease)    MDD (major depressive disorder)    BPH (benign prostatic hyperplasia)        PAST SURGICAL HISTORY  Cholecystectomy      Allergies    morphine (Unknown)  penicillin (Unknown)  shellfish (Unknown)    Intolerances  None       SOCIAL HISTORY  Advanced Directives:       [ X ] Full Code       [  ] DNR  Marital Status:         [  ] M      [ X ] S      [  ] D       [  ] W  Children:       [ X ] Yes      [  ] No  Occupation:        [  ] Employed       [ X ] Unemployed       [  ] Retired  Diet:       [X  ] Regular       [  ] PEG feeding          [  ] NG tube feeding  Drug Use:           [ X ] Patient denied          [  ] Yes  Alcohol:           [ X ] No             [  ] Yes (socially)         [  ] Yes (chronic)  Tobacco:           [  ] Yes           [ X ] No      FAMILY HISTORY  [ X ] Heart Disease            [X  ] Diabetes             [ X ] HTN             [  ] Colon Cancer             [  ] Stomach Cancer              [  ] Pancreatic Cancer      VITALS  Vital Signs Last 24 Hrs  T(C): 37 (11 Dec 2022 05:22), Max: 37 (11 Dec 2022 05:22)  T(F): 98.6 (11 Dec 2022 05:22), Max: 98.6 (11 Dec 2022 05:22)  HR: 68 (11 Dec 2022 05:22) (66 - 68)  BP: 109/74 (11 Dec 2022 05:22) (102/65 - 113/71)   RR: 17 (11 Dec 2022 05:22) (17 - 18)  SpO2: 100% (11 Dec 2022 05:22) (98% - 100%)  Parameters below as of 11 Dec 2022 05:22  Patient On (Oxygen Delivery Method): nasal cannula  O2 Flow (L/min): 2         MEDICATIONS  (STANDING):  atorvastatin 10 milliGRAM(s) Oral at bedtime  baclofen 5 milliGRAM(s) Oral every 12 hours  dextrose 5%. 1000 milliLiter(s) (50 mL/Hr) IV Continuous <Continuous>  dextrose 5%. 1000 milliLiter(s) (100 mL/Hr) IV Continuous <Continuous>  dextrose 50% Injectable 25 Gram(s) IV Push once  dextrose 50% Injectable 12.5 Gram(s) IV Push once  dextrose 50% Injectable 25 Gram(s) IV Push once  enoxaparin Injectable 55 milliGRAM(s) SubCutaneous every 12 hours  famotidine    Tablet 20 milliGRAM(s) Oral two times a day  glucagon  Injectable 1 milliGRAM(s) IntraMuscular once  insulin lispro (ADMELOG) corrective regimen sliding scale   SubCutaneous three times a day before meals  metoprolol tartrate 25 milliGRAM(s) Oral two times a day  NIFEdipine XL 60 milliGRAM(s) Oral daily  oxycodone    5 mG/acetaminophen 325 mG 1 Tablet(s) Oral every 4 hours  polyethylene glycol 3350 17 Gram(s) Oral daily  pregabalin 100 milliGRAM(s) Oral three times a day  senna 2 Tablet(s) Oral at bedtime  sodium chloride 0.9%. 1000 milliLiter(s) (80 mL/Hr) IV Continuous <Continuous>  tamsulosin 0.4 milliGRAM(s) Oral at bedtime    MEDICATIONS  (PRN):  dextrose Oral Gel 15 Gram(s) Oral once PRN Blood Glucose LESS THAN 70 milliGRAM(s)/deciliter                            14.9   8.75  )-----------( 159      ( 11 Dec 2022 07:07 )             44.6       12-11    131<L>  |  91<L>  |  10  ----------------------------<  104<H>  4.3   |  30  |  0.62    Ca    8.6      11 Dec 2022 07:07    TPro  7.3  /  Alb  2.5<L>  /  TBili  1.1  /  DBili  x   /  AST  169<H>  /  ALT  84<H>  /  AlkPhos  240<H>  12-11

## 2022-12-11 NOTE — PROGRESS NOTE ADULT - TIME BILLING
- Review of records, telemetry, vital signs and daily labs.   - General and cardiovascular physical examination.  - Generation of cardiovascular treatment plan.  - Coordination of care.      Patient was seen and examined by me on 12/11/22,interim events noted,labs and radiology studies reviewed.  Nasir Sharma MD,FACC.  [address] [place], [place] [id number].  [phone]

## 2022-12-11 NOTE — PROGRESS NOTE ADULT - PROBLEM SELECTOR PLAN 1
-CT A/P showed: Cholecystectomy. Dilated CBD up to 1.7 cm abrupt cut off of the distal CBD and  mild pancreatic duct dilatation.   -Concern for Choledocholithiasis vs papillary process   - f/u MRCP  - gi/fu

## 2022-12-11 NOTE — PROGRESS NOTE ADULT - PROBLEM SELECTOR PLAN 9
Pt came from [facility]  Disposition is pending clinical course

## 2022-12-11 NOTE — PROGRESS NOTE ADULT - SUBJECTIVE AND OBJECTIVE BOX
PATIENT SEEN AND EXAMINED ON :- 12/11/22  DATE OF SERVICE:   12/11/22          Interim events noted,Labs ,Radiological studies and Cardiology tests reviewed        HOSPITAL COURSE: HPI:  67 year old male, vaccinated, from [facility] with PMHx of DM, HTN, HLD, AF on eliquis, PVD, presents with vomiting. Patient states that last night he ate a burger, and started to vomit shortly after. Patient states he is unable to eat since then due to feeling nauseous and continues to vomit each time he tries to eat. Patient denies any recent sick contact.  Patient also denies any associated abdominal pain, fever, chills, SOB, chest pain, diarrhea, hematemesis or melena.     In the ED:  Afebrile, hemodynamically stable  No leukocytosis  Bili WNL, , ,   CT A/P showed: Cholecystectomy +Dilated CBD up to 1.7 cm abrupt cut off of the distal CBD and  mild pancreatic duct dilatation.      (08 Dec 2022 19:05)      INTERIM EVENTS:Patient seen at bedside ,interim events noted.      PMH -reviewed admission note, no change since admission  HEART FAILURE: Acute[ ]Chronic[ ] Systolic[ ] Diastolic[ ] Combined Systolic and Diastolic[ ]  CAD[ ] CABG[ ] PCI[ ]  DEVICES[ ] PPM[ ] ICD[ ] ILR[ ]  ATRIAL FIBRILLATION[ ] Paroxysmal[ ] Permanent[ ] CHADS2-[  ]  DILCIA[ ] CKD1[ ] CKD2[ ] CKD3[ ] CKD4[ ] ESRD[ ]  COPD[ ] HTN[ ]   DM[ ] Type1[ ] Type 2[ ]   CVA[ ] Paresis[ ]    AMBULATION: Assisted[ ] Cane/walker[ ] Independent[ ]    MEDICATIONS  (STANDING):  atorvastatin 10 milliGRAM(s) Oral at bedtime  baclofen 5 milliGRAM(s) Oral every 12 hours  dextrose 5%. 1000 milliLiter(s) (50 mL/Hr) IV Continuous <Continuous>  dextrose 5%. 1000 milliLiter(s) (100 mL/Hr) IV Continuous <Continuous>  dextrose 50% Injectable 25 Gram(s) IV Push once  dextrose 50% Injectable 12.5 Gram(s) IV Push once  dextrose 50% Injectable 25 Gram(s) IV Push once  enoxaparin Injectable 55 milliGRAM(s) SubCutaneous every 12 hours  famotidine    Tablet 20 milliGRAM(s) Oral two times a day  glucagon  Injectable 1 milliGRAM(s) IntraMuscular once  insulin lispro (ADMELOG) corrective regimen sliding scale   SubCutaneous three times a day before meals  metoprolol tartrate 25 milliGRAM(s) Oral two times a day  NIFEdipine XL 60 milliGRAM(s) Oral daily  oxycodone    5 mG/acetaminophen 325 mG 1 Tablet(s) Oral every 4 hours  polyethylene glycol 3350 17 Gram(s) Oral daily  pregabalin 100 milliGRAM(s) Oral three times a day  senna 2 Tablet(s) Oral at bedtime  sodium chloride 0.9%. 1000 milliLiter(s) (80 mL/Hr) IV Continuous <Continuous>  tamsulosin 0.4 milliGRAM(s) Oral at bedtime    MEDICATIONS  (PRN):  dextrose Oral Gel 15 Gram(s) Oral once PRN Blood Glucose LESS THAN 70 milliGRAM(s)/deciliter  ondansetron    Tablet 4 milliGRAM(s) Oral every 6 hours PRN Nausea            REVIEW OF SYSTEMS:  Constitutional: [ ] fever, [ ]weight loss,  [ ]fatigue [ ]weight gain  Eyes: [ ] visual changes  Respiratory: [ ]shortness of breath;  [ ] cough, [ ]wheezing, [ ]chills, [ ]hemoptysis  Cardiovascular: [ ] chest pain, [ ]palpitations, [ ]dizziness,  [ ]leg swelling[ ]orthopnea[ ]PND  Gastrointestinal: [ ] abdominal pain, [ ]nausea, [ ]vomiting,  [ ]diarrhea [ ]Constipation [ ]Melena  Genitourinary: [ ] dysuria, [ ] hematuria [ ]Cardoza  Neurologic: [ ] headaches [ ] tremors[ ]weakness [ ]Paralysis Right[ ] Left[ ]  Skin: [ ] itching, [ ]burning, [ ] rashes  Endocrine: [ ] heat or cold intolerance  Musculoskeletal: [ ] joint pain or swelling; [ ] muscle, back, or extremity pain  Psychiatric: [ ] depression, [ ]anxiety, [ ]mood swings, or [ ]difficulty sleeping  Hematologic: [ ] easy bruising, [ ] bleeding gums    [ ] All remaining systems negative except as per above.   [ ]Unable to obtain.  [x] No change in ROS since admission      Vital Signs Last 24 Hrs  T(C): 37.6 (11 Dec 2022 12:26), Max: 37.6 (11 Dec 2022 12:26)  T(F): 99.7 (11 Dec 2022 12:26), Max: 99.7 (11 Dec 2022 12:26)  HR: 80 (11 Dec 2022 17:28) (68 - 91)  BP: 116/71 (11 Dec 2022 17:28) (102/64 - 116/71)  BP(mean): --  RR: 17 (11 Dec 2022 12:26) (17 - 17)  SpO2: 98% (11 Dec 2022 12:26) (98% - 100%)    Parameters below as of 11 Dec 2022 12:26  Patient On (Oxygen Delivery Method): nasal cannula  O2 Flow (L/min): 2    I&O's Summary    10 Dec 2022 07:01  -  11 Dec 2022 07:00  --------------------------------------------------------  IN: 0 mL / OUT: 600 mL / NET: -600 mL        PHYSICAL EXAM:  General: No acute distress BMI-  HEENT: EOMI, PERRL  Neck: Supple, [ ] JVD  Lungs: Equal air entry bilaterally; [ ] rales [ ] wheezing [ ] rhonchi  Heart: Regular rate and rhythm; [x ] murmur   2/6 [ x] systolic [ ] diastolic [ ] radiation[ ] rubs [ ]  gallops  Abdomen: Nontender, bowel sounds present  Extremities: No clubbing, cyanosis, [ ] edema [ ]Pulses  equal and intact  Nervous system:  Alert & Oriented X3, no focal deficits  Psychiatric: Normal affect  Skin: No rashes or lesions    LABS:  12-11    131<L>  |  91<L>  |  10  ----------------------------<  104<H>  4.3   |  30  |  0.62    Ca    8.6      11 Dec 2022 07:07    TPro  7.3  /  Alb  2.5<L>  /  TBili  1.1  /  DBili  x   /  AST  169<H>  /  ALT  84<H>  /  AlkPhos  240<H>  12-11    Creatinine Trend: 0.62<--, 0.53<--, 0.51<--, 0.78<--                        14.9   8.75  )-----------( 159      ( 11 Dec 2022 07:07 )             44.6

## 2022-12-12 DIAGNOSIS — K80.50 CALCULUS OF BILE DUCT WITHOUT CHOLANGITIS OR CHOLECYSTITIS WITHOUT OBSTRUCTION: ICD-10-CM

## 2022-12-12 LAB
ALBUMIN SERPL ELPH-MCNC: 2 G/DL — LOW (ref 3.5–5)
ALP SERPL-CCNC: 220 U/L — HIGH (ref 40–120)
ALT FLD-CCNC: 66 U/L DA — HIGH (ref 10–60)
ANION GAP SERPL CALC-SCNC: 12 MMOL/L — SIGNIFICANT CHANGE UP (ref 5–17)
AST SERPL-CCNC: 124 U/L — HIGH (ref 10–40)
BILIRUB SERPL-MCNC: 1 MG/DL — SIGNIFICANT CHANGE UP (ref 0.2–1.2)
BUN SERPL-MCNC: 13 MG/DL — SIGNIFICANT CHANGE UP (ref 7–18)
CALCIUM SERPL-MCNC: 8.3 MG/DL — LOW (ref 8.4–10.5)
CHLORIDE SERPL-SCNC: 93 MMOL/L — LOW (ref 96–108)
CO2 SERPL-SCNC: 25 MMOL/L — SIGNIFICANT CHANGE UP (ref 22–31)
CREAT SERPL-MCNC: 0.56 MG/DL — SIGNIFICANT CHANGE UP (ref 0.5–1.3)
EGFR: 107 ML/MIN/1.73M2 — SIGNIFICANT CHANGE UP
GLUCOSE BLDC GLUCOMTR-MCNC: 118 MG/DL — HIGH (ref 70–99)
GLUCOSE BLDC GLUCOMTR-MCNC: 86 MG/DL — SIGNIFICANT CHANGE UP (ref 70–99)
GLUCOSE BLDC GLUCOMTR-MCNC: 98 MG/DL — SIGNIFICANT CHANGE UP (ref 70–99)
GLUCOSE SERPL-MCNC: 94 MG/DL — SIGNIFICANT CHANGE UP (ref 70–99)
HCT VFR BLD CALC: 41.8 % — SIGNIFICANT CHANGE UP (ref 39–50)
HGB BLD-MCNC: 14.1 G/DL — SIGNIFICANT CHANGE UP (ref 13–17)
MAGNESIUM SERPL-MCNC: 1.7 MG/DL — SIGNIFICANT CHANGE UP (ref 1.6–2.6)
MCHC RBC-ENTMCNC: 31.7 PG — SIGNIFICANT CHANGE UP (ref 27–34)
MCHC RBC-ENTMCNC: 33.7 GM/DL — SIGNIFICANT CHANGE UP (ref 32–36)
MCV RBC AUTO: 93.9 FL — SIGNIFICANT CHANGE UP (ref 80–100)
NRBC # BLD: 0 /100 WBCS — SIGNIFICANT CHANGE UP (ref 0–0)
PHOSPHATE SERPL-MCNC: 3.4 MG/DL — SIGNIFICANT CHANGE UP (ref 2.5–4.5)
PLATELET # BLD AUTO: 154 K/UL — SIGNIFICANT CHANGE UP (ref 150–400)
POTASSIUM SERPL-MCNC: 4.7 MMOL/L — SIGNIFICANT CHANGE UP (ref 3.5–5.3)
POTASSIUM SERPL-SCNC: 4.7 MMOL/L — SIGNIFICANT CHANGE UP (ref 3.5–5.3)
PROT SERPL-MCNC: 7.3 G/DL — SIGNIFICANT CHANGE UP (ref 6–8.3)
RBC # BLD: 4.45 M/UL — SIGNIFICANT CHANGE UP (ref 4.2–5.8)
RBC # FLD: 16.4 % — HIGH (ref 10.3–14.5)
SODIUM SERPL-SCNC: 130 MMOL/L — LOW (ref 135–145)
WBC # BLD: 7.15 K/UL — SIGNIFICANT CHANGE UP (ref 3.8–10.5)
WBC # FLD AUTO: 7.15 K/UL — SIGNIFICANT CHANGE UP (ref 3.8–10.5)

## 2022-12-12 PROCEDURE — 74183 MRI ABD W/O CNTR FLWD CNTR: CPT | Mod: 26

## 2022-12-12 RX ADMIN — TAMSULOSIN HYDROCHLORIDE 0.4 MILLIGRAM(S): 0.4 CAPSULE ORAL at 21:47

## 2022-12-12 RX ADMIN — ATORVASTATIN CALCIUM 10 MILLIGRAM(S): 80 TABLET, FILM COATED ORAL at 21:47

## 2022-12-12 RX ADMIN — FAMOTIDINE 20 MILLIGRAM(S): 10 INJECTION INTRAVENOUS at 17:23

## 2022-12-12 RX ADMIN — Medication 100 MILLIGRAM(S): at 21:47

## 2022-12-12 RX ADMIN — Medication 100 MILLIGRAM(S): at 13:39

## 2022-12-12 RX ADMIN — OXYCODONE AND ACETAMINOPHEN 1 TABLET(S): 5; 325 TABLET ORAL at 01:37

## 2022-12-12 RX ADMIN — Medication 5 MILLIGRAM(S): at 05:23

## 2022-12-12 RX ADMIN — Medication 5 MILLIGRAM(S): at 17:23

## 2022-12-12 RX ADMIN — OXYCODONE AND ACETAMINOPHEN 1 TABLET(S): 5; 325 TABLET ORAL at 18:20

## 2022-12-12 RX ADMIN — OXYCODONE AND ACETAMINOPHEN 1 TABLET(S): 5; 325 TABLET ORAL at 02:07

## 2022-12-12 RX ADMIN — Medication 25 MILLIGRAM(S): at 05:23

## 2022-12-12 RX ADMIN — OXYCODONE AND ACETAMINOPHEN 1 TABLET(S): 5; 325 TABLET ORAL at 15:39

## 2022-12-12 RX ADMIN — ENOXAPARIN SODIUM 55 MILLIGRAM(S): 100 INJECTION SUBCUTANEOUS at 21:46

## 2022-12-12 RX ADMIN — OXYCODONE AND ACETAMINOPHEN 1 TABLET(S): 5; 325 TABLET ORAL at 19:20

## 2022-12-12 RX ADMIN — OXYCODONE AND ACETAMINOPHEN 1 TABLET(S): 5; 325 TABLET ORAL at 14:39

## 2022-12-12 RX ADMIN — FAMOTIDINE 20 MILLIGRAM(S): 10 INJECTION INTRAVENOUS at 05:23

## 2022-12-12 RX ADMIN — ONDANSETRON 4 MILLIGRAM(S): 8 TABLET, FILM COATED ORAL at 05:24

## 2022-12-12 RX ADMIN — OXYCODONE AND ACETAMINOPHEN 1 TABLET(S): 5; 325 TABLET ORAL at 21:47

## 2022-12-12 RX ADMIN — Medication 60 MILLIGRAM(S): at 05:23

## 2022-12-12 RX ADMIN — OXYCODONE AND ACETAMINOPHEN 1 TABLET(S): 5; 325 TABLET ORAL at 10:06

## 2022-12-12 RX ADMIN — ENOXAPARIN SODIUM 55 MILLIGRAM(S): 100 INJECTION SUBCUTANEOUS at 09:37

## 2022-12-12 RX ADMIN — OXYCODONE AND ACETAMINOPHEN 1 TABLET(S): 5; 325 TABLET ORAL at 22:47

## 2022-12-12 RX ADMIN — SENNA PLUS 2 TABLET(S): 8.6 TABLET ORAL at 21:47

## 2022-12-12 RX ADMIN — Medication 100 MILLIGRAM(S): at 05:29

## 2022-12-12 RX ADMIN — OXYCODONE AND ACETAMINOPHEN 1 TABLET(S): 5; 325 TABLET ORAL at 05:30

## 2022-12-12 RX ADMIN — OXYCODONE AND ACETAMINOPHEN 1 TABLET(S): 5; 325 TABLET ORAL at 11:15

## 2022-12-12 NOTE — PROGRESS NOTE ADULT - PROBLEM SELECTOR PLAN 4
pt takes metoprolol 25mg BID and Eliquis  C/W Lovenox as pt may need intervention   c/w home meds    - Metoprolol - 25 mg PO  - Eliquis ? f/u pt takes metoprolol 25mg BID and Eliquis  C/W Lovenox as pt may need intervention   c/w  Metoprolol - 25 mg PO  - resume eliquis when work up completed

## 2022-12-12 NOTE — PROGRESS NOTE ADULT - TIME BILLING
- Review of records, telemetry, vital signs and daily labs.   - General and cardiovascular physical examination.  - Generation of cardiovascular treatment plan.  - Coordination of care.      Patient was seen and examined by me on 12/12/22,interim events noted,labs and radiology studies reviewed.  Nasir Sharma MD,FACC.  [address] [place], [place] [id number].  [phone]

## 2022-12-12 NOTE — PROGRESS NOTE ADULT - ASSESSMENT
Patient is a 67 year old male, vaccinated, from [facility] with PMHx of DM, HTN, HLD, AF on Eliquis, PVD, presents with vomiting, found to have elevated LFts. CT scan findings shows dilated CBD up to 1.7 cm with abrupt cut off of the distal CBD and mild pancreatic duct dilatation, suggestive of possible Choledocholithiasis, admitted for further management. GI Dr. Villanueva consulted. Of note, pt also noted with urinary retention, bladder scan x2 with urine > 500ml each time. Pt was straight cathed x2. F/U repeat bladder scan. Patient is a 67 year old male, vaccinated, from [facility] with PMHx of DM, HTN, HLD, AF on Eliquis, PVD, presents with c/o vomiting, found to have elevated LFts. CT scan  showed dilated CBD up to 1.7 cm with abrupt cut off of the distal CBD and mild pancreatic duct dilatation, suggestive of possible Choledocholithiasis. Plan for MRCP/MRI.  GI Dr. Villanueva consulted

## 2022-12-12 NOTE — PROGRESS NOTE ADULT - ASSESSMENT
Patient is a 67 year old male, vaccinated, from [facility] with PMHx of DM, HTN, HLD, AF on Eliquis, PVD, presents with c/o vomiting, found to have elevated LFts. CT scan  showed dilated CBD up to 1.7 cm with abrupt cut off of the distal CBD and mild pancreatic duct dilatation, suggestive of possible Choledocholithiasis. Plan for MRCP/MRI.  GI Dr. Villanueva consulted

## 2022-12-12 NOTE — PROGRESS NOTE ADULT - PROBLEM SELECTOR PLAN 9
Pt came from [facility]  Disposition is pending clinical course Pt came from [place] from [facility] - short term   will need PT eval   f/u GI for futher recs once mrcp done

## 2022-12-12 NOTE — PROGRESS NOTE ADULT - PROBLEM SELECTOR PLAN 2
Pt noted with difficulty urinating  + Abdominal distention  F/U with repeat bladder scan  - Tamsulosin 0.4 mg po - cont Tamsulosin 0.4 mg po

## 2022-12-12 NOTE — PROGRESS NOTE ADULT - SUBJECTIVE AND OBJECTIVE BOX
Patient is a 67y old  Male who presents with a chief complaint of     INTERVAL HPI/OVERNIGHT EVENTS: no acute events overnight    REVIEW OF SYSTEMS:  CONSTITUTIONAL: No fever, chills  ENMT:  No difficulty hearing, no change in vision  NECK: No pain or stiffness  RESPIRATORY: No cough, SOB  CARDIOVASCULAR: No chest pain, palpitations  GASTROINTESTINAL: No abdominal pain. No nausea, vomiting, or diarrhea  GENITOURINARY: No dysuria  NEUROLOGICAL: No HA  SKIN: No itching, burning, rashes, or lesions   LYMPH NODES: No enlarged glands  ENDOCRINE: No heat or cold intolerance; No hair loss  MUSCULOSKELETAL: No joint pain or swelling; No muscle, back, or extremity pain  PSYCHIATRIC: No depression, anxiety  HEME/LYMPH: No easy bruising, or bleeding gums    T(C): 36.7 (12-12-22 @ 05:19), Max: 37.6 (12-11-22 @ 12:26)  HR: 79 (12-12-22 @ 05:19) (73 - 91)  BP: 112/75 (12-12-22 @ 05:19) (102/64 - 124/76)  RR: 17 (12-12-22 @ 05:19) (17 - 18)  SpO2: 96% (12-12-22 @ 05:19) (96% - 98%)  Wt(kg): --Vital Signs Last 24 Hrs  T(C): 36.7 (12 Dec 2022 05:19), Max: 37.6 (11 Dec 2022 12:26)  T(F): 98 (12 Dec 2022 05:19), Max: 99.7 (11 Dec 2022 12:26)  HR: 79 (12 Dec 2022 05:19) (73 - 91)  BP: 112/75 (12 Dec 2022 05:19) (102/64 - 124/76)  BP(mean): --  RR: 17 (12 Dec 2022 05:19) (17 - 18)  SpO2: 96% (12 Dec 2022 05:19) (96% - 98%)    Parameters below as of 12 Dec 2022 05:19  Patient On (Oxygen Delivery Method): nasal cannula  O2 Flow (L/min): 2    MEDICATIONS  (STANDING):  atorvastatin 10 milliGRAM(s) Oral at bedtime  baclofen 5 milliGRAM(s) Oral every 12 hours  dextrose 5%. 1000 milliLiter(s) (50 mL/Hr) IV Continuous <Continuous>  dextrose 5%. 1000 milliLiter(s) (100 mL/Hr) IV Continuous <Continuous>  dextrose 50% Injectable 25 Gram(s) IV Push once  dextrose 50% Injectable 12.5 Gram(s) IV Push once  dextrose 50% Injectable 25 Gram(s) IV Push once  enoxaparin Injectable 55 milliGRAM(s) SubCutaneous every 12 hours  famotidine    Tablet 20 milliGRAM(s) Oral two times a day  glucagon  Injectable 1 milliGRAM(s) IntraMuscular once  insulin lispro (ADMELOG) corrective regimen sliding scale   SubCutaneous three times a day before meals  metoprolol tartrate 25 milliGRAM(s) Oral two times a day  NIFEdipine XL 60 milliGRAM(s) Oral daily  oxycodone    5 mG/acetaminophen 325 mG 1 Tablet(s) Oral every 4 hours  polyethylene glycol 3350 17 Gram(s) Oral daily  pregabalin 100 milliGRAM(s) Oral three times a day  senna 2 Tablet(s) Oral at bedtime  sodium chloride 0.9%. 1000 milliLiter(s) (80 mL/Hr) IV Continuous <Continuous>  tamsulosin 0.4 milliGRAM(s) Oral at bedtime    MEDICATIONS  (PRN):  dextrose Oral Gel 15 Gram(s) Oral once PRN Blood Glucose LESS THAN 70 milliGRAM(s)/deciliter  ondansetron    Tablet 4 milliGRAM(s) Oral every 6 hours PRN Nausea    PHYSICAL EXAM:  GENERAL: NAD  EYES: clear conjunctiva; EOMI  ENMT: Moist mucous membranes  NECK: Supple, No JVD, Normal thyroid  CHEST/LUNG: Clear to auscultation bilaterally; No rales, rhonchi, wheezing, or rubs  HEART: S1, S2, Regular rate and rhythm  ABDOMEN: Soft, Nontender, Nondistended; Bowel sounds present  NEURO: Alert & Oriented X3  EXTREMITIES: No LE edema, no calf tenderness  LYMPH: No lymphadenopathy noted  SKIN: No rashes or lesions    Consultant(s) Notes Reviewed:  [x ] YES  [ ] NO  Care Discussed with Consultants/Other Providers [ x] YES  [ ] NO    LABS:                        14.1   7.15  )-----------( 154      ( 12 Dec 2022 05:50 )             41.8     12-12    130<L>  |  93<L>  |  13  ----------------------------<  94  4.7   |  25  |  0.56    Ca    8.3<L>      12 Dec 2022 05:50  Phos  3.4     12-12  Mg     1.7     12-12    TPro  7.3  /  Alb  2.0<L>  /  TBili  1.0  /  DBili  x   /  AST  124<H>  /  ALT  66<H>  /  AlkPhos  220<H>  12-12    CAPILLARY BLOOD GLUCOSE    POCT Blood Glucose.: 86 mg/dL (12 Dec 2022 07:47)  POCT Blood Glucose.: 102 mg/dL (11 Dec 2022 21:40)  POCT Blood Glucose.: 98 mg/dL (11 Dec 2022 16:27)  POCT Blood Glucose.: 95 mg/dL (11 Dec 2022 11:42)    RADIOLOGY & ADDITIONAL TESTS:    Imaging Personally Reviewed:  [x] YES  [ ] NO    < from: CT Abdomen and Pelvis w/ IV Cont (12.08.22 @ 17:20) >    ACC: [id number] EXAM:  CT ABDOMEN AND PELVIS IC                          PROCEDURE DATE:  12/08/2022      INTERPRETATION:  CLINICAL INFORMATION: 67-year-old male patient with   vomiting    COMPARISON: None.    CONTRAST/COMPLICATIONS:  IV Contrast: Omnipaque 350  90 cc administered   10 cc discarded  Oral Contrast: NONE  Complications: None reported at time of study completion    PROCEDURE:  CT of the Abdomen and Pelvis was performed.  Sagittal and coronal reformats were performed.    FINDINGS:  LOWER CHEST: Bibasilar atelectatic changes. Coronary artery   calcifications. Aortic root calcifications. Normal-sized heart.    LIVER: Severe hepatic steatosis. Ill-defined focal hypodensity in   subcapsular location of segment 5 anteriorly measuring approximately 1.8   x 2.5 x 1.7 cm likely represents prominent focal fatty changes.  BILE DUCTS: No intrahepatic biliary duct dilatation. The CBD is dilated   to 1.8 cm with abrupt cut off of the distal CBD proximal to the papilla.  GALLBLADDER: Cholecystectomy.  SPLEEN: Within normal limits.  PANCREAS: Mild diffuse pancreatic parenchymal atrophy. Dilated proximal   pancreatic duct measuring 6 mm.  ADRENALS: Within normal limits.  KIDNEYS/URETERS: No hydronephrosis or nephroureterolithiasis. Symmetric   enhancement. No enhancing renal masses. Subcentimeter left midpole   cortical cyst.    BLADDER: Within normal limits.  REPRODUCTIVE ORGANS: Prostate within normal limits.    BOWEL: No acute appendicitis. Pancolonic diverticulosis, most prominent   in the sigmoid and descending colon without acute inflammation. No   evidence of a normal bowel wall thickening. The remainder of the colon is   collapsed. Small bowel loops of normal caliber.No bowel obstruction.  PERITONEUM: No ascites.  VESSELS: Atherosclerotic changes. Fusiform dilatation of the celiac axis.   Patent celiac axis and SMA with mild atherosclerotic changes. Patent   renal arteries. Patent hepatic veins, portal vein, SMV and splenic vein.  RETROPERITONEUM/LYMPH NODES: No lymphadenopathy.  ABDOMINAL WALL: Within normal limits.  BONES: Degenerative changes. Osteopenia.    IMPRESSION:  No evidence of bowel obstruction.    Dilated CBD up to 1.7 cm which can be seen with cholecystectomy, however   there is abrupt cut off ofthe distal CBD and  mild pancreatic duct   dilatation. Choledocholithiasis or a papillary process cannot be entirely   excluded. Correlate with liver function tests. If there is clinical   concern, consider MRI MRCP with contrast for further evaluation.    Hepatic steatosis with a focal area of increased hypoattenuation which   likely represents pronounced focal fatty changes, less likely underlying   mass. This finding could also be further evaluated on MRI/MRCP as   recommended above.    Non complicated colonic diverticulosis, no diverticulitis.    --- End of Report ---    YNO HYATT MD; Attending Radiologist  This document has been electronically signed. Dec  8 2022  5:46PM    < end of copied text >

## 2022-12-12 NOTE — PROGRESS NOTE ADULT - SUBJECTIVE AND OBJECTIVE BOX
[   ] ICU                                          [   ] CCU                                      [ X  ] Medical Floor      Patient is a 67 year old male with vomiting. GI consulted to evaluate.         67 year old male, vaccinated, from [facility] with past medical history significant for DM, HTN, HLD, AFib on eliquis, PVD, presents with vomiting. Patient states that last night he ate a burger, and started to vomit shortly after. Patient states he is unable to eat since then due to feeling nauseous and continues to vomit each time he tries to eat. Patient denies any recent sick contact.  Patient also denies any associated abdominal pain, fever, chills, SOB, chest pain, diarrhea, hematemesis or melena.     Patient is comfortable. No new complaints reported, No abdominal pain, N/V, hematemesis, hematochezia, melena, fever, chills, chest pain, SOB, cough or diarrhea reported.       PAIN MANAGEMENT:  Pain Scale:                0 /10  Pain Location:      Prior Colonoscopy:  Unknown    PAST MEDICAL HISTORY  COPD (chronic obstructive pulmonary disease)    Diabetes    PVD (peripheral vascular disease)    Chronic atrial fibrillation    GERD (gastroesophageal reflux disease)    MDD (major depressive disorder)    BPH (benign prostatic hyperplasia)        PAST SURGICAL HISTORY  Cholecystectomy      Allergies    morphine (Unknown)  penicillin (Unknown)  shellfish (Unknown)    Intolerances  None       SOCIAL HISTORY  Advanced Directives:       [ X ] Full Code       [  ] DNR  Marital Status:         [  ] M      [ X ] S      [  ] D       [  ] W  Children:       [ X ] Yes      [  ] No  Occupation:        [  ] Employed       [ X ] Unemployed       [  ] Retired  Diet:       [X  ] Regular       [  ] PEG feeding          [  ] NG tube feeding  Drug Use:           [ X ] Patient denied          [  ] Yes  Alcohol:           [ X ] No             [  ] Yes (socially)         [  ] Yes (chronic)  Tobacco:           [  ] Yes           [ X ] No      FAMILY HISTORY  [ X ] Heart Disease            [X  ] Diabetes             [ X ] HTN             [  ] Colon Cancer             [  ] Stomach Cancer              [  ] Pancreatic Cancer        VITALS  Vital Signs Last 24 Hrs  T(C): 36.7 (12 Dec 2022 05:19), Max: 37.6 (11 Dec 2022 12:26)  T(F): 98 (12 Dec 2022 05:19), Max: 99.7 (11 Dec 2022 12:26)  HR: 79 (12 Dec 2022 05:19) (73 - 91)  BP: 112/75 (12 Dec 2022 05:19) (102/64 - 124/76)   RR: 17 (12 Dec 2022 05:19) (17 - 18)  SpO2: 96% (12 Dec 2022 05:19) (96% - 98%)  Parameters below as of 12 Dec 2022 05:19  Patient On (Oxygen Delivery Method): nasal cannula  O2 Flow (L/min): 2       MEDICATIONS  (STANDING):  atorvastatin 10 milliGRAM(s) Oral at bedtime  baclofen 5 milliGRAM(s) Oral every 12 hours  dextrose 5%. 1000 milliLiter(s) (50 mL/Hr) IV Continuous <Continuous>  dextrose 5%. 1000 milliLiter(s) (100 mL/Hr) IV Continuous <Continuous>  dextrose 50% Injectable 25 Gram(s) IV Push once  dextrose 50% Injectable 12.5 Gram(s) IV Push once  dextrose 50% Injectable 25 Gram(s) IV Push once  enoxaparin Injectable 55 milliGRAM(s) SubCutaneous every 12 hours  famotidine    Tablet 20 milliGRAM(s) Oral two times a day  glucagon  Injectable 1 milliGRAM(s) IntraMuscular once  insulin lispro (ADMELOG) corrective regimen sliding scale   SubCutaneous three times a day before meals  metoprolol tartrate 25 milliGRAM(s) Oral two times a day  NIFEdipine XL 60 milliGRAM(s) Oral daily  oxycodone    5 mG/acetaminophen 325 mG 1 Tablet(s) Oral every 4 hours  polyethylene glycol 3350 17 Gram(s) Oral daily  pregabalin 100 milliGRAM(s) Oral three times a day  senna 2 Tablet(s) Oral at bedtime  sodium chloride 0.9%. 1000 milliLiter(s) (80 mL/Hr) IV Continuous <Continuous>  tamsulosin 0.4 milliGRAM(s) Oral at bedtime    MEDICATIONS  (PRN):  dextrose Oral Gel 15 Gram(s) Oral once PRN Blood Glucose LESS THAN 70 milliGRAM(s)/deciliter  ondansetron    Tablet 4 milliGRAM(s) Oral every 6 hours PRN Nausea                            14.1   7.15  )-----------( 154      ( 12 Dec 2022 05:50 )             41.8       12-12    130<L>  |  93<L>  |  13  ----------------------------<  94  4.7   |  25  |  0.56    Ca    8.3<L>      12 Dec 2022 05:50  Phos  3.4     12-12  Mg     1.7     12-12    TPro  7.3  /  Alb  2.0<L>  /  TBili  1.0  /  DBili  x   /  AST  124<H>  /  ALT  66<H>  /  AlkPhos  220<H>  12-12

## 2022-12-12 NOTE — PROGRESS NOTE ADULT - PROBLEM SELECTOR PLAN 1
p/w vomiting, improving  -CT A/P showed: Cholecystectomy. Dilated CBD up to 1.7 cm abrupt cut off of the distal CBD and  mild pancreatic duct dilatation.   - s/p MRCP - f/u  results  -c/w IVF  - Zofran PRN  -Consulted GI Dr. Villanueva

## 2022-12-12 NOTE — PROGRESS NOTE ADULT - PROBLEM SELECTOR PLAN 5
[employer] called requesting that Jung be scheduled again with Dr Latham now that his Covid test came back negative. She was advised that he already updated [facility] and is scheduled for 9/23.  She request that Dr Latham consider referring Jung back to [facility]. Eileen spoke with Jung about his refusing injections that were recommended by Malia POWER on 8/21. He denied ever being told about injections as an option and did not deny them. Eileen had the notes from the 8/21 visit and made Jung aware it was documented. If Jung is willing to return to [facility] he might reconsider the injections.  Advised that the information will be shared with Dr Latham for consideration.   pt takes nifedipine 60mg   c/w home meds  - Nifedipine xl - 60 mg PO   DASH diet on nifedipine 60mg   - c/w Nifedipine xl - 60 mg PO

## 2022-12-12 NOTE — PROGRESS NOTE ADULT - PROBLEM SELECTOR PLAN 4
pt takes metoprolol 25mg BID and Eliquis  C/W Lovenox as pt may need intervention   c/w  Metoprolol - 25 mg PO  - resume eliquis when work up completed

## 2022-12-12 NOTE — PROGRESS NOTE ADULT - SUBJECTIVE AND OBJECTIVE BOX
PATIENT SEEN AND EXAMINED ON :- 12/12/22  DATE OF SERVICE:     12/12/22        Interim events noted,Labs ,Radiological studies and Cardiology tests reviewed .       HOSPITAL COURSE: HPI:  67 year old male, vaccinated, from [facility] with PMHx of DM, HTN, HLD, AF on eliquis, PVD, presents with vomiting. Patient states that last night he ate a burger, and started to vomit shortly after. Patient states he is unable to eat since then due to feeling nauseous and continues to vomit each time he tries to eat. Patient denies any recent sick contact.  Patient also denies any associated abdominal pain, fever, chills, SOB, chest pain, diarrhea, hematemesis or melena.     In the ED:  Afebrile, hemodynamically stable  No leukocytosis  Bili WNL, , ,   CT A/P showed: Cholecystectomy +Dilated CBD up to 1.7 cm abrupt cut off of the distal CBD and  mild pancreatic duct dilatation.      (08 Dec 2022 19:05)      INTERIM EVENTS:Patient seen at bedside ,interim events noted.      PMH -reviewed admission note, no change since admission  HEART FAILURE: Acute[ ]Chronic[ ] Systolic[ ] Diastolic[ ] Combined Systolic and Diastolic[ ]  CAD[ ] CABG[ ] PCI[ ]  DEVICES[ ] PPM[ ] ICD[ ] ILR[ ]  ATRIAL FIBRILLATION[ ] Paroxysmal[ ] Permanent[ ] CHADS2-[  ]  DILCIA[ ] CKD1[ ] CKD2[ ] CKD3[ ] CKD4[ ] ESRD[ ]  COPD[ ] HTN[ ]   DM[ ] Type1[ ] Type 2[ ]   CVA[ ] Paresis[ ]    AMBULATION: Assisted[ ] Cane/walker[ ] Independent[ ]    MEDICATIONS  (STANDING):  atorvastatin 10 milliGRAM(s) Oral at bedtime  baclofen 5 milliGRAM(s) Oral every 12 hours  dextrose 5%. 1000 milliLiter(s) (100 mL/Hr) IV Continuous <Continuous>  dextrose 5%. 1000 milliLiter(s) (50 mL/Hr) IV Continuous <Continuous>  dextrose 50% Injectable 25 Gram(s) IV Push once  dextrose 50% Injectable 12.5 Gram(s) IV Push once  dextrose 50% Injectable 25 Gram(s) IV Push once  enoxaparin Injectable 55 milliGRAM(s) SubCutaneous every 12 hours  famotidine    Tablet 20 milliGRAM(s) Oral two times a day  glucagon  Injectable 1 milliGRAM(s) IntraMuscular once  insulin lispro (ADMELOG) corrective regimen sliding scale   SubCutaneous three times a day before meals  metoprolol tartrate 25 milliGRAM(s) Oral two times a day  NIFEdipine XL 60 milliGRAM(s) Oral daily  oxycodone    5 mG/acetaminophen 325 mG 1 Tablet(s) Oral every 4 hours  polyethylene glycol 3350 17 Gram(s) Oral daily  pregabalin 100 milliGRAM(s) Oral three times a day  senna 2 Tablet(s) Oral at bedtime  sodium chloride 0.9%. 1000 milliLiter(s) (80 mL/Hr) IV Continuous <Continuous>  tamsulosin 0.4 milliGRAM(s) Oral at bedtime    MEDICATIONS  (PRN):  dextrose Oral Gel 15 Gram(s) Oral once PRN Blood Glucose LESS THAN 70 milliGRAM(s)/deciliter  ondansetron    Tablet 4 milliGRAM(s) Oral every 6 hours PRN Nausea            REVIEW OF SYSTEMS:  Constitutional: [ ] fever, [ ]weight loss,  [ ]fatigue [ ]weight gain  Eyes: [ ] visual changes  Respiratory: [ ]shortness of breath;  [ ] cough, [ ]wheezing, [ ]chills, [ ]hemoptysis  Cardiovascular: [ ] chest pain, [ ]palpitations, [ ]dizziness,  [ ]leg swelling[ ]orthopnea[ ]PND  Gastrointestinal: [ ] abdominal pain, [ ]nausea, [ ]vomiting,  [ ]diarrhea [ ]Constipation [ ]Melena  Genitourinary: [ ] dysuria, [ ] hematuria [ ]Cardoza  Neurologic: [ ] headaches [ ] tremors[ ]weakness [ ]Paralysis Right[ ] Left[ ]  Skin: [ ] itching, [ ]burning, [ ] rashes  Endocrine: [ ] heat or cold intolerance  Musculoskeletal: [ ] joint pain or swelling; [ ] muscle, back, or extremity pain  Psychiatric: [ ] depression, [ ]anxiety, [ ]mood swings, or [ ]difficulty sleeping  Hematologic: [ ] easy bruising, [ ] bleeding gums    [ ] All remaining systems negative except as per above.   [ ]Unable to obtain.  [x] No change in ROS since admission      Vital Signs Last 24 Hrs  T(C): 37.1 (12 Dec 2022 12:50), Max: 37.1 (12 Dec 2022 12:50)  T(F): 98.8 (12 Dec 2022 12:50), Max: 98.8 (12 Dec 2022 12:50)  HR: 80 (12 Dec 2022 17:26) (73 - 85)  BP: 109/66 (12 Dec 2022 17:26) (109/66 - 124/76)  BP(mean): 75 (12 Dec 2022 12:50) (75 - 75)  RR: 18 (12 Dec 2022 12:50) (17 - 18)  SpO2: 94% (12 Dec 2022 12:50) (94% - 96%)    Parameters below as of 12 Dec 2022 12:50  Patient On (Oxygen Delivery Method): nasal cannula  O2 Flow (L/min): 2    I&O's Summary      PHYSICAL EXAM:  General: No acute distress BMI-  HEENT: EOMI, PERRL  Neck: Supple, [ ] JVD  Lungs: Equal air entry bilaterally; [ ] rales [ ] wheezing [ ] rhonchi  Heart: Regular rate and rhythm; [x ] murmur   2/6 [ x] systolic [ ] diastolic [ ] radiation[ ] rubs [ ]  gallops  Abdomen: Nontender, bowel sounds present  Extremities: No clubbing, cyanosis, [ ] edema [ ]Pulses  equal and intact  Nervous system:  Alert & Oriented X3, no focal deficits  Psychiatric: Normal affect  Skin: No rashes or lesions    LABS:  12-12    130<L>  |  93<L>  |  13  ----------------------------<  94  4.7   |  25  |  0.56    Ca    8.3<L>      12 Dec 2022 05:50  Phos  3.4     12-12  Mg     1.7     12-12    TPro  7.3  /  Alb  2.0<L>  /  TBili  1.0  /  DBili  x   /  AST  124<H>  /  ALT  66<H>  /  AlkPhos  220<H>  12-12    Creatinine Trend: 0.56<--, 0.62<--, 0.53<--, 0.51<--, 0.78<--                        14.1   7.15  )-----------( 154      ( 12 Dec 2022 05:50 )             41.8

## 2022-12-12 NOTE — PROGRESS NOTE ADULT - PROBLEM SELECTOR PLAN 1
-p/w vomiting for one day, denies abdominal pain   -Bili WNL, , ,   -Lipase neg  -CT A/P showed: Cholecystectomy. Dilated CBD up to 1.7 cm abrupt cut off of the distal CBD and  mild pancreatic duct dilatation.   - MRCP - f/u   -Concern for Choledocholithiasis vs papillary process   -c/w IVF  - Zofran PRN  -Consulted GI Dr. Villanueva p/w vomiting, improving  -Lipase neg  -CT A/P showed: Cholecystectomy. Dilated CBD up to 1.7 cm abrupt cut off of the distal CBD and  mild pancreatic duct dilatation.   - MRCP - f/u   -c/w IVF  - Zofran PRN  -Consulted GI Dr. Villanueva p/w vomiting, improving  -CT A/P showed: Cholecystectomy. Dilated CBD up to 1.7 cm abrupt cut off of the distal CBD and  mild pancreatic duct dilatation.   - s/p MRCP - f/u  results  -c/w IVF  - Zofran PRN  -Consulted GI Dr. Villanueva

## 2022-12-13 LAB
ALBUMIN SERPL ELPH-MCNC: 1.9 G/DL — LOW (ref 3.5–5)
ALP SERPL-CCNC: 245 U/L — HIGH (ref 40–120)
ALT FLD-CCNC: 66 U/L DA — HIGH (ref 10–60)
ANION GAP SERPL CALC-SCNC: 6 MMOL/L — SIGNIFICANT CHANGE UP (ref 5–17)
AST SERPL-CCNC: 117 U/L — HIGH (ref 10–40)
BILIRUB SERPL-MCNC: 0.8 MG/DL — SIGNIFICANT CHANGE UP (ref 0.2–1.2)
BUN SERPL-MCNC: 11 MG/DL — SIGNIFICANT CHANGE UP (ref 7–18)
CALCIUM SERPL-MCNC: 8.5 MG/DL — SIGNIFICANT CHANGE UP (ref 8.4–10.5)
CHLORIDE SERPL-SCNC: 95 MMOL/L — LOW (ref 96–108)
CO2 SERPL-SCNC: 31 MMOL/L — SIGNIFICANT CHANGE UP (ref 22–31)
CREAT SERPL-MCNC: 0.58 MG/DL — SIGNIFICANT CHANGE UP (ref 0.5–1.3)
EGFR: 106 ML/MIN/1.73M2 — SIGNIFICANT CHANGE UP
GLUCOSE BLDC GLUCOMTR-MCNC: 105 MG/DL — HIGH (ref 70–99)
GLUCOSE BLDC GLUCOMTR-MCNC: 109 MG/DL — HIGH (ref 70–99)
GLUCOSE BLDC GLUCOMTR-MCNC: 113 MG/DL — HIGH (ref 70–99)
GLUCOSE BLDC GLUCOMTR-MCNC: 117 MG/DL — HIGH (ref 70–99)
GLUCOSE SERPL-MCNC: 132 MG/DL — HIGH (ref 70–99)
HCT VFR BLD CALC: 41.7 % — SIGNIFICANT CHANGE UP (ref 39–50)
HGB BLD-MCNC: 14.1 G/DL — SIGNIFICANT CHANGE UP (ref 13–17)
MCHC RBC-ENTMCNC: 32.1 PG — SIGNIFICANT CHANGE UP (ref 27–34)
MCHC RBC-ENTMCNC: 33.8 GM/DL — SIGNIFICANT CHANGE UP (ref 32–36)
MCV RBC AUTO: 95 FL — SIGNIFICANT CHANGE UP (ref 80–100)
NRBC # BLD: 0 /100 WBCS — SIGNIFICANT CHANGE UP (ref 0–0)
PLATELET # BLD AUTO: 146 K/UL — LOW (ref 150–400)
POTASSIUM SERPL-MCNC: 4.2 MMOL/L — SIGNIFICANT CHANGE UP (ref 3.5–5.3)
POTASSIUM SERPL-SCNC: 4.2 MMOL/L — SIGNIFICANT CHANGE UP (ref 3.5–5.3)
PROT SERPL-MCNC: 7.4 G/DL — SIGNIFICANT CHANGE UP (ref 6–8.3)
RBC # BLD: 4.39 M/UL — SIGNIFICANT CHANGE UP (ref 4.2–5.8)
RBC # FLD: 16.4 % — HIGH (ref 10.3–14.5)
SARS-COV-2 RNA SPEC QL NAA+PROBE: SIGNIFICANT CHANGE UP
SODIUM SERPL-SCNC: 132 MMOL/L — LOW (ref 135–145)
WBC # BLD: 6.44 K/UL — SIGNIFICANT CHANGE UP (ref 3.8–10.5)
WBC # FLD AUTO: 6.44 K/UL — SIGNIFICANT CHANGE UP (ref 3.8–10.5)

## 2022-12-13 PROCEDURE — 99222 1ST HOSP IP/OBS MODERATE 55: CPT

## 2022-12-13 RX ORDER — INDOMETHACIN 50 MG
100 CAPSULE ORAL ONCE
Refills: 0 | Status: DISCONTINUED | OUTPATIENT
Start: 2022-12-15 | End: 2022-12-16

## 2022-12-13 RX ORDER — ONDANSETRON 8 MG/1
4 TABLET, FILM COATED ORAL ONCE
Refills: 0 | Status: COMPLETED | OUTPATIENT
Start: 2022-12-13 | End: 2022-12-13

## 2022-12-13 RX ADMIN — Medication 5 MILLIGRAM(S): at 05:14

## 2022-12-13 RX ADMIN — OXYCODONE AND ACETAMINOPHEN 1 TABLET(S): 5; 325 TABLET ORAL at 16:04

## 2022-12-13 RX ADMIN — ATORVASTATIN CALCIUM 10 MILLIGRAM(S): 80 TABLET, FILM COATED ORAL at 21:04

## 2022-12-13 RX ADMIN — Medication 60 MILLIGRAM(S): at 05:15

## 2022-12-13 RX ADMIN — Medication 25 MILLIGRAM(S): at 05:15

## 2022-12-13 RX ADMIN — OXYCODONE AND ACETAMINOPHEN 1 TABLET(S): 5; 325 TABLET ORAL at 03:09

## 2022-12-13 RX ADMIN — OXYCODONE AND ACETAMINOPHEN 1 TABLET(S): 5; 325 TABLET ORAL at 15:04

## 2022-12-13 RX ADMIN — OXYCODONE AND ACETAMINOPHEN 1 TABLET(S): 5; 325 TABLET ORAL at 20:42

## 2022-12-13 RX ADMIN — Medication 100 MILLIGRAM(S): at 15:04

## 2022-12-13 RX ADMIN — OXYCODONE AND ACETAMINOPHEN 1 TABLET(S): 5; 325 TABLET ORAL at 06:15

## 2022-12-13 RX ADMIN — FAMOTIDINE 20 MILLIGRAM(S): 10 INJECTION INTRAVENOUS at 18:13

## 2022-12-13 RX ADMIN — OXYCODONE AND ACETAMINOPHEN 1 TABLET(S): 5; 325 TABLET ORAL at 02:09

## 2022-12-13 RX ADMIN — OXYCODONE AND ACETAMINOPHEN 1 TABLET(S): 5; 325 TABLET ORAL at 10:32

## 2022-12-13 RX ADMIN — Medication 100 MILLIGRAM(S): at 21:04

## 2022-12-13 RX ADMIN — Medication 5 MILLIGRAM(S): at 18:12

## 2022-12-13 RX ADMIN — OXYCODONE AND ACETAMINOPHEN 1 TABLET(S): 5; 325 TABLET ORAL at 09:32

## 2022-12-13 RX ADMIN — FAMOTIDINE 20 MILLIGRAM(S): 10 INJECTION INTRAVENOUS at 05:14

## 2022-12-13 RX ADMIN — OXYCODONE AND ACETAMINOPHEN 1 TABLET(S): 5; 325 TABLET ORAL at 23:29

## 2022-12-13 RX ADMIN — TAMSULOSIN HYDROCHLORIDE 0.4 MILLIGRAM(S): 0.4 CAPSULE ORAL at 21:04

## 2022-12-13 RX ADMIN — OXYCODONE AND ACETAMINOPHEN 1 TABLET(S): 5; 325 TABLET ORAL at 19:46

## 2022-12-13 RX ADMIN — ONDANSETRON 4 MILLIGRAM(S): 8 TABLET, FILM COATED ORAL at 01:34

## 2022-12-13 RX ADMIN — OXYCODONE AND ACETAMINOPHEN 1 TABLET(S): 5; 325 TABLET ORAL at 05:15

## 2022-12-13 RX ADMIN — ENOXAPARIN SODIUM 55 MILLIGRAM(S): 100 INJECTION SUBCUTANEOUS at 09:32

## 2022-12-13 RX ADMIN — Medication 100 MILLIGRAM(S): at 05:15

## 2022-12-13 NOTE — CONSULT NOTE ADULT - NS ATTEND AMEND GEN_ALL_CORE FT
- Possible choledocholithiasis.  - Abdominal pain.  - N/v.  - Abnormal MRI.  - Biliary dilation.  - ?ampullary stenosis.     Patient seen and examined. Reports ongoing abd pain and difficulty tolerating diet. MRI reviewed, possible distal cbd stones/sludge. No signs of cholangitis. LFTs mildly elevated with normal bilis. Tentative plans for EUS to evaluate distal duct and likely ERCP same time this week. Diet as tolerated in the mean time.    Total time spent to complete patient's bedside assessment, physical examination, review medical chart including labs & imaging, discuss medical plan of care with housestaff was more than 25 minutes - Possible choledocholithiasis.  - Abdominal pain.  - N/v.  - Abnormal MRI.  - Biliary dilation.  - ?ampullary stenosis.     Patient seen and examined. Reports ongoing abd pain and difficulty tolerating diet. MRI reviewed, possible distal cbd stones/sludge. No signs of cholangitis. LFTs mildly elevated with normal bilis. Tentative plans for EUS to evaluate distal duct and likely ERCP same time this week. Diet as tolerated in the mean time.    Total time spent to complete patient's bedside assessment, physical examination, review medical chart including labs & imaging, discuss medical plan of care with housestaff was more than 50 minutes

## 2022-12-13 NOTE — CONSULT NOTE ADULT - SUBJECTIVE AND OBJECTIVE BOX
ADVANCED ENDOSCOPIST CONSULT NOTE    Patient is a 67y old  Male who presents with a chief complaint of   HPI:  67 year old male, vaccinated, from [facility] with PMHx of DM, HTN, HLD, AF on eliquis, PVD, presents with vomiting. Patient states that last night he ate a burger, and started to vomit shortly after. Patient states he is unable to eat since then due to feeling nauseous and continues to vomit each time he tries to eat. Patient denies any recent sick contact.  Patient also denies any associated abdominal pain, fever, chills, SOB, chest pain, diarrhea, hematemesis or melena.     In the ED:  Afebrile, hemodynamically stable  No leukocytosis  Bili WNL, , ,   CT A/P showed: Cholecystectomy +Dilated CBD up to 1.7 cm abrupt cut off of the distal CBD and  mild pancreatic duct dilatation.      (08 Dec 2022 19:05)      PMH/PSH:  PAST MEDICAL & SURGICAL HISTORY:  COPD (chronic obstructive pulmonary disease)  Diabetes  PVD (peripheral vascular disease)  Chronic atrial fibrillation  GERD (gastroesophageal reflux disease)  MDD (major depressive disorder)  BPH (benign prostatic hyperplasia)    FH:  FAMILY HISTORY:      MEDS:  MEDICATIONS  (STANDING):  atorvastatin 10 milliGRAM(s) Oral at bedtime  baclofen 5 milliGRAM(s) Oral every 12 hours  dextrose 5%. 1000 milliLiter(s) (50 mL/Hr) IV Continuous <Continuous>  dextrose 5%. 1000 milliLiter(s) (100 mL/Hr) IV Continuous <Continuous>  dextrose 50% Injectable 25 Gram(s) IV Push once  dextrose 50% Injectable 12.5 Gram(s) IV Push once  dextrose 50% Injectable 25 Gram(s) IV Push once  enoxaparin Injectable 55 milliGRAM(s) SubCutaneous every 12 hours  famotidine    Tablet 20 milliGRAM(s) Oral two times a day  glucagon  Injectable 1 milliGRAM(s) IntraMuscular once  insulin lispro (ADMELOG) corrective regimen sliding scale   SubCutaneous three times a day before meals  metoprolol tartrate 25 milliGRAM(s) Oral two times a day  NIFEdipine XL 60 milliGRAM(s) Oral daily  oxycodone    5 mG/acetaminophen 325 mG 1 Tablet(s) Oral every 4 hours  polyethylene glycol 3350 17 Gram(s) Oral daily  pregabalin 100 milliGRAM(s) Oral three times a day  senna 2 Tablet(s) Oral at bedtime  sodium chloride 0.9%. 1000 milliLiter(s) (80 mL/Hr) IV Continuous <Continuous>  tamsulosin 0.4 milliGRAM(s) Oral at bedtime    MEDICATIONS  (PRN):  dextrose Oral Gel 15 Gram(s) Oral once PRN Blood Glucose LESS THAN 70 milliGRAM(s)/deciliter  ondansetron    Tablet 4 milliGRAM(s) Oral every 6 hours PRN Nausea    Allergies    morphine (Unknown)  penicillin (Unknown)  shellfish (Unknown)  Intolerances    ROS: A detailed set of ROS were asked and negative except those outlined in GI HPI.  ______________________________________________________________________  PHYSICAL EXAM:  T(C): 36.9 (12-13-22 @ 05:05), Max: 37.1 (12-12-22 @ 12:50)  HR: 87 (12-13-22 @ 05:05)  BP: 117/71 (12-13-22 @ 05:05)  RR: 18 (12-13-22 @ 05:05)  SpO2: 95% (12-13-22 @ 05:05)  Wt(kg): --      GEN: NAD  HEENT: EOMI, conjunctivae anicteric, neck supple, dry mucous membranes  PULM: LSCTAB, no wheezing, rales, or rhonchi  CV: RRR, no m/r/b  GI: Soft, ttp, rounded, ND; +BS in all four quadrants, no ascites  MSK: OLEA  NEURO: A&O x 3, no gross deficits  ______________________________________________________________________  LABS:                        14.1   6.44  )-----------( 146      ( 13 Dec 2022 06:30 )             41.7     12-13    132<L>  |  95<L>  |  11  ----------------------------<  132<H>  4.2   |  31  |  0.58    Ca    8.5      13 Dec 2022 06:30  Phos  3.4     12-12  Mg     1.7     12-12    TPro  7.4  /  Alb  1.9<L>  /  TBili  0.8  /  DBili  x   /  AST  117<H>  /  ALT  66<H>  /  AlkPhos  245<H>  12-13    LIVER FUNCTIONS - ( 13 Dec 2022 06:30 )  Alb: 1.9 g/dL / Pro: 7.4 g/dL / ALK PHOS: 245 U/L / ALT: 66 U/L DA / AST: 117 U/L / GGT: x             ____________________________________________    IMAGING:    CT ABDOMEN AND PELVIS IC                        PROCEDURE DATE:  12/08/2022      FINDINGS:  LOWER CHEST: Bibasilar atelectatic changes. Coronary artery   calcifications. Aortic root calcifications. Normal-sized heart.    LIVER: Severe hepatic steatosis. Ill-defined focal hypodensity in   subcapsular location of segment 5 anteriorly measuring approximately 1.8   x 2.5 x 1.7 cm likely represents prominent focal fatty changes.  BILE DUCTS: No intrahepatic biliary duct dilatation. The CBD is dilated   to 1.8 cm with abrupt cut off of the distal CBD proximal to the papilla.  GALLBLADDER: Cholecystectomy.  SPLEEN: Within normal limits.  PANCREAS: Mild diffuse pancreatic parenchymal atrophy. Dilated proximal   pancreatic duct measuring 6 mm.  ADRENALS: Within normal limits.  KIDNEYS/URETERS: No hydronephrosis or nephroureterolithiasis. Symmetric   enhancement. No enhancing renal masses. Subcentimeter left midpole   cortical cyst.    BLADDER: Within normal limits.  REPRODUCTIVE ORGANS: Prostate within normal limits.    BOWEL: No acute appendicitis. Pancolonic diverticulosis, most prominent   in the sigmoid and descending colon without acute inflammation. No   evidence of a normal bowel wall thickening. The remainder of the colon is   collapsed. Small bowel loops of normal caliber.No bowel obstruction.  PERITONEUM: No ascites.  VESSELS: Atherosclerotic changes. Fusiform dilatation of the celiac axis.   Patent celiac axis and SMA with mild atherosclerotic changes. Patent   renal arteries. Patent hepatic veins, portal vein, SMV and splenic vein.  RETROPERITONEUM/LYMPH NODES: No lymphadenopathy.  ABDOMINAL WALL: Within normal limits.  BONES: Degenerative changes. Osteopenia.    IMPRESSION:  No evidence of bowel obstruction.    Dilated CBD up to 1.7 cm which can be seen with cholecystectomy, however   there is abrupt cut off ofthe distal CBD and  mild pancreatic duct   dilatation. Choledocholithiasis or a papillary process cannot be entirely   excluded. Correlate with liver function tests. If there is clinical   concern, consider MRI MRCP with contrast for further evaluation.    Hepatic steatosis with a focal area of increased hypoattenuation which   likely represents pronounced focal fatty changes, less likely underlying   mass. This finding could also be further evaluated on MRI/MRCP as   recommended above.    Non complicated colonic diverticulosis, no diverticulitis.      MR MRCP WAW IC                        PROCEDURE DATE:  12/12/2022      FINDINGS:  LOWER CHEST: Atelectasis and/or consolidation is seen in the RIGHT base.   This appears more prominent than was seen on the prior CT.    LIVER: Diffuse hepatic steatosis. Lesion described on CT within the LEFT   lobe of the liver corresponds to focal hepatic steatosis.  BILE DUCTS: Common bile duct is markedly dilated measuring 1.6 cm in   diameter. There is mild to moderate intrahepatic biliary dilatation as   well as dilatation of the cystic duct stump. Common bile duct is dilated   down to the level of the ampulla. There is a small amount of sludge   versus debris within the distal common bile duct at the level of the   ampulla. 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  GALLBLADDER: Cholecystectomy.  SPLEEN: Within normal limits.  PANCREAS: Within normal limits.  ADRENALS: Within normal limits.  KIDNEYS/URETERS: Within normal limits.    VISUALIZED PORTIONS:  BOWEL: Within normal limits.  PERITONEUM: No ascites.  VESSELS: Within normal limits.  RETROPERITONEUM/LYMPH NODES: No lymphadenopathy.  ABDOMINAL WALL: Within normal limits.  BONES: Within normal limits.    IMPRESSION:  1.  Intrahepatic and extrahepatic biliary dilatation with tapering of the   common bile duct at the ampulla suggesting ampullary stenosis. Decreased   signal at the level of the ampulla may represent a small amount of   sludge/debris.  2.  Diffuse hepatic steatosis with focal hepatic steatosis within the   LEFT lobe.   ADVANCED ENDOSCOPIST CONSULT NOTE    Patient is a 67y old  Male who presents with a chief complaint of   HPI:  67 year old male, vaccinated, from [facility] with PMHx of DM, HTN, HLD, AF on eliquis, PVD, presents with vomiting. Patient states that last night he ate a burger, and started to vomit shortly after. Patient states he is unable to eat since then due to feeling nauseous and continues to vomit each time he tries to eat. Patient denies any recent sick contact.  Patient also denies any associated abdominal pain, fever, chills, SOB, chest pain, diarrhea, hematemesis or melena.     In the ED:  Afebrile, hemodynamically stable  No leukocytosis  Bili WNL, , ,   CT A/P showed: Cholecystectomy +Dilated CBD up to 1.7 cm abrupt cut off of the distal CBD and  mild pancreatic duct dilatation.      (08 Dec 2022 19:05)      PMH/PSH:  PAST MEDICAL & SURGICAL HISTORY:  COPD (chronic obstructive pulmonary disease)  Diabetes  PVD (peripheral vascular disease)  Chronic atrial fibrillation  GERD (gastroesophageal reflux disease)  MDD (major depressive disorder)  BPH (benign prostatic hyperplasia)    FH:  FAMILY HISTORY:  Noncontributory.     MEDS:  MEDICATIONS  (STANDING):  atorvastatin 10 milliGRAM(s) Oral at bedtime  baclofen 5 milliGRAM(s) Oral every 12 hours  dextrose 5%. 1000 milliLiter(s) (50 mL/Hr) IV Continuous <Continuous>  dextrose 5%. 1000 milliLiter(s) (100 mL/Hr) IV Continuous <Continuous>  dextrose 50% Injectable 25 Gram(s) IV Push once  dextrose 50% Injectable 12.5 Gram(s) IV Push once  dextrose 50% Injectable 25 Gram(s) IV Push once  enoxaparin Injectable 55 milliGRAM(s) SubCutaneous every 12 hours  famotidine    Tablet 20 milliGRAM(s) Oral two times a day  glucagon  Injectable 1 milliGRAM(s) IntraMuscular once  insulin lispro (ADMELOG) corrective regimen sliding scale   SubCutaneous three times a day before meals  metoprolol tartrate 25 milliGRAM(s) Oral two times a day  NIFEdipine XL 60 milliGRAM(s) Oral daily  oxycodone    5 mG/acetaminophen 325 mG 1 Tablet(s) Oral every 4 hours  polyethylene glycol 3350 17 Gram(s) Oral daily  pregabalin 100 milliGRAM(s) Oral three times a day  senna 2 Tablet(s) Oral at bedtime  sodium chloride 0.9%. 1000 milliLiter(s) (80 mL/Hr) IV Continuous <Continuous>  tamsulosin 0.4 milliGRAM(s) Oral at bedtime    MEDICATIONS  (PRN):  dextrose Oral Gel 15 Gram(s) Oral once PRN Blood Glucose LESS THAN 70 milliGRAM(s)/deciliter  ondansetron    Tablet 4 milliGRAM(s) Oral every 6 hours PRN Nausea    Allergies    morphine (Unknown)  penicillin (Unknown)  shellfish (Unknown)  Intolerances    ROS: A detailed set of ROS were asked and negative except those outlined in GI HPI.  ______________________________________________________________________  PHYSICAL EXAM:  T(C): 36.9 (12-13-22 @ 05:05), Max: 37.1 (12-12-22 @ 12:50)  HR: 87 (12-13-22 @ 05:05)  BP: 117/71 (12-13-22 @ 05:05)  RR: 18 (12-13-22 @ 05:05)  SpO2: 95% (12-13-22 @ 05:05)  Wt(kg): --      GEN: NAD  HEENT: EOMI, conjunctivae anicteric, neck supple, dry mucous membranes  PULM: LSCTAB, no wheezing, rales, or rhonchi  CV: RRR, no m/r/b  GI: Soft, ttp, rounded, ND; +BS in all four quadrants, no ascites  MSK: OLEA  NEURO: A&O x 3, no gross deficits  ______________________________________________________________________  LABS:                        14.1   6.44  )-----------( 146      ( 13 Dec 2022 06:30 )             41.7     12-13    132<L>  |  95<L>  |  11  ----------------------------<  132<H>  4.2   |  31  |  0.58    Ca    8.5      13 Dec 2022 06:30  Phos  3.4     12-12  Mg     1.7     12-12    TPro  7.4  /  Alb  1.9<L>  /  TBili  0.8  /  DBili  x   /  AST  117<H>  /  ALT  66<H>  /  AlkPhos  245<H>  12-13    LIVER FUNCTIONS - ( 13 Dec 2022 06:30 )  Alb: 1.9 g/dL / Pro: 7.4 g/dL / ALK PHOS: 245 U/L / ALT: 66 U/L DA / AST: 117 U/L / GGT: x             ____________________________________________    IMAGING:    CT ABDOMEN AND PELVIS IC                        PROCEDURE DATE:  12/08/2022      FINDINGS:  LOWER CHEST: Bibasilar atelectatic changes. Coronary artery   calcifications. Aortic root calcifications. Normal-sized heart.    LIVER: Severe hepatic steatosis. Ill-defined focal hypodensity in   subcapsular location of segment 5 anteriorly measuring approximately 1.8   x 2.5 x 1.7 cm likely represents prominent focal fatty changes.  BILE DUCTS: No intrahepatic biliary duct dilatation. The CBD is dilated   to 1.8 cm with abrupt cut off of the distal CBD proximal to the papilla.  GALLBLADDER: Cholecystectomy.  SPLEEN: Within normal limits.  PANCREAS: Mild diffuse pancreatic parenchymal atrophy. Dilated proximal   pancreatic duct measuring 6 mm.  ADRENALS: Within normal limits.  KIDNEYS/URETERS: No hydronephrosis or nephroureterolithiasis. Symmetric   enhancement. No enhancing renal masses. Subcentimeter left midpole   cortical cyst.    BLADDER: Within normal limits.  REPRODUCTIVE ORGANS: Prostate within normal limits.    BOWEL: No acute appendicitis. Pancolonic diverticulosis, most prominent   in the sigmoid and descending colon without acute inflammation. No   evidence of a normal bowel wall thickening. The remainder of the colon is   collapsed. Small bowel loops of normal caliber.No bowel obstruction.  PERITONEUM: No ascites.  VESSELS: Atherosclerotic changes. Fusiform dilatation of the celiac axis.   Patent celiac axis and SMA with mild atherosclerotic changes. Patent   renal arteries. Patent hepatic veins, portal vein, SMV and splenic vein.  RETROPERITONEUM/LYMPH NODES: No lymphadenopathy.  ABDOMINAL WALL: Within normal limits.  BONES: Degenerative changes. Osteopenia.    IMPRESSION:  No evidence of bowel obstruction.    Dilated CBD up to 1.7 cm which can be seen with cholecystectomy, however   there is abrupt cut off ofthe distal CBD and  mild pancreatic duct   dilatation. Choledocholithiasis or a papillary process cannot be entirely   excluded. Correlate with liver function tests. If there is clinical   concern, consider MRI MRCP with contrast for further evaluation.    Hepatic steatosis with a focal area of increased hypoattenuation which   likely represents pronounced focal fatty changes, less likely underlying   mass. This finding could also be further evaluated on MRI/MRCP as   recommended above.    Non complicated colonic diverticulosis, no diverticulitis.      MR MRCP WAW IC                        PROCEDURE DATE:  12/12/2022      FINDINGS:  LOWER CHEST: Atelectasis and/or consolidation is seen in the RIGHT base.   This appears more prominent than was seen on the prior CT.    LIVER: Diffuse hepatic steatosis. Lesion described on CT within the LEFT   lobe of the liver corresponds to focal hepatic steatosis.  BILE DUCTS: Common bile duct is markedly dilated measuring 1.6 cm in   diameter. There is mild to moderate intrahepatic biliary dilatation as   well as dilatation of the cystic duct stump. Common bile duct is dilated   down to the level of the ampulla. There is a small amount of sludge   versus debris within the distal common bile duct at the level of the   ampulla. 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  GALLBLADDER: Cholecystectomy.  SPLEEN: Within normal limits.  PANCREAS: Within normal limits.  ADRENALS: Within normal limits.  KIDNEYS/URETERS: Within normal limits.    VISUALIZED PORTIONS:  BOWEL: Within normal limits.  PERITONEUM: No ascites.  VESSELS: Within normal limits.  RETROPERITONEUM/LYMPH NODES: No lymphadenopathy.  ABDOMINAL WALL: Within normal limits.  BONES: Within normal limits.    IMPRESSION:  1.  Intrahepatic and extrahepatic biliary dilatation with tapering of the   common bile duct at the ampulla suggesting ampullary stenosis. Decreased   signal at the level of the ampulla may represent a small amount of   sludge/debris.  2.  Diffuse hepatic steatosis with focal hepatic steatosis within the   LEFT lobe.

## 2022-12-13 NOTE — PROGRESS NOTE ADULT - TIME BILLING
- Review of records, telemetry, vital signs and daily labs.   - General and cardiovascular physical examination.  - Generation of cardiovascular treatment plan.  - Coordination of care.      Patient was seen and examined by me on 12/13/22,interim events noted,labs and radiology studies reviewed.  Nasir Sharma MD,FACC.  [address] [place], [place] [id number].  [phone] - Review of records, telemetry, vital signs and daily labs.   - General and cardiovascular physical examination.  - Generation of cardiovascular treatment plan.  - Coordination of care.      Patient was seen and examined by me on 12/14/22,interim events noted,labs and radiology studies reviewed.  Nasir Sharma MD,FACC.  [address] [place], [place] [id number].  [phone]

## 2022-12-13 NOTE — PROGRESS NOTE ADULT - PROBLEM SELECTOR PLAN 1
p/w vomiting, improving  -CT A/P showed: Cholecystectomy. Dilated CBD up to 1.7 cm abrupt cut off of the distal CBD and  mild pancreatic duct dilatation.   - s/p MRCP   - Plan for erdp in am - with Dr Antonio  -c/w MENDY  - Zofran PRN  -Consulted GI Dr. Villanueva

## 2022-12-13 NOTE — PROGRESS NOTE ADULT - ASSESSMENT
Patient is a 67 year old male, vaccinated, from [facility] with PMHx of DM, HTN, HLD, AF on Eliquis, PVD, presents with c/o vomiting, found to have elevated LFts. CT scan  showed dilated CBD up to 1.7 cm with abrupt cut off of the distal CBD and mild pancreatic duct dilatation, suggestive of possible Choledocholithiasis. Plan for MRCP/MRI.  GI Dr. Villanueva consulted

## 2022-12-13 NOTE — CONSULT NOTE ADULT - ASSESSMENT
Patient is a 67M with a PMHx of DM, HTN, HLD, AFib (on Eliquis), PVD, who was admitted 12/8 for vomiting. He is followed by Dr. Villanueva this admission for abdominal pain and vomiting. Advanced GI consult is requested for ERCP.     Patient states 12/7 PM, he ate a burger and vomited shortly after, and since then he has had decreased PO intake due to n/v, associated with RUQ abdominal pain. He denies hematemesis, hematochezia, melena, cp, sob.   Patient is s/p ccy (unable to recall when), no prior EGDs, last colonoscopy "years ago" (unable to recall when or with who).   Admission labs notable for , , , no leukocytosis, afebrile. TBili wnl.   CTAP 12/8: CBD dilated 1.8cm with abrupt cut off of the distal CBD proximal to the papilla.   MRCP 12/12: CBD dilated 1.6cm, mild to mod intrahepatic biliary dilatation as well as dilatation of cystic duct stump. CBD dilated down to level of ampulla. There is small amount of sludge vs debris within the distal CBD at the level of the ampulla. 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     12/13: improved labs, TBili 0.8, , , ALT 66.    #Elevated LFTs  #CBD Dilation  #R/o Choledocho  #Abdominal pain, n/v    	- Tentative ERCP tomorrow, 12/14, with Dr. Kim  	- CLD today, NPO pMN  	- HOLD AC today, Covid swab within 72 hrs  	- IVF per primary team  	- PRN Zofran  	- Trend LFTs  	- For tomorrow, Please obtain PT/INR, CBC, CMP. Replete lytes PRN    This note and its recommendations herein are preliminary until such time as cosigned by an attending.    GI will continue to follow.  Thank you for this consult!   Patient is a 67M with a PMHx of DM, HTN, HLD, AFib (on Eliquis), PVD, who was admitted 12/8 for vomiting. He is followed by Dr. Villanueva this admission for abdominal pain and vomiting. Advanced GI consult is requested for ERCP.     Patient states 12/7 PM, he ate a burger and vomited shortly after, and since then he has had decreased PO intake due to n/v, associated with RUQ abdominal pain. He denies hematemesis, hematochezia, melena, cp, sob.   Patient is s/p ccy (unable to recall when), no prior EGDs, last colonoscopy "years ago" (unable to recall when or with who).   Admission labs notable for , , , no leukocytosis, afebrile. TBili wnl.   CTAP 12/8: CBD dilated 1.8cm with abrupt cut off of the distal CBD proximal to the papilla.   MRCP 12/12: CBD dilated 1.6cm, mild to mod intrahepatic biliary dilatation as well as dilatation of cystic duct stump. CBD dilated down to level of ampulla. There is small amount of sludge vs debris within the distal CBD at the level of the ampulla. 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     12/13: improved labs, TBili 0.8, , , ALT 66.    #Elevated LFTs  #CBD Dilation  #R/o Choledocho  #Abdominal pain, n/v  Unable to perform ERCP 12/14 due to logistics.     	- Tentative ERCP Thursday, 12/15, with Dr. Kim  	- CLD for now  	- Hold AC Thursday, Covid swab within 72 hrs  	- IVF per primary team  	- PRN Zofran  	- Trend LFTs, daily PT/INR  	- For Thursday, Please obtain PT/INR, CBC, CMP. Replete lytes PRN    This note and its recommendations herein are preliminary until such time as cosigned by an attending.    GI will continue to follow.  Thank you for this consult!   Patient is a 67M with a PMHx of DM, HTN, HLD, AFib (on Eliquis), PVD, who was admitted 12/8 for vomiting. He is followed by Dr. Villanueva this admission for abdominal pain and vomiting. Advanced GI consult is requested for ERCP.     Patient states 12/7 PM, he ate a burger and vomited shortly after, and since then he has had decreased PO intake due to n/v, associated with RUQ abdominal pain. He denies hematemesis, hematochezia, melena, cp, sob.   Patient is s/p ccy (unable to recall when), no prior EGDs, last colonoscopy "years ago" (unable to recall when or with who).   Admission labs notable for , , , no leukocytosis, afebrile. TBili wnl.   CTAP 12/8: CBD dilated 1.8cm with abrupt cut off of the distal CBD proximal to the papilla.   MRCP 12/12: CBD dilated 1.6cm, mild to mod intrahepatic biliary dilatation as well as dilatation of cystic duct stump. CBD dilated down to level of ampulla. There is small amount of sludge vs debris within the distal CBD at the level of the ampulla. 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     12/13: improved labs, TBili 0.8, , , ALT 66.    #Elevated LFTs  #CBD Dilation  #R/o Choledocho  #Abdominal pain, n/v  Unable to perform ERCP 12/14 due to logistics.     	- Tentative EUS+/- ERCP Thursday, 12/15, with Dr. Kim  	- CLD for now  	- Hold AC Thursday, Covid swab within 72 hrs  	- IVF per primary team  	- PRN Zofran  	- Trend LFTs, daily PT/INR  	- For Thursday, Please obtain PT/INR, CBC, CMP. Replete lytes PRN    This note and its recommendations herein are preliminary until such time as cosigned by an attending.    GI will continue to follow.  Thank you for this consult!

## 2022-12-13 NOTE — PROGRESS NOTE ADULT - SUBJECTIVE AND OBJECTIVE BOX
Patient is a 67y old  Male who presents with a chief complaint of Vomiting (13 Dec 2022 10:45)      INTERVAL HPI/OVERNIGHT EVENTS: no acute events onvernight        REVIEW OF SYSTEMS:  CONSTITUTIONAL: No fever, chills  ENMT:  No difficulty hearing, no change in vision  NECK: No pain or stiffness  RESPIRATORY: No cough, SOB  CARDIOVASCULAR: No chest pain, palpitations  GASTROINTESTINAL: No abdominal pain. No nausea, vomiting, or diarrhea  GENITOURINARY: No dysuria  NEUROLOGICAL: No HA  SKIN: No itching, burning, rashes, or lesions   LYMPH NODES: No enlarged glands  ENDOCRINE: No heat or cold intolerance; No hair loss  MUSCULOSKELETAL: No joint pain or swelling; No muscle, back, or extremity pain  PSYCHIATRIC: No depression, anxiety  HEME/LYMPH: No easy bruising, or bleeding gums    T(C): 37.1 (12-13-22 @ 13:05), Max: 37.1 (12-13-22 @ 13:05)  HR: 73 (12-13-22 @ 13:05) (73 - 87)  BP: 114/65 (12-13-22 @ 13:05) (109/66 - 117/71)  RR: 18 (12-13-22 @ 13:05) (18 - 18)  SpO2: 95% (12-13-22 @ 13:05) (95% - 96%)  Wt(kg): --Vital Signs Last 24 Hrs  T(C): 37.1 (13 Dec 2022 13:05), Max: 37.1 (13 Dec 2022 13:05)  T(F): 98.8 (13 Dec 2022 13:05), Max: 98.8 (13 Dec 2022 13:05)  HR: 73 (13 Dec 2022 13:05) (73 - 87)  BP: 114/65 (13 Dec 2022 13:05) (109/66 - 117/71)  BP(mean): 76 (13 Dec 2022 13:05) (76 - 86)  RR: 18 (13 Dec 2022 13:05) (18 - 18)  SpO2: 95% (13 Dec 2022 13:05) (95% - 96%)    Parameters below as of 13 Dec 2022 13:05  Patient On (Oxygen Delivery Method): nasal cannula  O2 Flow (L/min): 2    MEDICATIONS  (STANDING):  atorvastatin 10 milliGRAM(s) Oral at bedtime  baclofen 5 milliGRAM(s) Oral every 12 hours  dextrose 5%. 1000 milliLiter(s) (50 mL/Hr) IV Continuous <Continuous>  dextrose 5%. 1000 milliLiter(s) (100 mL/Hr) IV Continuous <Continuous>  dextrose 50% Injectable 25 Gram(s) IV Push once  dextrose 50% Injectable 12.5 Gram(s) IV Push once  dextrose 50% Injectable 25 Gram(s) IV Push once  famotidine    Tablet 20 milliGRAM(s) Oral two times a day  glucagon  Injectable 1 milliGRAM(s) IntraMuscular once  insulin lispro (ADMELOG) corrective regimen sliding scale   SubCutaneous three times a day before meals  metoprolol tartrate 25 milliGRAM(s) Oral two times a day  NIFEdipine XL 60 milliGRAM(s) Oral daily  oxycodone    5 mG/acetaminophen 325 mG 1 Tablet(s) Oral every 4 hours  polyethylene glycol 3350 17 Gram(s) Oral daily  pregabalin 100 milliGRAM(s) Oral three times a day  senna 2 Tablet(s) Oral at bedtime  sodium chloride 0.9%. 1000 milliLiter(s) (80 mL/Hr) IV Continuous <Continuous>  tamsulosin 0.4 milliGRAM(s) Oral at bedtime    MEDICATIONS  (PRN):  dextrose Oral Gel 15 Gram(s) Oral once PRN Blood Glucose LESS THAN 70 milliGRAM(s)/deciliter  ondansetron    Tablet 4 milliGRAM(s) Oral every 6 hours PRN Nausea    PHYSICAL EXAM:  GENERAL: NAD  EYES: clear conjunctiva; EOMI  ENMT: Moist mucous membranes  NECK: Supple, No JVD, Normal thyroid  CHEST/LUNG: Clear to auscultation bilaterally; No rales, rhonchi, wheezing, or rubs  HEART: S1, S2, Regular rate and rhythm  ABDOMEN: Soft, Nontender, Nondistended; Bowel sounds present  NEURO: Alert & Oriented X3  EXTREMITIES: No LE edema, no calf tenderness  LYMPH: No lymphadenopathy noted  SKIN: No rashes or lesions    Consultant(s) Notes Reviewed:  [x ] YES  [ ] NO  Care Discussed with Consultants/Other Providers [ x] YES  [ ] NO    LABS:                        14.1   6.44  )-----------( 146      ( 13 Dec 2022 06:30 )             41.7     12-13    132<L>  |  95<L>  |  11  ----------------------------<  132<H>  4.2   |  31  |  0.58    Ca    8.5      13 Dec 2022 06:30  Phos  3.4     12-12  Mg     1.7     12-12    TPro  7.4  /  Alb  1.9<L>  /  TBili  0.8  /  DBili  x   /  AST  117<H>  /  ALT  66<H>  /  AlkPhos  245<H>  12-13      CAPILLARY BLOOD GLUCOSE      POCT Blood Glucose.: 105 mg/dL (13 Dec 2022 11:18)  POCT Blood Glucose.: 109 mg/dL (13 Dec 2022 07:42)  POCT Blood Glucose.: 118 mg/dL (12 Dec 2022 21:23)  POCT Blood Glucose.: 98 mg/dL (12 Dec 2022 16:37)            RADIOLOGY & ADDITIONAL TESTS:    Imaging Personally Reviewed:  [ ] YES  [ ] NO

## 2022-12-14 LAB
ALBUMIN SERPL ELPH-MCNC: 1.9 G/DL — LOW (ref 3.5–5)
ALP SERPL-CCNC: 255 U/L — HIGH (ref 40–120)
ALT FLD-CCNC: 71 U/L DA — HIGH (ref 10–60)
ANION GAP SERPL CALC-SCNC: 8 MMOL/L — SIGNIFICANT CHANGE UP (ref 5–17)
APTT BLD: 35.2 SEC — SIGNIFICANT CHANGE UP (ref 27.5–35.5)
AST SERPL-CCNC: 110 U/L — HIGH (ref 10–40)
BILIRUB SERPL-MCNC: 0.7 MG/DL — SIGNIFICANT CHANGE UP (ref 0.2–1.2)
BUN SERPL-MCNC: 11 MG/DL — SIGNIFICANT CHANGE UP (ref 7–18)
CALCIUM SERPL-MCNC: 8.8 MG/DL — SIGNIFICANT CHANGE UP (ref 8.4–10.5)
CHLORIDE SERPL-SCNC: 94 MMOL/L — LOW (ref 96–108)
CO2 SERPL-SCNC: 32 MMOL/L — HIGH (ref 22–31)
CREAT SERPL-MCNC: 0.52 MG/DL — SIGNIFICANT CHANGE UP (ref 0.5–1.3)
EGFR: 110 ML/MIN/1.73M2 — SIGNIFICANT CHANGE UP
GLUCOSE BLDC GLUCOMTR-MCNC: 104 MG/DL — HIGH (ref 70–99)
GLUCOSE BLDC GLUCOMTR-MCNC: 109 MG/DL — HIGH (ref 70–99)
GLUCOSE BLDC GLUCOMTR-MCNC: 185 MG/DL — HIGH (ref 70–99)
GLUCOSE BLDC GLUCOMTR-MCNC: 196 MG/DL — HIGH (ref 70–99)
GLUCOSE SERPL-MCNC: 120 MG/DL — HIGH (ref 70–99)
HCT VFR BLD CALC: 41.8 % — SIGNIFICANT CHANGE UP (ref 39–50)
HGB BLD-MCNC: 14 G/DL — SIGNIFICANT CHANGE UP (ref 13–17)
INR BLD: 1.63 RATIO — HIGH (ref 0.88–1.16)
MCHC RBC-ENTMCNC: 31.5 PG — SIGNIFICANT CHANGE UP (ref 27–34)
MCHC RBC-ENTMCNC: 33.5 GM/DL — SIGNIFICANT CHANGE UP (ref 32–36)
MCV RBC AUTO: 94.1 FL — SIGNIFICANT CHANGE UP (ref 80–100)
NRBC # BLD: 0 /100 WBCS — SIGNIFICANT CHANGE UP (ref 0–0)
PLATELET # BLD AUTO: 150 K/UL — SIGNIFICANT CHANGE UP (ref 150–400)
POTASSIUM SERPL-MCNC: 3.8 MMOL/L — SIGNIFICANT CHANGE UP (ref 3.5–5.3)
POTASSIUM SERPL-SCNC: 3.8 MMOL/L — SIGNIFICANT CHANGE UP (ref 3.5–5.3)
PROT SERPL-MCNC: 7.6 G/DL — SIGNIFICANT CHANGE UP (ref 6–8.3)
PROTHROM AB SERPL-ACNC: 19.5 SEC — HIGH (ref 10.5–13.4)
RBC # BLD: 4.44 M/UL — SIGNIFICANT CHANGE UP (ref 4.2–5.8)
RBC # FLD: 16.1 % — HIGH (ref 10.3–14.5)
SODIUM SERPL-SCNC: 134 MMOL/L — LOW (ref 135–145)
WBC # BLD: 6.94 K/UL — SIGNIFICANT CHANGE UP (ref 3.8–10.5)
WBC # FLD AUTO: 6.94 K/UL — SIGNIFICANT CHANGE UP (ref 3.8–10.5)

## 2022-12-14 PROCEDURE — 99232 SBSQ HOSP IP/OBS MODERATE 35: CPT

## 2022-12-14 RX ORDER — ONDANSETRON 8 MG/1
4 TABLET, FILM COATED ORAL ONCE
Refills: 0 | Status: COMPLETED | OUTPATIENT
Start: 2022-12-14 | End: 2022-12-14

## 2022-12-14 RX ADMIN — Medication 25 MILLIGRAM(S): at 05:48

## 2022-12-14 RX ADMIN — OXYCODONE AND ACETAMINOPHEN 1 TABLET(S): 5; 325 TABLET ORAL at 13:05

## 2022-12-14 RX ADMIN — ATORVASTATIN CALCIUM 10 MILLIGRAM(S): 80 TABLET, FILM COATED ORAL at 21:07

## 2022-12-14 RX ADMIN — FAMOTIDINE 20 MILLIGRAM(S): 10 INJECTION INTRAVENOUS at 17:07

## 2022-12-14 RX ADMIN — OXYCODONE AND ACETAMINOPHEN 1 TABLET(S): 5; 325 TABLET ORAL at 23:07

## 2022-12-14 RX ADMIN — OXYCODONE AND ACETAMINOPHEN 1 TABLET(S): 5; 325 TABLET ORAL at 10:08

## 2022-12-14 RX ADMIN — OXYCODONE AND ACETAMINOPHEN 1 TABLET(S): 5; 325 TABLET ORAL at 06:32

## 2022-12-14 RX ADMIN — Medication 100 MILLIGRAM(S): at 05:48

## 2022-12-14 RX ADMIN — FAMOTIDINE 20 MILLIGRAM(S): 10 INJECTION INTRAVENOUS at 05:48

## 2022-12-14 RX ADMIN — Medication 100 MILLIGRAM(S): at 13:04

## 2022-12-14 RX ADMIN — OXYCODONE AND ACETAMINOPHEN 1 TABLET(S): 5; 325 TABLET ORAL at 00:26

## 2022-12-14 RX ADMIN — Medication 60 MILLIGRAM(S): at 05:48

## 2022-12-14 RX ADMIN — POLYETHYLENE GLYCOL 3350 17 GRAM(S): 17 POWDER, FOR SOLUTION ORAL at 13:05

## 2022-12-14 RX ADMIN — Medication 5 MILLIGRAM(S): at 17:07

## 2022-12-14 RX ADMIN — OXYCODONE AND ACETAMINOPHEN 1 TABLET(S): 5; 325 TABLET ORAL at 14:00

## 2022-12-14 RX ADMIN — Medication 5 MILLIGRAM(S): at 05:48

## 2022-12-14 RX ADMIN — ONDANSETRON 4 MILLIGRAM(S): 8 TABLET, FILM COATED ORAL at 01:10

## 2022-12-14 RX ADMIN — Medication 100 MILLIGRAM(S): at 21:07

## 2022-12-14 RX ADMIN — OXYCODONE AND ACETAMINOPHEN 1 TABLET(S): 5; 325 TABLET ORAL at 02:55

## 2022-12-14 RX ADMIN — OXYCODONE AND ACETAMINOPHEN 1 TABLET(S): 5; 325 TABLET ORAL at 11:00

## 2022-12-14 RX ADMIN — Medication 25 MILLIGRAM(S): at 17:06

## 2022-12-14 RX ADMIN — OXYCODONE AND ACETAMINOPHEN 1 TABLET(S): 5; 325 TABLET ORAL at 07:00

## 2022-12-14 RX ADMIN — OXYCODONE AND ACETAMINOPHEN 1 TABLET(S): 5; 325 TABLET ORAL at 21:07

## 2022-12-14 RX ADMIN — OXYCODONE AND ACETAMINOPHEN 1 TABLET(S): 5; 325 TABLET ORAL at 17:06

## 2022-12-14 RX ADMIN — TAMSULOSIN HYDROCHLORIDE 0.4 MILLIGRAM(S): 0.4 CAPSULE ORAL at 21:07

## 2022-12-14 RX ADMIN — OXYCODONE AND ACETAMINOPHEN 1 TABLET(S): 5; 325 TABLET ORAL at 04:48

## 2022-12-14 RX ADMIN — OXYCODONE AND ACETAMINOPHEN 1 TABLET(S): 5; 325 TABLET ORAL at 19:04

## 2022-12-14 NOTE — PROGRESS NOTE ADULT - SUBJECTIVE AND OBJECTIVE BOX
[   ] ICU                                          [   ] CCU                                      [ X  ] Medical Floor      Patient is a 67 year old male with vomiting. GI consulted to evaluate.         67 year old male, vaccinated, from [facility] with past medical history significant for DM, HTN, HLD, AFib on eliquis, PVD, presents with vomiting. Patient states that last night he ate a burger, and started to vomit shortly after. Patient states he is unable to eat since then due to feeling nauseous and continues to vomit each time he tries to eat. Patient denies any recent sick contact.  Patient also denies any associated abdominal pain, fever, chills, SOB, chest pain, diarrhea, hematemesis or melena.     Patient is comfortable. No new complaints reported, No abdominal pain, N/V, hematemesis, hematochezia, melena, fever, chills, chest pain, SOB, cough or diarrhea reported.       PAIN MANAGEMENT:  Pain Scale:                0 /10  Pain Location:      Prior Colonoscopy:  Unknown    PAST MEDICAL HISTORY  COPD (chronic obstructive pulmonary disease)    Diabetes    PVD (peripheral vascular disease)    Chronic atrial fibrillation    GERD (gastroesophageal reflux disease)    MDD (major depressive disorder)    BPH (benign prostatic hyperplasia)        PAST SURGICAL HISTORY  Cholecystectomy      Allergies    morphine (Unknown)  penicillin (Unknown)  shellfish (Unknown)    Intolerances  None       SOCIAL HISTORY  Advanced Directives:       [ X ] Full Code       [  ] DNR  Marital Status:         [  ] M      [ X ] S      [  ] D       [  ] W  Children:       [ X ] Yes      [  ] No  Occupation:        [  ] Employed       [ X ] Unemployed       [  ] Retired  Diet:       [X  ] Regular       [  ] PEG feeding          [  ] NG tube feeding  Drug Use:           [ X ] Patient denied          [  ] Yes  Alcohol:           [ X ] No             [  ] Yes (socially)         [  ] Yes (chronic)  Tobacco:           [  ] Yes           [ X ] No      FAMILY HISTORY  [ X ] Heart Disease            [X  ] Diabetes             [ X ] HTN             [  ] Colon Cancer             [  ] Stomach Cancer              [  ] Pancreatic Cancer      VITALS  Vital Signs Last 24 Hrs  T(C): 36.5 (14 Dec 2022 05:16), Max: 37.1 (13 Dec 2022 13:05)  T(F): 97.7 (14 Dec 2022 05:16), Max: 98.8 (13 Dec 2022 13:05)  HR: 100 (14 Dec 2022 05:16) (73 - 100)  BP: 124/75 (14 Dec 2022 05:16) (114/65 - 124/75)  BP(mean): 77 (13 Dec 2022 20:45) (76 - 77)  RR: 16 (14 Dec 2022 05:44) (16 - 18)  SpO2: 94% (14 Dec 2022 05:44) (90% - 97%)  Parameters below as of 14 Dec 2022 05:44  Patient On (Oxygen Delivery Method): nasal cannula  O2 Flow (L/min): 2       MEDICATIONS  (STANDING):  atorvastatin 10 milliGRAM(s) Oral at bedtime  baclofen 5 milliGRAM(s) Oral every 12 hours  dextrose 5%. 1000 milliLiter(s) (100 mL/Hr) IV Continuous <Continuous>  dextrose 5%. 1000 milliLiter(s) (50 mL/Hr) IV Continuous <Continuous>  dextrose 50% Injectable 25 Gram(s) IV Push once  dextrose 50% Injectable 12.5 Gram(s) IV Push once  dextrose 50% Injectable 25 Gram(s) IV Push once  famotidine    Tablet 20 milliGRAM(s) Oral two times a day  glucagon  Injectable 1 milliGRAM(s) IntraMuscular once  insulin lispro (ADMELOG) corrective regimen sliding scale   SubCutaneous three times a day before meals  metoprolol tartrate 25 milliGRAM(s) Oral two times a day  NIFEdipine XL 60 milliGRAM(s) Oral daily  oxycodone    5 mG/acetaminophen 325 mG 1 Tablet(s) Oral every 4 hours  polyethylene glycol 3350 17 Gram(s) Oral daily  pregabalin 100 milliGRAM(s) Oral three times a day  senna 2 Tablet(s) Oral at bedtime  sodium chloride 0.9%. 1000 milliLiter(s) (80 mL/Hr) IV Continuous <Continuous>  tamsulosin 0.4 milliGRAM(s) Oral at bedtime    MEDICATIONS  (PRN):  dextrose Oral Gel 15 Gram(s) Oral once PRN Blood Glucose LESS THAN 70 milliGRAM(s)/deciliter  ondansetron    Tablet 4 milliGRAM(s) Oral every 6 hours PRN Nausea                            14.0   6.94  )-----------( 150      ( 14 Dec 2022 06:10 )             41.8       12-14    134<L>  |  94<L>  |  11  ----------------------------<  120<H>  3.8   |  32<H>  |  0.52    Ca    8.8      14 Dec 2022 06:10    TPro  7.6  /  Alb  1.9<L>  /  TBili  0.7  /  DBili  x   /  AST  110<H>  /  ALT  71<H>  /  AlkPhos  255<H>  12-14      PT/INR - ( 14 Dec 2022 06:10 )   PT: 19.5 sec;   INR: 1.63 ratio         PTT - ( 14 Dec 2022 06:10 )  PTT:35.2 sec

## 2022-12-14 NOTE — DIETITIAN INITIAL EVALUATION ADULT - ADD RECOMMEND
Advance diet with nutrition supplement as medically feasible or consider alternate nutrition support if on prolonged NPO/clears

## 2022-12-14 NOTE — PROGRESS NOTE ADULT - SUBJECTIVE AND OBJECTIVE BOX
Patient is a 67y old  Male who presents with a chief complaint of Vomiting     (14 Dec 2022 14:38)      INTERVAL HPI/OVERNIGHT EVENTS:      REVIEW OF SYSTEMS:  CONSTITUTIONAL: No fever, chills  ENMT:  No difficulty hearing, no change in vision  NECK: No pain or stiffness  RESPIRATORY: No cough, SOB  CARDIOVASCULAR: No chest pain, palpitations  GASTROINTESTINAL :c/o  abdominal pain and nausea,  GENITOURINARY: No dysuria  NEUROLOGICAL: No HA  SKIN: No itching, burning, rashes, or lesions   LYMPH NODES: No enlarged glands  ENDOCRINE: No heat or cold intolerance; No hair loss  MUSCULOSKELETAL: No joint pain or swelling; No muscle, back, or extremity pain  PSYCHIATRIC: No depression, anxiety  HEME/LYMPH: No easy bruising, or bleeding gums    T(C): 37.2 (12-15-22 @ 13:00), Max: 37.2 (12-15-22 @ 13:00)  HR: 82 (12-15-22 @ 13:00) (74 - 87)  BP: 119/62 (12-15-22 @ 13:00) (107/65 - 124/74)  RR: 18 (12-15-22 @ 13:00) (16 - 18)  SpO2: 93% (12-15-22 @ 13:00) (91% - 97%)  Wt(kg): --Vital Signs Last 24 Hrs  T(C): 37.2 (15 Dec 2022 13:00), Max: 37.2 (15 Dec 2022 13:00)  T(F): 98.9 (15 Dec 2022 13:00), Max: 98.9 (15 Dec 2022 13:00)  HR: 82 (15 Dec 2022 13:00) (74 - 87)  BP: 119/62 (15 Dec 2022 13:00) (107/65 - 124/74)  BP(mean): 76 (15 Dec 2022 13:00) (76 - 76)  RR: 18 (15 Dec 2022 13:00) (16 - 18)  SpO2: 93% (15 Dec 2022 13:00) (91% - 97%)    Parameters below as of 15 Dec 2022 13:00  Patient On (Oxygen Delivery Method): nasal cannula  O2 Flow (L/min): 2      PHYSICAL EXAM:  GENERAL: NAD  EYES: clear conjunctiva; EOMI  ENMT: Moist mucous membranes  NECK: Supple, No JVD, Normal thyroid  CHEST/LUNG: Clear to auscultation bilaterally; No rales, rhonchi, wheezing, or rubs  HEART: S1, S2, Regular rate and rhythm  ABDOMEN: Soft, Nontender, Nondistended; Bowel sounds present  NEURO: Alert & Oriented X3  EXTREMITIES: No LE edema, no calf tenderness  LYMPH: No lymphadenopathy noted  SKIN: No rashes or lesions    Consultant(s) Notes Reviewed:  [x ] YES  [ ] NO  Care Discussed with Consultants/Other Providers [ x] YES  [ ] NO    LABS:                        14.0   6.94  )-----------( 150      ( 14 Dec 2022 06:10 )             41.8     12-14    134<L>  |  94<L>  |  11  ----------------------------<  120<H>  3.8   |  32<H>  |  0.52    Ca    8.8      14 Dec 2022 06:10    TPro  7.6  /  Alb  1.9<L>  /  TBili  0.7  /  DBili  x   /  AST  110<H>  /  ALT  71<H>  /  AlkPhos  255<H>  12-14    PT/INR - ( 14 Dec 2022 06:10 )   PT: 19.5 sec;   INR: 1.63 ratio         PTT - ( 14 Dec 2022 06:10 )  PTT:35.2 sec  CAPILLARY BLOOD GLUCOSE      POCT Blood Glucose.: 111 mg/dL (15 Dec 2022 11:34)  POCT Blood Glucose.: 115 mg/dL (15 Dec 2022 07:49)  POCT Blood Glucose.: 196 mg/dL (14 Dec 2022 21:16)  POCT Blood Glucose.: 109 mg/dL (14 Dec 2022 16:25)            RADIOLOGY & ADDITIONAL TESTS:    Imaging Personally Reviewed:  [ ] YES  [ ] NO

## 2022-12-14 NOTE — PROGRESS NOTE ADULT - ASSESSMENT
Patient is a 67M with a PMHx of DM, HTN, HLD, AFib (on Eliquis), PVD, who was admitted 12/8 for vomiting. He is followed by Dr. Villanueva this admission for abdominal pain and vomiting. Advanced GI consult is requested for ERCP.     Patient states 12/7 PM, he ate a burger and vomited shortly after, and since then he has had decreased PO intake due to n/v, associated with RUQ abdominal pain. He denies hematemesis, hematochezia, melena, cp, sob.   Patient is s/p ccy (unable to recall when), no prior EGDs, last colonoscopy "years ago" (unable to recall when or with who).   Admission labs notable for , , , no leukocytosis, afebrile. TBili wnl.   CTAP 12/8: CBD dilated 1.8cm with abrupt cut off of the distal CBD proximal to the papilla.   MRCP 12/12: CBD dilated 1.6cm, mild to mod intrahepatic biliary dilatation as well as dilatation of cystic duct stump. CBD dilated down to level of ampulla. There is small amount of sludge vs debris within the distal CBD at the level of the ampulla. 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     12/13: improved labs, TBili 0.8, , , ALT 66.    #Elevated LFTs  #CBD Dilation  #R/o Choledocho  #Abdominal pain, n/v      	- EUS+/- ERCP likely early next week, 12/15, with Dr. Kim  	- CLD   	- Hold AC prior, Covid swab within 72 hrs  	- IVF per primary team  	- PRN Zofran  	- Trend LFTs, daily PT/INR Patient is a 67M with a PMHx of DM, HTN, HLD, AFib (on Eliquis), PVD, who was admitted 12/8 for vomiting. He is followed by Dr. Villanueva this admission for abdominal pain and vomiting. Advanced GI consult is requested for ERCP.     Patient states 12/7 PM, he ate a burger and vomited shortly after, and since then he has had decreased PO intake due to n/v, associated with RUQ abdominal pain. He denies hematemesis, hematochezia, melena, cp, sob.   Patient is s/p ccy (unable to recall when), no prior EGDs, last colonoscopy "years ago" (unable to recall when or with who).   Admission labs notable for , , , no leukocytosis, afebrile. TBili wnl.   CTAP 12/8: CBD dilated 1.8cm with abrupt cut off of the distal CBD proximal to the papilla.   MRCP 12/12: CBD dilated 1.6cm, mild to mod intrahepatic biliary dilatation as well as dilatation of cystic duct stump. CBD dilated down to level of ampulla. There is small amount of sludge vs debris within the distal CBD at the level of the ampulla. 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     12/13: improved labs, TBili 0.8, , , ALT 66.    #Elevated LFTs  #CBD Dilation  #R/o Choledocho  #Abdominal pain, n/v      	- EUS+/- ERCP tentatively scheduled for 12/16 with Dr. Kim  	- CLD   	- COVID within 72H, hold AC/VTE 24H prior to procedure, NPO pMN   	- IVF per primary team  	- PRN Zofran  	- Trend LFTs, daily PT/INR

## 2022-12-14 NOTE — PROGRESS NOTE ADULT - ASSESSMENT
Patient is a 67 year old male, vaccinated, from [facility] with PMHx of DM, HTN, HLD, AF on Eliquis, PVD, presents with c/o vomiting, found to have elevated LFts. CT scan  showed dilated CBD up to 1.7 cm with abrupt cut off of the distal CBD and mild pancreatic duct dilatation, suggestive of possible Choledocholithiasis. Plan for MRCP/MRI.  GI Dr. Villanueva consulted

## 2022-12-14 NOTE — DIETITIAN INITIAL EVALUATION ADULT - PERTINENT LABORATORY DATA
12-14    134<L>  |  94<L>  |  11  ----------------------------<  120<H>  3.8   |  32<H>  |  0.52    Ca    8.8      14 Dec 2022 06:10    TPro  7.6  /  Alb  1.9<L>  /  TBili  0.7  /  DBili  x   /  AST  110<H>  /  ALT  71<H>  /  AlkPhos  255<H>  12-14  POCT Blood Glucose.: 185 mg/dL (12-14-22 @ 11:21)  A1C with Estimated Average Glucose Result: 5.5 % (12-09-22 @ 06:00)  A1C with Estimated Average Glucose Result: 6.7 % (02-01-22 @ 08:57)

## 2022-12-14 NOTE — DIETITIAN INITIAL EVALUATION ADULT - OTHER INFO
Patient from [facility]. [facility] admitted for choledocholithiasis. Visited pt. alert & awake, reports "having nausea w/mild abdominal pain", RN aware, on pain "meds". Pt. requesting extra juices & jello noted, states started to having decreased po intake with n/vomiting x1 day at NH. Presently on clear liquids & tolerating, accepted nutrition supplement - Ensure Clear, encourage po intake, followed by GI/team, place on NPO after midnight pending EUS with possible ERCP on 12/15/22?

## 2022-12-14 NOTE — DIETITIAN INITIAL EVALUATION ADULT - PROBLEM/PLAN-6
Please CALL patient.    Will need Serbian .    At last visit we recommended the following:    - CONTINUE Toprol 12.5mg daily - no change.  - CONTINUE Bumex 0.5mg daily (down from bid)  - HOLD spironolactone.  - HOLD lisinopril.     Has he seen cardiology OR nephrology since our visit? If so, have they made any new/different recommendations?    Thank you.                               DISPLAY PLAN FREE TEXT

## 2022-12-14 NOTE — DIETITIAN INITIAL EVALUATION ADULT - NS FNS DIET ORDER
Diet, NPO after Midnight:      NPO Start Date: 14-Dec-2022,   NPO Start Time: 23:59 (12-14-22 @ 12:40)  Diet, Clear Liquid (12-14-22 @ 09:55)

## 2022-12-14 NOTE — DIETITIAN INITIAL EVALUATION ADULT - PERTINENT MEDS FT
MEDICATIONS  (STANDING):  atorvastatin 10 milliGRAM(s) Oral at bedtime  baclofen 5 milliGRAM(s) Oral every 12 hours  dextrose 5%. 1000 milliLiter(s) (100 mL/Hr) IV Continuous <Continuous>  dextrose 5%. 1000 milliLiter(s) (50 mL/Hr) IV Continuous <Continuous>  dextrose 50% Injectable 25 Gram(s) IV Push once  dextrose 50% Injectable 12.5 Gram(s) IV Push once  dextrose 50% Injectable 25 Gram(s) IV Push once  famotidine    Tablet 20 milliGRAM(s) Oral two times a day  glucagon  Injectable 1 milliGRAM(s) IntraMuscular once  insulin lispro (ADMELOG) corrective regimen sliding scale   SubCutaneous three times a day before meals  metoprolol tartrate 25 milliGRAM(s) Oral two times a day  NIFEdipine XL 60 milliGRAM(s) Oral daily  oxycodone    5 mG/acetaminophen 325 mG 1 Tablet(s) Oral every 4 hours  polyethylene glycol 3350 17 Gram(s) Oral daily  pregabalin 100 milliGRAM(s) Oral three times a day  senna 2 Tablet(s) Oral at bedtime  sodium chloride 0.9%. 1000 milliLiter(s) (80 mL/Hr) IV Continuous <Continuous>  tamsulosin 0.4 milliGRAM(s) Oral at bedtime    MEDICATIONS  (PRN):  dextrose Oral Gel 15 Gram(s) Oral once PRN Blood Glucose LESS THAN 70 milliGRAM(s)/deciliter  ondansetron    Tablet 4 milliGRAM(s) Oral every 6 hours PRN Nausea

## 2022-12-14 NOTE — PROGRESS NOTE ADULT - ASSESSMENT
1. Abdominal pain and vomiting  2. Dilated CBD  3. R/o CBD stone  4. Dilated pancreatic duct    Suggestions:    1. Follow up LFT's  2. ERCP with Dr Jarrett  3. Protonix daily  4. Avoid NSAID  5. Zofran PRN  6. DVT prophylaxis

## 2022-12-14 NOTE — PROGRESS NOTE ADULT - SUBJECTIVE AND OBJECTIVE BOX
GI PROGRESS NOTE    Patient is a 67y old  Male who presents with a chief complaint of Vomiting (13 Dec 2022 10:45)      HPI:  67 year old male, vaccinated, from [facility] with PMHx of DM, HTN, HLD, AF on eliquis, PVD, presents with vomiting. Patient states that last night he ate a burger, and started to vomit shortly after. Patient states he is unable to eat since then due to feeling nauseous and continues to vomit each time he tries to eat. Patient denies any recent sick contact.  Patient also denies any associated abdominal pain, fever, chills, SOB, chest pain, diarrhea, hematemesis or melena.     In the ED:  Afebrile, hemodynamically stable  No leukocytosis  Bili WNL, , ,   CT A/P showed: Cholecystectomy +Dilated CBD up to 1.7 cm abrupt cut off of the distal CBD and  mild pancreatic duct dilatation.      (08 Dec 2022 19:05)      GI HPI:  Resting comfortably.  Tolerating CLD.    ______________________________________________________________________  PMH/PSH:  PAST MEDICAL & SURGICAL HISTORY:  COPD (chronic obstructive pulmonary disease)      Diabetes      PVD (peripheral vascular disease)      Chronic atrial fibrillation      GERD (gastroesophageal reflux disease)      MDD (major depressive disorder)      BPH (benign prostatic hyperplasia)        ______________________________________________________________________  MEDS:  MEDICATIONS  (STANDING):  atorvastatin 10 milliGRAM(s) Oral at bedtime  baclofen 5 milliGRAM(s) Oral every 12 hours  dextrose 5%. 1000 milliLiter(s) (100 mL/Hr) IV Continuous <Continuous>  dextrose 5%. 1000 milliLiter(s) (50 mL/Hr) IV Continuous <Continuous>  dextrose 50% Injectable 25 Gram(s) IV Push once  dextrose 50% Injectable 12.5 Gram(s) IV Push once  dextrose 50% Injectable 25 Gram(s) IV Push once  famotidine    Tablet 20 milliGRAM(s) Oral two times a day  glucagon  Injectable 1 milliGRAM(s) IntraMuscular once  insulin lispro (ADMELOG) corrective regimen sliding scale   SubCutaneous three times a day before meals  metoprolol tartrate 25 milliGRAM(s) Oral two times a day  NIFEdipine XL 60 milliGRAM(s) Oral daily  oxycodone    5 mG/acetaminophen 325 mG 1 Tablet(s) Oral every 4 hours  polyethylene glycol 3350 17 Gram(s) Oral daily  pregabalin 100 milliGRAM(s) Oral three times a day  senna 2 Tablet(s) Oral at bedtime  sodium chloride 0.9%. 1000 milliLiter(s) (80 mL/Hr) IV Continuous <Continuous>  tamsulosin 0.4 milliGRAM(s) Oral at bedtime    MEDICATIONS  (PRN):  dextrose Oral Gel 15 Gram(s) Oral once PRN Blood Glucose LESS THAN 70 milliGRAM(s)/deciliter  ondansetron    Tablet 4 milliGRAM(s) Oral every 6 hours PRN Nausea    ______________________________________________________________________  ALL:   Allergies    morphine (Unknown)  penicillin (Unknown)  shellfish (Unknown)    Intolerances      ______________________________________________________________________  SH: Social History:  denies any tobacco or alcohol use (08 Dec 2022 19:05)    ______________________________________________________________________  FH:  FAMILY HISTORY:    ______________________________________________________________________  ROS:    CONSTITUTIONAL:  No weight loss, fever, chills, weakness or fatigue.    HEENT:  Eyes:  No visual loss, blurred vision, double vision or yellow sclerae. Ears, Nose, Throat:  No hearing loss, sneezing, congestion, runny nose or sore throat.    SKIN:  No rash or itching.    CARDIOVASCULAR:  No chest pain, chest pressure or chest discomfort. No palpitations or edema.    RESPIRATORY:  No shortness of breath, cough or sputum.    GASTROINTESTINAL:  SEE HPI    GENITOURINARY:  No dysuria, hematuria, urinary frequency    NEUROLOGICAL:  No headache, dizziness, syncope, paralysis, ataxia, numbness or tingling in the extremities. No change in bowel or bladder control.    MUSCULOSKELETAL:  No muscle, back pain, joint pain or stiffness.    HEMATOLOGIC:  No anemia, bleeding or bruising.    LYMPHATICS:  No enlarged nodes. No history of splenectomy.    PSYCHIATRIC:  No history of depression or anxiety.    ENDOCRINOLOGIC:  No reports of sweating, cold or heat intolerance. No polyuria or polydipsia.    ALLERGIES:  No history of asthma, hives, eczema or rhinitis.  ______________________________________________________________________  PHYSICAL EXAM:  T(C): 36.5 (12-14-22 @ 05:16), Max: 37.1 (12-13-22 @ 20:45)  HR: 100 (12-14-22 @ 05:16)  BP: 124/75 (12-14-22 @ 05:16)  RR: 16 (12-14-22 @ 05:44)  SpO2: 94% (12-14-22 @ 05:44)  Wt(kg): --      GEN: NAD   CVS- S1 S2  ABD: soft nontender, non distended, bowel sounds+  LUNGS: clear to auscultation  NEURO:  AAO x 3  Extremities: no cyanosis, no calf tenderness, no edema, no clubbing      ______________________________________________________________________  LABS:                        14.0   6.94  )-----------( 150      ( 14 Dec 2022 06:10 )             41.8     12-14    134<L>  |  94<L>  |  11  ----------------------------<  120<H>  3.8   |  32<H>  |  0.52    Ca    8.8      14 Dec 2022 06:10    TPro  7.6  /  Alb  1.9<L>  /  TBili  0.7  /  DBili  x   /  AST  110<H>  /  ALT  71<H>  /  AlkPhos  255<H>  12-14    LIVER FUNCTIONS - ( 14 Dec 2022 06:10 )  Alb: 1.9 g/dL / Pro: 7.6 g/dL / ALK PHOS: 255 U/L / ALT: 71 U/L DA / AST: 110 U/L / GGT: x           ______________________________________________________________________  IMAGING:    < from: MR MRCP w/wo IV Cont (12.12.22 @ 16:03) >  IMPRESSION:  1.  Intrahepatic and extrahepatic biliary dilatation with tapering of the   common bile duct at the ampulla suggesting ampullary stenosis. Decreased   signal at the level of the ampulla may represent a small amount of   sludge/debris.  2.  Diffuse hepatic steatosis with focal hepatic steatosis within the   LEFT lobe.    < end of copied text >

## 2022-12-15 ENCOUNTER — TRANSCRIPTION ENCOUNTER (OUTPATIENT)
Age: 67
End: 2022-12-15

## 2022-12-15 LAB
GLUCOSE BLDC GLUCOMTR-MCNC: 111 MG/DL — HIGH (ref 70–99)
GLUCOSE BLDC GLUCOMTR-MCNC: 114 MG/DL — HIGH (ref 70–99)
GLUCOSE BLDC GLUCOMTR-MCNC: 115 MG/DL — HIGH (ref 70–99)
GLUCOSE BLDC GLUCOMTR-MCNC: 149 MG/DL — HIGH (ref 70–99)

## 2022-12-15 PROCEDURE — 99232 SBSQ HOSP IP/OBS MODERATE 35: CPT

## 2022-12-15 RX ADMIN — OXYCODONE AND ACETAMINOPHEN 1 TABLET(S): 5; 325 TABLET ORAL at 17:00

## 2022-12-15 RX ADMIN — Medication 100 MILLIGRAM(S): at 16:04

## 2022-12-15 RX ADMIN — Medication 100 MILLIGRAM(S): at 21:40

## 2022-12-15 RX ADMIN — OXYCODONE AND ACETAMINOPHEN 1 TABLET(S): 5; 325 TABLET ORAL at 16:04

## 2022-12-15 RX ADMIN — ONDANSETRON 4 MILLIGRAM(S): 8 TABLET, FILM COATED ORAL at 11:17

## 2022-12-15 RX ADMIN — OXYCODONE AND ACETAMINOPHEN 1 TABLET(S): 5; 325 TABLET ORAL at 21:40

## 2022-12-15 RX ADMIN — FAMOTIDINE 20 MILLIGRAM(S): 10 INJECTION INTRAVENOUS at 17:40

## 2022-12-15 RX ADMIN — ATORVASTATIN CALCIUM 10 MILLIGRAM(S): 80 TABLET, FILM COATED ORAL at 21:40

## 2022-12-15 RX ADMIN — OXYCODONE AND ACETAMINOPHEN 1 TABLET(S): 5; 325 TABLET ORAL at 12:20

## 2022-12-15 RX ADMIN — Medication 25 MILLIGRAM(S): at 05:05

## 2022-12-15 RX ADMIN — SENNA PLUS 2 TABLET(S): 8.6 TABLET ORAL at 21:40

## 2022-12-15 RX ADMIN — OXYCODONE AND ACETAMINOPHEN 1 TABLET(S): 5; 325 TABLET ORAL at 01:30

## 2022-12-15 RX ADMIN — OXYCODONE AND ACETAMINOPHEN 1 TABLET(S): 5; 325 TABLET ORAL at 05:05

## 2022-12-15 RX ADMIN — OXYCODONE AND ACETAMINOPHEN 1 TABLET(S): 5; 325 TABLET ORAL at 03:00

## 2022-12-15 RX ADMIN — OXYCODONE AND ACETAMINOPHEN 1 TABLET(S): 5; 325 TABLET ORAL at 06:56

## 2022-12-15 RX ADMIN — TAMSULOSIN HYDROCHLORIDE 0.4 MILLIGRAM(S): 0.4 CAPSULE ORAL at 21:44

## 2022-12-15 RX ADMIN — ONDANSETRON 4 MILLIGRAM(S): 8 TABLET, FILM COATED ORAL at 17:39

## 2022-12-15 RX ADMIN — Medication 5 MILLIGRAM(S): at 17:40

## 2022-12-15 RX ADMIN — FAMOTIDINE 20 MILLIGRAM(S): 10 INJECTION INTRAVENOUS at 05:05

## 2022-12-15 RX ADMIN — OXYCODONE AND ACETAMINOPHEN 1 TABLET(S): 5; 325 TABLET ORAL at 11:17

## 2022-12-15 RX ADMIN — Medication 100 MILLIGRAM(S): at 05:05

## 2022-12-15 RX ADMIN — Medication 25 MILLIGRAM(S): at 17:41

## 2022-12-15 RX ADMIN — OXYCODONE AND ACETAMINOPHEN 1 TABLET(S): 5; 325 TABLET ORAL at 22:51

## 2022-12-15 RX ADMIN — Medication 60 MILLIGRAM(S): at 05:05

## 2022-12-15 RX ADMIN — Medication 5 MILLIGRAM(S): at 05:05

## 2022-12-15 NOTE — PROGRESS NOTE ADULT - TIME BILLING
- Review of records, telemetry, vital signs and daily labs.   - General and cardiovascular physical examination.  - Generation of cardiovascular treatment plan.  - Coordination of care.      Patient was seen and examined by me on 12/15/22,interim events noted,labs and radiology studies reviewed.  Nasir Sharma MD,FACC.  [address] [place], [place] [id number].  [phone]

## 2022-12-15 NOTE — PROGRESS NOTE ADULT - PROBLEM SELECTOR PLAN 4
pt takes metoprolol 25mg BID and Eliquis  - c/w  Metoprolol - 25 mg PO  - resume eliquis post ercp- f/u with GI

## 2022-12-15 NOTE — PROGRESS NOTE ADULT - PROBLEM SELECTOR PLAN 1
p/w vomiting, improving  -CT A/P showed: Cholecystectomy. Dilated CBD up to 1.7 cm abrupt cut off of the distal CBD and  mild pancreatic duct dilatation.   - MRCP showed Common bile duct is markedly dilated measuring 1.6 cm with mild to moderate intrahepatic biliary dilatation as well as dilatation of the cystic duct stump  - Plan for ercp  Friday 12/16 - with Dr Antonio  -c/w [facility]  - Zofran PRN  -GI Dr. Villanueva

## 2022-12-15 NOTE — DISCHARGE NOTE PROVIDER - CARE PROVIDER_API CALL
Raphael Lassiter  INTERNAL MEDICINE  [address] [place] [address]  Phone: [phone]  Fax: [phone]  Follow Up Time: 1 week    Gee Villanueva)  [facility]  [address] [facility], [address] [place] [address]  Phone: [phone]  Fax: [phone]  Follow Up Time:

## 2022-12-15 NOTE — PROGRESS NOTE ADULT - ASSESSMENT
Patient is a 67 year old male, vaccinated, from [facility] with PMHx of DM, HTN, HLD, AF on Eliquis, PVD, presents with c/o vomiting, found to have elevated LFts. CT scan  showed dilated CBD up to 1.7 cm with abrupt cut off of the distal CBD and mild pancreatic duct dilatation, suggestive of possible Choledocholithiasis. Plan for MRCP/MRI showed Common bile duct is markedly dilated measuring 1.6 cm with mild to moderate intrahepatic biliary dilatation as well as dilatation of the cystic duct stump. Plan for ERCp 12/16. GI following

## 2022-12-15 NOTE — DISCHARGE NOTE PROVIDER - NSDCMRMEDTOKEN_GEN_ALL_CORE_FT
atorvastatin 10 mg oral tablet: 1 tab(s) orally once a day  baclofen 5 mg oral tablet: 1 tab(s) orally 2 times a day  Eliquis 5 mg oral tablet: 1 tab(s) orally 2 times a day  Flomax 0.4 mg oral capsule: 1 cap(s) orally once a day  Lyrica 100 mg oral capsule: 1 cap(s) orally 3 times a day  Metoprolol Tartrate 25 mg oral tablet: 1 tab(s) orally 2 times a day  MiraLax oral powder for reconstitution: 17 gram(s) orally once a day, As Needed  NIFEdipine 60 mg oral tablet, extended release: 1 tab(s) orally once a day  Pepcid 20 mg oral tablet: 1 tab(s) orally 2 times a day  Percocet 5/325 oral tablet: 1 tab(s) orally every 4 hours  senna 8.6 mg oral tablet: 1 tab(s) orally once a day (at bedtime)   atorvastatin 10 mg oral tablet: 1 tab(s) orally once a day  baclofen 5 mg oral tablet: 1 tab(s) orally 2 times a day  Eliquis 5 mg oral tablet: 1 tab(s) orally 2 times a day  Flomax 0.4 mg oral capsule: 1 cap(s) orally once a day  Lyrica 100 mg oral capsule: 1 cap(s) orally 3 times a day  Metoprolol Tartrate 25 mg oral tablet: 1 tab(s) orally 2 times a day  MiraLax oral powder for reconstitution: 17 gram(s) orally once a day, As Needed  NIFEdipine 60 mg oral tablet, extended release: 1 tab(s) orally once a day  ondansetron 4 mg oral tablet: 1 tab(s) orally every 6 hours, As needed, Nausea  pantoprazole 40 mg oral delayed release tablet: 1 tab(s) orally once a day (before a meal)  Percocet 5/325 oral tablet: 1 tab(s) orally every 4 hours  senna 8.6 mg oral tablet: 1 tab(s) orally once a day (at bedtime)

## 2022-12-15 NOTE — PROGRESS NOTE ADULT - SUBJECTIVE AND OBJECTIVE BOX
[   ] ICU                                          [   ] CCU                                      [ X  ] Medical Floor      Patient is a 67 year old male with vomiting. GI consulted to evaluate.         67 year old male, vaccinated, from [facility] with past medical history significant for DM, HTN, HLD, AFib on eliquis, PVD, presents with vomiting. Patient states that last night he ate a burger, and started to vomit shortly after. Patient states he is unable to eat since then due to feeling nauseous and continues to vomit each time he tries to eat. Patient denies any recent sick contact.  Patient also denies any associated abdominal pain, fever, chills, SOB, chest pain, diarrhea, hematemesis or melena.     Patient is comfortable. No new complaints reported, No abdominal pain, N/V, hematemesis, hematochezia, melena, fever, chills, chest pain, SOB, cough or diarrhea reported.       PAIN MANAGEMENT:  Pain Scale:                0 /10  Pain Location:      Prior Colonoscopy:  Unknown    PAST MEDICAL HISTORY  COPD (chronic obstructive pulmonary disease)    Diabetes    PVD (peripheral vascular disease)    Chronic atrial fibrillation    GERD (gastroesophageal reflux disease)    MDD (major depressive disorder)    BPH (benign prostatic hyperplasia)        PAST SURGICAL HISTORY  Cholecystectomy      Allergies    morphine (Unknown)  penicillin (Unknown)  shellfish (Unknown)    Intolerances  None       SOCIAL HISTORY  Advanced Directives:       [ X ] Full Code       [  ] DNR  Marital Status:         [  ] M      [ X ] S      [  ] D       [  ] W  Children:       [ X ] Yes      [  ] No  Occupation:        [  ] Employed       [ X ] Unemployed       [  ] Retired  Diet:       [X  ] Regular       [  ] PEG feeding          [  ] NG tube feeding  Drug Use:           [ X ] Patient denied          [  ] Yes  Alcohol:           [ X ] No             [  ] Yes (socially)         [  ] Yes (chronic)  Tobacco:           [  ] Yes           [ X ] No      FAMILY HISTORY  [ X ] Heart Disease            [X  ] Diabetes             [ X ] HTN             [  ] Colon Cancer             [  ] Stomach Cancer              [  ] Pancreatic Cancer        VITALS  Vital Signs Last 24 Hrs  T(C): 36.9 (15 Dec 2022 05:12), Max: 36.9 (15 Dec 2022 05:12)  T(F): 98.4 (15 Dec 2022 05:12), Max: 98.4 (15 Dec 2022 05:12)  HR: 87 (15 Dec 2022 05:12) (74 - 87)  BP: 124/74 (15 Dec 2022 05:12) (104/67 - 124/74)  BP(mean): 76 (14 Dec 2022 13:05) (76 - 76)  RR: 16 (15 Dec 2022 05:12) (16 - 17)  SpO2: 91% (15 Dec 2022 05:12) (91% - 97%)  Parameters below as of 15 Dec 2022 05:12  Patient On (Oxygen Delivery Method): nasal cannula  O2 Flow (L/min): 2       MEDICATIONS  (STANDING):  atorvastatin 10 milliGRAM(s) Oral at bedtime  baclofen 5 milliGRAM(s) Oral every 12 hours  dextrose 5%. 1000 milliLiter(s) (50 mL/Hr) IV Continuous <Continuous>  dextrose 5%. 1000 milliLiter(s) (100 mL/Hr) IV Continuous <Continuous>  dextrose 50% Injectable 25 Gram(s) IV Push once  dextrose 50% Injectable 12.5 Gram(s) IV Push once  dextrose 50% Injectable 25 Gram(s) IV Push once  famotidine    Tablet 20 milliGRAM(s) Oral two times a day  glucagon  Injectable 1 milliGRAM(s) IntraMuscular once  indomethacin Suppository 100 milliGRAM(s) Rectal once  insulin lispro (ADMELOG) corrective regimen sliding scale   SubCutaneous three times a day before meals  metoprolol tartrate 25 milliGRAM(s) Oral two times a day  NIFEdipine XL 60 milliGRAM(s) Oral daily  oxycodone    5 mG/acetaminophen 325 mG 1 Tablet(s) Oral every 4 hours  polyethylene glycol 3350 17 Gram(s) Oral daily  pregabalin 100 milliGRAM(s) Oral three times a day  senna 2 Tablet(s) Oral at bedtime  sodium chloride 0.9%. 1000 milliLiter(s) (80 mL/Hr) IV Continuous <Continuous>  tamsulosin 0.4 milliGRAM(s) Oral at bedtime    MEDICATIONS  (PRN):  dextrose Oral Gel 15 Gram(s) Oral once PRN Blood Glucose LESS THAN 70 milliGRAM(s)/deciliter  ondansetron    Tablet 4 milliGRAM(s) Oral every 6 hours PRN Nausea                            14.0   6.94  )-----------( 150      ( 14 Dec 2022 06:10 )             41.8       12-14    134<L>  |  94<L>  |  11  ----------------------------<  120<H>  3.8   |  32<H>  |  0.52    Ca    8.8      14 Dec 2022 06:10    TPro  7.6  /  Alb  1.9<L>  /  TBili  0.7  /  DBili  x   /  AST  110<H>  /  ALT  71<H>  /  AlkPhos  255<H>  12-14      PT/INR - ( 14 Dec 2022 06:10 )   PT: 19.5 sec;   INR: 1.63 ratio         PTT - ( 14 Dec 2022 06:10 )  PTT:35.2 sec

## 2022-12-15 NOTE — DISCHARGE NOTE PROVIDER - NSDCCPCAREPLAN_GEN_ALL_CORE_FT
PRINCIPAL DISCHARGE DIAGNOSIS  Diagnosis: Choledocholithiasis  Assessment and Plan of Treatment:        PRINCIPAL DISCHARGE DIAGNOSIS  Diagnosis: Choledocholithiasis  Assessment and Plan of Treatment: You were sent to hospital from NH for nausea and vomiting.  You underwent CT scan of your abdomen which revealed presence of sludge and debris in your gall bladder.  You were followed by gastroenterologist and underwent MRI followed by MRCP with removal of sludge from your gall bladder.  You are now feeling better with occasional persisting nausea which is releaved with Zofran.  -Please follow up with gastroenterology for Gastric emptying study  -Take Protonix as prescribed  -NO NSAIDs      SECONDARY DISCHARGE DIAGNOSES  Diagnosis: Elevated LFTs  Assessment and Plan of Treatment: Your liver enzymes were elevated on admission, now improved    Diagnosis: Urinary retention  Assessment and Plan of Treatment: You were noted with urinary retention on admission likely due to enlarged prostate, now resolved and you are voiding freely.  -Take Flomax as prescribed     PRINCIPAL DISCHARGE DIAGNOSIS  Diagnosis: Choledocholithiasis  Assessment and Plan of Treatment: You were sent to hospital from NH for nausea and vomiting.  You underwent CT scan of your abdomen which revealed presence of sludge and debris in your gall bladder.  You were followed by gastroenterologist and underwent MRI followed by MRCP with removal of sludge from your gall bladder.  You are now feeling better with occasional persisting nausea which is releaved with Zofran.  -Please follow up with gastroenterology for Gastric emptying study  -Take Protonix as prescribed  -NO NSAIDs      SECONDARY DISCHARGE DIAGNOSES  Diagnosis: Chronic atrial fibrillation  Assessment and Plan of Treatment: Your heart rate is well controlled with current regimen.  -Continue cardiac meds and blood thinner  -Follow up with your primary doctor    Diagnosis: Elevated LFTs  Assessment and Plan of Treatment: Your liver enzymes were elevated on admission, now improved    Diagnosis: Urinary retention  Assessment and Plan of Treatment: You were noted with urinary retention on admission likely due to enlarged prostate, now resolved and you are voiding freely.  -Take Flomax as prescribed    Diagnosis: Oxygen desaturation  Assessment and Plan of Treatment: You are now requiring Oxygen as your oxygen saturation noted to be 88-90% off Oxygen.  This is likely due to shallow breathing pattern and inactivity.  -Please take deeper breaths and increase activity level as tolerated.

## 2022-12-15 NOTE — PROGRESS NOTE ADULT - SUBJECTIVE AND OBJECTIVE BOX
Patient is a 67y old  Male who presents with a chief complaint of Vomiting     (14 Dec 2022 14:38)      INTERVAL HPI/OVERNIGHT EVENTS: no acute events overnight    REVIEW OF SYSTEMS:  CONSTITUTIONAL: No fever, chills  ENMT:  No difficulty hearing, no change in vision  NECK: No pain or stiffness  RESPIRATORY: No cough, SOB  CARDIOVASCULAR: No chest pain, palpitations  GASTROINTESTINAL: c/o abdominal pain and nausea  GENITOURINARY: No dysuria  NEUROLOGICAL: No HA  SKIN: No itching, burning, rashes, or lesions   LYMPH NODES: No enlarged glands  ENDOCRINE: No heat or cold intolerance; No hair loss  MUSCULOSKELETAL: No joint pain or swelling; No muscle, back, or extremity pain  PSYCHIATRIC: No depression, anxiety  HEME/LYMPH: No easy bruising, or bleeding gums    T(C): 37.2 (12-15-22 @ 13:00), Max: 37.2 (12-15-22 @ 13:00)  HR: 82 (12-15-22 @ 13:00) (74 - 87)  BP: 119/62 (12-15-22 @ 13:00) (107/65 - 124/74)  RR: 18 (12-15-22 @ 13:00) (16 - 18)  SpO2: 93% (12-15-22 @ 13:00) (91% - 97%)  Wt(kg): --Vital Signs Last 24 Hrs  T(C): 37.2 (15 Dec 2022 13:00), Max: 37.2 (15 Dec 2022 13:00)  T(F): 98.9 (15 Dec 2022 13:00), Max: 98.9 (15 Dec 2022 13:00)  HR: 82 (15 Dec 2022 13:00) (74 - 87)  BP: 119/62 (15 Dec 2022 13:00) (107/65 - 124/74)  BP(mean): 76 (15 Dec 2022 13:00) (76 - 76)  RR: 18 (15 Dec 2022 13:00) (16 - 18)  SpO2: 93% (15 Dec 2022 13:00) (91% - 97%)    Parameters below as of 15 Dec 2022 13:00  Patient On (Oxygen Delivery Method): nasal cannula  O2 Flow (L/min): 2    MEDICATIONS  (STANDING):  atorvastatin 10 milliGRAM(s) Oral at bedtime  baclofen 5 milliGRAM(s) Oral every 12 hours  dextrose 5%. 1000 milliLiter(s) (50 mL/Hr) IV Continuous <Continuous>  dextrose 5%. 1000 milliLiter(s) (100 mL/Hr) IV Continuous <Continuous>  dextrose 50% Injectable 25 Gram(s) IV Push once  dextrose 50% Injectable 12.5 Gram(s) IV Push once  dextrose 50% Injectable 25 Gram(s) IV Push once  famotidine    Tablet 20 milliGRAM(s) Oral two times a day  glucagon  Injectable 1 milliGRAM(s) IntraMuscular once  indomethacin Suppository 100 milliGRAM(s) Rectal once  insulin lispro (ADMELOG) corrective regimen sliding scale   SubCutaneous three times a day before meals  metoprolol tartrate 25 milliGRAM(s) Oral two times a day  NIFEdipine XL 60 milliGRAM(s) Oral daily  oxycodone    5 mG/acetaminophen 325 mG 1 Tablet(s) Oral every 4 hours  polyethylene glycol 3350 17 Gram(s) Oral daily  pregabalin 100 milliGRAM(s) Oral three times a day  senna 2 Tablet(s) Oral at bedtime  sodium chloride 0.9%. 1000 milliLiter(s) (80 mL/Hr) IV Continuous <Continuous>  tamsulosin 0.4 milliGRAM(s) Oral at bedtime    MEDICATIONS  (PRN):  dextrose Oral Gel 15 Gram(s) Oral once PRN Blood Glucose LESS THAN 70 milliGRAM(s)/deciliter  ondansetron    Tablet 4 milliGRAM(s) Oral every 6 hours PRN Nausea    PHYSICAL EXAM:  GENERAL: NAD  EYES: clear conjunctiva; EOMI  ENMT: Moist mucous membranes  NECK: Supple, No JVD, Normal thyroid  CHEST/LUNG: Clear to auscultation bilaterally; No rales, rhonchi, wheezing, or rubs  HEART: S1, S2, Regular rate and rhythm  ABDOMEN: tender to palp, Nondistended; Bowel sounds present  NEURO: Alert & Oriented X3  EXTREMITIES: No LE edema, no calf tenderness  LYMPH: No lymphadenopathy noted  SKIN: No rashes or lesions    Consultant(s) Notes Reviewed:  [x ] YES  [ ] NO  Care Discussed with Consultants/Other Providers [ x] YES  [ ] NO    LABS:                        14.0   6.94  )-----------( 150      ( 14 Dec 2022 06:10 )             41.8     12-14    134<L>  |  94<L>  |  11  ----------------------------<  120<H>  3.8   |  32<H>  |  0.52    Ca    8.8      14 Dec 2022 06:10    TPro  7.6  /  Alb  1.9<L>  /  TBili  0.7  /  DBili  x   /  AST  110<H>  /  ALT  71<H>  /  AlkPhos  255<H>  12-14    PT/INR - ( 14 Dec 2022 06:10 )   PT: 19.5 sec;   INR: 1.63 ratio         PTT - ( 14 Dec 2022 06:10 )  PTT:35.2 sec  CAPILLARY BLOOD GLUCOSE      POCT Blood Glucose.: 111 mg/dL (15 Dec 2022 11:34)  POCT Blood Glucose.: 115 mg/dL (15 Dec 2022 07:49)  POCT Blood Glucose.: 196 mg/dL (14 Dec 2022 21:16)  POCT Blood Glucose.: 109 mg/dL (14 Dec 2022 16:25)      RADIOLOGY & ADDITIONAL TESTS:    Imaging Personally Reviewed:  [ x] YES  [ ] NO  < from: MR MRCP w/wo IV Cont (12.12.22 @ 16:03) >    ACC: [id number] EXAM:  MR MRCP WAW IC                          PROCEDURE DATE:  12/12/2022          INTERPRETATION:  CLINICAL INFORMATION: 67-year-old with dilated common   bile duct and concern for choledocholithiasis. Abnormal CT.    COMPARISON: CTdated December 08, 2022    CONTRAST/COMPLICATIONS:  IV Contrast: Gadavist  6 cc administered   1.5 cc discarded  Oral Contrast: NONE  Complications: None reported at time of study completion    PROCEDURE:  MRI of the abdomen was performed.  MRCP was performed.    FINDINGS:  LOWER CHEST: Atelectasis and/or consolidation is seen in the RIGHT base.   This appears more prominent than was seen on the prior CT.    LIVER: Diffuse hepatic steatosis. Lesion described on CT within the LEFT   lobe of the liver corresponds to focal hepatic steatosis.  BILE DUCTS: Common bile duct is markedly dilated measuring 1.6 cm in   diameter. There is mild to moderate intrahepatic biliary dilatation as   well as dilatation of the cystic duct stump. Common bile duct is dilated   down to the level of the ampulla. There is a small amount of sludge   versus debris within the distal common bile duct at the level of the   ampulla. 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  GALLBLADDER: Cholecystectomy.  SPLEEN: Within normal limits.  PANCREAS: Within normal limits.  ADRENALS: Within normal limits.  KIDNEYS/URETERS: Within normal limits.    VISUALIZED PORTIONS:  BOWEL: Within normal limits.  PERITONEUM: No ascites.  VESSELS: Within normal limits.  RETROPERITONEUM/LYMPH NODES: No lymphadenopathy.  ABDOMINAL WALL: Within normal limits.  BONES: Within normal limits.    IMPRESSION:  1.  Intrahepatic and extrahepatic biliary dilatation with tapering of the   common bile duct at the ampulla suggesting ampullary stenosis. Decreased   signal at the level of the ampulla may represent a small amount of   sludge/debris.  2.  Diffuse hepatic steatosis with focal hepatic steatosis within the   LEFT lobe.      --- End of Report ---            KELLY WEAVER MD; Attending Radiologist  This document has been electronically signed. Dec 12 2022  4:53PM    < end of copied text >  < from: CT Abdomen and Pelvis w/ IV Cont (12.08.22 @ 17:20) >    ACC: [id number] EXAM:  CT ABDOMEN AND PELVIS IC                          PROCEDURE DATE:  12/08/2022          INTERPRETATION:  CLINICAL INFORMATION: 67-year-old male patient with   vomiting    COMPARISON: None.    CONTRAST/COMPLICATIONS:  IV Contrast: Omnipaque 350  90 cc administered   10 cc discarded  Oral Contrast: NONE  Complications: None reported at time of study completion    PROCEDURE:  CT of the Abdomen and Pelvis was performed.  Sagittal and coronal reformats were performed.    FINDINGS:  LOWER CHEST: Bibasilar atelectatic changes. Coronary artery   calcifications. Aortic root calcifications. Normal-sized heart.    LIVER: Severe hepatic steatosis. Ill-defined focal hypodensity in   subcapsular location of segment 5 anteriorly measuring approximately 1.8   x 2.5 x 1.7 cm likely represents prominent focal fatty changes.  BILE DUCTS: No intrahepatic biliary duct dilatation. The CBD is dilated   to 1.8 cm with abrupt cut off of the distal CBD proximal to the papilla.  GALLBLADDER: Cholecystectomy.  SPLEEN: Within normal limits.  PANCREAS: Mild diffuse pancreatic parenchymal atrophy. Dilated proximal   pancreatic duct measuring 6 mm.  ADRENALS: Within normal limits.  KIDNEYS/URETERS: No hydronephrosis or nephroureterolithiasis. Symmetric   enhancement. No enhancing renal masses. Subcentimeter left midpole   cortical cyst.    BLADDER: Within normal limits.  REPRODUCTIVE ORGANS: Prostate within normal limits.    BOWEL: No acute appendicitis. Pancolonic diverticulosis, most prominent   in the sigmoid and descending colon without acute inflammation. No   evidence of a normal bowel wall thickening. The remainder of the colon is   collapsed. Small bowel loops of normal caliber.No bowel obstruction.  PERITONEUM: No ascites.  VESSELS: Atherosclerotic changes. Fusiform dilatation of the celiac axis.   Patent celiac axis and SMA with mild atherosclerotic changes. Patent   renal arteries. Patent hepatic veins, portal vein, SMV and splenic vein.  RETROPERITONEUM/LYMPH NODES: No lymphadenopathy.  ABDOMINAL WALL: Within normal limits.  BONES: Degenerative changes. Osteopenia.    IMPRESSION:  No evidence of bowel obstruction.    Dilated CBD up to 1.7 cm which can be seen with cholecystectomy, however   there is abrupt cut off ofthe distal CBD and  mild pancreatic duct   dilatation. Choledocholithiasis or a papillary process cannot be entirely   excluded. Correlate with liver function tests. If there is clinical   concern, consider MRI MRCP with contrast for further evaluation.    Hepatic steatosis with a focal area of increased hypoattenuation which   likely represents pronounced focal fatty changes, less likely underlying   mass. This finding could also be further evaluated on MRI/MRCP as   recommended above.    Non complicated colonic diverticulosis, no diverticulitis.        --- End of Report ---            YON HYATT MD; Attending Radiologist  This document has been electronically signed. Dec  8 2022  5:46PM    < end of copied text >

## 2022-12-15 NOTE — DISCHARGE NOTE PROVIDER - HOSPITAL COURSE
Patient is a 67 year old male, vaccinated, from [facility] with PMHx of DM, HTN, HLD, AF on Eliquis, PVD, presents with c/o vomiting, found to have elevated LFts. CT scan  showed dilated CBD up to 1.7 cm with abrupt cut off of the distal CBD and mild pancreatic duct dilatation, suggestive of possible Choledocholithiasis. Plan for MRCP/MRI.  GI Dr. Villanueva consulted   Patient is a 67 year old male, vaccinated, from [facility] with PMHx of DM, HTN, HLD, AF on Eliquis, PVD, presents with c/o vomiting, found to have elevated LFts. CT scan  showed dilated CBD up to 1.7 cm with abrupt cut off of the distal CBD and mild pancreatic duct dilatation, suggestive of possible Choledocholithiasis.  Pt. folowed by GI Dr. Villanueva and Dr. Kim, underwent MRCP 12/12 which revealed :  1.  Intrahepatic and extrahepatic biliary dilatation with tapering of the   common bile duct at the ampulla suggesting ampullary stenosis. Decreased signal at the level of the ampulla may represent a small amount of sludge/debris.  2.  Diffuse hepatic steatosis with focal hepatic steatosis within the LEFT lobe.  Pt. underwent ERCP 12/16 with removal of biliary sludge with biliary spincterotomy and balloon extraction.  Also noted with moderate non-erosive gastritis and duodenitis on EGD exam.  Pt.'s diet advanced, with persisting occasional nausea releaved with Zofran  Follow up with GI recc  to check gastric emptying study which can likely be performed as OP.  Pt. seen by PT, recc RYAN .     Patient is a 67 year old male, vaccinated, from [facility] with PMHx of DM, HTN, HLD, AF on Eliquis, PVD, presents with c/o vomiting, found to have elevated LFts. CT scan  showed dilated CBD up to 1.7 cm with abrupt cut off of the distal CBD and mild pancreatic duct dilatation, suggestive of possible Choledocholithiasis.  Pt. folowed by GI Dr. Villanueva and Dr. Kim, underwent MRCP 12/12 which revealed :  1.  Intrahepatic and extrahepatic biliary dilatation with tapering of the   common bile duct at the ampulla suggesting ampullary stenosis. Decreased signal at the level of the ampulla may represent a small amount of sludge/debris.  2.  Diffuse hepatic steatosis with focal hepatic steatosis within the LEFT lobe.  Pt. underwent ERCP 12/16 with removal of biliary sludge with biliary spincterotomy and balloon extraction.  Also noted with moderate non-erosive gastritis and duodenitis on EGD exam.  Pt.'s diet advanced, with persisting occasional nausea releaved with Zofran  Follow up with GI recc  to check gastric emptying study which can likely be performed as OP.  Pt. seen by PT, recc RYAN .  Pt. is medically stable for discharge to [facility], agrees with discharge.

## 2022-12-15 NOTE — PROGRESS NOTE ADULT - ASSESSMENT
Patient is a 67M with a PMHx of DM, HTN, HLD, AFib (on Eliquis), PVD, who was admitted 12/8 for vomiting. He is followed by Dr. Villanueva this admission for abdominal pain and vomiting. Advanced GI consult is requested for ERCP.     Patient states 12/7 PM, he ate a burger and vomited shortly after, and since then he has had decreased PO intake due to n/v, associated with RUQ abdominal pain. He denies hematemesis, hematochezia, melena, cp, sob.   Patient is s/p ccy (unable to recall when), no prior EGDs, last colonoscopy "years ago" (unable to recall when or with who).   Admission labs notable for , , , no leukocytosis, afebrile. TBili wnl.   CTAP 12/8: CBD dilated 1.8cm with abrupt cut off of the distal CBD proximal to the papilla.   MRCP 12/12: CBD dilated 1.6cm, mild to mod intrahepatic biliary dilatation as well as dilatation of cystic duct stump. CBD dilated down to level of ampulla. There is small amount of sludge vs debris within the distal CBD at the level of the ampulla. 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     12/13: improved labs, TBili 0.8, , , ALT 66.    #Elevated LFTs  #CBD Dilation  #R/o Choledocho  #Abdominal pain, n/v    	- EUS+/- ERCP tentatively scheduled for 12/16 with Dr. Kim  	- CLD   	- COVID within 72H, hold AC/VTE 24H prior to procedure, NPO pMN   	- IVF per primary team  	- PRN Zofran  	- Trend LFTs, daily PT/INR Patient is a 67M with a PMHx of DM, HTN, HLD, AFib (on Eliquis), PVD, who was admitted 12/8 for vomiting. He is followed by Dr. Villanueva this admission for abdominal pain and vomiting. Advanced GI consult is requested for ERCP.     Patient states 12/7 PM, he ate a burger and vomited shortly after, and since then he has had decreased PO intake due to n/v, associated with RUQ abdominal pain. He denies hematemesis, hematochezia, melena, cp, sob.   Patient is s/p ccy (unable to recall when), no prior EGDs, last colonoscopy "years ago" (unable to recall when or with who).   Admission labs notable for , , , no leukocytosis, afebrile. TBili wnl.   CTAP 12/8: CBD dilated 1.8cm with abrupt cut off of the distal CBD proximal to the papilla.   MRCP 12/12: CBD dilated 1.6cm, mild to mod intrahepatic biliary dilatation as well as dilatation of cystic duct stump. CBD dilated down to level of ampulla. There is small amount of sludge vs debris within the distal CBD at the level of the ampulla. 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     12/13: improved labs, TBili 0.8, , , ALT 66.    #Elevated LFTs  #CBD Dilation  #R/o Choledocho  #Abdominal pain, n/v    	- ERCP tentatively scheduled for 12/16 with Dr. Kim  	- CLD   	- COVID within 72H, hold AC/VTE 24H prior to procedure, NPO pMN   	- IVF per primary team  	- PRN Zofran  	- Trend LFTs, daily PT/INR

## 2022-12-16 ENCOUNTER — TRANSCRIPTION ENCOUNTER (OUTPATIENT)
Age: 67
End: 2022-12-16

## 2022-12-16 LAB
ALBUMIN SERPL ELPH-MCNC: 1.7 G/DL — LOW (ref 3.5–5)
ALP SERPL-CCNC: 258 U/L — HIGH (ref 40–120)
ALT FLD-CCNC: 83 U/L DA — HIGH (ref 10–60)
ANION GAP SERPL CALC-SCNC: 8 MMOL/L — SIGNIFICANT CHANGE UP (ref 5–17)
AST SERPL-CCNC: 106 U/L — HIGH (ref 10–40)
BILIRUB DIRECT SERPL-MCNC: 0.3 MG/DL — SIGNIFICANT CHANGE UP (ref 0–0.3)
BILIRUB INDIRECT FLD-MCNC: 0.3 MG/DL — SIGNIFICANT CHANGE UP (ref 0.2–1)
BILIRUB SERPL-MCNC: 0.6 MG/DL — SIGNIFICANT CHANGE UP (ref 0.2–1.2)
BUN SERPL-MCNC: 13 MG/DL — SIGNIFICANT CHANGE UP (ref 7–18)
CALCIUM SERPL-MCNC: 8.6 MG/DL — SIGNIFICANT CHANGE UP (ref 8.4–10.5)
CHLORIDE SERPL-SCNC: 93 MMOL/L — LOW (ref 96–108)
CO2 SERPL-SCNC: 32 MMOL/L — HIGH (ref 22–31)
CREAT SERPL-MCNC: 0.63 MG/DL — SIGNIFICANT CHANGE UP (ref 0.5–1.3)
EGFR: 104 ML/MIN/1.73M2 — SIGNIFICANT CHANGE UP
GLUCOSE BLDC GLUCOMTR-MCNC: 102 MG/DL — HIGH (ref 70–99)
GLUCOSE BLDC GLUCOMTR-MCNC: 109 MG/DL — HIGH (ref 70–99)
GLUCOSE BLDC GLUCOMTR-MCNC: 117 MG/DL — HIGH (ref 70–99)
GLUCOSE BLDC GLUCOMTR-MCNC: 141 MG/DL — HIGH (ref 70–99)
GLUCOSE SERPL-MCNC: 123 MG/DL — HIGH (ref 70–99)
HCT VFR BLD CALC: 39.2 % — SIGNIFICANT CHANGE UP (ref 39–50)
HGB BLD-MCNC: 13.2 G/DL — SIGNIFICANT CHANGE UP (ref 13–17)
MCHC RBC-ENTMCNC: 31.7 PG — SIGNIFICANT CHANGE UP (ref 27–34)
MCHC RBC-ENTMCNC: 33.7 GM/DL — SIGNIFICANT CHANGE UP (ref 32–36)
MCV RBC AUTO: 94 FL — SIGNIFICANT CHANGE UP (ref 80–100)
NRBC # BLD: 0 /100 WBCS — SIGNIFICANT CHANGE UP (ref 0–0)
PLATELET # BLD AUTO: 200 K/UL — SIGNIFICANT CHANGE UP (ref 150–400)
POTASSIUM SERPL-MCNC: 4.1 MMOL/L — SIGNIFICANT CHANGE UP (ref 3.5–5.3)
POTASSIUM SERPL-SCNC: 4.1 MMOL/L — SIGNIFICANT CHANGE UP (ref 3.5–5.3)
PROT SERPL-MCNC: 7.4 G/DL — SIGNIFICANT CHANGE UP (ref 6–8.3)
RBC # BLD: 4.17 M/UL — LOW (ref 4.2–5.8)
RBC # FLD: 16.4 % — HIGH (ref 10.3–14.5)
SODIUM SERPL-SCNC: 133 MMOL/L — LOW (ref 135–145)
WBC # BLD: 6.76 K/UL — SIGNIFICANT CHANGE UP (ref 3.8–10.5)
WBC # FLD AUTO: 6.76 K/UL — SIGNIFICANT CHANGE UP (ref 3.8–10.5)

## 2022-12-16 PROCEDURE — 43239 EGD BIOPSY SINGLE/MULTIPLE: CPT

## 2022-12-16 PROCEDURE — 43262 ENDO CHOLANGIOPANCREATOGRAPH: CPT

## 2022-12-16 PROCEDURE — 43264 ERCP REMOVE DUCT CALCULI: CPT

## 2022-12-16 PROCEDURE — 88312 SPECIAL STAINS GROUP 1: CPT | Mod: 26

## 2022-12-16 PROCEDURE — 88305 TISSUE EXAM BY PATHOLOGIST: CPT | Mod: 26

## 2022-12-16 DEVICE — BLLN EXTRCTR PRO RX-S 9-12MM: Type: IMPLANTABLE DEVICE | Status: FUNCTIONAL

## 2022-12-16 DEVICE — HYDRATOME 44: Type: IMPLANTABLE DEVICE | Status: FUNCTIONAL

## 2022-12-16 RX ORDER — OXYCODONE AND ACETAMINOPHEN 5; 325 MG/1; MG/1
1 TABLET ORAL EVERY 4 HOURS
Refills: 0 | Status: DISCONTINUED | OUTPATIENT
Start: 2022-12-16 | End: 2022-12-21

## 2022-12-16 RX ORDER — OXYCODONE AND ACETAMINOPHEN 5; 325 MG/1; MG/1
1 TABLET ORAL EVERY 4 HOURS
Refills: 0 | Status: DISCONTINUED | OUTPATIENT
Start: 2022-12-16 | End: 2022-12-16

## 2022-12-16 RX ORDER — INDOMETHACIN 50 MG
100 CAPSULE ORAL ONCE
Refills: 0 | Status: COMPLETED | OUTPATIENT
Start: 2022-12-16 | End: 2022-12-16

## 2022-12-16 RX ORDER — HYDROMORPHONE HYDROCHLORIDE 2 MG/ML
0.5 INJECTION INTRAMUSCULAR; INTRAVENOUS; SUBCUTANEOUS ONCE
Refills: 0 | Status: DISCONTINUED | OUTPATIENT
Start: 2022-12-16 | End: 2022-12-16

## 2022-12-16 RX ORDER — ONDANSETRON 8 MG/1
4 TABLET, FILM COATED ORAL ONCE
Refills: 0 | Status: COMPLETED | OUTPATIENT
Start: 2022-12-16 | End: 2022-12-16

## 2022-12-16 RX ORDER — ACETAMINOPHEN 500 MG
1000 TABLET ORAL ONCE
Refills: 0 | Status: COMPLETED | OUTPATIENT
Start: 2022-12-16 | End: 2022-12-16

## 2022-12-16 RX ADMIN — Medication 60 MILLIGRAM(S): at 06:02

## 2022-12-16 RX ADMIN — Medication 5 MILLIGRAM(S): at 06:01

## 2022-12-16 RX ADMIN — SENNA PLUS 2 TABLET(S): 8.6 TABLET ORAL at 21:43

## 2022-12-16 RX ADMIN — OXYCODONE AND ACETAMINOPHEN 1 TABLET(S): 5; 325 TABLET ORAL at 17:04

## 2022-12-16 RX ADMIN — OXYCODONE AND ACETAMINOPHEN 1 TABLET(S): 5; 325 TABLET ORAL at 06:01

## 2022-12-16 RX ADMIN — Medication 0: at 12:23

## 2022-12-16 RX ADMIN — TAMSULOSIN HYDROCHLORIDE 0.4 MILLIGRAM(S): 0.4 CAPSULE ORAL at 21:42

## 2022-12-16 RX ADMIN — Medication 100 MILLIGRAM(S): at 13:17

## 2022-12-16 RX ADMIN — FAMOTIDINE 20 MILLIGRAM(S): 10 INJECTION INTRAVENOUS at 06:02

## 2022-12-16 RX ADMIN — HYDROMORPHONE HYDROCHLORIDE 0.5 MILLIGRAM(S): 2 INJECTION INTRAMUSCULAR; INTRAVENOUS; SUBCUTANEOUS at 12:18

## 2022-12-16 RX ADMIN — Medication 25 MILLIGRAM(S): at 06:02

## 2022-12-16 RX ADMIN — Medication 1000 MILLIGRAM(S): at 12:04

## 2022-12-16 RX ADMIN — ATORVASTATIN CALCIUM 10 MILLIGRAM(S): 80 TABLET, FILM COATED ORAL at 21:43

## 2022-12-16 RX ADMIN — Medication 100 MILLIGRAM(S): at 10:50

## 2022-12-16 RX ADMIN — Medication 400 MILLIGRAM(S): at 11:50

## 2022-12-16 RX ADMIN — Medication 100 MILLIGRAM(S): at 06:01

## 2022-12-16 RX ADMIN — OXYCODONE AND ACETAMINOPHEN 1 TABLET(S): 5; 325 TABLET ORAL at 16:04

## 2022-12-16 RX ADMIN — ONDANSETRON 4 MILLIGRAM(S): 8 TABLET, FILM COATED ORAL at 18:03

## 2022-12-16 RX ADMIN — Medication 100 MILLIGRAM(S): at 21:42

## 2022-12-16 RX ADMIN — FAMOTIDINE 20 MILLIGRAM(S): 10 INJECTION INTRAVENOUS at 18:08

## 2022-12-16 NOTE — PROGRESS NOTE ADULT - SUBJECTIVE AND OBJECTIVE BOX
PATIENT SEEN AND EXAMINED ON :- 12/16/2022  DATE OF SERVICE:     12/16/2022        Interim events noted,Labs ,Radiological studies and Cardiology tests reviewed .       HOSPITAL COURSE: HPI:  67 year old male, vaccinated, from [facility] [place] with PMHx of DM, HTN, HLD, AF on eliquis, PVD, presents with vomiting. Patient states that last night he ate a burger, and started to vomit shortly after. Patient states he is unable to eat since then due to feeling nauseous and continues to vomit each time he tries to eat. Patient denies any recent sick contact.  Patient also denies any associated abdominal pain, fever, chills, SOB, chest pain, diarrhea, hematemesis or melena.     In the ED:  Afebrile, hemodynamically stable  No leukocytosis  Bili WNL, , ,   CT A/P showed: Cholecystectomy +Dilated CBD up to 1.7 cm abrupt cut off of the distal CBD and  mild pancreatic duct dilatation.      (08 Dec 2022 19:05)      INTERIM EVENTS:Patient seen at bedside ,interim events noted.      PMH -reviewed admission note, no change since admission  HEART FAILURE: Acute[ ]Chronic[ ] Systolic[ ] Diastolic[ ] Combined Systolic and Diastolic[ ]  CAD[ ] CABG[ ] PCI[ ]  DEVICES[ ] PPM[ ] ICD[ ] ILR[ ]  ATRIAL FIBRILLATION[ ] Paroxysmal[ ] Permanent[ ] CHADS2-[  ]  DILCIA[ ] CKD1[ ] CKD2[ ] CKD3[ ] CKD4[ ] ESRD[ ]  COPD[ ] HTN[ ]   DM[ ] Type1[ ] Type 2[ ]   CVA[ ] Paresis[ ]    AMBULATION: Assisted[ ] Cane/walker[ ] Independent[ ]    MEDICATIONS  (STANDING):  atorvastatin 10 milliGRAM(s) Oral at bedtime  baclofen 5 milliGRAM(s) Oral every 12 hours  dextrose 5%. 1000 milliLiter(s) (50 mL/Hr) IV Continuous <Continuous>  dextrose 5%. 1000 milliLiter(s) (100 mL/Hr) IV Continuous <Continuous>  dextrose 50% Injectable 25 Gram(s) IV Push once  dextrose 50% Injectable 12.5 Gram(s) IV Push once  dextrose 50% Injectable 25 Gram(s) IV Push once  famotidine    Tablet 20 milliGRAM(s) Oral two times a day  glucagon  Injectable 1 milliGRAM(s) IntraMuscular once  insulin lispro (ADMELOG) corrective regimen sliding scale   SubCutaneous three times a day before meals  metoprolol tartrate 25 milliGRAM(s) Oral two times a day  NIFEdipine XL 60 milliGRAM(s) Oral daily  polyethylene glycol 3350 17 Gram(s) Oral daily  pregabalin 100 milliGRAM(s) Oral three times a day  senna 2 Tablet(s) Oral at bedtime  sodium chloride 0.9%. 1000 milliLiter(s) (80 mL/Hr) IV Continuous <Continuous>  tamsulosin 0.4 milliGRAM(s) Oral at bedtime    MEDICATIONS  (PRN):  dextrose Oral Gel 15 Gram(s) Oral once PRN Blood Glucose LESS THAN 70 milliGRAM(s)/deciliter  ondansetron    Tablet 4 milliGRAM(s) Oral every 6 hours PRN Nausea  oxycodone    5 mG/acetaminophen 325 mG 1 Tablet(s) Oral every 4 hours PRN Moderate Pain (4 - 6)            REVIEW OF SYSTEMS:  Constitutional: [ ] fever, [ ]weight loss,  [ ]fatigue [ ]weight gain  Eyes: [ ] visual changes  Respiratory: [ ]shortness of breath;  [ ] cough, [ ]wheezing, [ ]chills, [ ]hemoptysis  Cardiovascular: [ ] chest pain, [ ]palpitations, [ ]dizziness,  [ ]leg swelling[ ]orthopnea[ ]PND  Gastrointestinal: [ ] abdominal pain, [ ]nausea, [ ]vomiting,  [ ]diarrhea [ ]Constipation [ ]Melena  Genitourinary: [ ] dysuria, [ ] hematuria [ ]Cardoza  Neurologic: [ ] headaches [ ] tremors[ ]weakness [ ]Paralysis Right[ ] Left[ ]  Skin: [ ] itching, [ ]burning, [ ] rashes  Endocrine: [ ] heat or cold intolerance  Musculoskeletal: [ ] joint pain or swelling; [ ] muscle, back, or extremity pain  Psychiatric: [ ] depression, [ ]anxiety, [ ]mood swings, or [ ]difficulty sleeping  Hematologic: [ ] easy bruising, [ ] bleeding gums    [ ] All remaining systems negative except as per above.   [ ]Unable to obtain.  [x] No change in ROS since admission      Vital Signs Last 24 Hrs  T(C): 36.2 (16 Dec 2022 17:15), Max: 36.8 (16 Dec 2022 09:57)  T(F): 97.2 (16 Dec 2022 17:15), Max: 98.2 (16 Dec 2022 09:57)  HR: 76 (16 Dec 2022 17:15) (73 - 93)  BP: 98/57 (16 Dec 2022 17:15) (98/57 - 127/78)  BP(mean): --  RR: 18 (16 Dec 2022 17:15) (14 - 18)  SpO2: 97% (16 Dec 2022 17:15) (90% - 100%)    Parameters below as of 16 Dec 2022 17:15  Patient On (Oxygen Delivery Method): nasal cannula  O2 Flow (L/min): 3    I&O's Summary      PHYSICAL EXAM:  General: No acute distress BMI-  HEENT: EOMI, PERRL  Neck: Supple, [ ] JVD  Lungs: Equal air entry bilaterally; [ ] rales [ ] wheezing [ ] rhonchi  Heart: Regular rate and rhythm; [x ] murmur   2/6 [ x] systolic [ ] diastolic [ ] radiation[ ] rubs [ ]  gallops  Abdomen: Nontender, bowel sounds present  Extremities: No clubbing, cyanosis, [ ] edema [ ]Pulses  equal and intact  Nervous system:  Alert & Oriented X3, no focal deficits  Psychiatric: Normal affect  Skin: No rashes or lesions    LABS:  12-16    133<L>  |  93<L>  |  13  ----------------------------<  123<H>  4.1   |  32<H>  |  0.63    Ca    8.6      16 Dec 2022 07:10    TPro  7.4  /  Alb  1.7<L>  /  TBili  0.6  /  DBili  0.3  /  AST  106<H>  /  ALT  83<H>  /  AlkPhos  258<H>  12-16    Creatinine Trend: 0.63<--, 0.52<--, 0.58<--, 0.56<--, 0.62<--, 0.53<--                        13.2   6.76  )-----------( 200      ( 16 Dec 2022 07:10 )             39.2

## 2022-12-16 NOTE — PROGRESS NOTE ADULT - PROBLEM SELECTOR PLAN 4
pt takes metoprolol 25mg BID and Eliquis  - continue Metoprolol 25 mg PO with parameters  - resume eliquis tomorrow 12/17

## 2022-12-16 NOTE — PROGRESS NOTE ADULT - ASSESSMENT
Patient is a 67 year old male, vaccinated, from [facility] with PMHx of DM, HTN, HLD, AF on Eliquis, PVD, presents with c/o vomiting, found to have elevated LFts. CT scan  showed dilated CBD up to 1.7 cm with abrupt cut off of the distal CBD and mild pancreatic duct dilatation, suggestive of possible Choledocholithiasis. Plan for MRCP/MRI showed Common bile duct is markedly dilated measuring 1.6 cm with mild to moderate intrahepatic biliary dilatation as well as dilatation of the cystic duct stump. Plan for ERCp 12/16. GI following

## 2022-12-16 NOTE — PROGRESS NOTE ADULT - TIME BILLING
- Review of records, telemetry, vital signs and daily labs.   - General and cardiovascular physical examination.  - Generation of cardiovascular treatment plan.  - Coordination of care.      Patient was seen and examined by me on 12/16/22,interim events noted,labs and radiology studies reviewed.  Nasir Sharma MD,FACC.  [address] [place], [place] [id number].  [phone]

## 2022-12-16 NOTE — PROGRESS NOTE ADULT - SUBJECTIVE AND OBJECTIVE BOX
[   ] ICU                                          [   ] CCU                                      [ X  ] Medical Floor      Patient is a 67 year old male with vomiting. GI consulted to evaluate.         67 year old male, vaccinated, from [facility] with past medical history significant for DM, HTN, HLD, AFib on eliquis, PVD, presents with vomiting. Patient states that last night he ate a burger, and started to vomit shortly after. Patient states he is unable to eat since then due to feeling nauseous and continues to vomit each time he tries to eat. Patient denies any recent sick contact.  Patient also denies any associated abdominal pain, fever, chills, SOB, chest pain, diarrhea, hematemesis or melena.     Patient is comfortable. No new complaints reported, No abdominal pain, N/V, hematemesis, hematochezia, melena, fever, chills, chest pain, SOB, cough or diarrhea reported.       PAIN MANAGEMENT:  Pain Scale:                0 /10  Pain Location:      Prior Colonoscopy:  Unknown    PAST MEDICAL HISTORY  COPD (chronic obstructive pulmonary disease)    Diabetes    PVD (peripheral vascular disease)    Chronic atrial fibrillation    GERD (gastroesophageal reflux disease)    MDD (major depressive disorder)    BPH (benign prostatic hyperplasia)        PAST SURGICAL HISTORY  Cholecystectomy      Allergies    morphine (Unknown)  penicillin (Unknown)  shellfish (Unknown)    Intolerances  None       SOCIAL HISTORY  Advanced Directives:       [ X ] Full Code       [  ] DNR  Marital Status:         [  ] M      [ X ] S      [  ] D       [  ] W  Children:       [ X ] Yes      [  ] No  Occupation:        [  ] Employed       [ X ] Unemployed       [  ] Retired  Diet:       [X  ] Regular       [  ] PEG feeding          [  ] NG tube feeding  Drug Use:           [ X ] Patient denied          [  ] Yes  Alcohol:           [ X ] No             [  ] Yes (socially)         [  ] Yes (chronic)  Tobacco:           [  ] Yes           [ X ] No      FAMILY HISTORY  [ X ] Heart Disease            [X  ] Diabetes             [ X ] HTN             [  ] Colon Cancer             [  ] Stomach Cancer              [  ] Pancreatic Cancer      VITALS  Vital Signs Last 24 Hrs  T(C): 36.3 (16 Dec 2022 13:02), Max: 36.8 (16 Dec 2022 09:57)  T(F): 97.3 (16 Dec 2022 13:02), Max: 98.2 (16 Dec 2022 09:57)  HR: 88 (16 Dec 2022 13:02) (73 - 93)  BP: 113/54 (16 Dec 2022 13:02) (101/65 - 127/78)   RR: 18 (16 Dec 2022 13:02) (14 - 18)  SpO2: 94% (16 Dec 2022 13:02) (90% - 100%)  Parameters below as of 16 Dec 2022 13:02  Patient On (Oxygen Delivery Method): nasal cannula  O2 Flow (L/min): 2       MEDICATIONS  (STANDING):  atorvastatin 10 milliGRAM(s) Oral at bedtime  baclofen 5 milliGRAM(s) Oral every 12 hours  dextrose 5%. 1000 milliLiter(s) (50 mL/Hr) IV Continuous <Continuous>  dextrose 5%. 1000 milliLiter(s) (100 mL/Hr) IV Continuous <Continuous>  dextrose 50% Injectable 25 Gram(s) IV Push once  dextrose 50% Injectable 12.5 Gram(s) IV Push once  dextrose 50% Injectable 25 Gram(s) IV Push once  famotidine    Tablet 20 milliGRAM(s) Oral two times a day  glucagon  Injectable 1 milliGRAM(s) IntraMuscular once  insulin lispro (ADMELOG) corrective regimen sliding scale   SubCutaneous three times a day before meals  metoprolol tartrate 25 milliGRAM(s) Oral two times a day  NIFEdipine XL 60 milliGRAM(s) Oral daily  polyethylene glycol 3350 17 Gram(s) Oral daily  pregabalin 100 milliGRAM(s) Oral three times a day  senna 2 Tablet(s) Oral at bedtime  sodium chloride 0.9%. 1000 milliLiter(s) (80 mL/Hr) IV Continuous <Continuous>  tamsulosin 0.4 milliGRAM(s) Oral at bedtime    MEDICATIONS  (PRN):  dextrose Oral Gel 15 Gram(s) Oral once PRN Blood Glucose LESS THAN 70 milliGRAM(s)/deciliter  ondansetron    Tablet 4 milliGRAM(s) Oral every 6 hours PRN Nausea  oxycodone    5 mG/acetaminophen 325 mG 1 Tablet(s) Oral every 4 hours PRN Moderate Pain (4 - 6)                            13.2   6.76  )-----------( 200      ( 16 Dec 2022 07:10 )             39.2       12-16    133<L>  |  93<L>  |  13  ----------------------------<  123<H>  4.1   |  32<H>  |  0.63    Ca    8.6      16 Dec 2022 07:10    TPro  7.4  /  Alb  1.7<L>  /  TBili  0.6  /  DBili  0.3  /  AST  106<H>  /  ALT  83<H>  /  AlkPhos  258<H>  12-16

## 2022-12-16 NOTE — PROGRESS NOTE ADULT - PROBLEM SELECTOR PLAN 1
p/w vomiting, improving  -CT A/P showed: Cholecystectomy. Dilated CBD up to 1.7 cm abrupt cut off of the distal CBD and  mild pancreatic duct dilatation.   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 Villanueva following

## 2022-12-16 NOTE — PROGRESS NOTE ADULT - PROBLEM SELECTOR PLAN 1
p/w vomiting, improving  -CT A/P showed: Cholecystectomy. Dilated CBD up to 1.7 cm abrupt cut off of the distal CBD and  mild pancreatic duct dilatation.   - MRCP showed Common bile duct is markedly dilated measuring 1.6 cm with mild to moderate intrahepatic biliary dilatation as well as dilatation of the cystic duct stump  - Plan for ercp  Friday 12/16 - with Dr Antonio  -c/w [facility]  - Zofran PRN  -GI Dr. Villanueva

## 2022-12-16 NOTE — PROGRESS NOTE ADULT - ASSESSMENT
Patient is a 67 year old male, vaccinated, from [facility] with PMHx of DM, HTN, HLD, AF on Eliquis, PVD, presents with c/o vomiting, found to have elevated LFTs. CT scan  showed dilated CBD up to 1.7 cm with abrupt cut off of the distal CBD and mild pancreatic duct dilatation, suggestive of possible Choledocholithiasis. 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 Dr. Kim consulted ERCP performed today 12/16, biliary sludge removal with biliary sphincterotomy and balloon extraction. Advance as tolerated, resume a/c tomorrow 12/17

## 2022-12-16 NOTE — PROGRESS NOTE ADULT - PROBLEM SELECTOR PLAN 9
- from [facility]- short term  - f/u PT eval  - s/p ERCP 12/16  - resume a/c tomorrow 12/17  - advance diet as tolerated

## 2022-12-16 NOTE — PROGRESS NOTE ADULT - SUBJECTIVE AND OBJECTIVE BOX
NP Note discussed with  Primary Attending    Patient is a 67y old  Male who presents with a chief complaint of Vomiting     (14 Dec 2022 14:38)      INTERVAL HPI/OVERNIGHT EVENTS: no acute events overnight    MEDICATIONS  (STANDING):  atorvastatin 10 milliGRAM(s) Oral at bedtime  baclofen 5 milliGRAM(s) Oral every 12 hours  dextrose 5%. 1000 milliLiter(s) (50 mL/Hr) IV Continuous <Continuous>  dextrose 5%. 1000 milliLiter(s) (100 mL/Hr) IV Continuous <Continuous>  dextrose 50% Injectable 25 Gram(s) IV Push once  dextrose 50% Injectable 12.5 Gram(s) IV Push once  dextrose 50% Injectable 25 Gram(s) IV Push once  famotidine    Tablet 20 milliGRAM(s) Oral two times a day  glucagon  Injectable 1 milliGRAM(s) IntraMuscular once  insulin lispro (ADMELOG) corrective regimen sliding scale   SubCutaneous three times a day before meals  metoprolol tartrate 25 milliGRAM(s) Oral two times a day  NIFEdipine XL 60 milliGRAM(s) Oral daily  polyethylene glycol 3350 17 Gram(s) Oral daily  pregabalin 100 milliGRAM(s) Oral three times a day  senna 2 Tablet(s) Oral at bedtime  sodium chloride 0.9%. 1000 milliLiter(s) (80 mL/Hr) IV Continuous <Continuous>  tamsulosin 0.4 milliGRAM(s) Oral at bedtime    MEDICATIONS  (PRN):  dextrose Oral Gel 15 Gram(s) Oral once PRN Blood Glucose LESS THAN 70 milliGRAM(s)/deciliter  ondansetron    Tablet 4 milliGRAM(s) Oral every 6 hours PRN Nausea  oxycodone    5 mG/acetaminophen 325 mG 1 Tablet(s) Oral every 4 hours PRN Moderate Pain (4 - 6)      __________________________________________________  REVIEW OF SYSTEMS:    CONSTITUTIONAL: No fever,   EYES: no acute visual disturbances  NECK: No pain or stiffness  RESPIRATORY: No cough; No shortness of breath  CARDIOVASCULAR: No chest pain, no palpitations  GASTROINTESTINAL: No pain. No nausea or vomiting; No diarrhea   NEUROLOGICAL: No headache or numbness, no tremors  MUSCULOSKELETAL: No joint pain, no muscle pain  GENITOURINARY: no dysuria, no frequency, no hesitancy  PSYCHIATRY: no depression , no anxiety  ALL OTHER  ROS negative        Vital Signs Last 24 Hrs  T(C): 36.2 (16 Dec 2022 17:15), Max: 36.8 (16 Dec 2022 09:57)  T(F): 97.2 (16 Dec 2022 17:15), Max: 98.2 (16 Dec 2022 09:57)  HR: 76 (16 Dec 2022 17:15) (73 - 93)  BP: 98/57 (16 Dec 2022 17:15) (98/57 - 127/78)  BP(mean): --  RR: 18 (16 Dec 2022 17:15) (14 - 18)  SpO2: 97% (16 Dec 2022 17:15) (90% - 100%)    Parameters below as of 16 Dec 2022 17:15  Patient On (Oxygen Delivery Method): nasal cannula  O2 Flow (L/min): 3      ________________________________________________  PHYSICAL EXAM:  GENERAL: NAD  HEENT: Normocephalic;  conjunctivae and sclerae clear  NECK : supple  CHEST/LUNG: Clear to auscultation bilaterally   HEART: S1 S2  RRR  ABDOMEN: Soft, Nontender, Nondistended; Bowel sounds present  EXTREMITIES: no cyanosis; no edema  SKIN: warm and dry; no rash  NERVOUS SYSTEM:  Awake and alert; Oriented  to place, person and time    _________________________________________________  LABS:                        13.2   6.76  )-----------( 200      ( 16 Dec 2022 07:10 )             39.2     12-16    133<L>  |  93<L>  |  13  ----------------------------<  123<H>  4.1   |  32<H>  |  0.63    Ca    8.6      16 Dec 2022 07:10    TPro  7.4  /  Alb  1.7<L>  /  TBili  0.6  /  DBili  0.3  /  AST  106<H>  /  ALT  83<H>  /  AlkPhos  258<H>  12-16        CAPILLARY BLOOD GLUCOSE      POCT Blood Glucose.: 109 mg/dL (16 Dec 2022 16:42)  POCT Blood Glucose.: 141 mg/dL (16 Dec 2022 12:06)  POCT Blood Glucose.: 117 mg/dL (16 Dec 2022 07:57)  POCT Blood Glucose.: 114 mg/dL (15 Dec 2022 21:17)        RADIOLOGY & ADDITIONAL TESTS:  < from: MR MRCP w/wo IV Cont (12.12.22 @ 16:03) >  IMPRESSION:  1.  Intrahepatic and extrahepatic biliary dilatation with tapering of the   common bile duct at the ampulla suggesting ampullary stenosis. Decreased   signal at the level of the ampulla may represent a small amount of   sludge/debris.  2.  Diffuse hepatic steatosis with focal hepatic steatosis within the   LEFT lobe.    < end of copied text >    < from: CT Abdomen and Pelvis w/ IV Cont (12.08.22 @ 17:20) >  IMPRESSION:  No evidence of bowel obstruction.    Dilated CBD up to 1.7 cm which can be seen with cholecystectomy, however   there is abrupt cut off ofthe distal CBD and  mild pancreatic duct   dilatation. Choledocholithiasis or a papillary process cannot be entirely   excluded. Correlate with liver function tests. If there is clinical   concern, consider MRI MRCP with contrast for further evaluation.    Hepatic steatosis with a focal area of increased hypoattenuation which   likely represents pronounced focal fatty changes, less likely underlying   mass. This finding could also be further evaluated on MRI/MRCP as   recommended above.    Non complicated colonic diverticulosis, no diverticulitis.    < end of copied text >        Imaging Personally Reviewed:  YES    Consultant(s) Notes Reviewed:   YES      Plan of care was discussed with patient and /or primary care giver; all questions and concerns were addressed and care was aligned with patient's wishes.

## 2022-12-17 LAB
ALBUMIN SERPL ELPH-MCNC: 1.7 G/DL — LOW (ref 3.5–5)
ALP SERPL-CCNC: 229 U/L — HIGH (ref 40–120)
ALT FLD-CCNC: 65 U/L DA — HIGH (ref 10–60)
ANION GAP SERPL CALC-SCNC: 9 MMOL/L — SIGNIFICANT CHANGE UP (ref 5–17)
AST SERPL-CCNC: 70 U/L — HIGH (ref 10–40)
BILIRUB DIRECT SERPL-MCNC: 0.3 MG/DL — SIGNIFICANT CHANGE UP (ref 0–0.3)
BILIRUB INDIRECT FLD-MCNC: 0.2 MG/DL — SIGNIFICANT CHANGE UP (ref 0.2–1)
BILIRUB SERPL-MCNC: 0.5 MG/DL — SIGNIFICANT CHANGE UP (ref 0.2–1.2)
BUN SERPL-MCNC: 15 MG/DL — SIGNIFICANT CHANGE UP (ref 7–18)
CALCIUM SERPL-MCNC: 8.7 MG/DL — SIGNIFICANT CHANGE UP (ref 8.4–10.5)
CHLORIDE SERPL-SCNC: 90 MMOL/L — LOW (ref 96–108)
CO2 SERPL-SCNC: 34 MMOL/L — HIGH (ref 22–31)
CREAT SERPL-MCNC: 0.53 MG/DL — SIGNIFICANT CHANGE UP (ref 0.5–1.3)
EGFR: 109 ML/MIN/1.73M2 — SIGNIFICANT CHANGE UP
GLUCOSE BLDC GLUCOMTR-MCNC: 101 MG/DL — HIGH (ref 70–99)
GLUCOSE BLDC GLUCOMTR-MCNC: 103 MG/DL — HIGH (ref 70–99)
GLUCOSE BLDC GLUCOMTR-MCNC: 105 MG/DL — HIGH (ref 70–99)
GLUCOSE BLDC GLUCOMTR-MCNC: 110 MG/DL — HIGH (ref 70–99)
GLUCOSE SERPL-MCNC: 114 MG/DL — HIGH (ref 70–99)
HCT VFR BLD CALC: 39.6 % — SIGNIFICANT CHANGE UP (ref 39–50)
HGB BLD-MCNC: 13.5 G/DL — SIGNIFICANT CHANGE UP (ref 13–17)
MCHC RBC-ENTMCNC: 32 PG — SIGNIFICANT CHANGE UP (ref 27–34)
MCHC RBC-ENTMCNC: 34.1 GM/DL — SIGNIFICANT CHANGE UP (ref 32–36)
MCV RBC AUTO: 93.8 FL — SIGNIFICANT CHANGE UP (ref 80–100)
NRBC # BLD: 0 /100 WBCS — SIGNIFICANT CHANGE UP (ref 0–0)
PLATELET # BLD AUTO: 191 K/UL — SIGNIFICANT CHANGE UP (ref 150–400)
POTASSIUM SERPL-MCNC: 3.9 MMOL/L — SIGNIFICANT CHANGE UP (ref 3.5–5.3)
POTASSIUM SERPL-SCNC: 3.9 MMOL/L — SIGNIFICANT CHANGE UP (ref 3.5–5.3)
PROT SERPL-MCNC: 7.3 G/DL — SIGNIFICANT CHANGE UP (ref 6–8.3)
RBC # BLD: 4.22 M/UL — SIGNIFICANT CHANGE UP (ref 4.2–5.8)
RBC # FLD: 16 % — HIGH (ref 10.3–14.5)
SODIUM SERPL-SCNC: 133 MMOL/L — LOW (ref 135–145)
WBC # BLD: 7.94 K/UL — SIGNIFICANT CHANGE UP (ref 3.8–10.5)
WBC # FLD AUTO: 7.94 K/UL — SIGNIFICANT CHANGE UP (ref 3.8–10.5)

## 2022-12-17 RX ADMIN — TAMSULOSIN HYDROCHLORIDE 0.4 MILLIGRAM(S): 0.4 CAPSULE ORAL at 21:59

## 2022-12-17 RX ADMIN — ATORVASTATIN CALCIUM 10 MILLIGRAM(S): 80 TABLET, FILM COATED ORAL at 21:59

## 2022-12-17 RX ADMIN — Medication 5 MILLIGRAM(S): at 05:50

## 2022-12-17 RX ADMIN — OXYCODONE AND ACETAMINOPHEN 1 TABLET(S): 5; 325 TABLET ORAL at 12:15

## 2022-12-17 RX ADMIN — FAMOTIDINE 20 MILLIGRAM(S): 10 INJECTION INTRAVENOUS at 05:51

## 2022-12-17 RX ADMIN — Medication 60 MILLIGRAM(S): at 05:50

## 2022-12-17 RX ADMIN — Medication 25 MILLIGRAM(S): at 05:51

## 2022-12-17 RX ADMIN — POLYETHYLENE GLYCOL 3350 17 GRAM(S): 17 POWDER, FOR SOLUTION ORAL at 11:28

## 2022-12-17 RX ADMIN — Medication 100 MILLIGRAM(S): at 22:00

## 2022-12-17 RX ADMIN — Medication 100 MILLIGRAM(S): at 14:02

## 2022-12-17 RX ADMIN — OXYCODONE AND ACETAMINOPHEN 1 TABLET(S): 5; 325 TABLET ORAL at 02:00

## 2022-12-17 RX ADMIN — Medication 5 MILLIGRAM(S): at 18:27

## 2022-12-17 RX ADMIN — FAMOTIDINE 20 MILLIGRAM(S): 10 INJECTION INTRAVENOUS at 18:28

## 2022-12-17 RX ADMIN — Medication 25 MILLIGRAM(S): at 18:28

## 2022-12-17 RX ADMIN — OXYCODONE AND ACETAMINOPHEN 1 TABLET(S): 5; 325 TABLET ORAL at 01:44

## 2022-12-17 RX ADMIN — SENNA PLUS 2 TABLET(S): 8.6 TABLET ORAL at 21:59

## 2022-12-17 RX ADMIN — OXYCODONE AND ACETAMINOPHEN 1 TABLET(S): 5; 325 TABLET ORAL at 11:15

## 2022-12-17 RX ADMIN — Medication 100 MILLIGRAM(S): at 05:49

## 2022-12-17 NOTE — PROGRESS NOTE ADULT - PROBLEM SELECTOR PLAN 1
s//p ersp    Biliary sludge. Removal with biliary sphincterotomy and balloon extraction.  Moderate non-erosive gastritis and duodenitis     f/u patho  advance diet as silva  PPI  gi f/u      d/c planning

## 2022-12-17 NOTE — PHYSICAL THERAPY INITIAL EVALUATION ADULT - ADDITIONAL COMMENTS
Patient is from the nursing home; reported that he was independent with transfers (bed to/from wheelchair and toilet) with walker; non ambulatory.

## 2022-12-17 NOTE — PHYSICAL THERAPY INITIAL EVALUATION ADULT - ACTIVE RANGE OF MOTION EXAMINATION, REHAB EVAL
LOM on B LE due pain/anant. upper extremity Active ROM was WNL (within normal limits)/deficits as listed below

## 2022-12-17 NOTE — PROGRESS NOTE ADULT - TIME BILLING
- Review of records, telemetry, vital signs and daily labs.   - General and cardiovascular physical examination.  - Generation of cardiovascular treatment plan.  - Coordination of care.      Patient was seen and examined by me on 12/17/22,interim events noted,labs and radiology studies reviewed.  Nasir Sharma MD,FACC.  [address] [place], [place] [id number].  [phone]

## 2022-12-17 NOTE — PROGRESS NOTE ADULT - SUBJECTIVE AND OBJECTIVE BOX
PATIENT SEEN AND EXAMINED ON :- 12/17/22  DATE OF SERVICE:     12/17/22        Interim events noted,Labs ,Radiological studies and Cardiology tests reviewed .         HOSPITAL COURSE: HPI:  67 year old male, vaccinated, from [facility] with PMHx of DM, HTN, HLD, AF on eliquis, PVD, presents with vomiting. Patient states that last night he ate a burger, and started to vomit shortly after. Patient states he is unable to eat since then due to feeling nauseous and continues to vomit each time he tries to eat. Patient denies any recent sick contact.  Patient also denies any associated abdominal pain, fever, chills, SOB, chest pain, diarrhea, hematemesis or melena.     In the ED:  Afebrile, hemodynamically stable  No leukocytosis  Bili WNL, , ,   CT A/P showed: Cholecystectomy +Dilated CBD up to 1.7 cm abrupt cut off of the distal CBD and  mild pancreatic duct dilatation.      (08 Dec 2022 19:05)      INTERIM EVENTS:Patient seen at bedside ,interim events noted.      PMH -reviewed admission note, no change since admission  HEART FAILURE: Acute[ ]Chronic[ ] Systolic[ ] Diastolic[ ] Combined Systolic and Diastolic[ ]  CAD[ ] CABG[ ] PCI[ ]  DEVICES[ ] PPM[ ] ICD[ ] ILR[ ]  ATRIAL FIBRILLATION[ ] Paroxysmal[ ] Permanent[ ] CHADS2-[  ]  DILCIA[ ] CKD1[ ] CKD2[ ] CKD3[ ] CKD4[ ] ESRD[ ]  COPD[ ] HTN[ ]   DM[ ] Type1[ ] Type 2[ ]   CVA[ ] Paresis[ ]    AMBULATION: Assisted[ ] Cane/walker[ ] Independent[ ]    MEDICATIONS  (STANDING):  atorvastatin 10 milliGRAM(s) Oral at bedtime  baclofen 5 milliGRAM(s) Oral every 12 hours  dextrose 5%. 1000 milliLiter(s) (50 mL/Hr) IV Continuous <Continuous>  dextrose 5%. 1000 milliLiter(s) (100 mL/Hr) IV Continuous <Continuous>  dextrose 50% Injectable 25 Gram(s) IV Push once  dextrose 50% Injectable 12.5 Gram(s) IV Push once  dextrose 50% Injectable 25 Gram(s) IV Push once  famotidine    Tablet 20 milliGRAM(s) Oral two times a day  glucagon  Injectable 1 milliGRAM(s) IntraMuscular once  insulin lispro (ADMELOG) corrective regimen sliding scale   SubCutaneous three times a day before meals  metoprolol tartrate 25 milliGRAM(s) Oral two times a day  NIFEdipine XL 60 milliGRAM(s) Oral daily  polyethylene glycol 3350 17 Gram(s) Oral daily  pregabalin 100 milliGRAM(s) Oral three times a day  senna 2 Tablet(s) Oral at bedtime  sodium chloride 0.9%. 1000 milliLiter(s) (80 mL/Hr) IV Continuous <Continuous>  tamsulosin 0.4 milliGRAM(s) Oral at bedtime    MEDICATIONS  (PRN):  dextrose Oral Gel 15 Gram(s) Oral once PRN Blood Glucose LESS THAN 70 milliGRAM(s)/deciliter  ondansetron    Tablet 4 milliGRAM(s) Oral every 6 hours PRN Nausea  oxycodone    5 mG/acetaminophen 325 mG 1 Tablet(s) Oral every 4 hours PRN Moderate Pain (4 - 6)            REVIEW OF SYSTEMS:  Constitutional: [ ] fever, [ ]weight loss,  [ ]fatigue [ ]weight gain  Eyes: [ ] visual changes  Respiratory: [ ]shortness of breath;  [ ] cough, [ ]wheezing, [ ]chills, [ ]hemoptysis  Cardiovascular: [ ] chest pain, [ ]palpitations, [ ]dizziness,  [ ]leg swelling[ ]orthopnea[ ]PND  Gastrointestinal: [ ] abdominal pain, [ ]nausea, [ ]vomiting,  [ ]diarrhea [ ]Constipation [ ]Melena  Genitourinary: [ ] dysuria, [ ] hematuria [ ]Cardoza  Neurologic: [ ] headaches [ ] tremors[ ]weakness [ ]Paralysis Right[ ] Left[ ]  Skin: [ ] itching, [ ]burning, [ ] rashes  Endocrine: [ ] heat or cold intolerance  Musculoskeletal: [ ] joint pain or swelling; [ ] muscle, back, or extremity pain  Psychiatric: [ ] depression, [ ]anxiety, [ ]mood swings, or [ ]difficulty sleeping  Hematologic: [ ] easy bruising, [ ] bleeding gums    [ ] All remaining systems negative except as per above.   [ ]Unable to obtain.  [x] No change in ROS since admission      Vital Signs Last 24 Hrs  T(C): 37.1 (17 Dec 2022 13:25), Max: 37.1 (17 Dec 2022 13:25)  T(F): 98.7 (17 Dec 2022 13:25), Max: 98.7 (17 Dec 2022 13:25)  HR: 91 (17 Dec 2022 13:25) (76 - 91)  BP: 115/62 (17 Dec 2022 13:25) (98/57 - 116/71)  BP(mean): 72 (17 Dec 2022 13:25) (72 - 72)  RR: 19 (17 Dec 2022 13:25) (17 - 19)  SpO2: 92% (17 Dec 2022 13:25) (91% - 97%)    Parameters below as of 17 Dec 2022 13:25  Patient On (Oxygen Delivery Method): nasal cannula  O2 Flow (L/min): 2    I&O's Summary      PHYSICAL EXAM:  General: No acute distress BMI-  HEENT: EOMI, PERRL  Neck: Supple, [ ] JVD  Lungs: Equal air entry bilaterally; [ ] rales [ ] wheezing [ ] rhonchi  Heart: Regular rate and rhythm; [x ] murmur   2/6 [ x] systolic [ ] diastolic [ ] radiation[ ] rubs [ ]  gallops  Abdomen: Nontender, bowel sounds present  Extremities: No clubbing, cyanosis, [ ] edema [ ]Pulses  equal and intact  Nervous system:  Alert & Oriented X3, no focal deficits  Psychiatric: Normal affect  Skin: No rashes or lesions    LABS:  12-17    133<L>  |  90<L>  |  15  ----------------------------<  114<H>  3.9   |  34<H>  |  0.53    Ca    8.7      17 Dec 2022 07:04    TPro  7.3  /  Alb  1.7<L>  /  TBili  0.5  /  DBili  0.3  /  AST  70<H>  /  ALT  65<H>  /  AlkPhos  229<H>  12-17    Creatinine Trend: 0.53<--, 0.63<--, 0.52<--, 0.58<--, 0.56<--, 0.62<--                        13.5   7.94  )-----------( 191      ( 17 Dec 2022 07:04 )             39.6

## 2022-12-18 LAB
ANION GAP SERPL CALC-SCNC: 9 MMOL/L — SIGNIFICANT CHANGE UP (ref 5–17)
BUN SERPL-MCNC: 16 MG/DL — SIGNIFICANT CHANGE UP (ref 7–18)
CALCIUM SERPL-MCNC: 8.9 MG/DL — SIGNIFICANT CHANGE UP (ref 8.4–10.5)
CHLORIDE SERPL-SCNC: 93 MMOL/L — LOW (ref 96–108)
CO2 SERPL-SCNC: 30 MMOL/L — SIGNIFICANT CHANGE UP (ref 22–31)
CREAT SERPL-MCNC: 0.45 MG/DL — LOW (ref 0.5–1.3)
EGFR: 115 ML/MIN/1.73M2 — SIGNIFICANT CHANGE UP
GLUCOSE BLDC GLUCOMTR-MCNC: 101 MG/DL — HIGH (ref 70–99)
GLUCOSE BLDC GLUCOMTR-MCNC: 107 MG/DL — HIGH (ref 70–99)
GLUCOSE BLDC GLUCOMTR-MCNC: 122 MG/DL — HIGH (ref 70–99)
GLUCOSE BLDC GLUCOMTR-MCNC: 94 MG/DL — SIGNIFICANT CHANGE UP (ref 70–99)
GLUCOSE SERPL-MCNC: 131 MG/DL — HIGH (ref 70–99)
HCT VFR BLD CALC: 41.6 % — SIGNIFICANT CHANGE UP (ref 39–50)
HGB BLD-MCNC: 13.9 G/DL — SIGNIFICANT CHANGE UP (ref 13–17)
MCHC RBC-ENTMCNC: 31.2 PG — SIGNIFICANT CHANGE UP (ref 27–34)
MCHC RBC-ENTMCNC: 33.4 GM/DL — SIGNIFICANT CHANGE UP (ref 32–36)
MCV RBC AUTO: 93.3 FL — SIGNIFICANT CHANGE UP (ref 80–100)
NRBC # BLD: 0 /100 WBCS — SIGNIFICANT CHANGE UP (ref 0–0)
PLATELET # BLD AUTO: 180 K/UL — SIGNIFICANT CHANGE UP (ref 150–400)
POTASSIUM SERPL-MCNC: 3.7 MMOL/L — SIGNIFICANT CHANGE UP (ref 3.5–5.3)
POTASSIUM SERPL-SCNC: 3.7 MMOL/L — SIGNIFICANT CHANGE UP (ref 3.5–5.3)
RBC # BLD: 4.46 M/UL — SIGNIFICANT CHANGE UP (ref 4.2–5.8)
RBC # FLD: 16.2 % — HIGH (ref 10.3–14.5)
SODIUM SERPL-SCNC: 132 MMOL/L — LOW (ref 135–145)
WBC # BLD: 7.89 K/UL — SIGNIFICANT CHANGE UP (ref 3.8–10.5)
WBC # FLD AUTO: 7.89 K/UL — SIGNIFICANT CHANGE UP (ref 3.8–10.5)

## 2022-12-18 RX ADMIN — Medication 100 MILLIGRAM(S): at 21:34

## 2022-12-18 RX ADMIN — Medication 25 MILLIGRAM(S): at 05:02

## 2022-12-18 RX ADMIN — Medication 5 MILLIGRAM(S): at 05:03

## 2022-12-18 RX ADMIN — OXYCODONE AND ACETAMINOPHEN 1 TABLET(S): 5; 325 TABLET ORAL at 05:03

## 2022-12-18 RX ADMIN — Medication 25 MILLIGRAM(S): at 17:43

## 2022-12-18 RX ADMIN — ONDANSETRON 4 MILLIGRAM(S): 8 TABLET, FILM COATED ORAL at 17:44

## 2022-12-18 RX ADMIN — Medication 60 MILLIGRAM(S): at 05:03

## 2022-12-18 RX ADMIN — OXYCODONE AND ACETAMINOPHEN 1 TABLET(S): 5; 325 TABLET ORAL at 06:00

## 2022-12-18 RX ADMIN — FAMOTIDINE 20 MILLIGRAM(S): 10 INJECTION INTRAVENOUS at 17:43

## 2022-12-18 RX ADMIN — SENNA PLUS 2 TABLET(S): 8.6 TABLET ORAL at 21:35

## 2022-12-18 RX ADMIN — OXYCODONE AND ACETAMINOPHEN 1 TABLET(S): 5; 325 TABLET ORAL at 11:54

## 2022-12-18 RX ADMIN — Medication 100 MILLIGRAM(S): at 14:54

## 2022-12-18 RX ADMIN — FAMOTIDINE 20 MILLIGRAM(S): 10 INJECTION INTRAVENOUS at 05:03

## 2022-12-18 RX ADMIN — OXYCODONE AND ACETAMINOPHEN 1 TABLET(S): 5; 325 TABLET ORAL at 10:48

## 2022-12-18 RX ADMIN — POLYETHYLENE GLYCOL 3350 17 GRAM(S): 17 POWDER, FOR SOLUTION ORAL at 14:57

## 2022-12-18 RX ADMIN — Medication 5 MILLIGRAM(S): at 17:42

## 2022-12-18 RX ADMIN — ATORVASTATIN CALCIUM 10 MILLIGRAM(S): 80 TABLET, FILM COATED ORAL at 21:34

## 2022-12-18 RX ADMIN — TAMSULOSIN HYDROCHLORIDE 0.4 MILLIGRAM(S): 0.4 CAPSULE ORAL at 21:34

## 2022-12-18 RX ADMIN — Medication 100 MILLIGRAM(S): at 05:02

## 2022-12-18 RX ADMIN — OXYCODONE AND ACETAMINOPHEN 1 TABLET(S): 5; 325 TABLET ORAL at 21:34

## 2022-12-18 RX ADMIN — OXYCODONE AND ACETAMINOPHEN 1 TABLET(S): 5; 325 TABLET ORAL at 22:04

## 2022-12-18 NOTE — PROGRESS NOTE ADULT - ASSESSMENT
Patient is a 67 year old male, vaccinated, from [facility] with PMHx of DM, HTN, HLD, AF on Eliquis, PVD, presents with c/o vomiting, found to have elevated LFts. CT scan  showed dilated CBD up to 1.7 cm with abrupt cut off of the distal CBD and mild pancreatic duct dilatation, suggestive of possible Choledocholithiasis. Plan for MRCP/MRI showed Common bile duct is markedly dilated measuring 1.6 cm with mild to moderate intrahepatic biliary dilatation as well as dilatation of the cystic duct stump. Plan for ERCp 12/16. GI following

## 2022-12-18 NOTE — PROGRESS NOTE ADULT - ASSESSMENT
1. Abdominal pain and vomiting  2. Dilated CBD  3. R/o CBD stone  4. Dilated pancreatic duct  5. S/p ERCP with CBD sludge extraction    Suggestions:    1. Follow up LFT's  2. Diet as tolerated  3. Protonix daily  4. Avoid NSAID  5. Zofran PRN  6. DVT prophylaxis

## 2022-12-18 NOTE — PROGRESS NOTE ADULT - SUBJECTIVE AND OBJECTIVE BOX
[   ] ICU                                          [   ] CCU                                      [ X  ] Medical Floor      Patient is a 67 year old male with vomiting. GI consulted to evaluate.         67 year old male, vaccinated, from [facility] with past medical history significant for DM, HTN, HLD, AFib on eliquis, PVD, presents with vomiting. Patient states that last night he ate a burger, and started to vomit shortly after. Patient states he is unable to eat since then due to feeling nauseous and continues to vomit each time he tries to eat. Patient denies any recent sick contact.  Patient also denies any associated abdominal pain, fever, chills, SOB, chest pain, diarrhea, hematemesis or melena.     Patient is comfortable. No new complaints reported, No abdominal pain, N/V, hematemesis, hematochezia, melena, fever, chills, chest pain, SOB, cough or diarrhea reported.       PAIN MANAGEMENT:  Pain Scale:                0 /10  Pain Location:      Prior Colonoscopy:  Unknown    PAST MEDICAL HISTORY  COPD (chronic obstructive pulmonary disease)    Diabetes    PVD (peripheral vascular disease)    Chronic atrial fibrillation    GERD (gastroesophageal reflux disease)    MDD (major depressive disorder)    BPH (benign prostatic hyperplasia)        PAST SURGICAL HISTORY  Cholecystectomy      Allergies    morphine (Unknown)  penicillin (Unknown)  shellfish (Unknown)    Intolerances  None       SOCIAL HISTORY  Advanced Directives:       [ X ] Full Code       [  ] DNR  Marital Status:         [  ] M      [ X ] S      [  ] D       [  ] W  Children:       [ X ] Yes      [  ] No  Occupation:        [  ] Employed       [ X ] Unemployed       [  ] Retired  Diet:       [X  ] Regular       [  ] PEG feeding          [  ] NG tube feeding  Drug Use:           [ X ] Patient denied          [  ] Yes  Alcohol:           [ X ] No             [  ] Yes (socially)         [  ] Yes (chronic)  Tobacco:           [  ] Yes           [ X ] No      FAMILY HISTORY  [ X ] Heart Disease            [X  ] Diabetes             [ X ] HTN             [  ] Colon Cancer             [  ] Stomach Cancer              [  ] Pancreatic Cancer      VITALS  Vital Signs Last 24 Hrs  T(C): 36.3 (18 Dec 2022 05:26), Max: 37.2 (17 Dec 2022 20:28)  T(F): 97.3 (18 Dec 2022 05:26), Max: 98.9 (17 Dec 2022 20:28)  HR: 103 (18 Dec 2022 05:26) (80 - 106)  BP: 119/73 (18 Dec 2022 05:26) (113/66 - 122/75)  BP(mean): 72 (17 Dec 2022 13:25) (72 - 72)  RR: 18 (18 Dec 2022 05:26) (18 - 19)  SpO2: 95% (18 Dec 2022 05:26) (91% - 95%)  Parameters below as of 18 Dec 2022 05:26  Patient On (Oxygen Delivery Method): nasal cannula  O2 Flow (L/min): 2         MEDICATIONS  (STANDING):  atorvastatin 10 milliGRAM(s) Oral at bedtime  baclofen 5 milliGRAM(s) Oral every 12 hours  dextrose 5%. 1000 milliLiter(s) (100 mL/Hr) IV Continuous <Continuous>  dextrose 5%. 1000 milliLiter(s) (50 mL/Hr) IV Continuous <Continuous>  dextrose 50% Injectable 25 Gram(s) IV Push once  dextrose 50% Injectable 12.5 Gram(s) IV Push once  dextrose 50% Injectable 25 Gram(s) IV Push once  famotidine    Tablet 20 milliGRAM(s) Oral two times a day  glucagon  Injectable 1 milliGRAM(s) IntraMuscular once  insulin lispro (ADMELOG) corrective regimen sliding scale   SubCutaneous three times a day before meals  metoprolol tartrate 25 milliGRAM(s) Oral two times a day  NIFEdipine XL 60 milliGRAM(s) Oral daily  polyethylene glycol 3350 17 Gram(s) Oral daily  pregabalin 100 milliGRAM(s) Oral three times a day  senna 2 Tablet(s) Oral at bedtime  sodium chloride 0.9%. 1000 milliLiter(s) (80 mL/Hr) IV Continuous <Continuous>  tamsulosin 0.4 milliGRAM(s) Oral at bedtime    MEDICATIONS  (PRN):  dextrose Oral Gel 15 Gram(s) Oral once PRN Blood Glucose LESS THAN 70 milliGRAM(s)/deciliter  ondansetron    Tablet 4 milliGRAM(s) Oral every 6 hours PRN Nausea  oxycodone    5 mG/acetaminophen 325 mG 1 Tablet(s) Oral every 4 hours PRN Moderate Pain (4 - 6)                            13.9   7.89  )-----------( 180      ( 18 Dec 2022 05:25 )             41.6       12-18    132<L>  |  93<L>  |  16  ----------------------------<  131<H>  3.7   |  30  |  0.45<L>    Ca    8.9      18 Dec 2022 05:25    TPro  7.3  /  Alb  1.7<L>  /  TBili  0.5  /  DBili  0.3  /  AST  70<H>  /  ALT  65<H>  /  AlkPhos  229<H>  12-17

## 2022-12-18 NOTE — PROGRESS NOTE ADULT - SUBJECTIVE AND OBJECTIVE BOX
PATIENT SEEN AND EXAMINED ON :- 12/18/22  DATE OF SERVICE:   12/18/22          Interim events noted,Labs ,Radiological studies and Cardiology tests reviewed .       HOSPITAL COURSE: HPI:  67 year old male, vaccinated, from [facility] with PMHx of DM, HTN, HLD, AF on eliquis, PVD, presents with vomiting. Patient states that last night he ate a burger, and started to vomit shortly after. Patient states he is unable to eat since then due to feeling nauseous and continues to vomit each time he tries to eat. Patient denies any recent sick contact.  Patient also denies any associated abdominal pain, fever, chills, SOB, chest pain, diarrhea, hematemesis or melena.     In the ED:  Afebrile, hemodynamically stable  No leukocytosis  Bili WNL, , ,   CT A/P showed: Cholecystectomy +Dilated CBD up to 1.7 cm abrupt cut off of the distal CBD and  mild pancreatic duct dilatation.      (08 Dec 2022 19:05)      INTERIM EVENTS:Patient seen at bedside ,interim events noted.      PMH -reviewed admission note, no change since admission  HEART FAILURE: Acute[ ]Chronic[ ] Systolic[ ] Diastolic[ ] Combined Systolic and Diastolic[ ]  CAD[ ] CABG[ ] PCI[ ]  DEVICES[ ] PPM[ ] ICD[ ] ILR[ ]  ATRIAL FIBRILLATION[ ] Paroxysmal[ ] Permanent[ ] CHADS2-[  ]  DILCIA[ ] CKD1[ ] CKD2[ ] CKD3[ ] CKD4[ ] ESRD[ ]  COPD[ ] HTN[ ]   DM[ ] Type1[ ] Type 2[ ]   CVA[ ] Paresis[ ]    AMBULATION: Assisted[ ] Cane/walker[ ] Independent[ ]    MEDICATIONS  (STANDING):  atorvastatin 10 milliGRAM(s) Oral at bedtime  baclofen 5 milliGRAM(s) Oral every 12 hours  dextrose 5%. 1000 milliLiter(s) (50 mL/Hr) IV Continuous <Continuous>  dextrose 5%. 1000 milliLiter(s) (100 mL/Hr) IV Continuous <Continuous>  dextrose 50% Injectable 25 Gram(s) IV Push once  dextrose 50% Injectable 12.5 Gram(s) IV Push once  dextrose 50% Injectable 25 Gram(s) IV Push once  famotidine    Tablet 20 milliGRAM(s) Oral two times a day  glucagon  Injectable 1 milliGRAM(s) IntraMuscular once  insulin lispro (ADMELOG) corrective regimen sliding scale   SubCutaneous three times a day before meals  metoprolol tartrate 25 milliGRAM(s) Oral two times a day  NIFEdipine XL 60 milliGRAM(s) Oral daily  polyethylene glycol 3350 17 Gram(s) Oral daily  pregabalin 100 milliGRAM(s) Oral three times a day  senna 2 Tablet(s) Oral at bedtime  sodium chloride 0.9%. 1000 milliLiter(s) (80 mL/Hr) IV Continuous <Continuous>  tamsulosin 0.4 milliGRAM(s) Oral at bedtime    MEDICATIONS  (PRN):  dextrose Oral Gel 15 Gram(s) Oral once PRN Blood Glucose LESS THAN 70 milliGRAM(s)/deciliter  ondansetron    Tablet 4 milliGRAM(s) Oral every 6 hours PRN Nausea  oxycodone    5 mG/acetaminophen 325 mG 1 Tablet(s) Oral every 4 hours PRN Moderate Pain (4 - 6)            REVIEW OF SYSTEMS:  Constitutional: [ ] fever, [ ]weight loss,  [ ]fatigue [ ]weight gain  Eyes: [ ] visual changes  Respiratory: [ ]shortness of breath;  [ ] cough, [ ]wheezing, [ ]chills, [ ]hemoptysis  Cardiovascular: [ ] chest pain, [ ]palpitations, [ ]dizziness,  [ ]leg swelling[ ]orthopnea[ ]PND  Gastrointestinal: [ ] abdominal pain, [ ]nausea, [ ]vomiting,  [ ]diarrhea [ ]Constipation [ ]Melena  Genitourinary: [ ] dysuria, [ ] hematuria [ ]Cardoza  Neurologic: [ ] headaches [ ] tremors[ ]weakness [ ]Paralysis Right[ ] Left[ ]  Skin: [ ] itching, [ ]burning, [ ] rashes  Endocrine: [ ] heat or cold intolerance  Musculoskeletal: [ ] joint pain or swelling; [ ] muscle, back, or extremity pain  Psychiatric: [ ] depression, [ ]anxiety, [ ]mood swings, or [ ]difficulty sleeping  Hematologic: [ ] easy bruising, [ ] bleeding gums    [ ] All remaining systems negative except as per above.   [ ]Unable to obtain.  [x] No change in ROS since admission      Vital Signs Last 24 Hrs  T(C): 36.3 (18 Dec 2022 12:45), Max: 37.2 (17 Dec 2022 20:28)  T(F): 97.4 (18 Dec 2022 12:45), Max: 98.9 (17 Dec 2022 20:28)  HR: 85 (18 Dec 2022 12:45) (80 - 106)  BP: 108/64 (18 Dec 2022 12:45) (108/64 - 122/75)  BP(mean): 74 (18 Dec 2022 12:45) (74 - 74)  RR: 18 (18 Dec 2022 12:45) (18 - 18)  SpO2: 95% (18 Dec 2022 12:45) (93% - 95%)    Parameters below as of 18 Dec 2022 12:45  Patient On (Oxygen Delivery Method): nasal cannula  O2 Flow (L/min): 2    I&O's Summary      PHYSICAL EXAM:  General: No acute distress BMI-  HEENT: EOMI, PERRL  Neck: Supple, [ ] JVD  Lungs: Equal air entry bilaterally; [ ] rales [ ] wheezing [ ] rhonchi  Heart: Regular rate and rhythm; [x ] murmur   2/6 [ x] systolic [ ] diastolic [ ] radiation[ ] rubs [ ]  gallops  Abdomen: Nontender, bowel sounds present  Extremities: No clubbing, cyanosis, [ ] edema [ ]Pulses  equal and intact  Nervous system:  Alert & Oriented X3, no focal deficits  Psychiatric: Normal affect  Skin: No rashes or lesions    LABS:  12-18    132<L>  |  93<L>  |  16  ----------------------------<  131<H>  3.7   |  30  |  0.45<L>    Ca    8.9      18 Dec 2022 05:25    TPro  7.3  /  Alb  1.7<L>  /  TBili  0.5  /  DBili  0.3  /  AST  70<H>  /  ALT  65<H>  /  AlkPhos  229<H>  12-17    Creatinine Trend: 0.45<--, 0.53<--, 0.63<--, 0.52<--, 0.58<--, 0.56<--                        13.9   7.89  )-----------( 180      ( 18 Dec 2022 05:25 )             41.6

## 2022-12-18 NOTE — PROGRESS NOTE ADULT - TIME BILLING
- Review of records, telemetry, vital signs and daily labs.   - General and cardiovascular physical examination.  - Generation of cardiovascular treatment plan.  - Coordination of care.      Patient was seen and examined by me on 12/18/22,interim events noted,labs and radiology studies reviewed.  Nasir Sharma MD,FACC.  [address] [place], [place] [id number].  [phone]

## 2022-12-19 LAB
ALBUMIN SERPL ELPH-MCNC: 1.7 G/DL — LOW (ref 3.5–5)
ALP SERPL-CCNC: 169 U/L — HIGH (ref 40–120)
ALT FLD-CCNC: 44 U/L DA — SIGNIFICANT CHANGE UP (ref 10–60)
ANION GAP SERPL CALC-SCNC: 7 MMOL/L — SIGNIFICANT CHANGE UP (ref 5–17)
AST SERPL-CCNC: 43 U/L — HIGH (ref 10–40)
BILIRUB DIRECT SERPL-MCNC: 0.2 MG/DL — SIGNIFICANT CHANGE UP (ref 0–0.3)
BILIRUB INDIRECT FLD-MCNC: 0.2 MG/DL — SIGNIFICANT CHANGE UP (ref 0.2–1)
BILIRUB SERPL-MCNC: 0.4 MG/DL — SIGNIFICANT CHANGE UP (ref 0.2–1.2)
BUN SERPL-MCNC: 13 MG/DL — SIGNIFICANT CHANGE UP (ref 7–18)
CALCIUM SERPL-MCNC: 8.7 MG/DL — SIGNIFICANT CHANGE UP (ref 8.4–10.5)
CHLORIDE SERPL-SCNC: 93 MMOL/L — LOW (ref 96–108)
CO2 SERPL-SCNC: 33 MMOL/L — HIGH (ref 22–31)
CREAT SERPL-MCNC: 0.49 MG/DL — LOW (ref 0.5–1.3)
EGFR: 112 ML/MIN/1.73M2 — SIGNIFICANT CHANGE UP
GLUCOSE BLDC GLUCOMTR-MCNC: 107 MG/DL — HIGH (ref 70–99)
GLUCOSE BLDC GLUCOMTR-MCNC: 142 MG/DL — HIGH (ref 70–99)
GLUCOSE BLDC GLUCOMTR-MCNC: 93 MG/DL — SIGNIFICANT CHANGE UP (ref 70–99)
GLUCOSE BLDC GLUCOMTR-MCNC: 97 MG/DL — SIGNIFICANT CHANGE UP (ref 70–99)
GLUCOSE SERPL-MCNC: 113 MG/DL — HIGH (ref 70–99)
HCT VFR BLD CALC: 38.3 % — LOW (ref 39–50)
HGB BLD-MCNC: 12.9 G/DL — LOW (ref 13–17)
MCHC RBC-ENTMCNC: 31.4 PG — SIGNIFICANT CHANGE UP (ref 27–34)
MCHC RBC-ENTMCNC: 33.7 GM/DL — SIGNIFICANT CHANGE UP (ref 32–36)
MCV RBC AUTO: 93.2 FL — SIGNIFICANT CHANGE UP (ref 80–100)
NRBC # BLD: 0 /100 WBCS — SIGNIFICANT CHANGE UP (ref 0–0)
PLATELET # BLD AUTO: 163 K/UL — SIGNIFICANT CHANGE UP (ref 150–400)
POTASSIUM SERPL-MCNC: 4 MMOL/L — SIGNIFICANT CHANGE UP (ref 3.5–5.3)
POTASSIUM SERPL-SCNC: 4 MMOL/L — SIGNIFICANT CHANGE UP (ref 3.5–5.3)
PROT SERPL-MCNC: 7.4 G/DL — SIGNIFICANT CHANGE UP (ref 6–8.3)
RBC # BLD: 4.11 M/UL — LOW (ref 4.2–5.8)
RBC # FLD: 16 % — HIGH (ref 10.3–14.5)
SODIUM SERPL-SCNC: 133 MMOL/L — LOW (ref 135–145)
WBC # BLD: 4.89 K/UL — SIGNIFICANT CHANGE UP (ref 3.8–10.5)
WBC # FLD AUTO: 4.89 K/UL — SIGNIFICANT CHANGE UP (ref 3.8–10.5)

## 2022-12-19 PROCEDURE — 99232 SBSQ HOSP IP/OBS MODERATE 35: CPT

## 2022-12-19 RX ORDER — APIXABAN 2.5 MG/1
5 TABLET, FILM COATED ORAL EVERY 12 HOURS
Refills: 0 | Status: DISCONTINUED | OUTPATIENT
Start: 2022-12-19 | End: 2022-12-21

## 2022-12-19 RX ADMIN — FAMOTIDINE 20 MILLIGRAM(S): 10 INJECTION INTRAVENOUS at 17:58

## 2022-12-19 RX ADMIN — Medication 100 MILLIGRAM(S): at 21:24

## 2022-12-19 RX ADMIN — TAMSULOSIN HYDROCHLORIDE 0.4 MILLIGRAM(S): 0.4 CAPSULE ORAL at 21:24

## 2022-12-19 RX ADMIN — APIXABAN 5 MILLIGRAM(S): 2.5 TABLET, FILM COATED ORAL at 17:58

## 2022-12-19 RX ADMIN — OXYCODONE AND ACETAMINOPHEN 1 TABLET(S): 5; 325 TABLET ORAL at 03:52

## 2022-12-19 RX ADMIN — Medication 100 MILLIGRAM(S): at 06:18

## 2022-12-19 RX ADMIN — OXYCODONE AND ACETAMINOPHEN 1 TABLET(S): 5; 325 TABLET ORAL at 15:30

## 2022-12-19 RX ADMIN — Medication 25 MILLIGRAM(S): at 17:57

## 2022-12-19 RX ADMIN — Medication 5 MILLIGRAM(S): at 06:18

## 2022-12-19 RX ADMIN — POLYETHYLENE GLYCOL 3350 17 GRAM(S): 17 POWDER, FOR SOLUTION ORAL at 17:57

## 2022-12-19 RX ADMIN — FAMOTIDINE 20 MILLIGRAM(S): 10 INJECTION INTRAVENOUS at 06:18

## 2022-12-19 RX ADMIN — Medication 5 MILLIGRAM(S): at 17:58

## 2022-12-19 RX ADMIN — OXYCODONE AND ACETAMINOPHEN 1 TABLET(S): 5; 325 TABLET ORAL at 14:55

## 2022-12-19 RX ADMIN — ATORVASTATIN CALCIUM 10 MILLIGRAM(S): 80 TABLET, FILM COATED ORAL at 22:27

## 2022-12-19 RX ADMIN — OXYCODONE AND ACETAMINOPHEN 1 TABLET(S): 5; 325 TABLET ORAL at 03:22

## 2022-12-19 NOTE — PROGRESS NOTE ADULT - ASSESSMENT
Patient is a 67 year old male, vaccinated, from [facility] with PMHx of DM, HTN, HLD, AF on Eliquis, PVD, presents with c/o vomiting, found to have elevated LFTs. CT scan  showed dilated CBD up to 1.7 cm with abrupt cut off of the distal CBD and mild pancreatic duct dilatation, suggestive of possible Choledocholithiasis. 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 Dr. Kim consulted ERCP performed today 12/16, biliary sludge removal with biliary sphincterotomy and balloon extraction. Patient endorsing mild pain and mild nausea today, possible d/c in AM if improvement in symptoms.

## 2022-12-19 NOTE — PROGRESS NOTE ADULT - ASSESSMENT
Patient is a 67M with a PMHx of DM, HTN, HLD, AFib (on Eliquis), PVD, who was admitted 12/8 for vomiting. He is followed by Dr. Villanueva this admission for abdominal pain and vomiting. Advanced GI consult is requested for ERCP.     Patient states 12/7 PM, he ate a burger and vomited shortly after, and since then he has had decreased PO intake due to n/v, associated with RUQ abdominal pain. He denies hematemesis, hematochezia, melena, cp, sob.   Patient is s/p ccy (unable to recall when), no prior EGDs, last colonoscopy "years ago" (unable to recall when or with who).   Admission labs notable for , , , no leukocytosis, afebrile. TBili wnl.   CTAP 12/8: CBD dilated 1.8cm with abrupt cut off of the distal CBD proximal to the papilla.   MRCP 12/12: CBD dilated 1.6cm, mild to mod intrahepatic biliary dilatation as well as dilatation of cystic duct stump. CBD dilated down to level of ampulla. There is small amount of sludge vs debris within the distal CBD at the level of the ampulla. 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   ERCP 12/16/22. Sludge removed from CBD. EGD showed erosive gastritis and duodenitis     12/19: trending down labs, TBili 0.4, , AST 43, ALT 44.    #Elevated LFTs  #CBD Dilation  #R/o Choledocho  #Abdominal pain, n/v      	- Advance diet as tolerated   	- PRN Zofran  	- Trend LFTs, daily PT/INR              -F/U Dr. Villanueva outpatient

## 2022-12-19 NOTE — PROGRESS NOTE ADULT - TIME BILLING
- Review of records, telemetry, vital signs and daily labs.   - General and cardiovascular physical examination.  - Generation of cardiovascular treatment plan.  - Coordination of care.      Patient was seen and examined by me on 12/18/22,interim events noted,labs and radiology studies reviewed.  Nasir Sharma MD,FACC.  [address] [place], [place] [id number].  [phone]

## 2022-12-19 NOTE — PROGRESS NOTE ADULT - SUBJECTIVE AND OBJECTIVE BOX
Advance GI PROGRESS NOTE    Patient is a 67y old  Male who presents with a chief complaint of Vomiting     (14 Dec 2022 14:38)      HPI:  67 year old male, vaccinated, from [facility] with PMHx of DM, HTN, HLD, AF on eliquis, PVD, presents with vomiting. Patient states that last night he ate a burger, and started to vomit shortly after. Patient states he is unable to eat since then due to feeling nauseous and continues to vomit each time he tries to eat. Patient denies any recent sick contact.  Patient also denies any associated abdominal pain, fever, chills, SOB, chest pain, diarrhea, hematemesis or melena.     In the ED:  Afebrile, hemodynamically stable  No leukocytosis  Bili WNL, , ,   CT A/P showed: Cholecystectomy +Dilated CBD up to 1.7 cm abrupt cut off of the distal CBD and  mild pancreatic duct dilatation.      (08 Dec 2022 19:05)     GI HPI   Patient resting in bed. S/P ERCP on 12/16/22. Tolerating meals. Intermittent N&V. No abdominal pain       ______________________________________________________________________  PMH/PSH:  PAST MEDICAL & SURGICAL HISTORY:  COPD (chronic obstructive pulmonary disease)      Diabetes      PVD (peripheral vascular disease)      Chronic atrial fibrillation      GERD (gastroesophageal reflux disease)      MDD (major depressive disorder)      BPH (benign prostatic hyperplasia)        ______________________________________________________________________  MEDS:  MEDICATIONS  (STANDING):  atorvastatin 10 milliGRAM(s) Oral at bedtime  baclofen 5 milliGRAM(s) Oral every 12 hours  dextrose 5%. 1000 milliLiter(s) (50 mL/Hr) IV Continuous <Continuous>  dextrose 5%. 1000 milliLiter(s) (100 mL/Hr) IV Continuous <Continuous>  dextrose 50% Injectable 25 Gram(s) IV Push once  dextrose 50% Injectable 12.5 Gram(s) IV Push once  dextrose 50% Injectable 25 Gram(s) IV Push once  famotidine    Tablet 20 milliGRAM(s) Oral two times a day  glucagon  Injectable 1 milliGRAM(s) IntraMuscular once  insulin lispro (ADMELOG) corrective regimen sliding scale   SubCutaneous three times a day before meals  metoprolol tartrate 25 milliGRAM(s) Oral two times a day  NIFEdipine XL 60 milliGRAM(s) Oral daily  polyethylene glycol 3350 17 Gram(s) Oral daily  senna 2 Tablet(s) Oral at bedtime  sodium chloride 0.9%. 1000 milliLiter(s) (80 mL/Hr) IV Continuous <Continuous>  tamsulosin 0.4 milliGRAM(s) Oral at bedtime    MEDICATIONS  (PRN):  dextrose Oral Gel 15 Gram(s) Oral once PRN Blood Glucose LESS THAN 70 milliGRAM(s)/deciliter  ondansetron    Tablet 4 milliGRAM(s) Oral every 6 hours PRN Nausea  oxycodone    5 mG/acetaminophen 325 mG 1 Tablet(s) Oral every 4 hours PRN Moderate Pain (4 - 6)    ______________________________________________________________________  ALL:   Allergies    morphine (Unknown)  penicillin (Unknown)  shellfish (Unknown)    Intolerances      ______________________________________________________________________  SH: Social History:  denies any tobacco or alcohol use (08 Dec 2022 19:05)    ______________________________________________________________________  FH:  FAMILY HISTORY:    ______________________________________________________________________  ROS:    CONSTITUTIONAL:  No weight loss, fever, chills, weakness or fatigue.    HEENT:  Eyes:  No visual loss, blurred vision, double vision or yellow sclerae. Ears, Nose, Throat:  No hearing loss, sneezing, congestion, runny nose or sore throat.    SKIN:  No rash or itching.    CARDIOVASCULAR:  No chest pain, chest pressure or chest discomfort. No palpitations or edema.    RESPIRATORY:  No shortness of breath, cough or sputum.    GASTROINTESTINAL:  SEE HPI    GENITOURINARY:  No dysuria, hematuria, urinary frequency    NEUROLOGICAL:  No headache, dizziness, syncope, paralysis, ataxia, numbness or tingling in the extremities. No change in bowel or bladder control.    MUSCULOSKELETAL:  No muscle, back pain, joint pain or stiffness.    ______________________________________________________________________  PHYSICAL EXAM:  T(C): 36.8 (12-19-22 @ 05:04), Max: 36.8 (12-18-22 @ 21:15)  HR: 92 (12-19-22 @ 05:04)  BP: 108/66 (12-19-22 @ 05:04)  RR: 17 (12-19-22 @ 05:04)  SpO2: 94% (12-19-22 @ 05:04)  Wt(kg): --      GEN: NAD  HEENT: EOMI, conjunctivae anicteric, neck supple, dry mucous membranes  PULM: LSCTAB, no wheezing, rales, or rhonchi  CV: RRR, no m/r/b  GI: Soft, ttp, rounded, ND; +BS in all four quadrants, no ascites  MSK: OLEA  NEURO: A&O x 3, no gross deficits      ______________________________________________________________________  LABS:                        12.9   4.89  )-----------( 163      ( 19 Dec 2022 05:50 )             38.3     12-19    133<L>  |  93<L>  |  13  ----------------------------<  113<H>  4.0   |  33<H>  |  0.49<L>    Ca    8.7      19 Dec 2022 05:50    TPro  7.4  /  Alb  1.7<L>  /  TBili  0.4  /  DBili  0.2  /  AST  43<H>  /  ALT  44  /  AlkPhos  169<H>  12-19    LIVER FUNCTIONS - ( 19 Dec 2022 05:50 )  Alb: 1.7 g/dL / Pro: 7.4 g/dL / ALK PHOS: 169 U/L / ALT: 44 U/L DA / AST: 43 U/L / GGT: x           ______________________________________________________________________  IMAGING:    ______________________________________________________________________  < from: MR FENG w/wo IV Cont (12.12.22 @ 16:03) >    ACC: [id number] EXAM:  MR FENG WAW IC                          PROCEDURE DATE:  12/12/2022          INTERPRETATION:  CLINICAL INFORMATION: 67-year-old with dilated common   bile duct and concern for choledocholithiasis. Abnormal CT.    COMPARISON: CTdated December 08, 2022    CONTRAST/COMPLICATIONS:  IV Contrast: Gadavist  6 cc administered   1.5 cc discarded  Oral Contrast: NONE  Complications: None reported at time of study completion    PROCEDURE:  MRI of the abdomen was performed.  MRCP was performed.    FINDINGS:  LOWER CHEST: Atelectasis and/or consolidation is seen in the RIGHT base.   This appears more prominent than was seen on the prior CT.    LIVER: Diffuse hepatic steatosis. Lesion described on CT within the LEFT   lobe of the liver corresponds to focal hepatic steatosis.  BILE DUCTS: Common bile duct is markedly dilated measuring 1.6 cm in   diameter. There is mild to moderate intrahepatic biliary dilatation as   well as dilatation of the cystic duct stump. Common bile duct is dilated   down to the level of the ampulla. There is a small amount of sludge   versus debris within the distal common bile duct at the level of the   ampulla. 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  GALLBLADDER: Cholecystectomy.  SPLEEN: Within normal limits.  PANCREAS: Within normal limits.  ADRENALS: Within normal limits.  KIDNEYS/URETERS: Within normal limits.    VISUALIZED PORTIONS:  BOWEL: Within normal limits.  PERITONEUM: No ascites.  VESSELS: Within normal limits.  RETROPERITONEUM/LYMPH NODES: No lymphadenopathy.  ABDOMINAL WALL: Within normal limits.  BONES: Within normal limits.    IMPRESSION:  1.  Intrahepatic and extrahepatic biliary dilatation with tapering of the   common bile duct at the ampulla suggesting ampullary stenosis. Decreased   signal at the level of the ampulla may represent a small amount of   sludge/debris.  2.  Diffuse hepatic steatosis with focal hepatic steatosis within the   LEFT lobe.      --- End of Report ---          < end of copied text >

## 2022-12-19 NOTE — PROGRESS NOTE ADULT - PROBLEM SELECTOR PLAN 9
- from [facility]- short term  - PT reccs RYAN  - s/p ERCP 12/16, Eliquis resumed  - patient with mild abd pain and nausea today, possible d/c in AM if improvement in symptoms

## 2022-12-19 NOTE — PROGRESS NOTE ADULT - PROBLEM SELECTOR PLAN 1
p/w vomiting, improving  -CT A/P showed: Cholecystectomy. Dilated CBD up to 1.7 cm abrupt cut off of the distal CBD and  mild pancreatic duct dilatation.   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

## 2022-12-19 NOTE — PROGRESS NOTE ADULT - SUBJECTIVE AND OBJECTIVE BOX
NP Note discussed with  Primary Attending    Patient is a 67y old  Male who presents with a chief complaint of Vomiting (19 Dec 2022 08:12)      INTERVAL HPI/OVERNIGHT EVENTS: no acute events overnight  MEDICATIONS  (STANDING):  apixaban 5 milliGRAM(s) Oral every 12 hours  atorvastatin 10 milliGRAM(s) Oral at bedtime  baclofen 5 milliGRAM(s) Oral every 12 hours  dextrose 5%. 1000 milliLiter(s) (100 mL/Hr) IV Continuous <Continuous>  dextrose 5%. 1000 milliLiter(s) (50 mL/Hr) IV Continuous <Continuous>  dextrose 50% Injectable 25 Gram(s) IV Push once  dextrose 50% Injectable 12.5 Gram(s) IV Push once  dextrose 50% Injectable 25 Gram(s) IV Push once  famotidine    Tablet 20 milliGRAM(s) Oral two times a day  glucagon  Injectable 1 milliGRAM(s) IntraMuscular once  insulin lispro (ADMELOG) corrective regimen sliding scale   SubCutaneous three times a day before meals  metoprolol tartrate 25 milliGRAM(s) Oral two times a day  NIFEdipine XL 60 milliGRAM(s) Oral daily  polyethylene glycol 3350 17 Gram(s) Oral daily  pregabalin 100 milliGRAM(s) Oral three times a day  senna 2 Tablet(s) Oral at bedtime  sodium chloride 0.9%. 1000 milliLiter(s) (80 mL/Hr) IV Continuous <Continuous>  tamsulosin 0.4 milliGRAM(s) Oral at bedtime    MEDICATIONS  (PRN):  dextrose Oral Gel 15 Gram(s) Oral once PRN Blood Glucose LESS THAN 70 milliGRAM(s)/deciliter  ondansetron    Tablet 4 milliGRAM(s) Oral every 6 hours PRN Nausea  oxycodone    5 mG/acetaminophen 325 mG 1 Tablet(s) Oral every 4 hours PRN Moderate Pain (4 - 6)      __________________________________________________  REVIEW OF SYSTEMS:    CONSTITUTIONAL: No fever,   EYES: no acute visual disturbances  NECK: No pain or stiffness  RESPIRATORY: No cough; No shortness of breath  CARDIOVASCULAR: No chest pain, no palpitations  GASTROINTESTINAL: + mild abd pain, + some nausea, no emesis  NEUROLOGICAL: No headache or numbness, no tremors  MUSCULOSKELETAL: No joint pain, no muscle pain  GENITOURINARY: no dysuria, no frequency, no hesitancy  PSYCHIATRY: no depression , no anxiety  ALL OTHER  ROS negative        Vital Signs Last 24 Hrs  T(C): 37 (19 Dec 2022 12:45), Max: 37 (19 Dec 2022 12:45)  T(F): 98.6 (19 Dec 2022 12:45), Max: 98.6 (19 Dec 2022 12:45)  HR: 102 (19 Dec 2022 12:45) (80 - 102)  BP: 116/72 (19 Dec 2022 12:45) (108/66 - 116/72)  BP(mean): 82 (19 Dec 2022 12:45) (82 - 82)  RR: 18 (19 Dec 2022 12:45) (17 - 18)  SpO2: 93% (19 Dec 2022 12:45) (93% - 97%)    Parameters below as of 19 Dec 2022 12:45  Patient On (Oxygen Delivery Method): nasal cannula  O2 Flow (L/min): 2      ________________________________________________  PHYSICAL EXAM:  GENERAL: NAD  HEENT: Normocephalic;  conjunctivae and sclerae clear  NECK : supple  CHEST/LUNG: Clear to auscultation bilaterally  HEART: S1 S2  RRR  ABDOMEN: Soft, Nontender, Nondistended; Bowel sounds present  EXTREMITIES: no cyanosis; no edema  SKIN: warm and dry; no rash  NERVOUS SYSTEM:  Awake and alert; Oriented  to place, person and time    _________________________________________________  LABS:                        12.9   4.89  )-----------( 163      ( 19 Dec 2022 05:50 )             38.3     12-19    133<L>  |  93<L>  |  13  ----------------------------<  113<H>  4.0   |  33<H>  |  0.49<L>    Ca    8.7      19 Dec 2022 05:50    TPro  7.4  /  Alb  1.7<L>  /  TBili  0.4  /  DBili  0.2  /  AST  43<H>  /  ALT  44  /  AlkPhos  169<H>  12-19        CAPILLARY BLOOD GLUCOSE      POCT Blood Glucose.: 142 mg/dL (19 Dec 2022 16:37)  POCT Blood Glucose.: 93 mg/dL (19 Dec 2022 11:33)  POCT Blood Glucose.: 107 mg/dL (19 Dec 2022 07:50)  POCT Blood Glucose.: 107 mg/dL (18 Dec 2022 21:24)        RADIOLOGY & ADDITIONAL TESTS:  < from: MR MRCP w/wo IV Cont (12.12.22 @ 16:03) >  IMPRESSION:  1.  Intrahepatic and extrahepatic biliary dilatation with tapering of the   common bile duct at the ampulla suggesting ampullary stenosis. Decreased   signal at the level of the ampulla may represent a small amount of   sludge/debris.  2.  Diffuse hepatic steatosis with focal hepatic steatosis within the   LEFT lobe.    < end of copied text >    < from: CT Abdomen and Pelvis w/ IV Cont (12.08.22 @ 17:20) >  IMPRESSION:  No evidence of bowel obstruction.    Dilated CBD up to 1.7 cm which can be seen with cholecystectomy, however   there is abrupt cut off ofthe distal CBD and  mild pancreatic duct   dilatation. Choledocholithiasis or a papillary process cannot be entirely   excluded. Correlate with liver function tests. If there is clinical   concern, consider MRI MRCP with contrast for further evaluation.    Hepatic steatosis with a focal area of increased hypoattenuation which   likely represents pronounced focal fatty changes, less likely underlying   mass. This finding could also be further evaluated on MRI/MRCP as   recommended above.    Non complicated colonic diverticulosis, no diverticulitis.    < end of copied text >      Imaging Personally Reviewed:  YES    Consultant(s) Notes Reviewed:   YES      Plan of care was discussed with patient and /or primary care giver; all questions and concerns were addressed and care was aligned with patient's wishes.

## 2022-12-19 NOTE — PROGRESS NOTE ADULT - NS ATTEND AMEND GEN_ALL_CORE FT
- Abdominal pain.  - N/v.  - Biliary sludge.    Patient doing overall ok post ERCP. Still intermittent n/v, unchanged even compared to prior ERCP, will assess for alternative etiologies. No abd pain. Reconsult advanced endoscopy PRN.    Total time spent to complete patient's bedside assessment, physical examination, review medical chart including labs & imaging, discuss medical plan of care with housestaff was more than 25 minutes
- Biliary dilation.  - Choledocholithiasis.  - Abd pain.  - N/v.    Date of service 12/15/22. Tentative plans for ERCP tomorrow. Symptoms unchanged. Abd soft.    Total time spent to complete patient's bedside assessment, physical examination, review medical chart including labs & imaging, discuss medical plan of care with housestaff was more than 25 minutes
No new complaints. Advance diet as tolerated. Tentative plans for ERCP Friday.     Total time spent to complete patient's bedside assessment, physical examination, review medical chart including labs & imaging, discuss medical plan of care with housestaff was more than 25 minutes

## 2022-12-19 NOTE — PROGRESS NOTE ADULT - SUBJECTIVE AND OBJECTIVE BOX
PATIENT SEEN AND EXAMINED ON :- 12/19/22  DATE OF SERVICE:  12/19/22           Interim events noted,Labs ,Radiological studies and Cardiology tests reviewed .    TELEMETRY REWIEVED >    PRESENTING CC:    SUBJ:       PMH -reviewed admission note, no change since admission  Heart failure: acute [ ] chronic [ ] acute or chronic [ ] diastolic [ ] systolic [ ] combined systolic and diastolic[ ]  DILCIA: ATN[ ] renal medullary necrosis [ ] CKD I [ ]CKDII [ ]CKD III [ ]CKD IV [ ]CKD V [ ]Other pathological lesions [ ]    MEDICATIONS  (STANDING):  apixaban 5 milliGRAM(s) Oral every 12 hours  atorvastatin 10 milliGRAM(s) Oral at bedtime  baclofen 5 milliGRAM(s) Oral every 12 hours  dextrose 5%. 1000 milliLiter(s) (100 mL/Hr) IV Continuous <Continuous>  dextrose 5%. 1000 milliLiter(s) (50 mL/Hr) IV Continuous <Continuous>  dextrose 50% Injectable 25 Gram(s) IV Push once  dextrose 50% Injectable 12.5 Gram(s) IV Push once  dextrose 50% Injectable 25 Gram(s) IV Push once  famotidine    Tablet 20 milliGRAM(s) Oral two times a day  glucagon  Injectable 1 milliGRAM(s) IntraMuscular once  insulin lispro (ADMELOG) corrective regimen sliding scale   SubCutaneous three times a day before meals  metoprolol tartrate 25 milliGRAM(s) Oral two times a day  NIFEdipine XL 60 milliGRAM(s) Oral daily  polyethylene glycol 3350 17 Gram(s) Oral daily  pregabalin 100 milliGRAM(s) Oral three times a day  senna 2 Tablet(s) Oral at bedtime  sodium chloride 0.9%. 1000 milliLiter(s) (80 mL/Hr) IV Continuous <Continuous>  tamsulosin 0.4 milliGRAM(s) Oral at bedtime    MEDICATIONS  (PRN):  dextrose Oral Gel 15 Gram(s) Oral once PRN Blood Glucose LESS THAN 70 milliGRAM(s)/deciliter  ondansetron    Tablet 4 milliGRAM(s) Oral every 6 hours PRN Nausea  oxycodone    5 mG/acetaminophen 325 mG 1 Tablet(s) Oral every 4 hours PRN Moderate Pain (4 - 6)              REVIEW OF SYSTEMS:  Constitutional: [ ] fever, [ ]weight loss,  [ ]fatigue  Eyes: [ ] visual changes  Respiratory: [ ]shortness of breath;  [ ] cough, [ ]wheezing, [ ]chills, [ ]hemoptysis  Cardiovascular: [ ] chest pain, [ ]palpitations, [ ]dizziness,  [ ]leg swelling[ ]orthopnea[ ]PND  Gastrointestinal: [ ] abdominal pain, [ ]nausea, [ ]vomiting,  [ ]diarrhea   Genitourinary: [ ] dysuria, [ ] hematuria  Neurologic: [ ] headaches [ ] tremors[ ]weakness  Skin: [ ] itching, [ ]burning, [ ] rashes  Endocrine: [ ] heat or cold intolerance  Musculoskeletal: [ ] joint pain or swelling; [ ] muscle, back, or extremity pain  Psychiatric: [ ] depression, [ ]anxiety, [ ]mood swings, or [ ]difficulty sleeping  Hematologic: [ ] easy bruising, [ ] bleeding gums    [x] All remaining systems negative except as per above.   [ ]Unable to obtain.    Vital Signs Last 24 Hrs  T(C): 37 (19 Dec 2022 12:45), Max: 37 (19 Dec 2022 12:45)  T(F): 98.6 (19 Dec 2022 12:45), Max: 98.6 (19 Dec 2022 12:45)  HR: 102 (19 Dec 2022 12:45) (80 - 102)  BP: 116/72 (19 Dec 2022 12:45) (108/66 - 116/72)  BP(mean): 82 (19 Dec 2022 12:45) (82 - 82)  RR: 18 (19 Dec 2022 12:45) (17 - 18)  SpO2: 93% (19 Dec 2022 12:45) (93% - 97%)    Parameters below as of 19 Dec 2022 12:45  Patient On (Oxygen Delivery Method): nasal cannula  O2 Flow (L/min): 2    I&O's Summary    Orthostatic VS      PHYSICAL EXAM:  General: No acute distress BMI-  HEENT: EOMI, PERRL  Neck: Supple, [ ] JVD  Lungs: Equal air entry bilaterally; [ ] rales [ ] wheezing [ ] rhonchi  Heart: Regular rate and rhythm; [ ] murmur   /6 [ ] systolic [ ] diastolic [ ] radiation[ ] rubs [ ]  gallops  Abdomen: Nontender, bowel sounds present  Extremities: No clubbing, cyanosis, [ ] edema  Nervous system:  Alert & Oriented X3, no focal deficits  Psychiatric: Normal affect  Skin: No rashes or lesions    LABS:  12-19    133<L>  |  93<L>  |  13  ----------------------------<  113<H>  4.0   |  33<H>  |  0.49<L>    Ca    8.7      19 Dec 2022 05:50    TPro  7.4  /  Alb  1.7<L>  /  TBili  0.4  /  DBili  0.2  /  AST  43<H>  /  ALT  44  /  AlkPhos  169<H>  12-19    Creatinine Trend: 0.49<--, 0.45<--, 0.53<--, 0.63<--, 0.52<--, 0.58<--                        12.9   4.89  )-----------( 163      ( 19 Dec 2022 05:50 )             38.3       Lipid Panel:   Cardiac Enzymes:

## 2022-12-19 NOTE — PROGRESS NOTE ADULT - ASSESSMENT
Patient is a 67 year old male, vaccinated, from [facility] with PMHx of DM, HTN, HLD, AF on Eliquis, PVD, presents with c/o vomiting, found to have elevated LFTs. CT scan  showed dilated CBD up to 1.7 cm with abrupt cut off of the distal CBD and mild pancreatic duct dilatation, suggestive of possible Choledocholithiasis. 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 Dr. Kim consulted ERCP performed today 12/16, biliary sludge removal with biliary sphincterotomy and balloon extraction. Patient endorsing mild pain and mild nausea today, possible d/c in AM if improvement in symptoms.

## 2022-12-19 NOTE — PROGRESS NOTE ADULT - PROBLEM SELECTOR PLAN 4
pt takes metoprolol 25mg BID and Eliquis  - continue Metoprolol 25 mg PO with parameters  - Eliquis resume today 12/19

## 2022-12-19 NOTE — PROGRESS NOTE ADULT - PROBLEM SELECTOR PLAN 1
p/w vomiting, improving  -CT A/P showed: Cholecystectomy. Dilated CBD up to 1.7 cm abrupt cut off of the distal CBD and  mild pancreatic duct dilatation.   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 Villanueva following

## 2022-12-19 NOTE — PROGRESS NOTE ADULT - SUBJECTIVE AND OBJECTIVE BOX
[   ] ICU                                          [   ] CCU                                      [ X  ] Medical Floor      Patient is a 67 year old male with vomiting. GI consulted to evaluate.         67 year old male, vaccinated, from [facility] with past medical history significant for DM, HTN, HLD, AFib on eliquis, PVD, presents with vomiting. Patient states that last night he ate a burger, and started to vomit shortly after. Patient states he is unable to eat since then due to feeling nauseous and continues to vomit each time he tries to eat. Patient denies any recent sick contact.  Patient also denies any associated abdominal pain, fever, chills, SOB, chest pain, diarrhea, hematemesis or melena.     Patient is comfortable. No new complaints reported, No abdominal pain, N/V, hematemesis, hematochezia, melena, fever, chills, chest pain, SOB, cough or diarrhea reported.       PAIN MANAGEMENT:  Pain Scale:                0 /10  Pain Location:      Prior Colonoscopy:  Unknown    PAST MEDICAL HISTORY  COPD (chronic obstructive pulmonary disease)    Diabetes    PVD (peripheral vascular disease)    Chronic atrial fibrillation    GERD (gastroesophageal reflux disease)    MDD (major depressive disorder)    BPH (benign prostatic hyperplasia)        PAST SURGICAL HISTORY  Cholecystectomy      Allergies    morphine (Unknown)  penicillin (Unknown)  shellfish (Unknown)    Intolerances  None       SOCIAL HISTORY  Advanced Directives:       [ X ] Full Code       [  ] DNR  Marital Status:         [  ] M      [ X ] S      [  ] D       [  ] W  Children:       [ X ] Yes      [  ] No  Occupation:        [  ] Employed       [ X ] Unemployed       [  ] Retired  Diet:       [X  ] Regular       [  ] PEG feeding          [  ] NG tube feeding  Drug Use:           [ X ] Patient denied          [  ] Yes  Alcohol:           [ X ] No             [  ] Yes (socially)         [  ] Yes (chronic)  Tobacco:           [  ] Yes           [ X ] No      FAMILY HISTORY  [ X ] Heart Disease            [X  ] Diabetes             [ X ] HTN             [  ] Colon Cancer             [  ] Stomach Cancer              [  ] Pancreatic Cancer      VITALS  Vital Signs Last 24 Hrs  T(C): 36.8 (19 Dec 2022 05:04), Max: 36.8 (18 Dec 2022 21:15)  T(F): 98.2 (19 Dec 2022 05:04), Max: 98.2 (18 Dec 2022 21:15)  HR: 92 (19 Dec 2022 05:04) (80 - 92)  BP: 108/66 (19 Dec 2022 05:04) (108/64 - 114/69)  BP(mean): 74 (18 Dec 2022 12:45) (74 - 74)  RR: 17 (19 Dec 2022 05:04) (17 - 18)  SpO2: 94% (19 Dec 2022 05:04) (94% - 97%)  Parameters below as of 19 Dec 2022 05:04  Patient On (Oxygen Delivery Method): nasal cannula  O2 Flow (L/min): 2       MEDICATIONS  (STANDING):  apixaban 5 milliGRAM(s) Oral every 12 hours  atorvastatin 10 milliGRAM(s) Oral at bedtime  baclofen 5 milliGRAM(s) Oral every 12 hours  dextrose 5%. 1000 milliLiter(s) (50 mL/Hr) IV Continuous <Continuous>  dextrose 5%. 1000 milliLiter(s) (100 mL/Hr) IV Continuous <Continuous>  dextrose 50% Injectable 25 Gram(s) IV Push once  dextrose 50% Injectable 12.5 Gram(s) IV Push once  dextrose 50% Injectable 25 Gram(s) IV Push once  famotidine    Tablet 20 milliGRAM(s) Oral two times a day  glucagon  Injectable 1 milliGRAM(s) IntraMuscular once  insulin lispro (ADMELOG) corrective regimen sliding scale   SubCutaneous three times a day before meals  metoprolol tartrate 25 milliGRAM(s) Oral two times a day  NIFEdipine XL 60 milliGRAM(s) Oral daily  polyethylene glycol 3350 17 Gram(s) Oral daily  senna 2 Tablet(s) Oral at bedtime  sodium chloride 0.9%. 1000 milliLiter(s) (80 mL/Hr) IV Continuous <Continuous>  tamsulosin 0.4 milliGRAM(s) Oral at bedtime    MEDICATIONS  (PRN):  dextrose Oral Gel 15 Gram(s) Oral once PRN Blood Glucose LESS THAN 70 milliGRAM(s)/deciliter  ondansetron    Tablet 4 milliGRAM(s) Oral every 6 hours PRN Nausea  oxycodone    5 mG/acetaminophen 325 mG 1 Tablet(s) Oral every 4 hours PRN Moderate Pain (4 - 6)                            12.9   4.89  )-----------( 163      ( 19 Dec 2022 05:50 )             38.3       12-19    133<L>  |  93<L>  |  13  ----------------------------<  113<H>  4.0   |  33<H>  |  0.49<L>    Ca    8.7      19 Dec 2022 05:50    TPro  7.4  /  Alb  1.7<L>  /  TBili  0.4  /  DBili  0.2  /  AST  43<H>  /  ALT  44  /  AlkPhos  169<H>  12-19

## 2022-12-20 LAB
ALBUMIN SERPL ELPH-MCNC: 1.7 G/DL — LOW (ref 3.5–5)
ALP SERPL-CCNC: 158 U/L — HIGH (ref 40–120)
ALT FLD-CCNC: 42 U/L DA — SIGNIFICANT CHANGE UP (ref 10–60)
ANION GAP SERPL CALC-SCNC: 9 MMOL/L — SIGNIFICANT CHANGE UP (ref 5–17)
AST SERPL-CCNC: 54 U/L — HIGH (ref 10–40)
BILIRUB DIRECT SERPL-MCNC: <0.1 MG/DL — SIGNIFICANT CHANGE UP (ref 0–0.3)
BILIRUB SERPL-MCNC: 0.5 MG/DL — SIGNIFICANT CHANGE UP (ref 0.2–1.2)
BUN SERPL-MCNC: 10 MG/DL — SIGNIFICANT CHANGE UP (ref 7–18)
CALCIUM SERPL-MCNC: 8.6 MG/DL — SIGNIFICANT CHANGE UP (ref 8.4–10.5)
CHLORIDE SERPL-SCNC: 95 MMOL/L — LOW (ref 96–108)
CO2 SERPL-SCNC: 27 MMOL/L — SIGNIFICANT CHANGE UP (ref 22–31)
CREAT SERPL-MCNC: 0.37 MG/DL — LOW (ref 0.5–1.3)
EGFR: 122 ML/MIN/1.73M2 — SIGNIFICANT CHANGE UP
GLUCOSE BLDC GLUCOMTR-MCNC: 101 MG/DL — HIGH (ref 70–99)
GLUCOSE BLDC GLUCOMTR-MCNC: 104 MG/DL — HIGH (ref 70–99)
GLUCOSE BLDC GLUCOMTR-MCNC: 107 MG/DL — HIGH (ref 70–99)
GLUCOSE BLDC GLUCOMTR-MCNC: 91 MG/DL — SIGNIFICANT CHANGE UP (ref 70–99)
GLUCOSE SERPL-MCNC: 103 MG/DL — HIGH (ref 70–99)
HCT VFR BLD CALC: 36.1 % — LOW (ref 39–50)
HGB BLD-MCNC: 12.3 G/DL — LOW (ref 13–17)
MCHC RBC-ENTMCNC: 31.4 PG — SIGNIFICANT CHANGE UP (ref 27–34)
MCHC RBC-ENTMCNC: 34.1 GM/DL — SIGNIFICANT CHANGE UP (ref 32–36)
MCV RBC AUTO: 92.1 FL — SIGNIFICANT CHANGE UP (ref 80–100)
NRBC # BLD: 0 /100 WBCS — SIGNIFICANT CHANGE UP (ref 0–0)
PLATELET # BLD AUTO: 152 K/UL — SIGNIFICANT CHANGE UP (ref 150–400)
POTASSIUM SERPL-MCNC: 4.9 MMOL/L — SIGNIFICANT CHANGE UP (ref 3.5–5.3)
POTASSIUM SERPL-SCNC: 4.9 MMOL/L — SIGNIFICANT CHANGE UP (ref 3.5–5.3)
PROT SERPL-MCNC: 7.6 G/DL — SIGNIFICANT CHANGE UP (ref 6–8.3)
RBC # BLD: 3.92 M/UL — LOW (ref 4.2–5.8)
RBC # FLD: 16.2 % — HIGH (ref 10.3–14.5)
SARS-COV-2 RNA SPEC QL NAA+PROBE: SIGNIFICANT CHANGE UP
SODIUM SERPL-SCNC: 131 MMOL/L — LOW (ref 135–145)
SURGICAL PATHOLOGY STUDY: SIGNIFICANT CHANGE UP
WBC # BLD: 4.03 K/UL — SIGNIFICANT CHANGE UP (ref 3.8–10.5)
WBC # FLD AUTO: 4.03 K/UL — SIGNIFICANT CHANGE UP (ref 3.8–10.5)

## 2022-12-20 RX ADMIN — Medication 5 MILLIGRAM(S): at 17:33

## 2022-12-20 RX ADMIN — OXYCODONE AND ACETAMINOPHEN 1 TABLET(S): 5; 325 TABLET ORAL at 04:17

## 2022-12-20 RX ADMIN — OXYCODONE AND ACETAMINOPHEN 1 TABLET(S): 5; 325 TABLET ORAL at 13:00

## 2022-12-20 RX ADMIN — FAMOTIDINE 20 MILLIGRAM(S): 10 INJECTION INTRAVENOUS at 17:33

## 2022-12-20 RX ADMIN — OXYCODONE AND ACETAMINOPHEN 1 TABLET(S): 5; 325 TABLET ORAL at 12:00

## 2022-12-20 RX ADMIN — APIXABAN 5 MILLIGRAM(S): 2.5 TABLET, FILM COATED ORAL at 06:06

## 2022-12-20 RX ADMIN — FAMOTIDINE 20 MILLIGRAM(S): 10 INJECTION INTRAVENOUS at 06:06

## 2022-12-20 RX ADMIN — ATORVASTATIN CALCIUM 10 MILLIGRAM(S): 80 TABLET, FILM COATED ORAL at 21:39

## 2022-12-20 RX ADMIN — Medication 5 MILLIGRAM(S): at 06:08

## 2022-12-20 RX ADMIN — Medication 100 MILLIGRAM(S): at 06:06

## 2022-12-20 RX ADMIN — Medication 25 MILLIGRAM(S): at 06:07

## 2022-12-20 RX ADMIN — TAMSULOSIN HYDROCHLORIDE 0.4 MILLIGRAM(S): 0.4 CAPSULE ORAL at 21:39

## 2022-12-20 RX ADMIN — POLYETHYLENE GLYCOL 3350 17 GRAM(S): 17 POWDER, FOR SOLUTION ORAL at 12:01

## 2022-12-20 RX ADMIN — Medication 100 MILLIGRAM(S): at 13:43

## 2022-12-20 RX ADMIN — OXYCODONE AND ACETAMINOPHEN 1 TABLET(S): 5; 325 TABLET ORAL at 04:47

## 2022-12-20 RX ADMIN — Medication 100 MILLIGRAM(S): at 21:39

## 2022-12-20 RX ADMIN — APIXABAN 5 MILLIGRAM(S): 2.5 TABLET, FILM COATED ORAL at 17:33

## 2022-12-20 RX ADMIN — Medication 25 MILLIGRAM(S): at 17:33

## 2022-12-20 RX ADMIN — Medication 60 MILLIGRAM(S): at 06:07

## 2022-12-20 NOTE — PROGRESS NOTE ADULT - PROBLEM SELECTOR PROBLEM 2
Urinary retention

## 2022-12-20 NOTE — PROGRESS NOTE ADULT - ASSESSMENT
Patient is a 67 year old male, vaccinated, from [facility] with PMHx of DM, HTN, HLD, AF on Eliquis, PVD, presents with c/o vomiting, found to have elevated LFTs. CT scan  showed dilated CBD up to 1.7 cm with abrupt cut off of the distal CBD and mild pancreatic duct dilatation, suggestive of possible Choledocholithiasis. 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 Dr. Kim consulted ERCP performed today 12/16, biliary sludge removal with biliary sphincterotomy and balloon extraction. Patient endorsing mild pain and mild nausea today, possible d/c in AM if improvement in symptoms.  12/20-Pt. c/o nausea this am, noted with poor appetite for breakfast.  GI note appreciated, recc gastric emptying study which can likely be performed as OP.  Also pt. not cooperating with PT today stating he has too much pain in his legs.

## 2022-12-20 NOTE — PROGRESS NOTE ADULT - PROBLEM SELECTOR PROBLEM 3
Elevated LFTs

## 2022-12-20 NOTE — PROGRESS NOTE ADULT - PROBLEM SELECTOR PLAN 6
Will start ss  Diabetic diet  Monitor BS
Will start ss  Diabetic diet  Monitor BS
Will start ss  A1c - 5.5   Diabetic diet  Monitor BS
- glucose controlled  - A1c - 5.5%   - Diabetic diet  - continue ISS  - Monitor glucose
Will start ss  Diabetic diet  Monitor BS
Will start ss  - A1c - 5.5   - Diabetic diet  - Monitor BS
Will start ss  - A1c - 5.5   - Diabetic diet  - Monitor BS
- glucose controlled  - A1c - 5.5%   - Diabetic diet  - continue ISS  - Monitor glucose
Will start ss  A1c - 5.5   Diabetic diet  Monitor BS
- glucose controlled  - A1c - 5.5%   - Diabetic diet  - continue ISS  - Monitor glucose
Will start ss  Diabetic diet  Monitor BS
Will start ss  - A1c - 5.5   - Diabetic diet  - Monitor BS
Will start ss  - A1c - 5.5   - Diabetic diet  - Monitor BS

## 2022-12-20 NOTE — PROGRESS NOTE ADULT - PROBLEM SELECTOR PROBLEM 8
DVT prophylaxis

## 2022-12-20 NOTE — PROGRESS NOTE ADULT - PROBLEM SELECTOR PLAN 4
pt takes metoprolol 25mg BID and Eliquis  - continue Metoprolol 25 mg PO with parameters  - Eliquis resumed 12/19

## 2022-12-20 NOTE — PROGRESS NOTE ADULT - SUBJECTIVE AND OBJECTIVE BOX
[   ] ICU                                          [   ] CCU                                      [ X  ] Medical Floor      Patient is a 67 year old male with vomiting. GI consulted to evaluate.         67 year old male, vaccinated, from [facility] with past medical history significant for DM, HTN, HLD, AFib on eliquis, PVD, presents with vomiting. Patient states that last night he ate a burger, and started to vomit shortly after. Patient states he is unable to eat since then due to feeling nauseous and continues to vomit each time he tries to eat. Patient denies any recent sick contact.  Patient also denies any associated abdominal pain, fever, chills, SOB, chest pain, diarrhea, hematemesis or melena.     Patient is comfortable. Patient is still c/o nausea but no new complaints otherwise. No abdominal pain, vomiting, hematemesis, hematochezia, melena, fever, chills, chest pain, SOB, cough or diarrhea reported.       PAIN MANAGEMENT:  Pain Scale:                0 /10  Pain Location:      Prior Colonoscopy:  Unknown    PAST MEDICAL HISTORY  COPD (chronic obstructive pulmonary disease)    Diabetes    PVD (peripheral vascular disease)    Chronic atrial fibrillation    GERD (gastroesophageal reflux disease)    MDD (major depressive disorder)    BPH (benign prostatic hyperplasia)        PAST SURGICAL HISTORY  Cholecystectomy      Allergies    morphine (Unknown)  penicillin (Unknown)  shellfish (Unknown)    Intolerances  None       SOCIAL HISTORY  Advanced Directives:       [ X ] Full Code       [  ] DNR  Marital Status:         [  ] M      [ X ] S      [  ] D       [  ] W  Children:       [ X ] Yes      [  ] No  Occupation:        [  ] Employed       [ X ] Unemployed       [  ] Retired  Diet:       [X  ] Regular       [  ] PEG feeding          [  ] NG tube feeding  Drug Use:           [ X ] Patient denied          [  ] Yes  Alcohol:           [ X ] No             [  ] Yes (socially)         [  ] Yes (chronic)  Tobacco:           [  ] Yes           [ X ] No      FAMILY HISTORY  [ X ] Heart Disease            [X  ] Diabetes             [ X ] HTN             [  ] Colon Cancer             [  ] Stomach Cancer              [  ] Pancreatic Cancer        VITALS  Vital Signs Last 24 Hrs  T(C): 36.4 (20 Dec 2022 12:51), Max: 36.8 (19 Dec 2022 20:32)  T(F): 97.6 (20 Dec 2022 12:51), Max: 98.3 (19 Dec 2022 20:32)  HR: 87 (20 Dec 2022 12:51) (82 - 87)  BP: 112/71 (20 Dec 2022 12:51) (112/71 - 119/70)   RR: 18 (20 Dec 2022 12:51) (17 - 18)  SpO2: 93% (20 Dec 2022 12:51) (91% - 94%)  Parameters below as of 20 Dec 2022 12:51  Patient On (Oxygen Delivery Method): nasal cannula  O2 Flow (L/min): 2       MEDICATIONS  (STANDING):  apixaban 5 milliGRAM(s) Oral every 12 hours  atorvastatin 10 milliGRAM(s) Oral at bedtime  baclofen 5 milliGRAM(s) Oral every 12 hours  dextrose 5%. 1000 milliLiter(s) (50 mL/Hr) IV Continuous <Continuous>  dextrose 5%. 1000 milliLiter(s) (100 mL/Hr) IV Continuous <Continuous>  dextrose 50% Injectable 25 Gram(s) IV Push once  dextrose 50% Injectable 12.5 Gram(s) IV Push once  dextrose 50% Injectable 25 Gram(s) IV Push once  famotidine    Tablet 20 milliGRAM(s) Oral two times a day  glucagon  Injectable 1 milliGRAM(s) IntraMuscular once  insulin lispro (ADMELOG) corrective regimen sliding scale   SubCutaneous three times a day before meals  metoprolol tartrate 25 milliGRAM(s) Oral two times a day  NIFEdipine XL 60 milliGRAM(s) Oral daily  polyethylene glycol 3350 17 Gram(s) Oral daily  pregabalin 100 milliGRAM(s) Oral three times a day  senna 2 Tablet(s) Oral at bedtime  sodium chloride 0.9%. 1000 milliLiter(s) (80 mL/Hr) IV Continuous <Continuous>  tamsulosin 0.4 milliGRAM(s) Oral at bedtime    MEDICATIONS  (PRN):  dextrose Oral Gel 15 Gram(s) Oral once PRN Blood Glucose LESS THAN 70 milliGRAM(s)/deciliter  ondansetron    Tablet 4 milliGRAM(s) Oral every 6 hours PRN Nausea  oxycodone    5 mG/acetaminophen 325 mG 1 Tablet(s) Oral every 4 hours PRN Moderate Pain (4 - 6)                            12.3   4.03  )-----------( 152      ( 20 Dec 2022 07:10 )             36.1       12-20    131<L>  |  95<L>  |  10  ----------------------------<  103<H>  4.9   |  27  |  0.37<L>    Ca    8.6      20 Dec 2022 07:10    TPro  7.6  /  Alb  1.7<L>  /  TBili  0.5  /  DBili  <0.1  /  AST  54<H>  /  ALT  42  /  AlkPhos  158<H>  12-20

## 2022-12-20 NOTE — PROGRESS NOTE ADULT - TIME BILLING
- Review of records, telemetry, vital signs and daily labs.   - General and cardiovascular physical examination.  - Generation of cardiovascular treatment plan.  - Coordination of care.      Patient was seen and examined by me on 12/20/22,interim events noted,labs and radiology studies reviewed.  Nasir Sharma MD,FACC.  [address] [place], [place] [id number].  [phone]

## 2022-12-20 NOTE — PROGRESS NOTE ADULT - SUBJECTIVE AND OBJECTIVE BOX
NP Note discussed with  Primary Attending    Patient is a 67y old  Male who presents with a chief complaint of Vomiting (19 Dec 2022 08:12)      INTERVAL HPI/OVERNIGHT EVENTS: no new complaints    MEDICATIONS  (STANDING):  apixaban 5 milliGRAM(s) Oral every 12 hours  atorvastatin 10 milliGRAM(s) Oral at bedtime  baclofen 5 milliGRAM(s) Oral every 12 hours  dextrose 5%. 1000 milliLiter(s) (50 mL/Hr) IV Continuous <Continuous>  dextrose 5%. 1000 milliLiter(s) (100 mL/Hr) IV Continuous <Continuous>  dextrose 50% Injectable 25 Gram(s) IV Push once  dextrose 50% Injectable 12.5 Gram(s) IV Push once  dextrose 50% Injectable 25 Gram(s) IV Push once  famotidine    Tablet 20 milliGRAM(s) Oral two times a day  glucagon  Injectable 1 milliGRAM(s) IntraMuscular once  insulin lispro (ADMELOG) corrective regimen sliding scale   SubCutaneous three times a day before meals  metoprolol tartrate 25 milliGRAM(s) Oral two times a day  NIFEdipine XL 60 milliGRAM(s) Oral daily  polyethylene glycol 3350 17 Gram(s) Oral daily  pregabalin 100 milliGRAM(s) Oral three times a day  senna 2 Tablet(s) Oral at bedtime  sodium chloride 0.9%. 1000 milliLiter(s) (80 mL/Hr) IV Continuous <Continuous>  tamsulosin 0.4 milliGRAM(s) Oral at bedtime    MEDICATIONS  (PRN):  dextrose Oral Gel 15 Gram(s) Oral once PRN Blood Glucose LESS THAN 70 milliGRAM(s)/deciliter  ondansetron    Tablet 4 milliGRAM(s) Oral every 6 hours PRN Nausea  oxycodone    5 mG/acetaminophen 325 mG 1 Tablet(s) Oral every 4 hours PRN Moderate Pain (4 - 6)      __________________________________________________  REVIEW OF SYSTEMS:    CONSTITUTIONAL: No fever,   EYES: no acute visual disturbances  NECK: No pain or stiffness  RESPIRATORY: No cough; No shortness of breath  CARDIOVASCULAR: No chest pain, no palpitations  GASTROINTESTINAL: No pain. No nausea or vomiting; No diarrhea   NEUROLOGICAL: No headache or numbness, no tremors  MUSCULOSKELETAL: No joint pain, no muscle pain  GENITOURINARY: no dysuria, no frequency, no hesitancy  PSYCHIATRY: no depression , no anxiety  ALL OTHER  ROS negative        Vital Signs Last 24 Hrs  T(C): 36.4 (20 Dec 2022 12:51), Max: 36.8 (19 Dec 2022 20:32)  T(F): 97.6 (20 Dec 2022 12:51), Max: 98.3 (19 Dec 2022 20:32)  HR: 87 (20 Dec 2022 12:51) (82 - 87)  BP: 112/71 (20 Dec 2022 12:51) (112/71 - 119/70)  BP(mean): --  RR: 18 (20 Dec 2022 12:51) (17 - 18)  SpO2: 93% (20 Dec 2022 12:51) (91% - 94%)    Parameters below as of 20 Dec 2022 12:51  Patient On (Oxygen Delivery Method): nasal cannula  O2 Flow (L/min): 2      ________________________________________________  PHYSICAL EXAM:  well developed, well groome,   GENERAL: NAD  HEENT: Normocephalic;  conjunctivae and sclerae clear; moist mucous membranes;   NECK : supple  CHEST/LUNG: Clear to auscultation bilaterally with good air entry   HEART: S1 S2  regular; no murmurs, gallops or rubs  ABDOMEN: Soft, Nontender, Nondistended; Bowel sounds present  EXTREMITIES: no cyanosis; no edema; no calf tenderness  SKIN: warm and dry; no rash  NERVOUS SYSTEM:  Awake and alert; Oriented  to place, person and time ; no new deficits    _________________________________________________  LABS:                        12.3   4.03  )-----------( 152      ( 20 Dec 2022 07:10 )             36.1     12-20    131<L>  |  95<L>  |  10  ----------------------------<  103<H>  4.9   |  27  |  0.37<L>    Ca    8.6      20 Dec 2022 07:10    TPro  7.6  /  Alb  1.7<L>  /  TBili  0.5  /  DBili  <0.1  /  AST  54<H>  /  ALT  42  /  AlkPhos  158<H>  12-20        CAPILLARY BLOOD GLUCOSE      POCT Blood Glucose.: 104 mg/dL (20 Dec 2022 11:25)  POCT Blood Glucose.: 91 mg/dL (20 Dec 2022 07:34)  POCT Blood Glucose.: 97 mg/dL (19 Dec 2022 21:20)  POCT Blood Glucose.: 142 mg/dL (19 Dec 2022 16:37)    RADIOLOGY & ADDITIONAL TESTS:    < from: MR MRCP w/wo IV Cont (12.12.22 @ 16:03) >    ACC: [id number] EXAM:  MR MRCP WAW IC                          PROCEDURE DATE:  12/12/2022          INTERPRETATION:  CLINICAL INFORMATION: 67-year-old with dilated common   bile duct and concern for choledocholithiasis. Abnormal CT.    COMPARISON: CTdated December 08, 2022    CONTRAST/COMPLICATIONS:  IV Contrast: Gadavist  6 cc administered   1.5 cc discarded  Oral Contrast: NONE  Complications: None reported at time of study completion    PROCEDURE:  MRI of the abdomen was performed.  MRCP was performed.    FINDINGS:  LOWER CHEST: Atelectasis and/or consolidation is seen in the RIGHT base.   This appears more prominent than was seen on the prior CT.    LIVER: Diffuse hepatic steatosis. Lesion described on CT within the LEFT   lobe of the liver corresponds to focal hepatic steatosis.  BILE DUCTS: Common bile duct is markedly dilated measuring 1.6 cm in   diameter. There is mild to moderate intrahepatic biliary dilatation as   well as dilatation of the cystic duct stump. Common bile duct is dilated   down to the level of the ampulla. There is a small amount of sludge   versus debris within the distal common bile duct at the level of the   ampulla. 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  GALLBLADDER: Cholecystectomy.  SPLEEN: Within normal limits.  PANCREAS: Within normal limits.  ADRENALS: Within normal limits.  KIDNEYS/URETERS: Within normal limits.    VISUALIZED PORTIONS:  BOWEL: Within normal limits.  PERITONEUM: No ascites.  VESSELS: Within normal limits.  RETROPERITONEUM/LYMPH NODES: No lymphadenopathy.  ABDOMINAL WALL: Within normal limits.  BONES: Within normal limits.    IMPRESSION:  1.  Intrahepatic and extrahepatic biliary dilatation with tapering of the   common bile duct at the ampulla suggesting ampullary stenosis. Decreased   signal at the level of the ampulla may represent a small amount of   sludge/debris.  2.  Diffuse hepatic steatosis with focal hepatic steatosis within the   LEFT lobe.      --- End of Report ---    < end of copied text >      < from: CT Abdomen and Pelvis w/ IV Cont (12.08.22 @ 17:20) >    ACC: [id number] EXAM:  CT ABDOMEN AND PELVIS IC                          PROCEDURE DATE:  12/08/2022          INTERPRETATION:  CLINICAL INFORMATION: 67-year-old male patient with   vomiting    COMPARISON: None.    CONTRAST/COMPLICATIONS:  IV Contrast: Omnipaque 350  90 cc administered   10 cc discarded  Oral Contrast: NONE  Complications: None reported at time of study completion    PROCEDURE:  CT of the Abdomen and Pelvis was performed.  Sagittal and coronal reformats were performed.    FINDINGS:  LOWER CHEST: Bibasilar atelectatic changes. Coronary artery   calcifications. Aortic root calcifications. Normal-sized heart.    LIVER: Severe hepatic steatosis. Ill-defined focal hypodensity in   subcapsular location of segment 5 anteriorly measuring approximately 1.8   x 2.5 x 1.7 cm likely represents prominent focal fatty changes.  BILE DUCTS: No intrahepatic biliary duct dilatation. The CBD is dilated   to 1.8 cm with abrupt cut off of the distal CBD proximal to the papilla.  GALLBLADDER: Cholecystectomy.  SPLEEN: Within normal limits.  PANCREAS: Mild diffuse pancreatic parenchymal atrophy. Dilated proximal   pancreatic duct measuring 6 mm.  ADRENALS: Within normal limits.  KIDNEYS/URETERS: No hydronephrosis or nephroureterolithiasis. Symmetric   enhancement. No enhancing renal masses. Subcentimeter left midpole   cortical cyst.    BLADDER: Within normal limits.  REPRODUCTIVE ORGANS: Prostate within normal limits.    BOWEL: No acute appendicitis. Pancolonic diverticulosis, most prominent   in the sigmoid and descending colon without acute inflammation. No   evidence of a normal bowel wall thickening. The remainder of the colon is   collapsed. Small bowel loops of normal caliber.No bowel obstruction.  PERITONEUM: No ascites.  VESSELS: Atherosclerotic changes. Fusiform dilatation of the celiac axis.   Patent celiac axis and SMA with mild atherosclerotic changes. Patent   renal arteries. Patent hepatic veins, portal vein, SMV and splenic vein.  RETROPERITONEUM/LYMPH NODES: No lymphadenopathy.  ABDOMINAL WALL: Within normal limits.  BONES: Degenerative changes. Osteopenia.    IMPRESSION:  No evidence of bowel obstruction.    Dilated CBD up to 1.7 cm which can be seen with cholecystectomy, however   there is abrupt cut off ofthe distal CBD and  mild pancreatic duct   dilatation. Choledocholithiasis or a papillary process cannot be entirely   excluded. Correlate with liver function tests. If there is clinical   concern, consider MRI MRCP with contrast for further evaluation.    Hepatic steatosis with a focal area of increased hypoattenuation which   likely represents pronounced focal fatty changes, less likely underlying   mass. This finding could also be further evaluated on MRI/MRCP as   recommended above.    Non complicated colonic diverticulosis, no diverticulitis.    < end of copied text >    Imaging Personally Reviewed:  YES/NO    Consultant(s) Notes Reviewed:   YES/ No    Care Discussed with Consultants :     Plan of care was discussed with patient and /or primary care giver; all questions and concerns were addressed and care was aligned with patient's wishes.

## 2022-12-20 NOTE — PROGRESS NOTE ADULT - PROBLEM SELECTOR PLAN 1
p/w vomiting, improving  -CT A/P showed: Cholecystectomy. Dilated CBD up to 1.7 cm abrupt cut off of the distal CBD and  mild pancreatic duct dilatation.   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 Villanueva following

## 2022-12-20 NOTE — PROGRESS NOTE ADULT - PROBLEM SELECTOR PLAN 4
pt takes metoprolol 25mg BID and Eliquis  - continue Metoprolol 25 mg PO with parameters  - Eliquis resume today 12/19 pt takes metoprolol 25mg BID and Eliquis  - continue Metoprolol 25 mg PO with parameters  - Eliquis resumed 12/19

## 2022-12-20 NOTE — PROGRESS NOTE ADULT - ASSESSMENT
1. Abdominal pain and vomiting  2. Dilated CBD  3. R/o CBD stone  4. Dilated pancreatic duct  5. S/p ERCP with CBD sludge extraction  6. R/o gastroparesis    Suggestions:    1. Follow up LFT's  2. Diet as tolerated  3. Protonix daily  4. Avoid NSAID  5. Zofran PRN  6. Check gastric emptying study  7. DVT prophylaxis

## 2022-12-20 NOTE — PROGRESS NOTE ADULT - PROBLEM SELECTOR PROBLEM 7
HLD (hyperlipidemia)
Yes

## 2022-12-20 NOTE — PROGRESS NOTE ADULT - PROBLEM SELECTOR PLAN 1
p/w vomiting, improving  -CT A/P showed: Cholecystectomy. Dilated CBD up to 1.7 cm abrupt cut off of the distal CBD and  mild pancreatic duct dilatation.   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 Villanueva following p/w vomiting, improving  -CT A/P showed: Cholecystectomy. Dilated CBD up to 1.7 cm abrupt cut off of the distal CBD and  mild pancreatic duct dilatation.   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 Villanueva following

## 2022-12-20 NOTE — PROGRESS NOTE ADULT - PROBLEM SELECTOR PROBLEM 9
Discharge planning issues

## 2022-12-20 NOTE — PROGRESS NOTE ADULT - PROBLEM SELECTOR PROBLEM 6
Diabetes

## 2022-12-20 NOTE — PROGRESS NOTE ADULT - ASSESSMENT
Patient is a 67 year old male, vaccinated, from [facility] with PMHx of DM, HTN, HLD, AF on Eliquis, PVD, presents with c/o vomiting, found to have elevated LFTs. CT scan  showed dilated CBD up to 1.7 cm with abrupt cut off of the distal CBD and mild pancreatic duct dilatation, suggestive of possible Choledocholithiasis. 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 Dr. Kim consulted ERCP performed today 12/16, biliary sludge removal with biliary sphincterotomy and balloon extraction. Patient endorsing mild pain and mild nausea today, possible d/c in AM if improvement in symptoms. Patient is a 67 year old male, vaccinated, from [facility] with PMHx of DM, HTN, HLD, AF on Eliquis, PVD, presents with c/o vomiting, found to have elevated LFTs. CT scan  showed dilated CBD up to 1.7 cm with abrupt cut off of the distal CBD and mild pancreatic duct dilatation, suggestive of possible Choledocholithiasis. 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 Dr. Kim consulted ERCP performed today 12/16, biliary sludge removal with biliary sphincterotomy and balloon extraction. Patient endorsing mild pain and mild nausea today, possible d/c in AM if improvement in symptoms.  12/20-Pt. c/o nausea this am, noted with poor appetite for breakfast.  GI note appreciated, recc gastric emptying study which can likely be performed as OP.  Also pt. not cooperating with PT today stating he has too much pain in his legs.

## 2022-12-20 NOTE — PROGRESS NOTE ADULT - PROBLEM SELECTOR PLAN 7
- continue home dose Atorvastatin
pt takes atorvastatin at home  c/w home meds
pt takes atorvastatin at home  c/w home meds  - Atorvastatin 10mg
pt takes atorvastatin at home  c/w home meds
pt takes atorvastatin at home  c/w home meds  - Atorvastatin 10mg
pt takes atorvastatin at home  c/w home meds  - Atorvastatin 10mg
- continue home dose Atorvastatin
- continue home dose Atorvastatin
pt takes atorvastatin at home  c/w home meds
pt takes atorvastatin at home  c/w home meds  - Atorvastatin 10mg
- continue home dose Atorvastatin
- continue home dose Atorvastatin
pt takes atorvastatin at home  c/w home meds
pt takes atorvastatin at home  c/w home meds  - Atorvastatin 10mg
pt takes atorvastatin at home  c/w home meds  - Atorvastatin 10mg

## 2022-12-20 NOTE — PROGRESS NOTE ADULT - SUBJECTIVE AND OBJECTIVE BOX
PATIENT SEEN AND EXAMINED ON :- 12/20/22  DATE OF SERVICE:    12/20/22         Interim events noted,Labs ,Radiological studies and Cardiology tests reviewed .         HOSPITAL COURSE: HPI:  67 year old male, vaccinated, from [facility] with PMHx of DM, HTN, HLD, AF on eliquis, PVD, presents with vomiting. Patient states that last night he ate a burger, and started to vomit shortly after. Patient states he is unable to eat since then due to feeling nauseous and continues to vomit each time he tries to eat. Patient denies any recent sick contact.  Patient also denies any associated abdominal pain, fever, chills, SOB, chest pain, diarrhea, hematemesis or melena.     In the ED:  Afebrile, hemodynamically stable  No leukocytosis  Bili WNL, , ,   CT A/P showed: Cholecystectomy +Dilated CBD up to 1.7 cm abrupt cut off of the distal CBD and  mild pancreatic duct dilatation.      (08 Dec 2022 19:05)      INTERIM EVENTS:Patient seen at bedside ,interim events noted.      PMH -reviewed admission note, no change since admission  HEART FAILURE: Acute[ ]Chronic[ ] Systolic[ ] Diastolic[ ] Combined Systolic and Diastolic[ ]  CAD[ ] CABG[ ] PCI[ ]  DEVICES[ ] PPM[ ] ICD[ ] ILR[ ]  ATRIAL FIBRILLATION[ ] Paroxysmal[ ] Permanent[ ] CHADS2-[  ]  DILCIA[ ] CKD1[ ] CKD2[ ] CKD3[ ] CKD4[ ] ESRD[ ]  COPD[ ] HTN[ ]   DM[ ] Type1[ ] Type 2[ ]   CVA[ ] Paresis[ ]    AMBULATION: Assisted[ ] Cane/walker[ ] Independent[ ]    MEDICATIONS  (STANDING):  apixaban 5 milliGRAM(s) Oral every 12 hours  atorvastatin 10 milliGRAM(s) Oral at bedtime  baclofen 5 milliGRAM(s) Oral every 12 hours  dextrose 5%. 1000 milliLiter(s) (50 mL/Hr) IV Continuous <Continuous>  dextrose 5%. 1000 milliLiter(s) (100 mL/Hr) IV Continuous <Continuous>  dextrose 50% Injectable 25 Gram(s) IV Push once  dextrose 50% Injectable 25 Gram(s) IV Push once  dextrose 50% Injectable 12.5 Gram(s) IV Push once  famotidine    Tablet 20 milliGRAM(s) Oral two times a day  glucagon  Injectable 1 milliGRAM(s) IntraMuscular once  insulin lispro (ADMELOG) corrective regimen sliding scale   SubCutaneous three times a day before meals  metoprolol tartrate 25 milliGRAM(s) Oral two times a day  NIFEdipine XL 60 milliGRAM(s) Oral daily  polyethylene glycol 3350 17 Gram(s) Oral daily  pregabalin 100 milliGRAM(s) Oral three times a day  senna 2 Tablet(s) Oral at bedtime  sodium chloride 0.9%. 1000 milliLiter(s) (80 mL/Hr) IV Continuous <Continuous>  tamsulosin 0.4 milliGRAM(s) Oral at bedtime    MEDICATIONS  (PRN):  dextrose Oral Gel 15 Gram(s) Oral once PRN Blood Glucose LESS THAN 70 milliGRAM(s)/deciliter  ondansetron    Tablet 4 milliGRAM(s) Oral every 6 hours PRN Nausea  oxycodone    5 mG/acetaminophen 325 mG 1 Tablet(s) Oral every 4 hours PRN Moderate Pain (4 - 6)            REVIEW OF SYSTEMS:  Constitutional: [ ] fever, [ ]weight loss,  [ ]fatigue [ ]weight gain  Eyes: [ ] visual changes  Respiratory: [ ]shortness of breath;  [ ] cough, [ ]wheezing, [ ]chills, [ ]hemoptysis  Cardiovascular: [ ] chest pain, [ ]palpitations, [ ]dizziness,  [ ]leg swelling[ ]orthopnea[ ]PND  Gastrointestinal: [ ] abdominal pain, [ ]nausea, [ ]vomiting,  [ ]diarrhea [ ]Constipation [ ]Melena  Genitourinary: [ ] dysuria, [ ] hematuria [ ][place]  Neurologic: [ ] headaches [ ] tremors[ ]weakness [ ]Paralysis Right[ ] Left[ ]  Skin: [ ] itching, [ ]burning, [ ] rashes  Endocrine: [ ] heat or cold intolerance  Musculoskeletal: [ ] joint pain or swelling; [ ] muscle, back, or extremity pain  Psychiatric: [ ] depression, [ ]anxiety, [ ]mood swings, or [ ]difficulty sleeping  Hematologic: [ ] easy bruising, [ ] bleeding gums    [ ] All remaining systems negative except as per above.   [ ]Unable to obtain.  [x] No change in ROS since admission      Vital Signs Last 24 Hrs  T(C): 36.4 (20 Dec 2022 12:51), Max: 36.8 (19 Dec 2022 20:32)  T(F): 97.6 (20 Dec 2022 12:51), Max: 98.3 (19 Dec 2022 20:32)  HR: 91 (20 Dec 2022 17:31) (82 - 91)  BP: 114/76 (20 Dec 2022 17:31) (112/71 - 119/70)  BP(mean): --  RR: 18 (20 Dec 2022 12:51) (17 - 18)  SpO2: 93% (20 Dec 2022 12:51) (91% - 94%)    Parameters below as of 20 Dec 2022 12:51  Patient On (Oxygen Delivery Method): nasal cannula  O2 Flow (L/min): 2    I&O's Summary    19 Dec 2022 07:01  -  20 Dec 2022 07:00  --------------------------------------------------------  IN: 0 mL / OUT: 350 mL / NET: -350 mL    20 Dec 2022 07:01  -  20 Dec 2022 19:38  --------------------------------------------------------  IN: 250 mL / OUT: 0 mL / NET: 250 mL        PHYSICAL EXAM:  General: No acute distress BMI-  HEENT: EOMI, PERRL  Neck: Supple, [ ] JVD  Lungs: Equal air entry bilaterally; [ ] rales [ ] wheezing [ ] rhonchi  Heart: Regular rate and rhythm; [x ] murmur   2/6 [ x] systolic [ ] diastolic [ ] radiation[ ] rubs [ ]  gallops  Abdomen: Nontender, bowel sounds present  Extremities: No clubbing, cyanosis, [ ] edema [ ]Pulses  equal and intact  Nervous system:  Alert & Oriented X3, no focal deficits  Psychiatric: Normal affect  Skin: No rashes or lesions    LABS:  12-20    131<L>  |  95<L>  |  10  ----------------------------<  103<H>  4.9   |  27  |  0.37<L>    Ca    8.6      20 Dec 2022 07:10    TPro  7.6  /  Alb  1.7<L>  /  TBili  0.5  /  DBili  <0.1  /  AST  54<H>  /  ALT  42  /  AlkPhos  158<H>  12-20    Creatinine Trend: 0.37<--, 0.49<--, 0.45<--, 0.53<--, 0.63<--, 0.52<--                        12.3   4.03  )-----------( 152      ( 20 Dec 2022 07:10 )             36.1

## 2022-12-21 ENCOUNTER — TRANSCRIPTION ENCOUNTER (OUTPATIENT)
Age: 67
End: 2022-12-21

## 2022-12-21 VITALS
OXYGEN SATURATION: 94 % | DIASTOLIC BLOOD PRESSURE: 68 MMHG | SYSTOLIC BLOOD PRESSURE: 109 MMHG | RESPIRATION RATE: 17 BRPM | TEMPERATURE: 98 F | HEART RATE: 91 BPM

## 2022-12-21 LAB
ALBUMIN SERPL ELPH-MCNC: 2 G/DL — LOW (ref 3.5–5)
ALP SERPL-CCNC: 143 U/L — HIGH (ref 40–120)
ALT FLD-CCNC: 37 U/L DA — SIGNIFICANT CHANGE UP (ref 10–60)
ANION GAP SERPL CALC-SCNC: 6 MMOL/L — SIGNIFICANT CHANGE UP (ref 5–17)
AST SERPL-CCNC: 38 U/L — SIGNIFICANT CHANGE UP (ref 10–40)
BILIRUB SERPL-MCNC: 0.4 MG/DL — SIGNIFICANT CHANGE UP (ref 0.2–1.2)
BUN SERPL-MCNC: 11 MG/DL — SIGNIFICANT CHANGE UP (ref 7–18)
CALCIUM SERPL-MCNC: 8.5 MG/DL — SIGNIFICANT CHANGE UP (ref 8.4–10.5)
CHLORIDE SERPL-SCNC: 96 MMOL/L — SIGNIFICANT CHANGE UP (ref 96–108)
CO2 SERPL-SCNC: 29 MMOL/L — SIGNIFICANT CHANGE UP (ref 22–31)
CREAT SERPL-MCNC: 0.44 MG/DL — LOW (ref 0.5–1.3)
EGFR: 115 ML/MIN/1.73M2 — SIGNIFICANT CHANGE UP
GLUCOSE BLDC GLUCOMTR-MCNC: 102 MG/DL — HIGH (ref 70–99)
GLUCOSE BLDC GLUCOMTR-MCNC: 126 MG/DL — HIGH (ref 70–99)
GLUCOSE BLDC GLUCOMTR-MCNC: 99 MG/DL — SIGNIFICANT CHANGE UP (ref 70–99)
GLUCOSE SERPL-MCNC: 113 MG/DL — HIGH (ref 70–99)
HCT VFR BLD CALC: 36.8 % — LOW (ref 39–50)
HGB BLD-MCNC: 12.6 G/DL — LOW (ref 13–17)
MCHC RBC-ENTMCNC: 32.4 PG — SIGNIFICANT CHANGE UP (ref 27–34)
MCHC RBC-ENTMCNC: 34.2 GM/DL — SIGNIFICANT CHANGE UP (ref 32–36)
MCV RBC AUTO: 94.6 FL — SIGNIFICANT CHANGE UP (ref 80–100)
NRBC # BLD: 0 /100 WBCS — SIGNIFICANT CHANGE UP (ref 0–0)
PLATELET # BLD AUTO: 127 K/UL — LOW (ref 150–400)
POTASSIUM SERPL-MCNC: 4.4 MMOL/L — SIGNIFICANT CHANGE UP (ref 3.5–5.3)
POTASSIUM SERPL-SCNC: 4.4 MMOL/L — SIGNIFICANT CHANGE UP (ref 3.5–5.3)
PROT SERPL-MCNC: 7.6 G/DL — SIGNIFICANT CHANGE UP (ref 6–8.3)
RBC # BLD: 3.89 M/UL — LOW (ref 4.2–5.8)
RBC # FLD: 15.9 % — HIGH (ref 10.3–14.5)
SODIUM SERPL-SCNC: 131 MMOL/L — LOW (ref 135–145)
WBC # BLD: 4.48 K/UL — SIGNIFICANT CHANGE UP (ref 3.8–10.5)
WBC # FLD AUTO: 4.48 K/UL — SIGNIFICANT CHANGE UP (ref 3.8–10.5)

## 2022-12-21 RX ORDER — ONDANSETRON 8 MG/1
1 TABLET, FILM COATED ORAL
Qty: 0 | Refills: 0 | DISCHARGE
Start: 2022-12-21

## 2022-12-21 RX ORDER — PANTOPRAZOLE SODIUM 20 MG/1
40 TABLET, DELAYED RELEASE ORAL
Refills: 0 | Status: DISCONTINUED | OUTPATIENT
Start: 2022-12-21 | End: 2022-12-21

## 2022-12-21 RX ORDER — PANTOPRAZOLE SODIUM 20 MG/1
1 TABLET, DELAYED RELEASE ORAL
Qty: 0 | Refills: 0 | DISCHARGE
Start: 2022-12-21

## 2022-12-21 RX ADMIN — OXYCODONE AND ACETAMINOPHEN 1 TABLET(S): 5; 325 TABLET ORAL at 03:44

## 2022-12-21 RX ADMIN — Medication 5 MILLIGRAM(S): at 06:15

## 2022-12-21 RX ADMIN — OXYCODONE AND ACETAMINOPHEN 1 TABLET(S): 5; 325 TABLET ORAL at 03:14

## 2022-12-21 RX ADMIN — Medication 100 MILLIGRAM(S): at 06:14

## 2022-12-21 RX ADMIN — FAMOTIDINE 20 MILLIGRAM(S): 10 INJECTION INTRAVENOUS at 06:15

## 2022-12-21 RX ADMIN — Medication 60 MILLIGRAM(S): at 06:15

## 2022-12-21 RX ADMIN — OXYCODONE AND ACETAMINOPHEN 1 TABLET(S): 5; 325 TABLET ORAL at 11:19

## 2022-12-21 RX ADMIN — Medication 25 MILLIGRAM(S): at 06:15

## 2022-12-21 RX ADMIN — OXYCODONE AND ACETAMINOPHEN 1 TABLET(S): 5; 325 TABLET ORAL at 12:15

## 2022-12-21 RX ADMIN — APIXABAN 5 MILLIGRAM(S): 2.5 TABLET, FILM COATED ORAL at 06:15

## 2022-12-21 NOTE — PROGRESS NOTE ADULT - NEGATIVE GASTROINTESTINAL SYMPTOMS
no nausea/no vomiting/no diarrhea/no melena/no hematochezia/no steatorrhea/no jaundice/no hiccoughs

## 2022-12-21 NOTE — PROGRESS NOTE ADULT - NEGATIVE GENERAL GENITOURINARY SYMPTOMS
no hematuria/no renal colic/no flank pain L/no flank pain R
Isotretinoin Pregnancy And Lactation Text: This medication is Pregnancy Category X and is considered extremely dangerous during pregnancy. It is unknown if it is excreted in breast milk.

## 2022-12-21 NOTE — PROGRESS NOTE ADULT - NEGATIVE SKIN SYMPTOMS
no rash/no itching/no dryness/no pitted nails

## 2022-12-21 NOTE — PROGRESS NOTE ADULT - NEGATIVE PSYCHIATRIC SYMPTOMS
no suicidal ideation/no insomnia/no memory loss/no paranoia/no mood swings/no agitation

## 2022-12-21 NOTE — PROGRESS NOTE ADULT - TONSILS
no redness/no swelling

## 2022-12-21 NOTE — PROGRESS NOTE ADULT - NEGATIVE HEMATOLOGY SYMPTOMS
no gum bleeding/no nose bleeding/no skin lumps

## 2022-12-21 NOTE — PROGRESS NOTE ADULT - PHARYNX
no redness/no discharge

## 2022-12-21 NOTE — PROGRESS NOTE ADULT - NECK
supple/symmetric/no tracheal deviation
supple/symmetric/no tracheal deviation
supple/symmetric
supple/symmetric/no tracheal deviation
supple/symmetric
supple/symmetric

## 2022-12-21 NOTE — PROGRESS NOTE ADULT - NEGATIVE GENERAL SYMPTOMS
no fever/no chills/no sweating/no polyphagia/no polyuria/no polydipsia

## 2022-12-21 NOTE — PROGRESS NOTE ADULT - NEGATIVE CARDIOVASCULAR SYMPTOMS
no chest pain/no palpitations/no orthopnea

## 2022-12-21 NOTE — DISCHARGE NOTE NURSING/CASE MANAGEMENT/SOCIAL WORK - PATIENT PORTAL LINK FT
You can access the FollowMyHealth Patient Portal offered by [facility] by registering at the following website: [email or web address]. By joining [employer]’s FollowMyHealth portal, you will also be able to view your health information using other applications (apps) compatible with our system.

## 2022-12-21 NOTE — PROGRESS NOTE ADULT - NEGATIVE RESPIRATORY AND THORAX SYMPTOMS
no wheezing/no dyspnea/no cough/no hemoptysis

## 2022-12-21 NOTE — PROGRESS NOTE ADULT - SUBJECTIVE AND OBJECTIVE BOX
[   ] ICU                                          [   ] CCU                                      [  X ] Medical Floor      Patient is a 67 year old male with vomiting. GI consulted to evaluate.         67 year old male, vaccinated, from [facility] with past medical history significant for DM, HTN, HLD, AFib on eliquis, PVD, presents with vomiting. Patient states that last night he ate a burger, and started to vomit shortly after. Patient states he is unable to eat since then due to feeling nauseous and continues to vomit each time he tries to eat. Patient denies any recent sick contact.  Patient also denies any associated abdominal pain, fever, chills, SOB, chest pain, diarrhea, hematemesis or melena.     Patient is comfortable. Patient is still c/o nausea but no new complaints otherwise. No abdominal pain, vomiting, hematemesis, hematochezia, melena, fever, chills, chest pain, SOB, cough or diarrhea reported.       PAIN MANAGEMENT:  Pain Scale:                0 /10  Pain Location:      Prior Colonoscopy:  Unknown    PAST MEDICAL HISTORY  COPD (chronic obstructive pulmonary disease)    Diabetes    PVD (peripheral vascular disease)    Chronic atrial fibrillation    GERD (gastroesophageal reflux disease)    MDD (major depressive disorder)    BPH (benign prostatic hyperplasia)        PAST SURGICAL HISTORY  Cholecystectomy      Allergies    morphine (Unknown)  penicillin (Unknown)  shellfish (Unknown)    Intolerances  None       SOCIAL HISTORY  Advanced Directives:       [ X ] Full Code       [  ] DNR  Marital Status:         [  ] M      [ X ] S      [  ] D       [  ] W  Children:       [ X ] Yes      [  ] No  Occupation:        [  ] Employed       [ X ] Unemployed       [  ] Retired  Diet:       [X  ] Regular       [  ] PEG feeding          [  ] NG tube feeding  Drug Use:           [ X ] Patient denied          [  ] Yes  Alcohol:           [ X ] No             [  ] Yes (socially)         [  ] Yes (chronic)  Tobacco:           [  ] Yes           [ X ] No      FAMILY HISTORY  [ X ] Heart Disease            [X  ] Diabetes             [ X ] HTN             [  ] Colon Cancer             [  ] Stomach Cancer              [  ] Pancreatic Cancer      VITALS  Vital Signs Last 24 Hrs  T(C): 36.4 (21 Dec 2022 05:09), Max: 36.9 (20 Dec 2022 20:44)  T(F): 97.5 (21 Dec 2022 05:09), Max: 98.4 (20 Dec 2022 20:44)  HR: 77 (21 Dec 2022 05:09) (75 - 91)  BP: 120/55 (21 Dec 2022 05:09) (111/65 - 120/55)   RR: 16 (21 Dec 2022 05:09) (16 - 18)  SpO2: 97% (21 Dec 2022 05:09) (92% - 97%)  Parameters below as of 21 Dec 2022 05:09  Patient On (Oxygen Delivery Method): nasal cannula  O2 Flow (L/min): 2       MEDICATIONS  (STANDING):  apixaban 5 milliGRAM(s) Oral every 12 hours  atorvastatin 10 milliGRAM(s) Oral at bedtime  baclofen 5 milliGRAM(s) Oral every 12 hours  dextrose 5%. 1000 milliLiter(s) (50 mL/Hr) IV Continuous <Continuous>  dextrose 5%. 1000 milliLiter(s) (100 mL/Hr) IV Continuous <Continuous>  dextrose 50% Injectable 25 Gram(s) IV Push once  dextrose 50% Injectable 12.5 Gram(s) IV Push once  dextrose 50% Injectable 25 Gram(s) IV Push once  glucagon  Injectable 1 milliGRAM(s) IntraMuscular once  insulin lispro (ADMELOG) corrective regimen sliding scale   SubCutaneous three times a day before meals  metoprolol tartrate 25 milliGRAM(s) Oral two times a day  NIFEdipine XL 60 milliGRAM(s) Oral daily  pantoprazole    Tablet 40 milliGRAM(s) Oral before breakfast  polyethylene glycol 3350 17 Gram(s) Oral daily  pregabalin 100 milliGRAM(s) Oral three times a day  senna 2 Tablet(s) Oral at bedtime  sodium chloride 0.9%. 1000 milliLiter(s) (80 mL/Hr) IV Continuous <Continuous>  tamsulosin 0.4 milliGRAM(s) Oral at bedtime    MEDICATIONS  (PRN):  dextrose Oral Gel 15 Gram(s) Oral once PRN Blood Glucose LESS THAN 70 milliGRAM(s)/deciliter  ondansetron    Tablet 4 milliGRAM(s) Oral every 6 hours PRN Nausea  oxycodone    5 mG/acetaminophen 325 mG 1 Tablet(s) Oral every 4 hours PRN Moderate Pain (4 - 6)                            12.6   4.48  )-----------( 127      ( 21 Dec 2022 07:00 )             36.8       12-21    131<L>  |  96  |  11  ----------------------------<  113<H>  4.4   |  29  |  0.44<L>    Ca    8.5      21 Dec 2022 07:00    TPro  7.6  /  Alb  2.0<L>  /  TBili  0.4  /  DBili  x   /  AST  38  /  ALT  37  /  AlkPhos  143<H>  12-21

## 2022-12-21 NOTE — DISCHARGE NOTE NURSING/CASE MANAGEMENT/SOCIAL WORK - NSDCPEEMAIL_GEN_ALL_CORE
[facility] for Tobacco Control email [email or web address]

## 2022-12-21 NOTE — PROGRESS NOTE ADULT - NOSE
no discharge/no deviation

## 2022-12-21 NOTE — PROGRESS NOTE ADULT - PROVIDER SPECIALTY LIST ADULT
Cardiology
Cardiology
Gastroenterology
Gastroenterology
Internal Medicine
Gastroenterology
Internal Medicine
Gastroenterology
Internal Medicine
Cardiology
Cardiology
Internal Medicine
Gastroenterology
Internal Medicine
Cardiology
Gastroenterology
Cardiology
Cardiology
Gastroenterology
Internal Medicine
Cardiology
Gastroenterology
Gastroenterology
Cardiology
Gastroenterology

## 2022-12-21 NOTE — DISCHARGE NOTE NURSING/CASE MANAGEMENT/SOCIAL WORK - NSDCPEFALRISK_GEN_ALL_CORE
For information on Fall & Injury Prevention, visit: [email or web address] OR  [email or web address] OR  https://www.cdc.gov/steadi/patient.html

## 2022-12-21 NOTE — PROGRESS NOTE ADULT - LYMPHATIC
Hospitalist Admission History and Physical         NAME:            Phil Agosto    Age:                78 y.o.    :               1956    MRN:              [id number]    PCP: Rose Marie Mcneal MD    Consulting MD:    Treatment Team: Attending Provider: Ellyn Hernandez DO; Registered Nurse: Rebecca Giraldo RN         Chief Complaint   Patient presents with    Seizures   HPI:    Patient is a 78 y.o. male who presented to the ED for cc unresponsiveness found by family laying in the floor. Patient unable to give me history and no family in the room. EMS noted seizure like activity. Hx of dCHF EF 55%, CKD stage III, HLD, CAD s/p PCI and TAVR, pacemaker, CVA, severe spinal osteoarthritis / degenerative disease with chronic b/l LE weakness,  s/p upper back surgery with titanium spinal cage, s/p neuro-spinal stimulator, nocturnal hypoxia wearing 2L NC at night, a fib on Eliquis, and seizures no longer on anticonvulsant medications. BP low at times in ER. Labs unremarkable. Lactic acid normal.      CT head with no acute findings  Chest x ray with no obvious pneumonia    UA and UDS pending.    Past Medical History:   Diagnosis Date    Aortic stenosis     Aortic valve replacement 2018    CAD (coronary artery disease)     PCI in , 2015 had MI and then stents    Cancer ([address]     SCC removed face     CHF (congestive heart failure) ([address]     Chronic renal disease, stage III ([address]     sees neph and does not have actual diagnosis at this time    COPD (chronic obstructive pulmonary disease) ([address]     fibrosis in lungs    Dyslipidemia     Heart failure ([address]     CHF per patient    HTN (hypertension)     med controlled    Hyperlipidemia     Ischemic cardiomyopathy     Pacemaker     only pacemaker    Paroxysmal atrial fibrillation ([address]     pradaxa for this    Pulmonary fibrosis ([address]     Seizure disorder ([address]     lyme disease - small lesion frontal part of brain - no maintenance meds - last seizure 2
months ago, due to fall - before that it was nine years    Systolic heart failure [facility])             Past Surgical History:   Procedure Laterality Date    ABLATION OF DYSRHYTHMIC FOCUS      AORTIC VALVE REPLACEMENT  07/2018    BACK SURGERY      discectomy    CARDIAC CATHETERIZATION      PCI 2014, 2015    CARDIAC PROCEDURE N/A 7/11/2022    LEFT HEART CATH / CORONARY ANGIOGRAPHY performed by Ina Mcconnell MD at [address] [place] [address] CATH LAB    CERVICAL FUSION      C3-4 fusion    EGD  6/17/2022         HIP ARTHROPLASTY Left     SPINAL CORD STIMULATOR SURGERY      lower back     UPPER GASTROINTESTINAL ENDOSCOPY N/A 6/17/2022    EGD ESOPHAGOGASTRODUODENOSCOPY/ PT HAS PACEMAKER performed by Angelina Jordan MD at [facility] ENDOSCOPY            Family History   Problem Relation Age of Onset    Heart Disease Sister     Heart Disease Mother        Family history reviewed and negative except as noted above. Social History     Social History Narrative    Not on file            Social History     Tobacco Use    Smoking status: Every Day     Packs/day: 1.50     Years: 56.00     Pack years: 84.00     Types: Cigarettes    Smokeless tobacco: Never    Tobacco comments:     Quit smoking: cutting back to 4 Cigarettes a day   Substance Use Topics    Alcohol use: Not Currently            Social History     Substance and Sexual Activity   Drug Use Yes    Comment: cannabis used: two weeks ago as of 6/15/2022 edibles                  Allergies   Allergen Reactions    Carbamazepine Anaphylaxis    Lacosamide Nausea Only    Lorazepam Other (See Comments)     Violent behavior    Nitroglycerin Other (See Comments)     hypotension            Prior to Admission medications    Medication Sig Start Date End Date Taking?  Authorizing Provider   predniSONE (DELTASONE) 20 MG tablet Take 2 day 1-2 then 1 day 3-5 then stop 11/30/22   Surya Casillas MD   apixaban (ELIQUIS) 5 MG TABS tablet Take 1 tablet by mouth 2 times daily 11/23/22 12/23/22  Marta Oshea
MD   atorvastatin (LIPITOR) 80 MG tablet Take 80 mg by mouth daily    Historical Provider, MD   atorvastatin (LIPITOR) 80 MG tablet Take 1 tablet by mouth nightly 11/16/22 12/16/22  Leigh Ramirez MD   tiotropium (SPIRIVA RESPIMAT) 2.5 MCG/ACT AERS inhaler Inhale 2 puffs into the lungs daily    Historical Provider, MD   ferrous sulfate (IRON 325) 325 (65 Fe) MG tablet Take 325 mg by mouth 2 times daily    Historical Provider, MD   ondansetron (ZOFRAN-ODT) 8 MG TBDP disintegrating tablet Take 1 tablet by mouth every 8 hours as needed for Nausea 9/21/22   Leigh Ramirez MD   Cholecalciferol (VITAMIN D3) 50 MCG (2000 UT) CAPS TAKE 1 CAPSULE BY MOUTH EVERY DAY 7/29/22   Fozia Alvarenga DO   omeprazole (PRILOSEC) 20 MG delayed release capsule Take 1 capsule by mouth in the morning and at bedtime 7/12/22   GEORGE Heaton - ANGELIQUE   albuterol sulfate  (90 Base) MCG/ACT inhaler Inhale 2 puffs into the lungs every 4 hours as needed 4/18/22   Ar Automatic Reconciliation   aspirin 81 MG chewable tablet Take 81 mg by mouth daily    Ar Automatic Reconciliation   fluticasone-salmeterol (ADVAIR) 250-50 MCG/ACT AEPB diskus inhaler Inhale 1 puff into the lungs every 12 hours    Ar Automatic Reconciliation   ipratropium-albuterol (DUONEB) 0.5-2.5 (3) MG/3ML SOLN nebulizer solution Inhale 3 mLs into the lungs every 4 hours as needed     Ar Automatic Reconciliation   metoprolol succinate (TOPROL XL) 100 MG extended release tablet Take 100 mg by mouth every 12 hours 12/7/21   Ar Automatic Reconciliation                      Review of Systems         Constitutional: NAD    Eyes:  no change in visual acuity, no photophobia    Ears, nose, mouth, throat, and face: no  Odynphagia, dysphagia, no thrush or exudate, negative for chronic sinus congestion, recurrent headaches    Respiratory: negative for SOB, hemoptysis or cough    Cardiovascular: negative for CP, palpitations, or PND    Gastrointestinal: negative for abdominal pain, no
hematemesis, hematochezia or BRBPR    Genitourinary: no urgency, frequency, or dysuria, no nocturia    Integument/breast: negative for skin rash or skin lesions    Hematologic/lymphatic: negative for known bleeding disorder    Musculoskeletal:chronic back pain with stimulator     Neurological: negative for lightheadedness, syncope or presyncopal events, no seizure or CVA history    Behavioral/Psych: negative for depression or chronic anxiety,    Endocrine: negative for polydyspia, polyuria or intolerance to heat or cold    Allergic/Immunologic: negative for chronic allergic rhinitis, or known connective tissue disorder              Objective:         Patient Vitals for the past 24 hrs:   Temp Pulse Resp BP SpO2   12/19/22 2206 -- 70 17 -- 98 %   12/19/22 2156 -- 70 11 87/62 99 %   12/19/22 2146 -- 72 25 84/67 98 %   12/19/22 2136 -- 71 16 -- 99 %   12/19/22 2126 -- 71 18 (!) 90/54 98 %   12/19/22 2116 -- 70 18 -- 98 %   12/19/22 2109 -- 75 18 -- 100 %   12/19/22 2106 -- 79 23 -- 93 %   12/19/22 2059 -- 72 16 -- --   12/19/22 2049 -- 76 16 -- 99 %   12/19/22 2039 -- 77 20 -- 99 %   12/19/22 2038 -- 76 15 110/89 98 %   12/19/22 2029 -- 72 18 (!) 82/55 97 %   12/19/22 2019 -- 70 19 90/61 97 %   12/19/22 2018 -- 71 19 -- 97 %   12/19/22 2014 -- 74 19 -- 96 %   12/19/22 2008 -- 69 18 -- 95 %   12/19/22 2003 -- 73 18 (!) 95/57 97 %   12/19/22 1959 -- 72 17 (!) 88/57 97 %   12/19/22 1958 -- 70 18 -- 97 %   12/19/22 1953 -- 72 17 -- 97 %   12/19/22 1948 -- 73 17 -- 99 %   12/19/22 1944 -- 70 18 (!) 125/95 97 %   12/19/22 1941 -- 72 14 -- 99 %   12/19/22 1931 -- 72 18 105/62 97 %   12/19/22 1929 -- 73 19 -- 95 %   12/19/22 1921 -- 72 16 -- 96 %   12/19/22 1914 -- 70 20 -- 97 %   12/19/22 1911 -- 72 22 88/63 97 %   12/19/22 1901 -- 73 20 (!) 100/59 98 %   12/19/22 1851 -- 70 21 -- 97 %   12/19/22 1845 -- 75 20 124/88 99 %   12/19/22 1841 -- 74 (!) 32 -- 99 %   12/19/22 1831 -- 72 22 (!) 119/92 98 %   12/19/22 1811 -- 75 22
132/80 99 %   12/19/22 1801 -- 70 23 118/81 100 %   12/19/22 1741 -- 75 27 127/83 99 %   12/19/22 1715 -- 70 13 (!) 132/106 98 %   12/19/22 1711 -- 71 19 (!) 145/93 --   12/19/22 1650 -- 70 19 -- --   12/19/22 1615 96.9 °F (36.1 °C) 70 17 (!) 143/78 98 %            No intake/output data recorded.     12/18 0701 - 12/19 1900  In: 250   Out: -          Data Review:   Recent Results (from the past 24 hour(s))   EKG 12 Lead    Collection Time: 12/19/22  4:13 PM   Result Value Ref Range    Ventricular Rate 70 BPM    Atrial Rate 155 BPM    P-R Interval 56 ms    QRS Duration 178 ms    Q-T Interval 456 ms    QTc Calculation (Bazett) 493 ms    P Axis 0 degrees    R Axis 260 degrees    T Axis 84 degrees    Diagnosis Sinus rhythm    POCT Blood Gas & Electrolytes    Collection Time: 12/19/22  4:31 PM   Result Value Ref Range    pH, Arterial, POC 7.44 7.35 - 7.45      pCO2, Arterial, POC 38.1 35 - 45 MMHG    pO2, Arterial, POC 75 75 - 100 MMHG    POC Sodium 142 136 - 145 MMOL/L    POC Potassium 3.7 3.5 - 5.1 MMOL/L    POC Ionized Calcium 1.13 1.12 - 1.32 mmol/L    POC Glucose 136 (H) 65 - 100 MG/DL    Base Excess 1.4 mmol/L    HCO3, Mixed 25.6 22 - 26 MMOL/L    POC TCO2 25 (H) 13 - 23 MMOL/L    POC O2 SAT 95 %    Source ARTERIAL      Site RIGHT RADIAL      POC Eddie's Test Positive      DEVICE NASAL CANNULA      Respiratory Rate 16      Respiratory Rate 16      FIO2 4      Performed by: Dell     eGFR, POC Cannot be calculated >60 ml/min/1.73m2   POCT Glucose    Collection Time: 12/19/22  5:41 PM   Result Value Ref Range    POC Glucose 93 65 - 100 mg/dL    Performed by: Arnol    Lactic Acid    Collection Time: 12/19/22  6:46 PM   Result Value Ref Range    Lactic Acid, Plasma 1.2 0.4 - 2.0 MMOL/L   Lactic Acid    Collection Time: 12/19/22  7:47 PM   Result Value Ref Range    Lactic Acid, Plasma 0.9 0.4 - 2.0 MMOL/L   CBC with Auto Differential    Collection Time: 12/19/22  7:58 PM   Result Value Ref Range
WBC 8.3 4.3 - 11.1 K/uL    RBC 4.78 4.23 - 5.6 M/uL    Hemoglobin 13.7 13.6 - 17.2 g/dL    Hematocrit 41.9 41.1 - 50.3 %    MCV 87.7 82 - 102 FL    MCH 28.7 26.1 - 32.9 PG    MCHC 32.7 31.4 - 35.0 g/dL    RDW 15.6 (H) 11.9 - 14.6 %    Platelets 063 800 - 996 K/uL    MPV 9.9 9.4 - 12.3 FL    nRBC 0.00 0.0 - 0.2 K/uL    Differential Type AUTOMATED      Seg Neutrophils 65 43 - 78 %    Lymphocytes 21 13 - 44 %    Monocytes 10 4.0 - 12.0 %    Eosinophils % 3 0.5 - 7.8 %    Basophils 1 0.0 - 2.0 %    Immature Granulocytes 0 0.0 - 5.0 %    Segs Absolute 5.4 1.7 - 8.2 K/UL    Absolute Lymph # 1.7 0.5 - 4.6 K/UL    Absolute Mono # 0.8 0.1 - 1.3 K/UL    Absolute Eos # 0.2 0.0 - 0.8 K/UL    Basophils Absolute 0.1 0.0 - 0.2 K/UL    Absolute Immature Granulocyte 0.0 0.0 - 0.5 K/UL   Comprehensive Metabolic Panel    Collection Time: 12/19/22  7:58 PM   Result Value Ref Range    Sodium 142 133 - 143 mmol/L    Potassium 3.7 3.5 - 5.1 mmol/L    Chloride 111 (H) 101 - 110 mmol/L    CO2 29 21 - 32 mmol/L    Anion Gap 2 2 - 11 mmol/L    Glucose 104 (H) 65 - 100 mg/dL    BUN 22 8 - 23 MG/DL    Creatinine 1.10 0.8 - 1.5 MG/DL    Est, Glom Filt Rate >60 >60 ml/min/1.73m2    Calcium 8.5 8.3 - 10.4 MG/DL    Total Bilirubin 0.5 0.2 - 1.1 MG/DL    ALT 30 12 - 65 U/L    AST 21 15 - 37 U/L    Alk Phosphatase 102 50 - 136 U/L    Total Protein 6.5 6.3 - 8.2 g/dL    Albumin 3.0 (L) 3.2 - 4.6 g/dL    Globulin 3.5 2.8 - 4.5 g/dL    Albumin/Globulin Ratio 0.9 0.4 - 1.6     Magnesium    Collection Time: 12/19/22  7:58 PM   Result Value Ref Range    Magnesium 2.1 1.8 - 2.4 mg/dL   Protime-INR    Collection Time: 12/19/22  7:58 PM   Result Value Ref Range    Protime 13.2 12.6 - 14.3 sec    INR 1.0     TSH    Collection Time: 12/19/22  7:58 PM   Result Value Ref Range    TSH, 3RD GENERATION 1.640 0.358 - 3.740 uIU/mL   Troponin    Collection Time: 12/19/22  7:58 PM   Result Value Ref Range    Troponin, High Sensitivity 13.5 0 - 14 pg/mL   Acetaminophen
Level    Collection Time: 12/19/22  7:58 PM   Result Value Ref Range    Acetaminophen Level <2 (L) 10.0 - 57.4 ug/mL   Salicylate    Collection Time: 12/19/22  7:58 PM   Result Value Ref Range    Salicylate, Serum 2.4 (L) 2.8 - 20.0 MG/DL            Physical Exam:         General:    Alert, cooperative, frail appearing     Eyes:    Conjunctivae/corneas clear. PERRL    Ears:    Normal     Nose:    Nares normal.     Mouth/Throat:    Lips, mucosa, and tongue normal. Teeth and gums normal.    Neck:    no JVD. Back:     Scars from previous surgeries but no obvious skin lesions     Lungs:     Diffuse coarse breathe sounds    Heart:    Regular rate and rhythm, S1, S2 normal    Abdomen:     Soft, non-tender. Bowel sounds normal.     Extremities:    Limited ROM of legs, flexed towards body, no obvious edema    Skin:    Skin color, texture, turgor normal. No rashes or lesions    Neurologic:    Slow to answer and soft spoken. Can tell me where he is located and the year. Cannot tell me who the president is. Assessment and Plan         Principal Problem:    Unresponsiveness  Active Problems:    Nocturnal hypoxia    Cerebrovascular accident (CVA) ([facility]    Hyperlipidemia    Atrial fibrillation ([facility]    Pulmonary fibrosis (HCC)    S/P TAVR (transcatheter aortic valve replacement)    COPD (chronic obstructive pulmonary disease) (HCC)    Chronic renal disease, stage III (HCC)  Resolved Problems:    * No resolved hospital problems. *    Unresponsiveness - Check TSH/T4. I have tried to call family with no answer. UA pending. Possible seizures? Lactic acid normal but will check CK. Seizure precautions. Order EEG in AM. Keppra loading dose given in ER, will start Keppra 500mg BID tomorrow. PRN ativan. Htn - episodes of hypotension in room. Holding BP medications.     Paroxysmal a fib and hx of TAVR - Eliquis     HLD - statin    PPI    PPD/PT/OT    Since I am unsure baseline, he is slightly confused, and no family in room,
No lymphadedenopathy
will make FULL code for now.      DVT prophylaxis - SCDs    Signed By:    Rajni Angel,     December 19, 2022
No lymphadedenopathy

## 2022-12-21 NOTE — PROGRESS NOTE ADULT - EYES
PERRL/EOMI/conjunctiva clear/non icteric sclera
PERRL/EOMI/conjunctiva clear
PERRL/EOMI/conjunctiva clear/non icteric sclera
PERRL/EOMI/conjunctiva clear
PERRL/EOMI/conjunctiva clear/non icteric sclera
PERRL/EOMI/conjunctiva clear/non icteric sclera
PERRL/EOMI/conjunctiva clear

## 2022-12-21 NOTE — DISCHARGE NOTE NURSING/CASE MANAGEMENT/SOCIAL WORK - NSDCPEWEB_GEN_ALL_CORE
[facility] for Tobacco Control website --- [email or web address] website --- [email or web address]

## 2022-12-21 NOTE — PROGRESS NOTE ADULT - NEGATIVE NEUROLOGICAL SYMPTOMS
no syncope/no tremors/no vertigo/no loss of sensation/no headache

## 2022-12-22 ENCOUNTER — INPATIENT (INPATIENT)
Facility: HOSPITAL | Age: 67
LOS: 14 days | Discharge: NURSING, REHAB OR LONG-TERM CARE | DRG: 871 | End: 2023-01-06
Attending: INTERNAL MEDICINE | Admitting: INTERNAL MEDICINE
Payer: MEDICARE

## 2022-12-22 VITALS
HEIGHT: 74 IN | SYSTOLIC BLOOD PRESSURE: 106 MMHG | OXYGEN SATURATION: 91 % | RESPIRATION RATE: 18 BRPM | WEIGHT: 218.92 LBS | HEART RATE: 99 BPM | DIASTOLIC BLOOD PRESSURE: 62 MMHG

## 2022-12-22 DIAGNOSIS — K92.1 MELENA: ICD-10-CM

## 2022-12-22 DIAGNOSIS — I48.20 CHRONIC ATRIAL FIBRILLATION, UNSPECIFIED: ICD-10-CM

## 2022-12-22 DIAGNOSIS — Z29.9 ENCOUNTER FOR PROPHYLACTIC MEASURES, UNSPECIFIED: ICD-10-CM

## 2022-12-22 DIAGNOSIS — R10.9 UNSPECIFIED ABDOMINAL PAIN: ICD-10-CM

## 2022-12-22 DIAGNOSIS — A41.9 SEPSIS, UNSPECIFIED ORGANISM: ICD-10-CM

## 2022-12-22 DIAGNOSIS — K92.2 GASTROINTESTINAL HEMORRHAGE, UNSPECIFIED: ICD-10-CM

## 2022-12-22 DIAGNOSIS — J44.9 CHRONIC OBSTRUCTIVE PULMONARY DISEASE, UNSPECIFIED: ICD-10-CM

## 2022-12-22 DIAGNOSIS — R74.01 ELEVATION OF LEVELS OF LIVER TRANSAMINASE LEVELS: ICD-10-CM

## 2022-12-22 DIAGNOSIS — I10 ESSENTIAL (PRIMARY) HYPERTENSION: ICD-10-CM

## 2022-12-22 DIAGNOSIS — N39.0 URINARY TRACT INFECTION, SITE NOT SPECIFIED: ICD-10-CM

## 2022-12-22 DIAGNOSIS — E11.9 TYPE 2 DIABETES MELLITUS WITHOUT COMPLICATIONS: ICD-10-CM

## 2022-12-22 LAB
ALBUMIN SERPL ELPH-MCNC: 2.1 G/DL — LOW (ref 3.5–5)
ALBUMIN SERPL ELPH-MCNC: 2.4 G/DL — LOW (ref 3.5–5)
ALP SERPL-CCNC: 306 U/L — HIGH (ref 40–120)
ALP SERPL-CCNC: 488 U/L — HIGH (ref 40–120)
ALT FLD-CCNC: 111 U/L DA — HIGH (ref 10–60)
ALT FLD-CCNC: 73 U/L DA — HIGH (ref 10–60)
ANION GAP SERPL CALC-SCNC: 11 MMOL/L — SIGNIFICANT CHANGE UP (ref 5–17)
ANION GAP SERPL CALC-SCNC: 7 MMOL/L — SIGNIFICANT CHANGE UP (ref 5–17)
APPEARANCE UR: CLEAR — SIGNIFICANT CHANGE UP
APTT BLD: 34.4 SEC — SIGNIFICANT CHANGE UP (ref 27.5–35.5)
AST SERPL-CCNC: 134 U/L — HIGH (ref 10–40)
AST SERPL-CCNC: 79 U/L — HIGH (ref 10–40)
BACTERIA # UR AUTO: ABNORMAL /HPF
BASE EXCESS BLDV CALC-SCNC: 1.9 MMOL/L — SIGNIFICANT CHANGE UP
BASOPHILS # BLD AUTO: 0 K/UL — SIGNIFICANT CHANGE UP (ref 0–0.2)
BASOPHILS NFR BLD AUTO: 0 % — SIGNIFICANT CHANGE UP (ref 0–2)
BILIRUB SERPL-MCNC: 0.4 MG/DL — SIGNIFICANT CHANGE UP (ref 0.2–1.2)
BILIRUB SERPL-MCNC: 0.6 MG/DL — SIGNIFICANT CHANGE UP (ref 0.2–1.2)
BILIRUB UR-MCNC: ABNORMAL
BUN SERPL-MCNC: 29 MG/DL — HIGH (ref 7–18)
BUN SERPL-MCNC: 35 MG/DL — HIGH (ref 7–18)
CALCIUM SERPL-MCNC: 8.2 MG/DL — LOW (ref 8.4–10.5)
CALCIUM SERPL-MCNC: 9.6 MG/DL — SIGNIFICANT CHANGE UP (ref 8.4–10.5)
CHLORIDE SERPL-SCNC: 102 MMOL/L — SIGNIFICANT CHANGE UP (ref 96–108)
CHLORIDE SERPL-SCNC: 97 MMOL/L — SIGNIFICANT CHANGE UP (ref 96–108)
CO2 SERPL-SCNC: 27 MMOL/L — SIGNIFICANT CHANGE UP (ref 22–31)
CO2 SERPL-SCNC: 28 MMOL/L — SIGNIFICANT CHANGE UP (ref 22–31)
COLOR SPEC: YELLOW — SIGNIFICANT CHANGE UP
CREAT SERPL-MCNC: 1.04 MG/DL — SIGNIFICANT CHANGE UP (ref 0.5–1.3)
CREAT SERPL-MCNC: 1.19 MG/DL — SIGNIFICANT CHANGE UP (ref 0.5–1.3)
DIFF PNL FLD: ABNORMAL
EGFR: 67 ML/MIN/1.73M2 — SIGNIFICANT CHANGE UP
EGFR: 78 ML/MIN/1.73M2 — SIGNIFICANT CHANGE UP
EOSINOPHIL # BLD AUTO: 0 K/UL — SIGNIFICANT CHANGE UP (ref 0–0.5)
EOSINOPHIL NFR BLD AUTO: 0 % — SIGNIFICANT CHANGE UP (ref 0–6)
EPI CELLS # UR: ABNORMAL /HPF
FLUAV AG NPH QL: SIGNIFICANT CHANGE UP
FLUBV AG NPH QL: SIGNIFICANT CHANGE UP
GLUCOSE BLDC GLUCOMTR-MCNC: 143 MG/DL — HIGH (ref 70–99)
GLUCOSE SERPL-MCNC: 162 MG/DL — HIGH (ref 70–99)
GLUCOSE SERPL-MCNC: 214 MG/DL — HIGH (ref 70–99)
GLUCOSE UR QL: NEGATIVE — SIGNIFICANT CHANGE UP
HCO3 BLDV-SCNC: 29 MMOL/L — SIGNIFICANT CHANGE UP (ref 22–29)
HCT VFR BLD CALC: 34.1 % — LOW (ref 39–50)
HCT VFR BLD CALC: 45.7 % — SIGNIFICANT CHANGE UP (ref 39–50)
HGB BLD-MCNC: 11.4 G/DL — LOW (ref 13–17)
HGB BLD-MCNC: 14.9 G/DL — SIGNIFICANT CHANGE UP (ref 13–17)
HYPOSEGMENTATION: PRESENT — SIGNIFICANT CHANGE UP
INR BLD: 1.63 RATIO — HIGH (ref 0.88–1.16)
KETONES UR-MCNC: ABNORMAL
LACTATE SERPL-SCNC: 1.8 MMOL/L — SIGNIFICANT CHANGE UP (ref 0.7–2)
LACTATE SERPL-SCNC: 5.9 MMOL/L — CRITICAL HIGH (ref 0.7–2)
LEUKOCYTE ESTERASE UR-ACNC: ABNORMAL
LIDOCAIN IGE QN: 134 U/L — SIGNIFICANT CHANGE UP (ref 73–393)
LYMPHOCYTES # BLD AUTO: 19 % — SIGNIFICANT CHANGE UP (ref 13–44)
LYMPHOCYTES # BLD AUTO: 2.81 K/UL — SIGNIFICANT CHANGE UP (ref 1–3.3)
MAGNESIUM SERPL-MCNC: 1.9 MG/DL — SIGNIFICANT CHANGE UP (ref 1.6–2.6)
MANUAL SMEAR VERIFICATION: SIGNIFICANT CHANGE UP
MCHC RBC-ENTMCNC: 31.6 PG — SIGNIFICANT CHANGE UP (ref 27–34)
MCHC RBC-ENTMCNC: 32.3 PG — SIGNIFICANT CHANGE UP (ref 27–34)
MCHC RBC-ENTMCNC: 32.6 GM/DL — SIGNIFICANT CHANGE UP (ref 32–36)
MCHC RBC-ENTMCNC: 33.4 GM/DL — SIGNIFICANT CHANGE UP (ref 32–36)
MCV RBC AUTO: 94.5 FL — SIGNIFICANT CHANGE UP (ref 80–100)
MCV RBC AUTO: 98.9 FL — SIGNIFICANT CHANGE UP (ref 80–100)
MONOCYTES # BLD AUTO: 0.44 K/UL — SIGNIFICANT CHANGE UP (ref 0–0.9)
MONOCYTES NFR BLD AUTO: 3 % — SIGNIFICANT CHANGE UP (ref 2–14)
NEUTROPHILS # BLD AUTO: 11.53 K/UL — HIGH (ref 1.8–7.4)
NEUTROPHILS NFR BLD AUTO: 71 % — SIGNIFICANT CHANGE UP (ref 43–77)
NEUTS BAND # BLD: 7 % — SIGNIFICANT CHANGE UP (ref 0–8)
NITRITE UR-MCNC: POSITIVE
NRBC # BLD: 0 /100 WBCS — SIGNIFICANT CHANGE UP (ref 0–0)
NRBC # BLD: 0 /100 — SIGNIFICANT CHANGE UP (ref 0–0)
PCO2 BLDV: 55 MMHG — SIGNIFICANT CHANGE UP (ref 42–55)
PH BLDV: 7.33 — SIGNIFICANT CHANGE UP (ref 7.32–7.43)
PH UR: 6 — SIGNIFICANT CHANGE UP (ref 5–8)
PHOSPHATE SERPL-MCNC: 4.3 MG/DL — SIGNIFICANT CHANGE UP (ref 2.5–4.5)
PLAT MORPH BLD: NORMAL — SIGNIFICANT CHANGE UP
PLATELET # BLD AUTO: 117 K/UL — LOW (ref 150–400)
PLATELET # BLD AUTO: 163 K/UL — SIGNIFICANT CHANGE UP (ref 150–400)
PLATELET COUNT - ESTIMATE: NORMAL — SIGNIFICANT CHANGE UP
PO2 BLDV: 46 MMHG — SIGNIFICANT CHANGE UP
POTASSIUM SERPL-MCNC: 4.2 MMOL/L — SIGNIFICANT CHANGE UP (ref 3.5–5.3)
POTASSIUM SERPL-MCNC: 4.5 MMOL/L — SIGNIFICANT CHANGE UP (ref 3.5–5.3)
POTASSIUM SERPL-SCNC: 4.2 MMOL/L — SIGNIFICANT CHANGE UP (ref 3.5–5.3)
POTASSIUM SERPL-SCNC: 4.5 MMOL/L — SIGNIFICANT CHANGE UP (ref 3.5–5.3)
PROT SERPL-MCNC: 6.9 G/DL — SIGNIFICANT CHANGE UP (ref 6–8.3)
PROT SERPL-MCNC: 9.4 G/DL — HIGH (ref 6–8.3)
PROT UR-MCNC: 30 MG/DL
PROTHROM AB SERPL-ACNC: 19.5 SEC — HIGH (ref 10.5–13.4)
RBC # BLD: 3.61 M/UL — LOW (ref 4.2–5.8)
RBC # BLD: 4.62 M/UL — SIGNIFICANT CHANGE UP (ref 4.2–5.8)
RBC # FLD: 15.9 % — HIGH (ref 10.3–14.5)
RBC # FLD: 16.3 % — HIGH (ref 10.3–14.5)
RBC BLD AUTO: NORMAL — SIGNIFICANT CHANGE UP
RBC CASTS # UR COMP ASSIST: ABNORMAL /HPF (ref 0–2)
SAO2 % BLDV: 73.3 % — SIGNIFICANT CHANGE UP
SARS-COV-2 RNA SPEC QL NAA+PROBE: SIGNIFICANT CHANGE UP
SODIUM SERPL-SCNC: 136 MMOL/L — SIGNIFICANT CHANGE UP (ref 135–145)
SODIUM SERPL-SCNC: 136 MMOL/L — SIGNIFICANT CHANGE UP (ref 135–145)
SP GR SPEC: 1.02 — SIGNIFICANT CHANGE UP (ref 1.01–1.02)
TOXIC GRANULES BLD QL SMEAR: PRESENT — SIGNIFICANT CHANGE UP
UROBILINOGEN FLD QL: 8
WBC # BLD: 14.67 K/UL — HIGH (ref 3.8–10.5)
WBC # BLD: 14.78 K/UL — HIGH (ref 3.8–10.5)
WBC # FLD AUTO: 14.67 K/UL — HIGH (ref 3.8–10.5)
WBC # FLD AUTO: 14.78 K/UL — HIGH (ref 3.8–10.5)
WBC UR QL: SIGNIFICANT CHANGE UP /HPF (ref 0–5)

## 2022-12-22 PROCEDURE — 82962 GLUCOSE BLOOD TEST: CPT

## 2022-12-22 PROCEDURE — 85730 THROMBOPLASTIN TIME PARTIAL: CPT

## 2022-12-22 PROCEDURE — 88312 SPECIAL STAINS GROUP 1: CPT

## 2022-12-22 PROCEDURE — 82550 ASSAY OF CK (CPK): CPT

## 2022-12-22 PROCEDURE — 81001 URINALYSIS AUTO W/SCOPE: CPT

## 2022-12-22 PROCEDURE — C1889: CPT

## 2022-12-22 PROCEDURE — 71045 X-RAY EXAM CHEST 1 VIEW: CPT | Mod: 26,77

## 2022-12-22 PROCEDURE — 85027 COMPLETE CBC AUTOMATED: CPT

## 2022-12-22 PROCEDURE — 74183 MRI ABD W/O CNTR FLWD CNTR: CPT

## 2022-12-22 PROCEDURE — 80053 COMPREHEN METABOLIC PANEL: CPT

## 2022-12-22 PROCEDURE — 99285 EMERGENCY DEPT VISIT HI MDM: CPT

## 2022-12-22 PROCEDURE — 84100 ASSAY OF PHOSPHORUS: CPT

## 2022-12-22 PROCEDURE — 83735 ASSAY OF MAGNESIUM: CPT

## 2022-12-22 PROCEDURE — 99291 CRITICAL CARE FIRST HOUR: CPT

## 2022-12-22 PROCEDURE — 71045 X-RAY EXAM CHEST 1 VIEW: CPT | Mod: 26

## 2022-12-22 PROCEDURE — C1769: CPT

## 2022-12-22 PROCEDURE — 74176 CT ABD & PELVIS W/O CONTRAST: CPT | Mod: 26,MA

## 2022-12-22 PROCEDURE — 96374 THER/PROPH/DIAG INJ IV PUSH: CPT

## 2022-12-22 PROCEDURE — 83036 HEMOGLOBIN GLYCOSYLATED A1C: CPT

## 2022-12-22 PROCEDURE — 85610 PROTHROMBIN TIME: CPT

## 2022-12-22 PROCEDURE — 99222 1ST HOSP IP/OBS MODERATE 55: CPT

## 2022-12-22 PROCEDURE — 85025 COMPLETE CBC W/AUTO DIFF WBC: CPT

## 2022-12-22 PROCEDURE — 82248 BILIRUBIN DIRECT: CPT

## 2022-12-22 PROCEDURE — 74177 CT ABD & PELVIS W/CONTRAST: CPT | Mod: MA

## 2022-12-22 PROCEDURE — 76000 FLUOROSCOPY <1 HR PHYS/QHP: CPT

## 2022-12-22 PROCEDURE — 76700 US EXAM ABDOM COMPLETE: CPT | Mod: 26

## 2022-12-22 PROCEDURE — 84484 ASSAY OF TROPONIN QUANT: CPT

## 2022-12-22 PROCEDURE — 93010 ELECTROCARDIOGRAM REPORT: CPT

## 2022-12-22 PROCEDURE — 80048 BASIC METABOLIC PNL TOTAL CA: CPT

## 2022-12-22 PROCEDURE — 80307 DRUG TEST PRSMV CHEM ANLYZR: CPT

## 2022-12-22 PROCEDURE — 87086 URINE CULTURE/COLONY COUNT: CPT

## 2022-12-22 PROCEDURE — 93005 ELECTROCARDIOGRAM TRACING: CPT

## 2022-12-22 PROCEDURE — 83690 ASSAY OF LIPASE: CPT

## 2022-12-22 PROCEDURE — 36415 COLL VENOUS BLD VENIPUNCTURE: CPT

## 2022-12-22 PROCEDURE — A9585: CPT

## 2022-12-22 PROCEDURE — 88305 TISSUE EXAM BY PATHOLOGIST: CPT

## 2022-12-22 PROCEDURE — 80076 HEPATIC FUNCTION PANEL: CPT

## 2022-12-22 PROCEDURE — 87635 SARS-COV-2 COVID-19 AMP PRB: CPT

## 2022-12-22 RX ORDER — SODIUM CHLORIDE 9 MG/ML
1000 INJECTION INTRAMUSCULAR; INTRAVENOUS; SUBCUTANEOUS ONCE
Refills: 0 | Status: COMPLETED | OUTPATIENT
Start: 2022-12-22 | End: 2022-12-22

## 2022-12-22 RX ORDER — POLYETHYLENE GLYCOL 3350 17 G/17G
17 POWDER, FOR SOLUTION ORAL DAILY
Refills: 0 | Status: DISCONTINUED | OUTPATIENT
Start: 2022-12-22 | End: 2022-12-22

## 2022-12-22 RX ORDER — SODIUM CHLORIDE 9 MG/ML
1000 INJECTION INTRAMUSCULAR; INTRAVENOUS; SUBCUTANEOUS
Refills: 0 | Status: DISCONTINUED | OUTPATIENT
Start: 2022-12-22 | End: 2022-12-22

## 2022-12-22 RX ORDER — CEFTRIAXONE 500 MG/1
1000 INJECTION, POWDER, FOR SOLUTION INTRAMUSCULAR; INTRAVENOUS ONCE
Refills: 0 | Status: COMPLETED | OUTPATIENT
Start: 2022-12-22 | End: 2022-12-22

## 2022-12-22 RX ORDER — OXYCODONE AND ACETAMINOPHEN 5; 325 MG/1; MG/1
1 TABLET ORAL EVERY 4 HOURS
Refills: 0 | Status: DISCONTINUED | OUTPATIENT
Start: 2022-12-22 | End: 2022-12-25

## 2022-12-22 RX ORDER — SODIUM CHLORIDE 9 MG/ML
2000 INJECTION, SOLUTION INTRAVENOUS ONCE
Refills: 0 | Status: DISCONTINUED | OUTPATIENT
Start: 2022-12-22 | End: 2022-12-22

## 2022-12-22 RX ORDER — ACETAMINOPHEN 500 MG
1000 TABLET ORAL ONCE
Refills: 0 | Status: COMPLETED | OUTPATIENT
Start: 2022-12-22 | End: 2022-12-22

## 2022-12-22 RX ORDER — FAMOTIDINE 10 MG/ML
20 INJECTION INTRAVENOUS ONCE
Refills: 0 | Status: COMPLETED | OUTPATIENT
Start: 2022-12-22 | End: 2022-12-22

## 2022-12-22 RX ORDER — METOPROLOL TARTRATE 50 MG
12.5 TABLET ORAL
Refills: 0 | Status: DISCONTINUED | OUTPATIENT
Start: 2022-12-22 | End: 2022-12-22

## 2022-12-22 RX ORDER — SODIUM CHLORIDE 9 MG/ML
1000 INJECTION INTRAMUSCULAR; INTRAVENOUS; SUBCUTANEOUS
Refills: 0 | Status: DISCONTINUED | OUTPATIENT
Start: 2022-12-22 | End: 2022-12-23

## 2022-12-22 RX ORDER — SENNA PLUS 8.6 MG/1
2 TABLET ORAL AT BEDTIME
Refills: 0 | Status: DISCONTINUED | OUTPATIENT
Start: 2022-12-22 | End: 2022-12-22

## 2022-12-22 RX ORDER — MEROPENEM 1 G/30ML
1000 INJECTION INTRAVENOUS EVERY 8 HOURS
Refills: 0 | Status: DISCONTINUED | OUTPATIENT
Start: 2022-12-22 | End: 2022-12-29

## 2022-12-22 RX ORDER — PANTOPRAZOLE SODIUM 20 MG/1
40 TABLET, DELAYED RELEASE ORAL
Refills: 0 | Status: DISCONTINUED | OUTPATIENT
Start: 2022-12-22 | End: 2022-12-22

## 2022-12-22 RX ORDER — PANTOPRAZOLE SODIUM 20 MG/1
40 TABLET, DELAYED RELEASE ORAL ONCE
Refills: 0 | Status: COMPLETED | OUTPATIENT
Start: 2022-12-22 | End: 2022-12-22

## 2022-12-22 RX ORDER — CHLORHEXIDINE GLUCONATE 213 G/1000ML
1 SOLUTION TOPICAL
Refills: 0 | Status: DISCONTINUED | OUTPATIENT
Start: 2022-12-22 | End: 2022-12-25

## 2022-12-22 RX ORDER — ONDANSETRON 8 MG/1
4 TABLET, FILM COATED ORAL ONCE
Refills: 0 | Status: COMPLETED | OUTPATIENT
Start: 2022-12-22 | End: 2022-12-22

## 2022-12-22 RX ORDER — PANTOPRAZOLE SODIUM 20 MG/1
8 TABLET, DELAYED RELEASE ORAL
Qty: 80 | Refills: 0 | Status: DISCONTINUED | OUTPATIENT
Start: 2022-12-22 | End: 2022-12-24

## 2022-12-22 RX ORDER — HYDROMORPHONE HYDROCHLORIDE 2 MG/ML
0.25 INJECTION INTRAMUSCULAR; INTRAVENOUS; SUBCUTANEOUS ONCE
Refills: 0 | Status: DISCONTINUED | OUTPATIENT
Start: 2022-12-22 | End: 2022-12-22

## 2022-12-22 RX ORDER — TAMSULOSIN HYDROCHLORIDE 0.4 MG/1
0.4 CAPSULE ORAL AT BEDTIME
Refills: 0 | Status: DISCONTINUED | OUTPATIENT
Start: 2022-12-22 | End: 2023-01-02

## 2022-12-22 RX ORDER — CEFEPIME 1 G/1
INJECTION, POWDER, FOR SOLUTION INTRAMUSCULAR; INTRAVENOUS
Refills: 0 | Status: DISCONTINUED | OUTPATIENT
Start: 2022-12-22 | End: 2022-12-22

## 2022-12-22 RX ORDER — APIXABAN 2.5 MG/1
5 TABLET, FILM COATED ORAL EVERY 12 HOURS
Refills: 0 | Status: DISCONTINUED | OUTPATIENT
Start: 2022-12-22 | End: 2022-12-22

## 2022-12-22 RX ORDER — SODIUM CHLORIDE 9 MG/ML
10 INJECTION INTRAMUSCULAR; INTRAVENOUS; SUBCUTANEOUS
Refills: 0 | Status: DISCONTINUED | OUTPATIENT
Start: 2022-12-22 | End: 2023-01-06

## 2022-12-22 RX ORDER — BACLOFEN 100 %
5 POWDER (GRAM) MISCELLANEOUS EVERY 12 HOURS
Refills: 0 | Status: DISCONTINUED | OUTPATIENT
Start: 2022-12-22 | End: 2023-01-06

## 2022-12-22 RX ORDER — INSULIN LISPRO 100/ML
VIAL (ML) SUBCUTANEOUS EVERY 6 HOURS
Refills: 0 | Status: DISCONTINUED | OUTPATIENT
Start: 2022-12-22 | End: 2022-12-25

## 2022-12-22 RX ORDER — ATORVASTATIN CALCIUM 80 MG/1
10 TABLET, FILM COATED ORAL AT BEDTIME
Refills: 0 | Status: DISCONTINUED | OUTPATIENT
Start: 2022-12-22 | End: 2023-01-06

## 2022-12-22 RX ORDER — ONDANSETRON 8 MG/1
4 TABLET, FILM COATED ORAL EVERY 8 HOURS
Refills: 0 | Status: DISCONTINUED | OUTPATIENT
Start: 2022-12-22 | End: 2023-01-06

## 2022-12-22 RX ORDER — CEFEPIME 1 G/1
1000 INJECTION, POWDER, FOR SOLUTION INTRAMUSCULAR; INTRAVENOUS EVERY 8 HOURS
Refills: 0 | Status: DISCONTINUED | OUTPATIENT
Start: 2022-12-22 | End: 2022-12-22

## 2022-12-22 RX ORDER — CEFEPIME 1 G/1
1000 INJECTION, POWDER, FOR SOLUTION INTRAMUSCULAR; INTRAVENOUS ONCE
Refills: 0 | Status: COMPLETED | OUTPATIENT
Start: 2022-12-22 | End: 2022-12-22

## 2022-12-22 RX ORDER — LANOLIN ALCOHOL/MO/W.PET/CERES
3 CREAM (GRAM) TOPICAL AT BEDTIME
Refills: 0 | Status: DISCONTINUED | OUTPATIENT
Start: 2022-12-22 | End: 2023-01-06

## 2022-12-22 RX ORDER — SODIUM CHLORIDE 9 MG/ML
1000 INJECTION INTRAMUSCULAR; INTRAVENOUS; SUBCUTANEOUS ONCE
Refills: 0 | Status: DISCONTINUED | OUTPATIENT
Start: 2022-12-22 | End: 2022-12-22

## 2022-12-22 RX ADMIN — ONDANSETRON 4 MILLIGRAM(S): 8 TABLET, FILM COATED ORAL at 09:28

## 2022-12-22 RX ADMIN — Medication 30 MILLILITER(S): at 11:48

## 2022-12-22 RX ADMIN — PANTOPRAZOLE SODIUM 10 MG/HR: 20 TABLET, DELAYED RELEASE ORAL at 20:41

## 2022-12-22 RX ADMIN — SODIUM CHLORIDE 1000 MILLILITER(S): 9 INJECTION INTRAMUSCULAR; INTRAVENOUS; SUBCUTANEOUS at 11:46

## 2022-12-22 RX ADMIN — FAMOTIDINE 20 MILLIGRAM(S): 10 INJECTION INTRAVENOUS at 09:28

## 2022-12-22 RX ADMIN — OXYCODONE AND ACETAMINOPHEN 1 TABLET(S): 5; 325 TABLET ORAL at 19:02

## 2022-12-22 RX ADMIN — PANTOPRAZOLE SODIUM 40 MILLIGRAM(S): 20 TABLET, DELAYED RELEASE ORAL at 12:22

## 2022-12-22 RX ADMIN — CEFEPIME 100 MILLIGRAM(S): 1 INJECTION, POWDER, FOR SOLUTION INTRAMUSCULAR; INTRAVENOUS at 14:34

## 2022-12-22 RX ADMIN — OXYCODONE AND ACETAMINOPHEN 1 TABLET(S): 5; 325 TABLET ORAL at 22:15

## 2022-12-22 RX ADMIN — ATORVASTATIN CALCIUM 10 MILLIGRAM(S): 80 TABLET, FILM COATED ORAL at 21:47

## 2022-12-22 RX ADMIN — SODIUM CHLORIDE 75 MILLILITER(S): 9 INJECTION INTRAMUSCULAR; INTRAVENOUS; SUBCUTANEOUS at 20:41

## 2022-12-22 RX ADMIN — Medication 1000 MILLIGRAM(S): at 12:00

## 2022-12-22 RX ADMIN — OXYCODONE AND ACETAMINOPHEN 1 TABLET(S): 5; 325 TABLET ORAL at 23:00

## 2022-12-22 RX ADMIN — MEROPENEM 100 MILLIGRAM(S): 1 INJECTION INTRAVENOUS at 21:46

## 2022-12-22 RX ADMIN — CEFTRIAXONE 100 MILLIGRAM(S): 500 INJECTION, POWDER, FOR SOLUTION INTRAMUSCULAR; INTRAVENOUS at 12:20

## 2022-12-22 RX ADMIN — OXYCODONE AND ACETAMINOPHEN 1 TABLET(S): 5; 325 TABLET ORAL at 19:22

## 2022-12-22 RX ADMIN — Medication 400 MILLIGRAM(S): at 11:47

## 2022-12-22 RX ADMIN — HYDROMORPHONE HYDROCHLORIDE 0.25 MILLIGRAM(S): 2 INJECTION INTRAMUSCULAR; INTRAVENOUS; SUBCUTANEOUS at 15:00

## 2022-12-22 RX ADMIN — HYDROMORPHONE HYDROCHLORIDE 0.25 MILLIGRAM(S): 2 INJECTION INTRAMUSCULAR; INTRAVENOUS; SUBCUTANEOUS at 14:33

## 2022-12-22 RX ADMIN — TAMSULOSIN HYDROCHLORIDE 0.4 MILLIGRAM(S): 0.4 CAPSULE ORAL at 21:47

## 2022-12-22 RX ADMIN — Medication 100 MILLIGRAM(S): at 21:47

## 2022-12-22 NOTE — ED PROVIDER NOTE - TEMPLATE, MLM
West for 8/1/2019.   General

## 2022-12-22 NOTE — H&P ADULT - NSHPPHYSICALEXAM_GEN_ALL_CORE
GENERAL: ill looking   HEAD:  Atraumatic, Normocephalic  EYES:  conjunctiva and sclera clear  NECK: Supple, No JVD, Normal thyroid  CHEST/LUNG: Clear to auscultation. Clear to percussion bilaterally; No rales, rhonchi, wheezing, or rubs  HEART: Regular rate and rhythm; No murmurs, rubs, or gallops  ABDOMEN: RUQ tenderness, Nondistended; Bowel sounds present  NERVOUS SYSTEM:  Alert & Oriented X3,    EXTREMITIES:  2+ Peripheral Pulses, No clubbing, cyanosis, or edema  SKIN: warm dry

## 2022-12-22 NOTE — CONSULT NOTE ADULT - ASSESSMENT
Patient is a 67 year old male, vaccinated, from [facility] with PMHx of DM, HTN, HLD, AF on Eliquis, PVD, presents with c/o vomiting and right sided abdominal pain. Patient states yesterday after lunch he had a sharp right sided abdominal pain 8/10 persistent with nausea and vomiting. He was discharged yesterday from hospital. He had choledocholithiasis and underwent  ERCP on 12/16 biliary sludge removal with biliary sphincterotomy and balloon extraction. Admitted for sepsis.    CT AP noncon: Liver : stable small hypodense area at left hepatic lobe. Dilated CBD. GB: stable cholecystectomy.  Pancreas: mild dilatation of main pancreatic duct. Bowel: Colonic diverticulosis without diverticulitis. (+) stool in rectum and distal sigmoid colon.   US abd : 1.5 cm CBD dilation    GI consulted .      Patient is a 67 year old male, vaccinated, from [facility] with PMHx of DM, HTN, HLD, AF on Eliquis, PVD, presents with c/o vomiting and right sided abdominal pain. Patient states yesterday after lunch he had a sharp right sided abdominal pain 8/10 persistent with nausea and vomiting. He was discharged yesterday from hospital. He had choledocholithiasis and underwent  ERCP on 12/16 biliary sludge removal with biliary sphincterotomy and balloon extraction. Admitted for sepsis. Also with melena starting today.     CT AP noncon: Liver : stable small hypodense area at left hepatic lobe. Dilated CBD. GB: stable cholecystectomy.  Pancreas: mild dilatation of main pancreatic duct. Bowel: Colonic diverticulosis without diverticulitis. (+) stool in rectum and distal sigmoid colon.   US abd : 1.5 cm CBD dilation    GI consulted .

## 2022-12-22 NOTE — ED ADULT NURSE NOTE - ED STAT RN HANDOFF DETAILS 2
Received patient @ 3pm, Patient is A&Ox3, ICU resident seen at bedside made aware of IV infiltration & will facilitate transfer to ICU to place central line. Vital signs stable as documented, sinus tach noted on tele monitor, . no acute distress noted, bed in lowest position, safety maintained. Will continue to monitor. Endorsed to Garrett SALEEM (ICU).

## 2022-12-22 NOTE — H&P ADULT - ASSESSMENT
Patient is a 67 year old male, vaccinated, from [facility] with PMHx of DM, HTN, HLD, AF on Eliquis, PVD, presents with c/o vomiting and right sided abdominal pain. Patient states yesterday after lunch he had a sharp right sided abdominal pain 8/10 persistent with nausea and vomiting. He was discharged yesterday from hospital. He had choledocholithiasis and underwent  ERCP on 12/16 biliary sludge removal with biliary sphincterotomy and balloon extraction. Admitted for sepsis      In ED:   Vitals: BP: 81/59mmHg  HR:  118  RR: 97% RA   WBC 14.7 k  elevated LFT, lactate   s/p  Rocephin 1LNS and zofran  Patient is a 67 year old male, vaccinated, from [facility] with PMHx of DM, HTN, HLD, AF on Eliquis, PVD, presents with c/o vomiting and right sided abdominal pain. Patient states yesterday after lunch he had a sharp right sided abdominal pain 8/10 persistent with nausea and vomiting. He was discharged yesterday from hospital. He had choledocholithiasis and underwent  ERCP on 12/16 biliary sludge removal with biliary sphincterotomy and balloon extraction. Patient had two episodes of melena in ED. Admitted for sepsis      In ED:   Vitals: BP: 81/59mmHg  HR:  118  RR: 97% RA   WBC 14.7 k  elevated LFT, lactate   s/p  Rocephin 1LNS and zofran

## 2022-12-22 NOTE — ED PROVIDER NOTE - CLINICAL SUMMARY MEDICAL DECISION MAKING FREE TEXT BOX
67M presenting with vomiting. chart review shows the patient had a dilated CBD and had an ERCP done last week. continued to have nausea and vomiting but diet advanced. mild epigastric tenderness on exam. concern for perforation given recent procedure, recurrent choledocholithiasis, pancreatitis, gastritis. will get labs, CT abdomen, US, GI consult. will reassess.

## 2022-12-22 NOTE — H&P ADULT - PROBLEM SELECTOR PLAN 4
on Eliquis 5mg BID   hold as per GI recs UA positive  f/u Urine culture  s/p  Rocephin  c/w cefepime  ID Dr. Novoa

## 2022-12-22 NOTE — H&P ADULT - PROBLEM SELECTOR PLAN 6
Hx DM   NPO for now   ISS and fingersticks q6 hx HTN   on nifedipine and metoprolol   low  BP: 81/59mmHg  S/P 2L NS bolus   c/w IVF   monitor BP

## 2022-12-22 NOTE — CONSULT NOTE ADULT - SUBJECTIVE AND OBJECTIVE BOX
Patient is a 67y old  Male who presents with a chief complaint of Abdominal pain (22 Dec 2022 13:29)      HPI:  Patient is a 67 year old male, vaccinated, from [facility] with PMHx of DM, HTN, HLD, AF on Eliquis, PVD, presents with c/o vomiting and right sided abdominal pain. patient states yesterday after lunch he had a sharp right sided abdominal pain 8/10 persistent with nausea and vomiting. He also endorsed chills and rigors, unsure if he spiked a fever. He denies any changes in bowel habits, chest pain, shortness of breath or palpitations. Patient had two episodes of melena in ED. He was discharged yesterday from hospital. He had choledocholithiasis,  CT A/P showed: Cholecystectomy with dilated CBD up to 1.7 cm abrupt cut off of the distal CBD and  mild pancreatic duct dilatation. 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 He underwent  ERCP on  biliary sludge removal with biliary sphincterotomy and balloon extraction. He was discharged after symptom improvement and out patient gastric emptying study.     In ED:   Vitals: BP: 81/59mmHg  HR:  118  RR: 97% RA   WBC 14.7 k  elevated LFT, lactate   s/p  Rocephin 1LNS and zofran  (22 Dec 2022 12:00)      Interval hx:  Patient had 2 episodes of melena. BP     Allergies    morphine (Unknown)  penicillin (Unknown)  shellfish (Unknown)    Intolerances        MEDICATIONS  (STANDING):  atorvastatin 10 milliGRAM(s) Oral at bedtime  cefepime   IVPB      cefepime   IVPB 1000 milliGRAM(s) IV Intermittent every 8 hours  insulin lispro (ADMELOG) corrective regimen sliding scale   SubCutaneous every 6 hours  lactated ringers Bolus 2000 milliLiter(s) IV Bolus once  oxycodone    5 mG/acetaminophen 325 mG 1 Tablet(s) Oral every 4 hours  pantoprazole    Tablet 40 milliGRAM(s) Oral before breakfast  pregabalin 100 milliGRAM(s) Oral three times a day  sodium chloride 0.9% Bolus 1000 milliLiter(s) IV Bolus once  sodium chloride 0.9%. 1000 milliLiter(s) (75 mL/Hr) IV Continuous <Continuous>  tamsulosin 0.4 milliGRAM(s) Oral at bedtime    MEDICATIONS  (PRN):  aluminum hydroxide/magnesium hydroxide/simethicone Suspension 30 milliLiter(s) Oral every 4 hours PRN Dyspepsia  baclofen 5 milliGRAM(s) Oral every 12 hours PRN Musculoskeletal Pain  melatonin 3 milliGRAM(s) Oral at bedtime PRN Insomnia  ondansetron Injectable 4 milliGRAM(s) IV Push every 8 hours PRN Nausea and/or Vomiting      Daily Height in cm: 187.96 (22 Dec 2022 07:05)    Daily     Drug Dosing Weight  Height (cm): 188 (22 Dec 2022 07:05)  Weight (kg): 99.3 (22 Dec 2022 07:05)  BMI (kg/m2): 28.1 (22 Dec 2022 07:05)  BSA (m2): 2.26 (22 Dec 2022 07:05)    PAST MEDICAL & SURGICAL HISTORY:  COPD (chronic obstructive pulmonary disease)      Diabetes      PVD (peripheral vascular disease)      Chronic atrial fibrillation      GERD (gastroesophageal reflux disease)      MDD (major depressive disorder)      BPH (benign prostatic hyperplasia)          FAMILY HISTORY:      SOCIAL HISTORY:    ADVANCE DIRECTIVES:    REVIEW OF SYSTEMS:    CONSTITUTIONAL: No fever, weight loss, or fatigue  EYES: No eye pain, visual disturbances, or discharge  ENMT:  No difficulty hearing, tinnitus, vertigo; No sinus or throat pain  NECK: No pain or stiffness  BREASTS: No pain, masses, or nipple discharge  RESPIRATORY: No cough, wheezing, chills or hemoptysis; No shortness of breath  CARDIOVASCULAR: No chest pain, palpitations, dizziness, or leg swelling  GASTROINTESTINAL: No abdominal or epigastric pain. No nausea, vomiting, or hematemesis; No diarrhea or constipation. No melena or hematochezia.  GENITOURINARY: No dysuria, frequency, hematuria, or incontinence  NEUROLOGICAL: No headaches, memory loss, loss of strength, numbness, or tremors  SKIN: No itching, burning, rashes, or lesions   LYMPH NODES: No enlarged glands  ENDOCRINE: No heat or cold intolerance; No hair loss  MUSCULOSKELETAL: No joint pain or swelling; No muscle, back, or extremity pain  PSYCHIATRIC: No depression, anxiety, mood swings, or difficulty sleeping  HEME/LYMPH: No easy bruising, or bleeding gums  ALLERGY AND IMMUNOLOGIC: No hives or eczema          ICU Vital Signs Last 24 Hrs  T(C): 38 (22 Dec 2022 11:46), Max: 38 (22 Dec 2022 11:46)  T(F): 100.4 (22 Dec 2022 11:46), Max: 100.4 (22 Dec 2022 11:46)  HR: 118 (22 Dec 2022 12:38) (99 - 118)  BP: 95/66 (22 Dec 2022 13:27) (81/59 - 106/62)  BP(mean): --  ABP: --  ABP(mean): --  RR: 16 (22 Dec 2022 12:38) (16 - 18)  SpO2: 97% (22 Dec 2022 12:38) (91% - 97%)    O2 Parameters below as of 22 Dec 2022 12:38  Patient On (Oxygen Delivery Method): room air                I&O's Detail      PHYSICAL EXAM:    GENERAL: NAD, well-groomed, well-developed  HEAD:  Atraumatic, Normocephalic  EYES: EOMI, PERRLA, conjunctiva and sclera clear  ENMT: No tonsillar erythema, exudates, or enlargement; Moist mucous membranes, Good dentition, No lesions  NECK: Supple, No JVD, Normal thyroid  NERVOUS SYSTEM:  Alert & Oriented X3, Good concentration; Motor Strength 5/5 B/L upper and lower extremities; DTRs 2+ intact and symmetric  CHEST/LUNG: Clear to percussion bilaterally; No rales, rhonchi, wheezing, or rubs  HEART: Regular rate and rhythm; No murmurs, rubs, or gallops  ABDOMEN: Soft, Nontender, Nondistended; Bowel sounds present  EXTREMITIES:  2+ Peripheral Pulses, No clubbing, cyanosis, or edema  LYMPH: No lymphadenopathy noted  SKIN: No rashes or lesions    LABS:  CBC Full  -  ( 22 Dec 2022 08:19 )  WBC Count : 14.78 K/uL  RBC Count : 4.62 M/uL  Hemoglobin : 14.9 g/dL  Hematocrit : 45.7 %  Platelet Count - Automated : 163 K/uL  Mean Cell Volume : 98.9 fl  Mean Cell Hemoglobin : 32.3 pg  Mean Cell Hemoglobin Concentration : 32.6 gm/dL  Auto Neutrophil # : 11.53 K/uL  Auto Lymphocyte # : 2.81 K/uL  Auto Monocyte # : 0.44 K/uL  Auto Eosinophil # : 0.00 K/uL  Auto Basophil # : 0.00 K/uL  Auto Neutrophil % : 71.0 %  Auto Lymphocyte % : 19.0 %  Auto Monocyte % : 3.0 %  Auto Eosinophil % : 0.0 %  Auto Basophil % : 0.0 %        136  |  97  |  29<H>  ----------------------------<  214<H>  4.5   |  28  |  1.19    Ca    9.6      22 Dec 2022 08:40  Phos  4.3       Mg     1.9         TPro  9.4<H>  /  Alb  2.4<L>  /  TBili  0.6  /  DBili  x   /  AST  134<H>  /  ALT  111<H>  /  AlkPhos  488<H>      CAPILLARY BLOOD GLUCOSE      POCT Blood Glucose.: 143 mg/dL (22 Dec 2022 14:09)      Urinalysis Basic - ( 22 Dec 2022 09:20 )    Color: Yellow / Appearance: Clear / S.020 / pH: x  Gluc: x / Ketone: Trace  / Bili: Small / Urobili: 8   Blood: x / Protein: 30 mg/dL / Nitrite: Positive   Leuk Esterase: Trace / RBC: 25-50 /HPF / WBC 3-5 /HPF   Sq Epi: x / Non Sq Epi: Occasional /HPF / Bacteria: Many /HPF              EKG:    ECHO, US:    RADIOLOGY:    CRITICAL CARE TIME SPENT:   Patient is a 67y old  Male who presents with a chief complaint of Abdominal pain (22 Dec 2022 13:29)      HPI:  Patient is a 67 year old male, vaccinated, from [facility] with PMHx of DM, HTN, HLD, AF on Eliquis, PVD, presents with c/o vomiting and right sided abdominal pain. patient states yesterday after lunch he had a sharp right sided abdominal pain 8/10 persistent with nausea and vomiting. He also endorsed chills and rigors, unsure if he spiked a fever. He denies any changes in bowel habits, chest pain, shortness of breath or palpitations. Patient had two episodes of melena in ED. He was discharged yesterday from hospital. He had choledocholithiasis,  CT A/P showed: Cholecystectomy with dilated CBD up to 1.7 cm abrupt cut off of the distal CBD and  mild pancreatic duct dilatation. 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 He underwent  ERCP on  biliary sludge removal with biliary sphincterotomy and balloon extraction. He was discharged after symptom improvement and out patient gastric emptying study.     In ED:   Vitals: BP: 81/59mmHg  HR:  118  RR: 97% RA   WBC 14.7 k  elevated LFT, lactate   s/p  Rocephin 1LNS and zofran  (22 Dec 2022 12:00)      Interval hx:  Patient had 2 episodes of melena and hypotension.   BP: 87/70 s/p 1L bolus     Allergies    morphine (Unknown)  penicillin (Unknown)  shellfish (Unknown)    Intolerances        MEDICATIONS  (STANDING):  atorvastatin 10 milliGRAM(s) Oral at bedtime  cefepime   IVPB      cefepime   IVPB 1000 milliGRAM(s) IV Intermittent every 8 hours  insulin lispro (ADMELOG) corrective regimen sliding scale   SubCutaneous every 6 hours  lactated ringers Bolus 2000 milliLiter(s) IV Bolus once  oxycodone    5 mG/acetaminophen 325 mG 1 Tablet(s) Oral every 4 hours  pantoprazole    Tablet 40 milliGRAM(s) Oral before breakfast  pregabalin 100 milliGRAM(s) Oral three times a day  sodium chloride 0.9% Bolus 1000 milliLiter(s) IV Bolus once  sodium chloride 0.9%. 1000 milliLiter(s) (75 mL/Hr) IV Continuous <Continuous>  tamsulosin 0.4 milliGRAM(s) Oral at bedtime    MEDICATIONS  (PRN):  aluminum hydroxide/magnesium hydroxide/simethicone Suspension 30 milliLiter(s) Oral every 4 hours PRN Dyspepsia  baclofen 5 milliGRAM(s) Oral every 12 hours PRN Musculoskeletal Pain  melatonin 3 milliGRAM(s) Oral at bedtime PRN Insomnia  ondansetron Injectable 4 milliGRAM(s) IV Push every 8 hours PRN Nausea and/or Vomiting      Daily Height in cm: 187.96 (22 Dec 2022 07:05)    Daily     Drug Dosing Weight  Height (cm): 188 (22 Dec 2022 07:05)  Weight (kg): 99.3 (22 Dec 2022 07:05)  BMI (kg/m2): 28.1 (22 Dec 2022 07:05)  BSA (m2): 2.26 (22 Dec 2022 07:05)    PAST MEDICAL & SURGICAL HISTORY:  COPD (chronic obstructive pulmonary disease)      Diabetes      PVD (peripheral vascular disease)      Chronic atrial fibrillation      GERD (gastroesophageal reflux disease)      MDD (major depressive disorder)      BPH (benign prostatic hyperplasia)          FAMILY HISTORY:      SOCIAL HISTORY:    ADVANCE DIRECTIVES:    REVIEW OF SYSTEMS:    CONSTITUTIONAL: fatigue  EYES: No eye pain, visual disturbances, or discharge  ENMT:  No difficulty hearing, tinnitus, vertigo; No sinus or throat pain  NECK: No pain or stiffness  BREASTS: No pain, masses, or nipple discharge  RESPIRATORY: No cough, wheezing, chills or hemoptysis; No shortness of breath  CARDIOVASCULAR: No chest pain, palpitations, dizziness, or leg swelling  GASTROINTESTINAL: Abdominal pain   GENITOURINARY: No dysuria, frequency, hematuria, or incontinence  NEUROLOGICAL: No headaches, memory loss, loss of strength, numbness, or tremors  SKIN: No itching, burning, rashes, or lesions   LYMPH NODES: No enlarged glands  ENDOCRINE: No heat or cold intolerance; No hair loss  MUSCULOSKELETAL: left leg pain   PSYCHIATRIC: No depression, anxiety, mood swings, or difficulty sleeping  HEME/LYMPH: No easy bruising, or bleeding gums  ALLERGY AND IMMUNOLOGIC: No hives or eczema          ICU Vital Signs Last 24 Hrs  T(C): 38 (22 Dec 2022 11:46), Max: 38 (22 Dec 2022 11:46)  T(F): 100.4 (22 Dec 2022 11:46), Max: 100.4 (22 Dec 2022 11:46)  HR: 118 (22 Dec 2022 12:38) (99 - 118)  BP: 95/66 (22 Dec 2022 13:27) (81/59 - 106/62)  BP(mean): --  ABP: --  ABP(mean): --  RR: 16 (22 Dec 2022 12:38) (16 - 18)  SpO2: 97% (22 Dec 2022 12:38) (91% - 97%)    O2 Parameters below as of 22 Dec 2022 12:38  Patient On (Oxygen Delivery Method): room air                I&O's Detail      PHYSICAL EXAM:    GENERAL: diaphoretic    HEAD:  Atraumatic, Normocephalic  EYES:  conjunctiva and sclera clear  NECK: Supple, No JVD, Normal thyroid  CHEST/LUNG: Clear to auscultation. Clear to percussion bilaterally; No rales, rhonchi, wheezing, or rubs  HEART: Regular rate and rhythm; No murmurs, rubs, or gallops  ABDOMEN: RUQ tenderness, Nondistended; Bowel sounds present  NERVOUS SYSTEM:  Alert & Oriented X3,    EXTREMITIES:  2+ Peripheral Pulses, No clubbing, cyanosis, or edema  SKIN: warm dry    LABS:  CBC Full  -  ( 22 Dec 2022 08:19 )  WBC Count : 14.78 K/uL  RBC Count : 4.62 M/uL  Hemoglobin : 14.9 g/dL  Hematocrit : 45.7 %  Platelet Count - Automated : 163 K/uL  Mean Cell Volume : 98.9 fl  Mean Cell Hemoglobin : 32.3 pg  Mean Cell Hemoglobin Concentration : 32.6 gm/dL  Auto Neutrophil # : 11.53 K/uL  Auto Lymphocyte # : 2.81 K/uL  Auto Monocyte # : 0.44 K/uL  Auto Eosinophil # : 0.00 K/uL  Auto Basophil # : 0.00 K/uL  Auto Neutrophil % : 71.0 %  Auto Lymphocyte % : 19.0 %  Auto Monocyte % : 3.0 %  Auto Eosinophil % : 0.0 %  Auto Basophil % : 0.0 %        136  |  97  |  29<H>  ----------------------------<  214<H>  4.5   |  28  |  1.19    Ca    9.6      22 Dec 2022 08:40  Phos  4.3       Mg     1.9         TPro  9.4<H>  /  Alb  2.4<L>  /  TBili  0.6  /  DBili  x   /  AST  134<H>  /  ALT  111<H>  /  AlkPhos  488<H>  12-22    CAPILLARY BLOOD GLUCOSE      POCT Blood Glucose.: 143 mg/dL (22 Dec 2022 14:09)      Urinalysis Basic - ( 22 Dec 2022 09:20 )    Color: Yellow / Appearance: Clear / S.020 / pH: x  Gluc: x / Ketone: Trace  / Bili: Small / Urobili: 8   Blood: x / Protein: 30 mg/dL / Nitrite: Positive   Leuk Esterase: Trace / RBC: 25-50 /HPF / WBC 3-5 /HPF   Sq Epi: x / Non Sq Epi: Occasional /HPF / Bacteria: Many /HPF              EKG:    ECHO, US:    RADIOLOGY:    CRITICAL CARE TIME SPENT:

## 2022-12-22 NOTE — ED PROVIDER NOTE - PROGRESS NOTE DETAILS
spoke with Dr. Villanueva who requested dr. Kim be consulted as he did the ERCP on the patient. message left for Dr. Kim. will admit. Tyrone Long 2nd US IV placed. attemtped to reach Dr. Kim for a 2nd time. Tyrone Long 2nd US IV placed. attempted to reach Dr. Kim for a 2nd time. Tyrone Long PA for Dr. Kim at bedside. patient newly hypotensive and now rectally febrile. reported dark stool by nurse. concern for sepsis, GI bleeding. additional fluids ordered. Tyrone Long ICU consulted. BP responding to fluids. Tyrone Long

## 2022-12-22 NOTE — ED ADULT NURSE REASSESSMENT NOTE - NS ED NURSE REASSESS COMMENT FT1
Received patient @ 3pm, Patient is A&Ox3, ICU resident seen at bedside made aware of IV infiltration & will facilitate transfer to ICU to place central line. Vital signs stable as documented, sinus tach noted on tele monitor, . no acute distress noted, bed in lowest position, safety maintained. Will continue to monitor. Garrett RN (ICU) requests call back at 4:55pm for report, charge nurse made aware.

## 2022-12-22 NOTE — PATIENT PROFILE ADULT - FALL HARM RISK - HARM RISK INTERVENTIONS

## 2022-12-22 NOTE — H&P ADULT - PROBLEM SELECTOR PLAN 1
sepsis 2/2 choledocholithiasis vs ascending cholangitis vs ASP PNA VS UTI   s/p  Rocephin 1LNS and zofran in ED  Vitals: BP: 81/59mmHg  HR:  118  RR: 97% RA   WBC 14.7 k, lactate 5.9, elevated LFTs  UA positive   US Abdomen: Biliary dilatation and hepatic steatosis  CT A/P: dilation of the common bile duct and main pancreatic duct again noted.. New RLL opacity  NPO for possible procedure  hold elquis  start cefepime 1g q8  f/u MRCP w/IV contrast   f/u CXR  f/u BCUX and UCX  c/w IVF, given 1 L bolus   trend lactate q2  GI consulted Dr Villanueva  ID Dr. Novoa sepsis 2/2 choledocholithiasis vs ascending cholangitis vs ASP PNA    s/p  Rocephin 1LNS and zofran in ED  Vitals: BP: 81/59mmHg  HR:  118  RR: 97% RA   WBC 14.7 k, lactate 5.9, elevated LFTs  UA positive   US Abdomen: Biliary dilatation and hepatic steatosis  CT A/P: dilation of the common bile duct and main pancreatic duct again noted.. New RLL opacity  NPO for possible procedure  hold elquis  start cefepime 1g q8  f/u MRCP w/IV contrast   f/u CXR  f/u BCUX and UCX  c/w IVF, given 1 L bolus   trend lactate q2  GI consulted Dr Villanueva  ID Dr. Novoa

## 2022-12-22 NOTE — ED PROVIDER NOTE - NSFOLLOWUPINSTRUCTIONS_ED_ALL_ED_FT
You were seen in the emergency department for abdominal pain and vomiting.     Please follow-up with your primary care doctor in the next 24-48 hours.     If you have any worsening symptoms, severe headache, chest pain, trouble breathing, please return to the emergency department.

## 2022-12-22 NOTE — H&P ADULT - TIME BILLING
- Review of records, telemetry, vital signs and daily labs.   - General and cardiovascular physical examination.  - Generation of cardiovascular treatment plan.  - Coordination of care.      Patient was seen and examined by me on12/22/2022,interim events noted,labs and radiology studies reviewed.  Nasir Sharma MD,FACC.  [address] [place], [place] [id number].  [phone]

## 2022-12-22 NOTE — CONSULT NOTE ADULT - NS ATTEND AMEND GEN_ALL_CORE FT
- GI bleed, melena.  - Leukocytosis.  - Severe sepsis.  - Abdominal pain.  - Elevated LFTs.  - Recent ERCP.    Patient seen and examined. Presenting with n/v, RUQ abd pain and melena starting today. Of note, recent ERCP 6 days ago with biliary sphincterotomy and sludge removal and patient restarted on eliquis prior to dc. Suspect potential post sphincterotomy bleed resulting in cholangitis given melena, rise in LFTs, and sepsis. Recommend broad spectrum abx, ICU evaluation. Awaiting optimization (currently no IV access). Maintain NPO. Plan for ERCP tomorrow AM.

## 2022-12-22 NOTE — ED ADULT TRIAGE NOTE - CHIEF COMPLAINT QUOTE
biba from nursing home with abdominal pain and vomiting s/p lower GI procedure and dc from hospital  yesterday, h/o copd Spo2 88 % RA

## 2022-12-22 NOTE — CONSULT NOTE ADULT - SUBJECTIVE AND OBJECTIVE BOX
[facility]    Patient is a 67y old  Male who presents with a chief complaint of Abdominal pain (22 Dec 2022 12:00)    HPI:  Patient is a 67 year old male, vaccinated, from [facility] with PMHx of DM, HTN, HLD, AF on Eliquis, PVD, presents with c/o vomiting and right sided abdominal pain. patient states yesterday after lunch he had a sharp right sided abdominal pain 8/10 persistent with nausea and vomiting. He also endorsed chills and rigors, unsure if he spiked a fever. He denies any changes in bowel habits, chest pain, shortness of breath or palpitations. He was discharged yesterday from hospital. He had choledocholithiasis,  CT A/P showed: Cholecystectomy with dilated CBD up to 1.7 cm abrupt cut off of the distal CBD and  mild pancreatic duct dilatation. 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 He underwent  ERCP on 12/16 biliary sludge removal with biliary sphincterotomy and balloon extraction. He was discharged after symptom improvement and out patient gastric emptying study.     In ED:   Vitals: BP: 81/59mmHg  HR:  118  RR: 97% RA   WBC 14.7 k  elevated LFT, lactate   s/p  Rocephin 1LNS and zofran  (22 Dec 2022 12:00)    GI HPI  Pt seen and examined in ED. Pt awake, ill -appearing, endorses feeling unwell since discharge from hospital, developed weakness,  RUQ abdominal discomfort radiating to mid-epigastric region and back,   accompanied with nausea with NBNB emesis since yesterday, loose BM's. In ED, BM's noted to be dark black. Pt endorses last dose of Eliquis yesterday morning.   Pt denies dizziness, SOB, chest discomfort. (+) chronic LLE discomfort due to neuropathy.         PMH/PSH:  PAST MEDICAL & SURGICAL HISTORY:  COPD (chronic obstructive pulmonary disease)  Diabetes  PVD (peripheral vascular disease)  Chronic atrial fibrillation  GERD (gastroesophageal reflux disease)  MDD (major depressive disorder)  BPH (benign prostatic hyperplasia)    FH:  FAMILY HISTORY:      Social History:       MEDS:  MEDICATIONS  (STANDING):  atorvastatin 10 milliGRAM(s) Oral at bedtime  cefepime   IVPB      cefepime   IVPB 1000 milliGRAM(s) IV Intermittent once  cefepime   IVPB 1000 milliGRAM(s) IV Intermittent every 8 hours  insulin lispro (ADMELOG) corrective regimen sliding scale   SubCutaneous every 6 hours  lactated ringers Bolus 2000 milliLiter(s) IV Bolus once  oxycodone    5 mG/acetaminophen 325 mG 1 Tablet(s) Oral every 4 hours  pantoprazole    Tablet 40 milliGRAM(s) Oral before breakfast  pregabalin 100 milliGRAM(s) Oral three times a day  sodium chloride 0.9% Bolus 1000 milliLiter(s) IV Bolus once  sodium chloride 0.9%. 1000 milliLiter(s) (75 mL/Hr) IV Continuous <Continuous>  tamsulosin 0.4 milliGRAM(s) Oral at bedtime    MEDICATIONS  (PRN):  aluminum hydroxide/magnesium hydroxide/simethicone Suspension 30 milliLiter(s) Oral every 4 hours PRN Dyspepsia  baclofen 5 milliGRAM(s) Oral every 12 hours PRN Musculoskeletal Pain  melatonin 3 milliGRAM(s) Oral at bedtime PRN Insomnia  ondansetron Injectable 4 milliGRAM(s) IV Push every 8 hours PRN Nausea and/or Vomiting    Allergies    morphine (Unknown)  penicillin (Unknown)  shellfish (Unknown)    Intolerances            CONSTITUTIONAL:(+) fatigue, generalized weakness  HEENT:  Eyes:  No visual loss, blurred vision, double vision or yellow sclerae. Ears, Nose, Throat:  No hearing loss, sneezing, congestion, runny nose or sore throat.  SKIN:  No rash or itching.  CARDIOVASCULAR:  No chest pain, chest pressure or chest discomfort. No palpitations or edema.  RESPIRATORY:  No shortness of breath, cough or sputum.  GASTROINTESTINAL:  SEE HPI  GENITOURINARY:  No dysuria, hematuria, urinary frequency  NEUROLOGICAL:  No headache, dizziness, syncope, paralysis, ataxia, numbness or tingling in the extremities. No change in bowel or bladder control.  MUSCULOSKELETAL:  No muscle, back pain, joint pain or stiffness.  HEMATOLOGIC:  No anemia, bleeding or bruising.  LYMPHATICS:  No enlarged nodes. No history of splenectomy.  PSYCHIATRIC:  No history of depression or anxiety.  ENDOCRINOLOGIC:  No reports of sweating, cold or heat intolerance. No polyuria or polydipsia.      ______________________________________________________________________  PHYSICAL EXAM:  T(C): 38 (12-22-22 @ 11:46), Max: 38 (12-22-22 @ 11:46)  HR: 118 (12-22-22 @ 12:38)  BP: 95/66 (12-22-22 @ 13:27)  RR: 16 (12-22-22 @ 12:38)  SpO2: 97% (12-22-22 @ 12:38)  Wt(kg): --      GEN: toxic appearing  HEENT: normocephalic  CVS: S1S2+  CHEST: clear to auscultation. No wheezing, no rales  ABD: BS (+) soft , tender RUQ , nondistended, bowel sounds present. JORDAN: dark black loose BM  EXTR: no cyanosis, no clubbing, no edema  NEURO: Awake , oriented x3  SKIN:  warm;  non icteric, pale    ______________________________________________________________________  LABS:                        14.9   14.78 )-----------( 163      ( 22 Dec 2022 08:19 )             45.7     12-22    136  |  97  |  29<H>  ----------------------------<  214<H>  4.5   |  28  |  1.19    Ca    9.6      22 Dec 2022 08:40  Phos  4.3     12-22  Mg     1.9     12-22    TPro  9.4<H>  /  Alb  2.4<L>  /  TBili  0.6  /  DBili  x   /  AST  134<H>  /  ALT  111<H>  /  AlkPhos  488<H>  12-22    LIVER FUNCTIONS - ( 22 Dec 2022 08:40 )  Alb: 2.4 g/dL / Pro: 9.4 g/dL / ALK PHOS: 488 U/L / ALT: 111 U/L DA / AST: 134 U/L / GGT: x             ____________________________________________  IMAGING:       < from: CT Abdomen and Pelvis No Cont (12.22.22 @ 10:37) >    ACC: [id number] EXAM:  CT ABDOMEN AND PELVIS                          PROCEDURE DATE:  12/22/2022          INTERPRETATION:  CLINICAL INFORMATION: Abdominal pain    COMPARISON: Abdominal CT dated 12/8/2022    CONTRAST/COMPLICATIONS:  IV Contrast: NONE  Oral Contrast: NONE  Complications: None reported at time of study completion    PROCEDURE:  CT of the Abdomen and Pelvis was performed.  Sagittal and coronal reformats were performed.    FINDINGS: Evaluation of the abdomen and pelvis is limited by lack of IV   contrast.  LOWER CHEST: New airspace opacifications in the right lower lung zone for   which clinical correlation with pneumonia is recommended.    LIVER: Stable small hypodense area in the left hepatic lobe. Please see   recent abdominalMR dated 12/12/2022 for further characterization.  BILE DUCTS: Dilatation of the common bile duct is again noted.  GALLBLADDER: Stable cholecystectomy clips.  SPLEEN: Within normal limits.  PANCREAS: The pancreatic parenchyma appears unremarkable. Mild dilatation   of the main pancreatic duct in the pancreatic head is again noted.  ADRENALS: Within normal limits.  KIDNEYS/URETERS: Small hypodense lesion in the left kidney, likely   representing a cyst. The right kidney appears unremarkable. No evidence   for a calculus in the kidneys or ureters. No hydronephrosis.    BLADDER: Underdistended.  REPRODUCTIVE ORGANS: The prostate and seminal vesicles appear grossly   unremarkable.    BOWEL: No bowel obstruction or grossly thickened bowel wall. Colonic   diverticulosis without evidence for diverticulitis. Moderate amount of   stool in the rectum and distal sigmoid colon.. Appendix not visualized.   No secondary signs for acute appendicitis.  PERITONEUM: No free air or ascites.  VESSELS: Calcified atherosclerotic disease.  RETROPERITONEUM/LYMPH NODES: No lymphadenopathy.  ABDOMINAL WALL: Small fat-containing ventral hernia.  BONES: Degenerative spondylosis.    IMPRESSION: No bowel obstruction or grossly thickened bowel wall. Colonic   diverticulosis without evidence for diverticulitis. Moderate amount of   stool in the rectum and distal sigmoid colon.. Appendix not visualized.   No secondary signs for acute appendicitis.    Dilatation of the common bile duct and main pancreatic duct again noted..   Please see recent abdominal MR dated 12/12/2022. If clinically indicated,   endoscopic ultrasound may be pursued to rule out a small obstructive   ampullary tumor or pancreatic head mass.    < end of copied text >  < from: US Abdomen Complete (US Abdomen Complete .) (12.22.22 @ 09:48) >  ACC: [id number] EXAM:  US ABDOMEN COMPLETE                          PROCEDURE DATE:  12/22/2022          INTERPRETATION:  CLINICAL INFORMATION: Abdominal pain and vomiting.    COMPARISON: CT and MRI dated 12/8/2022 and 12/12/2022.    TECHNIQUE: Sonography of the abdomen. Technically difficult and limited   study due to patient's body habitus.    FINDINGS:  Liver: Increased echogenicity. No evidence of a focal hepatic mass   lesion. Normal portal and hepatic venous flow.  Bile ducts: Dilated. Common bile duct measures 1.5 cm.  Gallbladder: Cholecystectomy.  Pancreas: Visualized portions are within normal limits.  Spleen: Not visualized.  Right kidney: 8.6 cm. No hydronephrosis.  Left kidney: 10 cm. No hydronephrosis.  Ascites: None.  Aorta andIVC: Visualized portions are within normal limits.    IMPRESSION:  Biliary dilatation in this patient with prior cholecystectomy. 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  Hepatic steatosis.  Nonvisualized spleen.    < end of copied text >         [facility]    Patient is a 67y old  Male who presents with a chief complaint of Abdominal pain (22 Dec 2022 12:00)    HPI:  Patient is a 67 year old male, vaccinated, from [facility] with PMHx of DM, HTN, HLD, AF on Eliquis, PVD, presents with c/o vomiting and right sided abdominal pain. patient states yesterday after lunch he had a sharp right sided abdominal pain 8/10 persistent with nausea and vomiting. He also endorsed chills and rigors, unsure if he spiked a fever. He denies any changes in bowel habits, chest pain, shortness of breath or palpitations. He was discharged yesterday from hospital. He had choledocholithiasis,  CT A/P showed: Cholecystectomy with dilated CBD up to 1.7 cm abrupt cut off of the distal CBD and  mild pancreatic duct dilatation. 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 He underwent  ERCP on 12/16 biliary sludge removal with biliary sphincterotomy and balloon extraction. He was discharged after symptom improvement and out patient gastric emptying study.     In ED:   Vitals: BP: 81/59mmHg  HR:  118  RR: 97% RA   WBC 14.7 k  elevated LFT, lactate   s/p  Rocephin 1LNS and zofran  (22 Dec 2022 12:00)    GI HPI  Pt seen and examined in ED. Pt awake, ill -appearing, endorses feeling unwell since discharge from hospital, developed weakness,  RUQ abdominal discomfort radiating to mid-epigastric region and back, rate 8/10,  accompanied with nausea with NBNB emesis since yesterday, loose BM's. In ED, BM's noted to be dark black. Pt endorses last dose of Eliquis yesterday morning.   Pt denies dizziness, SOB, chest discomfort. (+) chronic LLE discomfort due to neuropathy.         PMH/PSH:  PAST MEDICAL & SURGICAL HISTORY:  COPD (chronic obstructive pulmonary disease)  Diabetes  PVD (peripheral vascular disease)  Chronic atrial fibrillation  GERD (gastroesophageal reflux disease)  MDD (major depressive disorder)  BPH (benign prostatic hyperplasia)    FH:  FAMILY HISTORY:      Social History:       MEDS:  MEDICATIONS  (STANDING):  atorvastatin 10 milliGRAM(s) Oral at bedtime  cefepime   IVPB      cefepime   IVPB 1000 milliGRAM(s) IV Intermittent once  cefepime   IVPB 1000 milliGRAM(s) IV Intermittent every 8 hours  insulin lispro (ADMELOG) corrective regimen sliding scale   SubCutaneous every 6 hours  lactated ringers Bolus 2000 milliLiter(s) IV Bolus once  oxycodone    5 mG/acetaminophen 325 mG 1 Tablet(s) Oral every 4 hours  pantoprazole    Tablet 40 milliGRAM(s) Oral before breakfast  pregabalin 100 milliGRAM(s) Oral three times a day  sodium chloride 0.9% Bolus 1000 milliLiter(s) IV Bolus once  sodium chloride 0.9%. 1000 milliLiter(s) (75 mL/Hr) IV Continuous <Continuous>  tamsulosin 0.4 milliGRAM(s) Oral at bedtime    MEDICATIONS  (PRN):  aluminum hydroxide/magnesium hydroxide/simethicone Suspension 30 milliLiter(s) Oral every 4 hours PRN Dyspepsia  baclofen 5 milliGRAM(s) Oral every 12 hours PRN Musculoskeletal Pain  melatonin 3 milliGRAM(s) Oral at bedtime PRN Insomnia  ondansetron Injectable 4 milliGRAM(s) IV Push every 8 hours PRN Nausea and/or Vomiting    Allergies    morphine (Unknown)  penicillin (Unknown)  shellfish (Unknown)    Intolerances            CONSTITUTIONAL:(+) fatigue, generalized weakness  HEENT:  Eyes:  No visual loss, blurred vision, double vision or yellow sclerae. Ears, Nose, Throat:  No hearing loss, sneezing, congestion, runny nose or sore throat.  SKIN:  No rash or itching.  CARDIOVASCULAR:  No chest pain, chest pressure or chest discomfort. No palpitations or edema.  RESPIRATORY:  No shortness of breath, cough or sputum.  GASTROINTESTINAL:  SEE HPI  GENITOURINARY:  No dysuria, hematuria, urinary frequency  NEUROLOGICAL:  No headache, dizziness, syncope, paralysis, ataxia, numbness or tingling in the extremities. No change in bowel or bladder control.  MUSCULOSKELETAL:  No muscle, back pain, joint pain or stiffness.  HEMATOLOGIC:  No anemia, bleeding or bruising.  LYMPHATICS:  No enlarged nodes. No history of splenectomy.  PSYCHIATRIC:  No history of depression or anxiety.  ENDOCRINOLOGIC:  No reports of sweating, cold or heat intolerance. No polyuria or polydipsia.      ______________________________________________________________________  PHYSICAL EXAM:  T(C): 38 (12-22-22 @ 11:46), Max: 38 (12-22-22 @ 11:46)  HR: 118 (12-22-22 @ 12:38)  BP: 95/66 (12-22-22 @ 13:27)  RR: 16 (12-22-22 @ 12:38)  SpO2: 97% (12-22-22 @ 12:38)  Wt(kg): --      GEN: toxic appearing  HEENT: normocephalic  CVS: S1S2+  CHEST: clear to auscultation. No wheezing, no rales  ABD: BS (+) soft , tender RUQ , nondistended, bowel sounds present. JORDAN: dark black loose BM  EXTR: no cyanosis, no clubbing, no edema  NEURO: Awake , oriented x3  SKIN:  warm;  non icteric, pale    ______________________________________________________________________  LABS:                        14.9   14.78 )-----------( 163      ( 22 Dec 2022 08:19 )             45.7     12-22    136  |  97  |  29<H>  ----------------------------<  214<H>  4.5   |  28  |  1.19    Ca    9.6      22 Dec 2022 08:40  Phos  4.3     12-22  Mg     1.9     12-22    TPro  9.4<H>  /  Alb  2.4<L>  /  TBili  0.6  /  DBili  x   /  AST  134<H>  /  ALT  111<H>  /  AlkPhos  488<H>  12-22    LIVER FUNCTIONS - ( 22 Dec 2022 08:40 )  Alb: 2.4 g/dL / Pro: 9.4 g/dL / ALK PHOS: 488 U/L / ALT: 111 U/L DA / AST: 134 U/L / GGT: x             ____________________________________________  IMAGING:       < from: CT Abdomen and Pelvis No Cont (12.22.22 @ 10:37) >    ACC: [id number] EXAM:  CT ABDOMEN AND PELVIS                          PROCEDURE DATE:  12/22/2022          INTERPRETATION:  CLINICAL INFORMATION: Abdominal pain    COMPARISON: Abdominal CT dated 12/8/2022    CONTRAST/COMPLICATIONS:  IV Contrast: NONE  Oral Contrast: NONE  Complications: None reported at time of study completion    PROCEDURE:  CT of the Abdomen and Pelvis was performed.  Sagittal and coronal reformats were performed.    FINDINGS: Evaluation of the abdomen and pelvis is limited by lack of IV   contrast.  LOWER CHEST: New airspace opacifications in the right lower lung zone for   which clinical correlation with pneumonia is recommended.    LIVER: Stable small hypodense area in the left hepatic lobe. Please see   recent abdominalMR dated 12/12/2022 for further characterization.  BILE DUCTS: Dilatation of the common bile duct is again noted.  GALLBLADDER: Stable cholecystectomy clips.  SPLEEN: Within normal limits.  PANCREAS: The pancreatic parenchyma appears unremarkable. Mild dilatation   of the main pancreatic duct in the pancreatic head is again noted.  ADRENALS: Within normal limits.  KIDNEYS/URETERS: Small hypodense lesion in the left kidney, likely   representing a cyst. The right kidney appears unremarkable. No evidence   for a calculus in the kidneys or ureters. No hydronephrosis.    BLADDER: Underdistended.  REPRODUCTIVE ORGANS: The prostate and seminal vesicles appear grossly   unremarkable.    BOWEL: No bowel obstruction or grossly thickened bowel wall. Colonic   diverticulosis without evidence for diverticulitis. Moderate amount of   stool in the rectum and distal sigmoid colon.. Appendix not visualized.   No secondary signs for acute appendicitis.  PERITONEUM: No free air or ascites.  VESSELS: Calcified atherosclerotic disease.  RETROPERITONEUM/LYMPH NODES: No lymphadenopathy.  ABDOMINAL WALL: Small fat-containing ventral hernia.  BONES: Degenerative spondylosis.    IMPRESSION: No bowel obstruction or grossly thickened bowel wall. Colonic   diverticulosis without evidence for diverticulitis. Moderate amount of   stool in the rectum and distal sigmoid colon.. Appendix not visualized.   No secondary signs for acute appendicitis.    Dilatation of the common bile duct and main pancreatic duct again noted..   Please see recent abdominal MR dated 12/12/2022. If clinically indicated,   endoscopic ultrasound may be pursued to rule out a small obstructive   ampullary tumor or pancreatic head mass.    < end of copied text >  < from: US Abdomen Complete (US Abdomen Complete .) (12.22.22 @ 09:48) >  ACC: [id number] EXAM:  US ABDOMEN COMPLETE                          PROCEDURE DATE:  12/22/2022          INTERPRETATION:  CLINICAL INFORMATION: Abdominal pain and vomiting.    COMPARISON: CT and MRI dated 12/8/2022 and 12/12/2022.    TECHNIQUE: Sonography of the abdomen. Technically difficult and limited   study due to patient's body habitus.    FINDINGS:  Liver: Increased echogenicity. No evidence of a focal hepatic mass   lesion. Normal portal and hepatic venous flow.  Bile ducts: Dilated. Common bile duct measures 1.5 cm.  Gallbladder: Cholecystectomy.  Pancreas: Visualized portions are within normal limits.  Spleen: Not visualized.  Right kidney: 8.6 cm. No hydronephrosis.  Left kidney: 10 cm. No hydronephrosis.  Ascites: None.  Aorta andIVC: Visualized portions are within normal limits.    IMPRESSION:  Biliary dilatation in this patient with prior cholecystectomy. 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  Hepatic steatosis.  Nonvisualized spleen.    < end of copied text >

## 2022-12-22 NOTE — ED PROVIDER NOTE - INPATIENT RESIDENT/ACP NOTIFIED DATE
"Thanks for visiting us today! Remember these important phone numbers:    [phone] for phone nurses during the day and our nurse answering service at night    [phone]  for scheduling or changing future appointments    [phone] for the [facility]    When leaving a message for our staff, please include:   the spelling of your childâs full name and date of birth  your full name and relationship to child  best phone number and time to reach you   reason for the call        We strongly recommend that all children age 7 months and older receive the COVID-19 vaccine. Call our office at [phone] to schedule. Is your child signed up for [facility]? If you do this, you can message us rather than playing phone tag! You can also look at labs, pay your bills, and do some scheduling. Go to your own account first. (If you don't have one yet, you can set one up at the website below or at your doctor's office). Using a web browser (not the phone jhony), on the right hand side of your page, click the button marked ""Request Access to my Child's Records. \"" Fill out the information. In a few days your child's information will be linked to your account. It's that simple!!  Here is the website for more information:     [email or web address]        --------------------------------------------------------------------------------------------------------------------      " 22-Dec-2022 10:26

## 2022-12-22 NOTE — H&P ADULT - PROBLEM SELECTOR PLAN 2
WBC 14.7 k, lactate 5.9, elevated LFTs  Discharged yesterday hx of choledocholithiasis   US Abdomen: Biliary dilatation and hepatic steatosis  CT A/P: dilation of the common bile duct and main pancreatic duct again noted..   NPO for possible procedure  hold elquis  start cefepime 1g q8  f/u MRCP w/IV contrast   f/u BCUX   c/w IVF, given 1 L bolus   GI consulted Dr Villanueva  ID Dr. Novoa

## 2022-12-22 NOTE — CONSULT NOTE ADULT - ASSESSMENT
Recurrent Cholangitis  Sepsis  Pneumonia ? - healthcare acquired  Fevers  Leukocytosis      Plan - Cont Meropenem 1 gm iv q8hrs  for repeat ERCP tomorrow

## 2022-12-22 NOTE — ED PROVIDER NOTE - CRITICAL CARE ATTENDING CONTRIBUTION TO CARE
The patient's condition is critical. Management options were put in place to ensure further deterioration does not occur. In addition to the usual care provided, I have spent additional time with this patient through but not limited to the following: additional documentation  reviewing test results,  discussing with consultants,  discussing with patient / patient's family prognosis and course of care,  reassessment of patient's status and response to interventions.

## 2022-12-22 NOTE — CONSULT NOTE ADULT - ASSESSMENT
Patient is a 67 year old male, vaccinated, from [facility] with PMHx of DM, HTN, HLD, AF on Eliquis, PVD, presents with c/o vomiting and right sided abdominal pain. Admitted to ICU for hypovolemic vs septic shock     Assessment:  - Septic shock   - Cholangitis   - Melena   - Diverticulosis   - Afib   - HTN  - DM    Plan:  Neuro:  - avoid any sedating meds    CV:  # Septic shock vs hypovolemic shock   - BP 87/70, , 98% RA  - WBC 14.7 k, lactate 5.9, elevated LFTs  - 2 episodes of melena   - US Abdomen: Biliary dilatation and hepatic steatosis  - CT A/P: dilation of the common bile duct and main pancreatic duct again noted.. New RLL opacity  - NPO for possible ERCP  - hold eliquis  - start cefepime 1g q8  - f/u MRCP w/IV contrast   - f/u CXR  - f/u BCUX and UCX  - c/w IVF, given 1 L bolus   - trend lactate q2  - GI consulted Dr Villanueva/ Judy(ERCP)  - ID Dr. Novoa.    # Afib  - Hold home eliquis     # HTN  - on nifedipine and metoprolol, hold for shock   - low  BP: 81/59mmHg  S/P 1L NS bolus   - c/w IVF   monitor BP    Pulm:  # Aspiration PNA   - CT abd: New airspace opacifications in the right lower lung zone  - 98% RA  - Continue Cefepime   - ID Dr. Novoa following     ID:  # Septic shock    - BP 87/70, , 98% RA  - WBC 14.7 k, lactate 5.9, elevated LFTs  - 2 episodes of melena   - US Abdomen: Biliary dilatation and hepatic steatosis  - CT A/P: dilation of the common bile duct and main pancreatic duct again noted.. New RLL opacity  - NPO for possible ERCP  - hold eliquis  - start cefepime 1g q8  - f/u MRCP w/IV contrast   - f/u CXR  - f/u BCUX and UCX  - c/w IVF, given 1 L bolus   - trend lactate q2  - GI consulted Dr Villanueva/ Judy(ERCP)  - ID Dr. Novoa.      Nephro:  No active issues     GI:  # Cholangitis   - Fever, RUQ pain, jaundice, leukocytosis, Transaminitis   - US Abdomen: Biliary dilatation and hepatic steatosis  - CT A/P: dilation of the common bile duct and main pancreatic duct again noted.. New RLL opacity  - NPO for possible ERCP  - ERCP 12/16: biliary sphincterotomy and sludge removal    - Continue cefepime   - Follow Bili and T bili     # GI Bleed   - 2 episodes of melena  - H/H stable, 11.4/34.1  - likely post sphincterotomy bleed resulting in cholangitis as per GI   - CT: colonic diverticulosis   - Start on Protonix ggt   - Follow MRCP  - Continue IV fluids   - NPO   - Trend H/H Q6   - Transfuse for Hb<7     Heme:  - no indication for transfusion       Endo:  # DM:   - FS and ISS Q6 while NPO       Prophy:  - SCD for DVT ppx  - Protonix     Dispo:  - monitor in the ICU

## 2022-12-22 NOTE — H&P ADULT - HISTORY OF PRESENT ILLNESS
Patient is a 67 year old male, vaccinated, from [facility] with PMHx of DM, HTN, HLD, AF on Eliquis, PVD, presents with c/o vomiting and right sided abdominal pain. patient states yesterday after lunch he had a sharp right sided abdominal pain 8/10 persistent with nausea and vomiting. He also endorsed chills and rigors, unsure if he spiked a fever. He denies any changes in bowel habits, chest pain, shortness of breath or palpitations. He was discharged yesterday from hospital. He had choledocholithiasis,  CT A/P showed: Cholecystectomy with dilated CBD up to 1.7 cm abrupt cut off of the distal CBD and  mild pancreatic duct dilatation. 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 He underwent  ERCP on 12/16 biliary sludge removal with biliary sphincterotomy and balloon extraction. He was discharged after symptom improvement and out patient gastric emptying study.     In ED:   Vitals: BP: 81/59mmHg  HR:  118  RR: 97% RA   WBC 14.7 k  elevated LFT, lactate   s/p  Rocephin 1LNS and zofran  Patient is a 67 year old male, vaccinated, from [facility] with PMHx of DM, HTN, HLD, AF on Eliquis, PVD, presents with c/o vomiting and right sided abdominal pain. patient states yesterday after lunch he had a sharp right sided abdominal pain 8/10 persistent with nausea and vomiting. He also endorsed chills and rigors, unsure if he spiked a fever. He denies any changes in bowel habits, chest pain, shortness of breath or palpitations. Patient had two episodes of melena in ED. He was discharged yesterday from hospital. He had choledocholithiasis,  CT A/P showed: Cholecystectomy with dilated CBD up to 1.7 cm abrupt cut off of the distal CBD and  mild pancreatic duct dilatation. 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 He underwent  ERCP on 12/16 biliary sludge removal with biliary sphincterotomy and balloon extraction. He was discharged after symptom improvement and out patient gastric emptying study.     In ED:   Vitals: BP: 81/59mmHg  HR:  118  RR: 97% RA   WBC 14.7 k  elevated LFT, lactate   s/p  Rocephin 1LNS and zofran

## 2022-12-22 NOTE — CONSULT NOTE ADULT - ATTENDING COMMENTS
67 year old male, vaccinated, from [facility] with PMHx of DM, HTN, HLD, AF on Eliquis, PVD, presents with c/o vomiting and right sided abdominal pain. Admitted to ICU for hypovolemic vs septic shock     Assessment:  - Shock - Septic vs. hypovolemic   - CBD dilation - r/o cholangitis  - Transaminitis   - Acute renal failure   - Melena   - Diverticulosis   - Afib   - HTN  - DM type 2    Plan:  - IV fluid hydration  - Noted with increased creatinine compared to discharge  - Suspect possibly biliary sepsis  - Broad spectrum antibiotics   - Follow up cultures  - Monitor for further bleeding  - Trend hgb  - Hold Eliquis for now  - GI consult  - PPI infusion   - Cardoza to monitor urine output   - Pain control  - Close glucose monitoring with coverage for hyperglycemia  - Admit to ICU

## 2022-12-22 NOTE — H&P ADULT - PROBLEM SELECTOR PLAN 5
hx HTN   on nifedipine and metoprolol   low  BP: 81/59mmHg  S/P 2L NS bolus   c/w IVF   monitor BP on Eliquis 5mg BID   hold as per GI recs

## 2022-12-22 NOTE — CONSULT NOTE ADULT - SUBJECTIVE AND OBJECTIVE BOX
HPI:  Patient is a 67 year old male, vaccinated, from [facility] with PMHx of DM, HTN, HLD, AF on Eliquis, PVD, presents with c/o vomiting and right sided abdominal pain. patient states yesterday after lunch he had a sharp right sided abdominal pain 8/10 persistent with nausea and vomiting. He also endorsed chills and rigors, unsure if he spiked a fever. He denies any changes in bowel habits, chest pain, shortness of breath or palpitations. Patient had two episodes of melena in ED. He was discharged yesterday from hospital. He had choledocholithiasis,  CT A/P showed: Cholecystectomy with dilated CBD up to 1.7 cm abrupt cut off of the distal CBD and  mild pancreatic duct dilatation. 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 He underwent  ERCP on  biliary sludge removal with biliary sphincterotomy and balloon extraction. He was discharged after symptom improvement and out patient gastric emptying study.     In ED:   Vitals: BP: 81/59mmHg  HR:  118  RR: 97% RA   WBC 14.7 k  elevated LFT, lactate   s/p  Rocephin 1LNS and zofran  (22 Dec 2022 12:00)      PAST MEDICAL & SURGICAL HISTORY:  COPD (chronic obstructive pulmonary disease)      Diabetes      PVD (peripheral vascular disease)      Chronic atrial fibrillation      GERD (gastroesophageal reflux disease)      MDD (major depressive disorder)      BPH (benign prostatic hyperplasia)          morphine (Unknown)  penicillin (Unknown)  shellfish (Unknown)      Meds:  aluminum hydroxide/magnesium hydroxide/simethicone Suspension 30 milliLiter(s) Oral every 4 hours PRN  atorvastatin 10 milliGRAM(s) Oral at bedtime  baclofen 5 milliGRAM(s) Oral every 12 hours PRN  insulin lispro (ADMELOG) corrective regimen sliding scale   SubCutaneous every 6 hours  lactated ringers Bolus 2000 milliLiter(s) IV Bolus once  melatonin 3 milliGRAM(s) Oral at bedtime PRN  meropenem  IVPB 1000 milliGRAM(s) IV Intermittent every 8 hours  ondansetron Injectable 4 milliGRAM(s) IV Push every 8 hours PRN  oxycodone    5 mG/acetaminophen 325 mG 1 Tablet(s) Oral every 4 hours  pantoprazole Infusion 8 mG/Hr IV Continuous <Continuous>  pregabalin 100 milliGRAM(s) Oral three times a day  sodium chloride 0.9% Bolus 1000 milliLiter(s) IV Bolus once  sodium chloride 0.9%. 1000 milliLiter(s) IV Continuous <Continuous>  tamsulosin 0.4 milliGRAM(s) Oral at bedtime      SOCIAL HISTORY:  Smoker:  YES / NO        PACK YEARS:                         WHEN QUIT?  ETOH use:  YES / NO               FREQUENCY / QUANTITY:  Ilicit Drug use:  YES / NO  Occupation:  Assisted device use (Cane / Walker):  Live with:    FAMILY HISTORY:      VITALS:  Vital Signs Last 24 Hrs  T(C): 37.8 (22 Dec 2022 17:50), Max: 38 (22 Dec 2022 11:46)  T(F): 100 (22 Dec 2022 17:50), Max: 100.4 (22 Dec 2022 11:46)  HR: 106 (22 Dec 2022 17:50) (99 - 118)  BP: 97/57 (22 Dec 2022 17:50) (81/59 - 108/70)  BP(mean): 67 (22 Dec 2022 17:50) (67 - 67)  RR: 23 (22 Dec 2022 17:50) (16 - 23)  SpO2: 100% (22 Dec 2022 17:50) (91% - 100%)    Parameters below as of 22 Dec 2022 12:38  Patient On (Oxygen Delivery Method): room air        LABS/DIAGNOSTIC TESTS:                          11.4   14.67 )-----------( 117      ( 22 Dec 2022 14:36 )             34.1     WBC Count: 14.67 K/uL ( @ 14:36)  WBC Count: 14.78 K/uL ( @ 08:19)  WBC Count: 4.48 K/uL ( @ 07:00)  WBC Count: 4.03 K/uL ( @ 07:10)          136  |  102  |  35<H>  ----------------------------<  162<H>  4.2   |  27  |  1.04    Ca    8.2<L>      22 Dec 2022 14:36  Phos  4.3       Mg     1.9         TPro  6.9  /  Alb  2.1<L>  /  TBili  0.4  /  DBili  0.1  /  AST  79<H>  /  ALT  73<H>  /  AlkPhos  306<H>        Urinalysis Basic - ( 22 Dec 2022 09:20 )    Color: Yellow / Appearance: Clear / S.020 / pH: x  Gluc: x / Ketone: Trace  / Bili: Small / Urobili: 8   Blood: x / Protein: 30 mg/dL / Nitrite: Positive   Leuk Esterase: Trace / RBC: 25-50 /HPF / WBC 3-5 /HPF   Sq Epi: x / Non Sq Epi: Occasional /HPF / Bacteria: Many /HPF        LIVER FUNCTIONS - ( 22 Dec 2022 14:36 )  Alb: 2.1 g/dL / Pro: 6.9 g/dL / ALK PHOS: 306 U/L / ALT: 73 U/L DA / AST: 79 U/L / GGT: x             PT/INR - ( 22 Dec 2022 14:36 )   PT: 19.5 sec;   INR: 1.63 ratio         PTT - ( 22 Dec 2022 14:36 )  PTT:34.4 sec    LACTATE:Lactate, Blood: 1.8 mmol/L ( @ 14:30)  Lactate, Blood: 5.9 mmol/L ( @ 08:40)      ABG -     CULTURES:           RADIOLOGY:< from: Xray Chest 1 View- PORTABLE-Routine (Xray Chest 1 View- PORTABLE-Routine .) (22 @ 16:02) >  ACC: [id number] EXAM:  XR CHEST PORTABLE ROUTINE 1V                          PROCEDURE DATE:  2022          INTERPRETATION:  Portable chest radiograph    CLINICAL INFORMATION: Dyspnea, shortness of breath.    TECHNIQUE:  Portable  AP chest radiograph.    COMPARISON: 2022 chest x-ray . Abdominal CT scan 2022    FINDINGS:  CATHETERS AND TUBES: None    PULMONARY: Multifocal RIGHT lower zone airspace disease/consolidations.   Remaining lung parenchyma clear. No pneumothorax.    HEART/VASCULAR: The heart and mediastinum size and configuration are   within normal limits.    BONES: Visualized osseous structures are intact.    IMPRESSION:   Multifocal RIGHT lower zone airspace   disease/consolidations..    --- End of Report ---            FREDDY QUINTANILLA MD; Attending Radiologist  This document has been electronically signed. Dec 22 2022  4:11PM    < end of copied text >  < from: CT Abdomen and Pelvis No Cont (22 @ 10:37) >  ACC: [id number] EXAM:  CT ABDOMEN AND PELVIS                          PROCEDURE DATE:  2022          INTERPRETATION:  CLINICAL INFORMATION: Abdominal pain    COMPARISON: Abdominal CT dated 2022    CONTRAST/COMPLICATIONS:  IV Contrast: NONE  Oral Contrast: NONE  Complications: None reported at time of study completion    PROCEDURE:  CT of the Abdomen and Pelvis was performed.  Sagittal and coronal reformats were performed.    FINDINGS: Evaluation of the abdomen and pelvis is limited by lack of IV   contrast.  LOWER CHEST: New airspace opacifications in the right lower lung zone for   which clinical correlation with pneumonia is recommended.    LIVER: Stable small hypodense area in the left hepatic lobe. Please see   recent abdominalMR dated 2022 for further characterization.  BILE DUCTS: Dilatation of the common bile duct is again noted.  GALLBLADDER: Stable cholecystectomy clips.  SPLEEN: Within normal limits.  PANCREAS: The pancreatic parenchyma appears unremarkable. Mild dilatation   of the main pancreatic duct in the pancreatic head is again noted.  ADRENALS: Within normal limits.  KIDNEYS/URETERS: Small hypodense lesion in the left kidney, likely   representing a cyst. The right kidney appears unremarkable. No evidence   for a calculus in the kidneys or ureters. No hydronephrosis.    BLADDER: Underdistended.  REPRODUCTIVE ORGANS: The prostate and seminal vesicles appear grossly   unremarkable.    BOWEL: No bowel obstruction or grossly thickened bowel wall. Colonic   diverticulosis without evidence for diverticulitis. Moderate amount of   stool in the rectum and distal sigmoid colon.. Appendix not visualized.   No secondary signs for acute appendicitis.  PERITONEUM: No free air or ascites.  VESSELS: Calcified atherosclerotic disease.  RETROPERITONEUM/LYMPH NODES: No lymphadenopathy.  ABDOMINAL WALL: Small fat-containing ventral hernia.  BONES: Degenerative spondylosis.    IMPRESSION: No bowel obstruction or grossly thickened bowel wall. Colonic   diverticulosis without evidence for diverticulitis. Moderate amount of   stool in the rectum and distal sigmoid colon.. Appendix not visualized.   No secondary signs for acute appendicitis.    Dilatation of the common bile duct and main pancreatic duct again noted..   Please see recent abdominal MR dated 2022. If clinically indicated,   endoscopic ultrasound may be pursued to rule out a small obstructive   ampullary tumor or pancreatic head mass.    New airspace opacifications in the right lower lung zone for which   clinical correlation with pneumonia is recommended.    --- End of Report ---            CARIDAD WILLIAM MD; Attending Radiologist  This document has been electronically signed. Dec 22 2022 11:23AM    < end of copied text >        ROS  [  ] UNABLE TO ELICIT               HPI:  Patient is a 67 year old male, vaccinated, from [facility] with PMHx of DM, HTN, HLD, AF on Eliquis, PVD, presents with c/o vomiting and right sided abdominal pain. patient states yesterday after lunch he had a sharp right sided abdominal pain 8/10 persistent with nausea and vomiting. He also endorsed chills and rigors, unsure if he spiked a fever. He denies any changes in bowel habits, chest pain, shortness of breath or palpitations. Patient had two episodes of melena in ED. He was discharged yesterday from hospital. He had choledocholithiasis,  CT A/P showed: Cholecystectomy with dilated CBD up to 1.7 cm abrupt cut off of the distal CBD and  mild pancreatic duct dilatation. 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 He underwent  ERCP on  biliary sludge removal with biliary sphincterotomy and balloon extraction. He was discharged after symptom improvement and out patient gastric emptying study.     In ED:   Vitals: BP: 81/59mmHg  HR:  118  RR: 97% RA   WBC 14.7 k  elevated LFT, lactate   s/p  Rocephin 1LNS and zofran  (22 Dec 2022 12:00)      History as above , asked to see this patient who was just discharged from this hospital yesterday after being treated for Cholangitis from CBD stones and had an ERCP last admission ( he has had a Cholecystectomy in the past). He presents with abdominal pain in the RUQ and epigastric region , he was found to have low grade fevers, leukocytosis and hypotension. He also was found to have a Right lower lobe infiltrate on CXR and his abdominal CT but denies having any coughing , SOB or pleuritic chest pain. He has no nausea, vomiting or diarrhea either. He was brought up to the ICU given his hypotension ( he did respond to fluid resuscitation). He is going to go for an ERCP again tomorrow am. Started on Meropenem now given his recent history of Cholangitis and now with recurrence along with his right lung infiltrate ( pneumonia).      PAST MEDICAL & SURGICAL HISTORY:  COPD (chronic obstructive pulmonary disease)      Diabetes      PVD (peripheral vascular disease)      Chronic atrial fibrillation      GERD (gastroesophageal reflux disease)      MDD (major depressive disorder)      BPH (benign prostatic hyperplasia)          morphine (Unknown)  penicillin (Unknown)  shellfish (Unknown)      Meds:  aluminum hydroxide/magnesium hydroxide/simethicone Suspension 30 milliLiter(s) Oral every 4 hours PRN  atorvastatin 10 milliGRAM(s) Oral at bedtime  baclofen 5 milliGRAM(s) Oral every 12 hours PRN  insulin lispro (ADMELOG) corrective regimen sliding scale   SubCutaneous every 6 hours  lactated ringers Bolus 2000 milliLiter(s) IV Bolus once  melatonin 3 milliGRAM(s) Oral at bedtime PRN  meropenem  IVPB 1000 milliGRAM(s) IV Intermittent every 8 hours  ondansetron Injectable 4 milliGRAM(s) IV Push every 8 hours PRN  oxycodone    5 mG/acetaminophen 325 mG 1 Tablet(s) Oral every 4 hours  pantoprazole Infusion 8 mG/Hr IV Continuous <Continuous>  pregabalin 100 milliGRAM(s) Oral three times a day  sodium chloride 0.9% Bolus 1000 milliLiter(s) IV Bolus once  sodium chloride 0.9%. 1000 milliLiter(s) IV Continuous <Continuous>  tamsulosin 0.4 milliGRAM(s) Oral at bedtime      SOCIAL HISTORY:  Smoker:  ex smoker  ETOH use:  socially in past  Ilicit Drug use:  NO      FAMILY HISTORY:  not contributory      VITALS:  Vital Signs Last 24 Hrs  T(C): 37.8 (22 Dec 2022 17:50), Max: 38 (22 Dec 2022 11:46)  T(F): 100 (22 Dec 2022 17:50), Max: 100.4 (22 Dec 2022 11:46)  HR: 106 (22 Dec 2022 17:50) (99 - 118)  BP: 97/57 (22 Dec 2022 17:50) (81/59 - 108/70)  BP(mean): 67 (22 Dec 2022 17:50) (67 - 67)  RR: 23 (22 Dec 2022 17:50) (16 - 23)  SpO2: 100% (22 Dec 2022 17:50) (91% - 100%)    Parameters below as of 22 Dec 2022 12:38  Patient On (Oxygen Delivery Method): room air        LABS/DIAGNOSTIC TESTS:                          11.4   14.67 )-----------( 117      ( 22 Dec 2022 14:36 )             34.1     WBC Count: 14.67 K/uL ( @ 14:36)  WBC Count: 14.78 K/uL ( @ 08:19)  WBC Count: 4.48 K/uL ( @ 07:00)  WBC Count: 4.03 K/uL ( @ 07:10)          136  |  102  |  35<H>  ----------------------------<  162<H>  4.2   |  27  |  1.04    Ca    8.2<L>      22 Dec 2022 14:36  Phos  4.3       Mg     1.9         TPro  6.9  /  Alb  2.1<L>  /  TBili  0.4  /  DBili  0.1  /  AST  79<H>  /  ALT  73<H>  /  AlkPhos  306<H>        Urinalysis Basic - ( 22 Dec 2022 09:20 )    Color: Yellow / Appearance: Clear / S.020 / pH: x  Gluc: x / Ketone: Trace  / Bili: Small / Urobili: 8   Blood: x / Protein: 30 mg/dL / Nitrite: Positive   Leuk Esterase: Trace / RBC: 25-50 /HPF / WBC 3-5 /HPF   Sq Epi: x / Non Sq Epi: Occasional /HPF / Bacteria: Many /HPF        LIVER FUNCTIONS - ( 22 Dec 2022 14:36 )  Alb: 2.1 g/dL / Pro: 6.9 g/dL / ALK PHOS: 306 U/L / ALT: 73 U/L DA / AST: 79 U/L / GGT: x             PT/INR - ( 22 Dec 2022 14:36 )   PT: 19.5 sec;   INR: 1.63 ratio         PTT - ( 22 Dec 2022 14:36 )  PTT:34.4 sec    LACTATE:Lactate, Blood: 1.8 mmol/L ( @ 14:30)  Lactate, Blood: 5.9 mmol/L ( @ 08:40)      ABG -     CULTURES:           RADIOLOGY:< from: Xray Chest 1 View- PORTABLE-Routine (Xray Chest 1 View- PORTABLE-Routine .) (22 @ 16:02) >  ACC: [id number] EXAM:  XR CHEST PORTABLE ROUTINE 1V                          PROCEDURE DATE:  2022          INTERPRETATION:  Portable chest radiograph    CLINICAL INFORMATION: Dyspnea, shortness of breath.    TECHNIQUE:  Portable  AP chest radiograph.    COMPARISON: 2022 chest x-ray . Abdominal CT scan 2022    FINDINGS:  CATHETERS AND TUBES: None    PULMONARY: Multifocal RIGHT lower zone airspace disease/consolidations.   Remaining lung parenchyma clear. No pneumothorax.    HEART/VASCULAR: The heart and mediastinum size and configuration are   within normal limits.    BONES: Visualized osseous structures are intact.    IMPRESSION:   Multifocal RIGHT lower zone airspace   disease/consolidations..    --- End of Report ---            FREDDY QUINTANILLA MD; Attending Radiologist  This document has been electronically signed. Dec 22 2022  4:11PM    < end of copied text >  ----------------------------------------------------------------------------------------------------------------------------------------------------------------------------------------------------------------------------------  ACC: [id number] EXAM:  CT ABDOMEN AND PELVIS                          PROCEDURE DATE:  2022          INTERPRETATION:  CLINICAL INFORMATION: Abdominal pain    COMPARISON: Abdominal CT dated 2022    CONTRAST/COMPLICATIONS:  IV Contrast: NONE  Oral Contrast: NONE  Complications: None reported at time of study completion    PROCEDURE:  CT of the Abdomen and Pelvis was performed.  Sagittal and coronal reformats were performed.    FINDINGS: Evaluation of the abdomen and pelvis is limited by lack of IV   contrast.  LOWER CHEST: New airspace opacifications in the right lower lung zone for   which clinical correlation with pneumonia is recommended.    LIVER: Stable small hypodense area in the left hepatic lobe. Please see   recent abdominalMR dated 2022 for further characterization.  BILE DUCTS: Dilatation of the common bile duct is again noted.  GALLBLADDER: Stable cholecystectomy clips.  SPLEEN: Within normal limits.  PANCREAS: The pancreatic parenchyma appears unremarkable. Mild dilatation   of the main pancreatic duct in the pancreatic head is again noted.  ADRENALS: Within normal limits.  KIDNEYS/URETERS: Small hypodense lesion in the left kidney, likely   representing a cyst. The right kidney appears unremarkable. No evidence   for a calculus in the kidneys or ureters. No hydronephrosis.    BLADDER: Underdistended.  REPRODUCTIVE ORGANS: The prostate and seminal vesicles appear grossly   unremarkable.    BOWEL: No bowel obstruction or grossly thickened bowel wall. Colonic   diverticulosis without evidence for diverticulitis. Moderate amount of   stool in the rectum and distal sigmoid colon.. Appendix not visualized.   No secondary signs for acute appendicitis.  PERITONEUM: No free air or ascites.  VESSELS: Calcified atherosclerotic disease.  RETROPERITONEUM/LYMPH NODES: No lymphadenopathy.  ABDOMINAL WALL: Small fat-containing ventral hernia.  BONES: Degenerative spondylosis.    IMPRESSION: No bowel obstruction or grossly thickened bowel wall. Colonic   diverticulosis without evidence for diverticulitis. Moderate amount of   stool in the rectum and distal sigmoid colon.. Appendix not visualized.   No secondary signs for acute appendicitis.    Dilatation of the common bile duct and main pancreatic duct again noted..   Please see recent abdominal MR dated 2022. If clinically indicated,   endoscopic ultrasound may be pursued to rule out a small obstructive   ampullary tumor or pancreatic head mass.    New airspace opacifications in the right lower lung zone for which   clinical correlation with pneumonia is recommended.    --- End of Report ---            CARIDAD WILLIAM MD; Attending Radiologist  This document has been electronically signed. Dec 22 2022 11:23AM    < end of copied text >        ROS  [  ] UNABLE TO ELICIT

## 2022-12-22 NOTE — CONSULT NOTE ADULT - PROBLEM SELECTOR RECOMMENDATION 2
Dark melena stools x 2 this morning.   concerning for post sphincterotomy bleed   Protonix drip   IV hydration   NPO   Monitor CBC and transfuse for Hgb < 7.0  Tentative EGD/ERCP tomorrow.

## 2022-12-22 NOTE — ED PROVIDER NOTE - PHYSICAL EXAMINATION
General: well appearing male, no acute distress   HEENT: normocephalic, atraumatic   Respiratory: normal work of breathing, lungs clear to auscultation bilaterally   Cardiac: regular rate and rhythm   Abdomen: soft, epigastric tenderness to palpation   MSK: no swelling or tenderness of lower extremities, moving all extremities spontaneously   Skin: warm, dry   Neuro: A&Ox3  Psych: appropriate affect

## 2022-12-22 NOTE — H&P ADULT - PROBLEM SELECTOR PLAN 3
UA positive  f/u Urine culture  s/p  Rocephin  c/w cefepime  ID Dr. Novoa pty had two episodes of melena in ED   CT A/P : Colonic diverticulosis without evidence for diverticulitis.  GI following

## 2022-12-23 ENCOUNTER — TRANSCRIPTION ENCOUNTER (OUTPATIENT)
Age: 67
End: 2022-12-23

## 2022-12-23 LAB
ALBUMIN SERPL ELPH-MCNC: 1.8 G/DL — LOW (ref 3.5–5)
ALP SERPL-CCNC: 195 U/L — HIGH (ref 40–120)
ALT FLD-CCNC: 44 U/L DA — SIGNIFICANT CHANGE UP (ref 10–60)
ANION GAP SERPL CALC-SCNC: 7 MMOL/L — SIGNIFICANT CHANGE UP (ref 5–17)
ANION GAP SERPL CALC-SCNC: 8 MMOL/L — SIGNIFICANT CHANGE UP (ref 5–17)
ANISOCYTOSIS BLD QL: SLIGHT — SIGNIFICANT CHANGE UP
APTT BLD: 33 SEC — SIGNIFICANT CHANGE UP (ref 27.5–35.5)
AST SERPL-CCNC: 43 U/L — HIGH (ref 10–40)
BASE EXCESS BLDV CALC-SCNC: 2.2 MMOL/L — SIGNIFICANT CHANGE UP
BASOPHILS # BLD AUTO: 0 K/UL — SIGNIFICANT CHANGE UP (ref 0–0.2)
BASOPHILS # BLD AUTO: 0.04 K/UL — SIGNIFICANT CHANGE UP (ref 0–0.2)
BASOPHILS # BLD AUTO: 0.04 K/UL — SIGNIFICANT CHANGE UP (ref 0–0.2)
BASOPHILS NFR BLD AUTO: 0 % — SIGNIFICANT CHANGE UP (ref 0–2)
BASOPHILS NFR BLD AUTO: 0.7 % — SIGNIFICANT CHANGE UP (ref 0–2)
BASOPHILS NFR BLD AUTO: 0.8 % — SIGNIFICANT CHANGE UP (ref 0–2)
BILIRUB SERPL-MCNC: 0.2 MG/DL — SIGNIFICANT CHANGE UP (ref 0.2–1.2)
BLOOD GAS COMMENTS, VENOUS: SIGNIFICANT CHANGE UP
BUN SERPL-MCNC: 16 MG/DL — SIGNIFICANT CHANGE UP (ref 7–18)
BUN SERPL-MCNC: 27 MG/DL — HIGH (ref 7–18)
CALCIUM SERPL-MCNC: 7.7 MG/DL — LOW (ref 8.4–10.5)
CALCIUM SERPL-MCNC: 8.1 MG/DL — LOW (ref 8.4–10.5)
CHLORIDE SERPL-SCNC: 100 MMOL/L — SIGNIFICANT CHANGE UP (ref 96–108)
CHLORIDE SERPL-SCNC: 100 MMOL/L — SIGNIFICANT CHANGE UP (ref 96–108)
CO2 SERPL-SCNC: 27 MMOL/L — SIGNIFICANT CHANGE UP (ref 22–31)
CO2 SERPL-SCNC: 28 MMOL/L — SIGNIFICANT CHANGE UP (ref 22–31)
CREAT SERPL-MCNC: 0.58 MG/DL — SIGNIFICANT CHANGE UP (ref 0.5–1.3)
CREAT SERPL-MCNC: 0.61 MG/DL — SIGNIFICANT CHANGE UP (ref 0.5–1.3)
CULTURE RESULTS: SIGNIFICANT CHANGE UP
EGFR: 105 ML/MIN/1.73M2 — SIGNIFICANT CHANGE UP
EGFR: 106 ML/MIN/1.73M2 — SIGNIFICANT CHANGE UP
ELLIPTOCYTES BLD QL SMEAR: SLIGHT — SIGNIFICANT CHANGE UP
EOSINOPHIL # BLD AUTO: 0 K/UL — SIGNIFICANT CHANGE UP (ref 0–0.5)
EOSINOPHIL # BLD AUTO: 0.04 K/UL — SIGNIFICANT CHANGE UP (ref 0–0.5)
EOSINOPHIL # BLD AUTO: 0.06 K/UL — SIGNIFICANT CHANGE UP (ref 0–0.5)
EOSINOPHIL NFR BLD AUTO: 0 % — SIGNIFICANT CHANGE UP (ref 0–6)
EOSINOPHIL NFR BLD AUTO: 0.7 % — SIGNIFICANT CHANGE UP (ref 0–6)
EOSINOPHIL NFR BLD AUTO: 1.2 % — SIGNIFICANT CHANGE UP (ref 0–6)
GI PCR PANEL: SIGNIFICANT CHANGE UP
GLUCOSE SERPL-MCNC: 123 MG/DL — HIGH (ref 70–99)
GLUCOSE SERPL-MCNC: 97 MG/DL — SIGNIFICANT CHANGE UP (ref 70–99)
HCO3 BLDV-SCNC: 29 MMOL/L — SIGNIFICANT CHANGE UP (ref 22–29)
HCT VFR BLD CALC: 27.7 % — LOW (ref 39–50)
HCT VFR BLD CALC: 29.9 % — LOW (ref 39–50)
HCT VFR BLD CALC: 30.1 % — LOW (ref 39–50)
HGB BLD-MCNC: 9 G/DL — LOW (ref 13–17)
HGB BLD-MCNC: 9.7 G/DL — LOW (ref 13–17)
HGB BLD-MCNC: 9.9 G/DL — LOW (ref 13–17)
IMM GRANULOCYTES NFR BLD AUTO: 1.1 % — HIGH (ref 0–0.9)
IMM GRANULOCYTES NFR BLD AUTO: 1.2 % — HIGH (ref 0–0.9)
INR BLD: 1.42 RATIO — HIGH (ref 0.88–1.16)
LYMPHOCYTES # BLD AUTO: 0.62 K/UL — LOW (ref 1–3.3)
LYMPHOCYTES # BLD AUTO: 0.68 K/UL — LOW (ref 1–3.3)
LYMPHOCYTES # BLD AUTO: 0.7 K/UL — LOW (ref 1–3.3)
LYMPHOCYTES # BLD AUTO: 12.2 % — LOW (ref 13–44)
LYMPHOCYTES # BLD AUTO: 12.6 % — LOW (ref 13–44)
LYMPHOCYTES # BLD AUTO: 9 % — LOW (ref 13–44)
MAGNESIUM SERPL-MCNC: 1.5 MG/DL — LOW (ref 1.6–2.6)
MAGNESIUM SERPL-MCNC: 1.7 MG/DL — SIGNIFICANT CHANGE UP (ref 1.6–2.6)
MANUAL SMEAR VERIFICATION: SIGNIFICANT CHANGE UP
MCHC RBC-ENTMCNC: 31.3 PG — SIGNIFICANT CHANGE UP (ref 27–34)
MCHC RBC-ENTMCNC: 31.4 PG — SIGNIFICANT CHANGE UP (ref 27–34)
MCHC RBC-ENTMCNC: 31.6 PG — SIGNIFICANT CHANGE UP (ref 27–34)
MCHC RBC-ENTMCNC: 32.2 GM/DL — SIGNIFICANT CHANGE UP (ref 32–36)
MCHC RBC-ENTMCNC: 32.5 GM/DL — SIGNIFICANT CHANGE UP (ref 32–36)
MCHC RBC-ENTMCNC: 33.1 GM/DL — SIGNIFICANT CHANGE UP (ref 32–36)
MCV RBC AUTO: 95.5 FL — SIGNIFICANT CHANGE UP (ref 80–100)
MCV RBC AUTO: 96.5 FL — SIGNIFICANT CHANGE UP (ref 80–100)
MCV RBC AUTO: 97.1 FL — SIGNIFICANT CHANGE UP (ref 80–100)
MONOCYTES # BLD AUTO: 0.32 K/UL — SIGNIFICANT CHANGE UP (ref 0–0.9)
MONOCYTES # BLD AUTO: 0.34 K/UL — SIGNIFICANT CHANGE UP (ref 0–0.9)
MONOCYTES # BLD AUTO: 0.39 K/UL — SIGNIFICANT CHANGE UP (ref 0–0.9)
MONOCYTES NFR BLD AUTO: 5 % — SIGNIFICANT CHANGE UP (ref 2–14)
MONOCYTES NFR BLD AUTO: 6.1 % — SIGNIFICANT CHANGE UP (ref 2–14)
MONOCYTES NFR BLD AUTO: 6.5 % — SIGNIFICANT CHANGE UP (ref 2–14)
NEUTROPHILS # BLD AUTO: 3.81 K/UL — SIGNIFICANT CHANGE UP (ref 1.8–7.4)
NEUTROPHILS # BLD AUTO: 4.41 K/UL — SIGNIFICANT CHANGE UP (ref 1.8–7.4)
NEUTROPHILS # BLD AUTO: 6.67 K/UL — SIGNIFICANT CHANGE UP (ref 1.8–7.4)
NEUTROPHILS NFR BLD AUTO: 77.7 % — HIGH (ref 43–77)
NEUTROPHILS NFR BLD AUTO: 79.2 % — HIGH (ref 43–77)
NEUTROPHILS NFR BLD AUTO: 86 % — HIGH (ref 43–77)
NRBC # BLD: 0 /100 WBCS — SIGNIFICANT CHANGE UP (ref 0–0)
NRBC # BLD: 0 /100 WBCS — SIGNIFICANT CHANGE UP (ref 0–0)
NRBC # BLD: 0 /100 — SIGNIFICANT CHANGE UP (ref 0–0)
PCO2 BLDV: 55 MMHG — SIGNIFICANT CHANGE UP (ref 42–55)
PH BLDV: 7.33 — SIGNIFICANT CHANGE UP (ref 7.32–7.43)
PHOSPHATE SERPL-MCNC: 2 MG/DL — LOW (ref 2.5–4.5)
PHOSPHATE SERPL-MCNC: 2.9 MG/DL — SIGNIFICANT CHANGE UP (ref 2.5–4.5)
PLAT MORPH BLD: NORMAL — SIGNIFICANT CHANGE UP
PLATELET # BLD AUTO: 103 K/UL — LOW (ref 150–400)
PLATELET # BLD AUTO: 109 K/UL — LOW (ref 150–400)
PLATELET # BLD AUTO: 97 K/UL — LOW (ref 150–400)
PO2 BLDV: 21 MMHG — SIGNIFICANT CHANGE UP
POIKILOCYTOSIS BLD QL AUTO: SLIGHT — SIGNIFICANT CHANGE UP
POTASSIUM SERPL-MCNC: 3.4 MMOL/L — LOW (ref 3.5–5.3)
POTASSIUM SERPL-MCNC: 3.9 MMOL/L — SIGNIFICANT CHANGE UP (ref 3.5–5.3)
POTASSIUM SERPL-SCNC: 3.4 MMOL/L — LOW (ref 3.5–5.3)
POTASSIUM SERPL-SCNC: 3.9 MMOL/L — SIGNIFICANT CHANGE UP (ref 3.5–5.3)
PROT SERPL-MCNC: 6.1 G/DL — SIGNIFICANT CHANGE UP (ref 6–8.3)
PROTHROM AB SERPL-ACNC: 17 SEC — HIGH (ref 10.5–13.4)
RBC # BLD: 2.87 M/UL — LOW (ref 4.2–5.8)
RBC # BLD: 3.1 M/UL — LOW (ref 4.2–5.8)
RBC # BLD: 3.13 M/UL — LOW (ref 4.2–5.8)
RBC # FLD: 15.9 % — HIGH (ref 10.3–14.5)
RBC # FLD: 16 % — HIGH (ref 10.3–14.5)
RBC # FLD: 16 % — HIGH (ref 10.3–14.5)
RBC BLD AUTO: ABNORMAL
SAO2 % BLDV: 23.1 % — SIGNIFICANT CHANGE UP
SODIUM SERPL-SCNC: 134 MMOL/L — LOW (ref 135–145)
SODIUM SERPL-SCNC: 136 MMOL/L — SIGNIFICANT CHANGE UP (ref 135–145)
SPECIMEN SOURCE: SIGNIFICANT CHANGE UP
WBC # BLD: 4.91 K/UL — SIGNIFICANT CHANGE UP (ref 3.8–10.5)
WBC # BLD: 5.57 K/UL — SIGNIFICANT CHANGE UP (ref 3.8–10.5)
WBC # BLD: 7.76 K/UL — SIGNIFICANT CHANGE UP (ref 3.8–10.5)
WBC # FLD AUTO: 4.91 K/UL — SIGNIFICANT CHANGE UP (ref 3.8–10.5)
WBC # FLD AUTO: 5.57 K/UL — SIGNIFICANT CHANGE UP (ref 3.8–10.5)
WBC # FLD AUTO: 7.76 K/UL — SIGNIFICANT CHANGE UP (ref 3.8–10.5)

## 2022-12-23 PROCEDURE — 43264 ERCP REMOVE DUCT CALCULI: CPT

## 2022-12-23 PROCEDURE — 43259 EGD US EXAM DUODENUM/JEJUNUM: CPT

## 2022-12-23 PROCEDURE — 43274 ERCP DUCT STENT PLACEMENT: CPT

## 2022-12-23 PROCEDURE — 43255 EGD CONTROL BLEEDING ANY: CPT | Mod: 59

## 2022-12-23 DEVICE — BLLN EXTRCTR PRO RX-S 9-12MM: Type: IMPLANTABLE DEVICE | Status: FUNCTIONAL

## 2022-12-23 DEVICE — HYDRATOME 44: Type: IMPLANTABLE DEVICE | Status: FUNCTIONAL

## 2022-12-23 DEVICE — CLIP HEMOSTASIS DURACLIP 16MM: Type: IMPLANTABLE DEVICE | Status: FUNCTIONAL

## 2022-12-23 DEVICE — IMPLANTABLE DEVICE: Type: IMPLANTABLE DEVICE | Status: FUNCTIONAL

## 2022-12-23 RX ORDER — SODIUM CHLORIDE 9 MG/ML
1000 INJECTION, SOLUTION INTRAVENOUS
Refills: 0 | Status: DISCONTINUED | OUTPATIENT
Start: 2022-12-23 | End: 2022-12-23

## 2022-12-23 RX ORDER — POTASSIUM PHOSPHATE, MONOBASIC POTASSIUM PHOSPHATE, DIBASIC 236; 224 MG/ML; MG/ML
15 INJECTION, SOLUTION INTRAVENOUS ONCE
Refills: 0 | Status: COMPLETED | OUTPATIENT
Start: 2022-12-23 | End: 2022-12-23

## 2022-12-23 RX ORDER — FINASTERIDE 5 MG/1
5 TABLET, FILM COATED ORAL DAILY
Refills: 0 | Status: DISCONTINUED | OUTPATIENT
Start: 2022-12-23 | End: 2023-01-06

## 2022-12-23 RX ORDER — PHENAZOPYRIDINE HCL 100 MG
200 TABLET ORAL EVERY 8 HOURS
Refills: 0 | Status: COMPLETED | OUTPATIENT
Start: 2022-12-23 | End: 2022-12-25

## 2022-12-23 RX ORDER — SODIUM CHLORIDE 9 MG/ML
1000 INJECTION, SOLUTION INTRAVENOUS ONCE
Refills: 0 | Status: COMPLETED | OUTPATIENT
Start: 2022-12-23 | End: 2022-12-23

## 2022-12-23 RX ORDER — POTASSIUM CHLORIDE 20 MEQ
40 PACKET (EA) ORAL ONCE
Refills: 0 | Status: COMPLETED | OUTPATIENT
Start: 2022-12-23 | End: 2022-12-23

## 2022-12-23 RX ADMIN — MEROPENEM 100 MILLIGRAM(S): 1 INJECTION INTRAVENOUS at 21:11

## 2022-12-23 RX ADMIN — OXYCODONE AND ACETAMINOPHEN 1 TABLET(S): 5; 325 TABLET ORAL at 15:20

## 2022-12-23 RX ADMIN — Medication 200 MILLIGRAM(S): at 06:28

## 2022-12-23 RX ADMIN — Medication 200 MILLIGRAM(S): at 21:12

## 2022-12-23 RX ADMIN — SODIUM CHLORIDE 1000 MILLILITER(S): 9 INJECTION, SOLUTION INTRAVENOUS at 00:57

## 2022-12-23 RX ADMIN — OXYCODONE AND ACETAMINOPHEN 1 TABLET(S): 5; 325 TABLET ORAL at 10:15

## 2022-12-23 RX ADMIN — MEROPENEM 100 MILLIGRAM(S): 1 INJECTION INTRAVENOUS at 05:04

## 2022-12-23 RX ADMIN — FINASTERIDE 5 MILLIGRAM(S): 5 TABLET, FILM COATED ORAL at 06:16

## 2022-12-23 RX ADMIN — Medication 100 MILLIGRAM(S): at 21:12

## 2022-12-23 RX ADMIN — MEROPENEM 100 MILLIGRAM(S): 1 INJECTION INTRAVENOUS at 14:19

## 2022-12-23 RX ADMIN — ATORVASTATIN CALCIUM 10 MILLIGRAM(S): 80 TABLET, FILM COATED ORAL at 21:12

## 2022-12-23 RX ADMIN — OXYCODONE AND ACETAMINOPHEN 1 TABLET(S): 5; 325 TABLET ORAL at 10:03

## 2022-12-23 RX ADMIN — Medication 200 MILLIGRAM(S): at 14:46

## 2022-12-23 RX ADMIN — TAMSULOSIN HYDROCHLORIDE 0.4 MILLIGRAM(S): 0.4 CAPSULE ORAL at 21:12

## 2022-12-23 RX ADMIN — Medication 40 MILLIEQUIVALENT(S): at 20:13

## 2022-12-23 RX ADMIN — CHLORHEXIDINE GLUCONATE 1 APPLICATION(S): 213 SOLUTION TOPICAL at 05:17

## 2022-12-23 RX ADMIN — OXYCODONE AND ACETAMINOPHEN 1 TABLET(S): 5; 325 TABLET ORAL at 05:45

## 2022-12-23 RX ADMIN — POTASSIUM PHOSPHATE, MONOBASIC POTASSIUM PHOSPHATE, DIBASIC 62.5 MILLIMOLE(S): 236; 224 INJECTION, SOLUTION INTRAVENOUS at 20:13

## 2022-12-23 RX ADMIN — OXYCODONE AND ACETAMINOPHEN 1 TABLET(S): 5; 325 TABLET ORAL at 22:12

## 2022-12-23 RX ADMIN — OXYCODONE AND ACETAMINOPHEN 1 TABLET(S): 5; 325 TABLET ORAL at 05:04

## 2022-12-23 RX ADMIN — SODIUM CHLORIDE 75 MILLILITER(S): 9 INJECTION, SOLUTION INTRAVENOUS at 05:24

## 2022-12-23 RX ADMIN — OXYCODONE AND ACETAMINOPHEN 1 TABLET(S): 5; 325 TABLET ORAL at 14:19

## 2022-12-23 RX ADMIN — OXYCODONE AND ACETAMINOPHEN 1 TABLET(S): 5; 325 TABLET ORAL at 18:30

## 2022-12-23 RX ADMIN — OXYCODONE AND ACETAMINOPHEN 1 TABLET(S): 5; 325 TABLET ORAL at 21:12

## 2022-12-23 RX ADMIN — Medication 100 MILLIGRAM(S): at 14:19

## 2022-12-23 RX ADMIN — OXYCODONE AND ACETAMINOPHEN 1 TABLET(S): 5; 325 TABLET ORAL at 17:33

## 2022-12-23 RX ADMIN — Medication 100 MILLIGRAM(S): at 05:04

## 2022-12-23 RX ADMIN — PANTOPRAZOLE SODIUM 10 MG/HR: 20 TABLET, DELAYED RELEASE ORAL at 04:01

## 2022-12-23 NOTE — PROGRESS NOTE ADULT - SUBJECTIVE AND OBJECTIVE BOX
ICU VISIT  67y Male    Meds:  meropenem  IVPB 1000 milliGRAM(s) IV Intermittent every 8 hours    Allergies    morphine (Unknown)  penicillin (Unknown)  shellfish (Unknown)    Intolerances        VITALS:  Vital Signs Last 24 Hrs  T(C): 36 (23 Dec 2022 15:31), Max: 37.8 (22 Dec 2022 17:50)  T(F): 96.8 (23 Dec 2022 15:31), Max: 100 (22 Dec 2022 17:50)  HR: 90 (23 Dec 2022 12:00) (88 - 106)  BP: 109/60 (23 Dec 2022 12:00) (79/55 - 122/60)  BP(mean): 71 (23 Dec 2022 12:00) (59 - 76)  RR: 14 (23 Dec 2022 12:00) (14 - 24)  SpO2: 100% (23 Dec 2022 12:00) (94% - 100%)    Parameters below as of 23 Dec 2022 10:00    O2 Flow (L/min): 2      LABS/DIAGNOSTIC TESTS:                          9.0    5.57  )-----------( 103      ( 23 Dec 2022 16:30 )             27.7         12-23    134<L>  |  100  |  16  ----------------------------<  123<H>  3.4<L>   |  27  |  0.58    Ca    7.7<L>      23 Dec 2022 16:30  Phos  2.0     12-23  Mg     1.5     12-23    TPro  6.1  /  Alb  1.8<L>  /  TBili  0.2  /  DBili  x   /  AST  43<H>  /  ALT  44  /  AlkPhos  195<H>  12-23      LIVER FUNCTIONS - ( 23 Dec 2022 16:30 )  Alb: 1.8 g/dL / Pro: 6.1 g/dL / ALK PHOS: 195 U/L / ALT: 44 U/L DA / AST: 43 U/L / GGT: x             CULTURES: .Stool Feces  12-22 @ 20:20   Testing in progress  --  --                RADIOLOGY:      ROS:  [  ] UNABLE TO ELICIT ICU VISIT  67y Male who is in the ICU and doing better overall , he is s/p ERCP and was found to have a clot at the ampulla causing his abdominal pain as well as some pus in the CBD as well, he still has abdominal pain but much less, he has an appetite back and is hungry , he had and tolerate a liquid diet just now. He has some low grade fever, he has no coughing or SOB, no chest pain, no nausea, vomiting or diarrhea.    Meds:  meropenem  IVPB 1000 milliGRAM(s) IV Intermittent every 8 hours    Allergies    morphine (Unknown)  penicillin (Unknown)  shellfish (Unknown)    Intolerances        VITALS:  Vital Signs Last 24 Hrs  T(C): 36 (23 Dec 2022 15:31), Max: 37.8 (22 Dec 2022 17:50)  T(F): 96.8 (23 Dec 2022 15:31), Max: 100 (22 Dec 2022 17:50)  HR: 90 (23 Dec 2022 12:00) (88 - 106)  BP: 109/60 (23 Dec 2022 12:00) (79/55 - 122/60)  BP(mean): 71 (23 Dec 2022 12:00) (59 - 76)  RR: 14 (23 Dec 2022 12:00) (14 - 24)  SpO2: 100% (23 Dec 2022 12:00) (94% - 100%)    Parameters below as of 23 Dec 2022 10:00    O2 Flow (L/min): 2      LABS/DIAGNOSTIC TESTS:                          9.0    5.57  )-----------( 103      ( 23 Dec 2022 16:30 )             27.7         12-23    134<L>  |  100  |  16  ----------------------------<  123<H>  3.4<L>   |  27  |  0.58    Ca    7.7<L>      23 Dec 2022 16:30  Phos  2.0     12-23  Mg     1.5     12-23    TPro  6.1  /  Alb  1.8<L>  /  TBili  0.2  /  DBili  x   /  AST  43<H>  /  ALT  44  /  AlkPhos  195<H>  12-23      LIVER FUNCTIONS - ( 23 Dec 2022 16:30 )  Alb: 1.8 g/dL / Pro: 6.1 g/dL / ALK PHOS: 195 U/L / ALT: 44 U/L DA / AST: 43 U/L / GGT: x             CULTURES: .Stool Feces  12-22 @ 20:20   Testing in progress  --  --                RADIOLOGY:      ROS:  [  ] UNABLE TO ELICIT

## 2022-12-23 NOTE — PROGRESS NOTE ADULT - SUBJECTIVE AND OBJECTIVE BOX
Patient is a 67y old  Male who presents with a chief complaint of Abdominal pain (22 Dec 2022 18:09)    INTERVAL HISTORY/ OVERNIGHT EVENTS:   PRESSORS: [ ] YES [ ] NO  WHICH:    ANTIBIOTICS:                  DATE STARTED:  ANTIBIOTICS:                  DATE STARTED:  ANTIBIOTICS:                  DATE STARTED:    Antimicrobial:  meropenem  IVPB 1000 milliGRAM(s) IV Intermittent every 8 hours    Cardiovascular:    Pulmonary:    Hematalogic:    Other:  aluminum hydroxide/magnesium hydroxide/simethicone Suspension 30 milliLiter(s) Oral every 4 hours PRN  atorvastatin 10 milliGRAM(s) Oral at bedtime  baclofen 5 milliGRAM(s) Oral every 12 hours PRN  chlorhexidine 4% Liquid 1 Application(s) Topical <User Schedule>  dextrose 5% + sodium chloride 0.9%. 1000 milliLiter(s) IV Continuous <Continuous>  finasteride 5 milliGRAM(s) Oral daily  insulin lispro (ADMELOG) corrective regimen sliding scale   SubCutaneous every 6 hours  melatonin 3 milliGRAM(s) Oral at bedtime PRN  ondansetron Injectable 4 milliGRAM(s) IV Push every 8 hours PRN  oxycodone    5 mG/acetaminophen 325 mG 1 Tablet(s) Oral every 4 hours  pantoprazole Infusion 8 mG/Hr IV Continuous <Continuous>  phenazopyridine 200 milliGRAM(s) Oral every 8 hours  pregabalin 100 milliGRAM(s) Oral three times a day  sodium chloride 0.9% lock flush 10 milliLiter(s) IV Push every 1 hour PRN  tamsulosin 0.4 milliGRAM(s) Oral at bedtime    aluminum hydroxide/magnesium hydroxide/simethicone Suspension 30 milliLiter(s) Oral every 4 hours PRN  atorvastatin 10 milliGRAM(s) Oral at bedtime  baclofen 5 milliGRAM(s) Oral every 12 hours PRN  chlorhexidine 4% Liquid 1 Application(s) Topical <User Schedule>  dextrose 5% + sodium chloride 0.9%. 1000 milliLiter(s) IV Continuous <Continuous>  finasteride 5 milliGRAM(s) Oral daily  insulin lispro (ADMELOG) corrective regimen sliding scale   SubCutaneous every 6 hours  melatonin 3 milliGRAM(s) Oral at bedtime PRN  meropenem  IVPB 1000 milliGRAM(s) IV Intermittent every 8 hours  ondansetron Injectable 4 milliGRAM(s) IV Push every 8 hours PRN  oxycodone    5 mG/acetaminophen 325 mG 1 Tablet(s) Oral every 4 hours  pantoprazole Infusion 8 mG/Hr IV Continuous <Continuous>  phenazopyridine 200 milliGRAM(s) Oral every 8 hours  pregabalin 100 milliGRAM(s) Oral three times a day  sodium chloride 0.9% lock flush 10 milliLiter(s) IV Push every 1 hour PRN  tamsulosin 0.4 milliGRAM(s) Oral at bedtime    Drug Dosing Weight  Height (cm): 188 (22 Dec 2022 07:05)  Weight (kg): 83.2 (22 Dec 2022 17:50)  BMI (kg/m2): 23.5 (22 Dec 2022 17:50)  BSA (m2): 2.09 (22 Dec 2022 17:50)    CENTRAL LINE: [ ] YES [ ] NO  LOCATION:     [facility]: [ ] YES [ ] NO      A-LINE:  [ ] YES [ ] NO  LOCATION:         ICU Vital Signs Last 24 Hrs  T(C): 36 (23 Dec 2022 07:19), Max: 38 (22 Dec 2022 11:46)  T(F): 96.8 (23 Dec 2022 07:19), Max: 100.4 (22 Dec 2022 11:46)  HR: 91 (23 Dec 2022 07:00) (88 - 118)  BP: 108/53 (23 Dec 2022 07:00) (79/55 - 122/60)  BP(mean): 66 (23 Dec 2022 07:00) (59 - 76)  ABP: --  ABP(mean): --  RR: 20 (23 Dec 2022 07:00) (16 - 24)  SpO2: 98% (23 Dec 2022 07:00) (97% - 100%)    O2 Parameters below as of 23 Dec 2022 06:00  Patient On (Oxygen Delivery Method): nasal cannula  O2 Flow (L/min): 2                - @ 07:01  -  - @ 07:00  --------------------------------------------------------  IN: 2034 mL / OUT: 1010 mL / NET: 1024 mL            PHYSICAL EXAM:    GENERAL: NAD, well-groomed, well-developed  EYES: EOMI, PERRLA,   NECK: Supple, No JVD; Normal thyroid; Trachea midline  NERVOUS SYSTEM:  Alert & Oriented X3,  Motor Strength 5/5 B/L upper and lower extremities; DTRs 2+ intact and symmetric  CHEST/LUNG: No rales, rhonchi, wheezing   HEART: Regular rate and rhythm; No murmurs,   ABDOMEN: Soft, Nontender, Nondistended; Bowel sounds present  EXTREMITIES:  2+ Peripheral Pulses, No clubbing, cyanosis, or edema      LABS:                        9.9    7.76  )-----------( 109      ( 23 Dec 2022 03:25 )             29.9         136  |  100  |  27<H>  ----------------------------<  97  3.9   |  28  |  0.61    Ca    8.1<L>      23 Dec 2022 03:25  Phos  2.9       Mg     1.7         TPro  6.4  /  Alb  2.0<L>  /  TBili  0.3  /  DBili  0.1  /  AST  53<H>  /  ALT  55  /  AlkPhos  237<H>      PT/INR - ( 22 Dec 2022 14:36 )   PT: 19.5 sec;   INR: 1.63 ratio         PTT - ( 22 Dec 2022 14:36 )  PTT:34.4 sec  Urinalysis Basic - ( 22 Dec 2022 09:20 )    Color: Yellow / Appearance: Clear / S.020 / pH: x  Gluc: x / Ketone: Trace  / Bili: Small / Urobili: 8   Blood: x / Protein: 30 mg/dL / Nitrite: Positive   Leuk Esterase: Trace / RBC: 25-50 /HPF / WBC 3-5 /HPF   Sq Epi: x / Non Sq Epi: Occasional /HPF / Bacteria: Many /HPF      CAPILLARY BLOOD GLUCOSE      POCT Blood Glucose.: 70 mg/dL (23 Dec 2022 05:12)  POCT Blood Glucose.: 110 mg/dL (22 Dec 2022 23:00)  POCT Blood Glucose.: 132 mg/dL (22 Dec 2022 19:03)  POCT Blood Glucose.: 143 mg/dL (22 Dec 2022 14:09)        RADIOLOGY & ADDITIONAL STUDIES REVIEWED:  ***     Patient is a 67y old  Male who presents with a chief complaint of Abdominal pain (22 Dec 2022 18:09)    INTERVAL HISTORY/ OVERNIGHT EVENTS:   No overnight events. ERCP was performed in AM. Pt tolerated the procedure, 2 clips and 1 stent was placed. As per GI, can restart diet as tolerated, and hold anticoagulation for at least 7 days unless required from cardiac standpoint. Consulted Dr. Sharma who recommended to hold Eliquis for now. Pt was seen and examined at bedside after return from the procedure, he stated that he is having pain in the abdomen, and denied any chest pain, dyspnea, palpitation, N/V/D. He reported to be thirsty and required ice chips.    PRESSORS: [ ] YES [ x] NO  WHICH:    ANTIBIOTICS:                  DATE STARTED:  ANTIBIOTICS:                  DATE STARTED:  ANTIBIOTICS:                  DATE STARTED:    Antimicrobial:  meropenem  IVPB 1000 milliGRAM(s) IV Intermittent every 8 hours    Cardiovascular:    Pulmonary:    Hematalogic:    Other:  aluminum hydroxide/magnesium hydroxide/simethicone Suspension 30 milliLiter(s) Oral every 4 hours PRN  atorvastatin 10 milliGRAM(s) Oral at bedtime  baclofen 5 milliGRAM(s) Oral every 12 hours PRN  chlorhexidine 4% Liquid 1 Application(s) Topical <User Schedule>  dextrose 5% + sodium chloride 0.9%. 1000 milliLiter(s) IV Continuous <Continuous>  finasteride 5 milliGRAM(s) Oral daily  insulin lispro (ADMELOG) corrective regimen sliding scale   SubCutaneous every 6 hours  melatonin 3 milliGRAM(s) Oral at bedtime PRN  ondansetron Injectable 4 milliGRAM(s) IV Push every 8 hours PRN  oxycodone    5 mG/acetaminophen 325 mG 1 Tablet(s) Oral every 4 hours  pantoprazole Infusion 8 mG/Hr IV Continuous <Continuous>  phenazopyridine 200 milliGRAM(s) Oral every 8 hours  pregabalin 100 milliGRAM(s) Oral three times a day  sodium chloride 0.9% lock flush 10 milliLiter(s) IV Push every 1 hour PRN  tamsulosin 0.4 milliGRAM(s) Oral at bedtime    aluminum hydroxide/magnesium hydroxide/simethicone Suspension 30 milliLiter(s) Oral every 4 hours PRN  atorvastatin 10 milliGRAM(s) Oral at bedtime  baclofen 5 milliGRAM(s) Oral every 12 hours PRN  chlorhexidine 4% Liquid 1 Application(s) Topical <User Schedule>  dextrose 5% + sodium chloride 0.9%. 1000 milliLiter(s) IV Continuous <Continuous>  finasteride 5 milliGRAM(s) Oral daily  insulin lispro (ADMELOG) corrective regimen sliding scale   SubCutaneous every 6 hours  melatonin 3 milliGRAM(s) Oral at bedtime PRN  meropenem  IVPB 1000 milliGRAM(s) IV Intermittent every 8 hours  ondansetron Injectable 4 milliGRAM(s) IV Push every 8 hours PRN  oxycodone    5 mG/acetaminophen 325 mG 1 Tablet(s) Oral every 4 hours  pantoprazole Infusion 8 mG/Hr IV Continuous <Continuous>  phenazopyridine 200 milliGRAM(s) Oral every 8 hours  pregabalin 100 milliGRAM(s) Oral three times a day  sodium chloride 0.9% lock flush 10 milliLiter(s) IV Push every 1 hour PRN  tamsulosin 0.4 milliGRAM(s) Oral at bedtime    Drug Dosing Weight  Height (cm): 188 (22 Dec 2022 07:05)  Weight (kg): 83.2 (22 Dec 2022 17:50)  BMI (kg/m2): 23.5 (22 Dec 2022 17:50)  BSA (m2): 2.09 (22 Dec 2022 17:50)    CENTRAL LINE: [x ] YES [ ] NO  LOCATION:    [facility]   [facility]: [ ] YES [x ] NO      A-LINE:  [ ] YES [ x] NO  LOCATION:         ICU Vital Signs Last 24 Hrs  T(C): 36 (23 Dec 2022 07:19), Max: 38 (22 Dec 2022 11:46)  T(F): 96.8 (23 Dec 2022 07:19), Max: 100.4 (22 Dec 2022 11:46)  HR: 91 (23 Dec 2022 07:00) (88 - 118)  BP: 108/53 (23 Dec 2022 07:00) (79/55 - 122/60)  BP(mean): 66 (23 Dec 2022 07:00) (59 - 76)  ABP: --  ABP(mean): --  RR: 20 (23 Dec 2022 07:00) (16 - 24)  SpO2: 98% (23 Dec 2022 07:00) (97% - 100%)    O2 Parameters below as of 23 Dec 2022 06:00  Patient On (Oxygen Delivery Method): nasal cannula  O2 Flow (L/min): 2                 @ 07:01  -   @ 07:00  --------------------------------------------------------  IN: 2034 mL / OUT: 1010 mL / NET: 1024 mL            PHYSICAL EXAM:    GENERAL: NAD, well-groomed, well-developed  EYES: EOMI, PERRLA,   NECK: Supple, No JVD; Normal thyroid; Trachea midline  NERVOUS SYSTEM:  Alert & Oriented X3,  Motor Strength 5/5 B/L upper and lower extremities; DTRs 2+ intact and symmetric  CHEST/LUNG: No rales, rhonchi, wheezing   HEART: Regular rate and rhythm; No murmurs,   ABDOMEN: Soft, tender , Nondistended; Bowel sounds present  EXTREMITIES:  2+ Peripheral Pulses, No clubbing, cyanosis, or edema      LABS:                        9.9    7.76  )-----------( 109      ( 23 Dec 2022 03:25 )             29.9         136  |  100  |  27<H>  ----------------------------<  97  3.9   |  28  |  0.61    Ca    8.1<L>      23 Dec 2022 03:25  Phos  2.9       Mg     1.7         TPro  6.4  /  Alb  2.0<L>  /  TBili  0.3  /  DBili  0.1  /  AST  53<H>  /  ALT  55  /  AlkPhos  237<H>      PT/INR - ( 22 Dec 2022 14:36 )   PT: 19.5 sec;   INR: 1.63 ratio         PTT - ( 22 Dec 2022 14:36 )  PTT:34.4 sec  Urinalysis Basic - ( 22 Dec 2022 09:20 )    Color: Yellow / Appearance: Clear / S.020 / pH: x  Gluc: x / Ketone: Trace  / Bili: Small / Urobili: 8   Blood: x / Protein: 30 mg/dL / Nitrite: Positive   Leuk Esterase: Trace / RBC: 25-50 /HPF / WBC 3-5 /HPF   Sq Epi: x / Non Sq Epi: Occasional /HPF / Bacteria: Many /HPF      CAPILLARY BLOOD GLUCOSE      POCT Blood Glucose.: 70 mg/dL (23 Dec 2022 05:12)  POCT Blood Glucose.: 110 mg/dL (22 Dec 2022 23:00)  POCT Blood Glucose.: 132 mg/dL (22 Dec 2022 19:03)  POCT Blood Glucose.: 143 mg/dL (22 Dec 2022 14:09)        RADIOLOGY & ADDITIONAL STUDIES REVIEWED:  ***     Patient is a 67y old  Male who presents with a chief complaint of Abdominal pain (22 Dec 2022 18:09)    INTERVAL HISTORY/ OVERNIGHT EVENTS:   No overnight events. ERCP was performed in AM. Pt tolerated the procedure, 2 clips and 1 stent was placed. As per GI, can restart diet as tolerated, and hold anticoagulation for at least 7 days unless required from cardiac standpoint. Consulted Dr. Sharma who recommended to hold Eliquis for now. Pt was seen and examined at bedside after return from the procedure, he stated that he is having pain in the abdomen, and denied any chest pain, dyspnea, palpitation, N/V/D. He reported to be thirsty and required ice chips.    PRESSORS: [ ] YES [ x] NO  WHICH:    ANTIBIOTICS:                  DATE STARTED:  ANTIBIOTICS:                  DATE STARTED:  ANTIBIOTICS:                  DATE STARTED:    Antimicrobial:  meropenem  IVPB 1000 milliGRAM(s) IV Intermittent every 8 hours    Cardiovascular:    Pulmonary:    Hematalogic:    Other:  aluminum hydroxide/magnesium hydroxide/simethicone Suspension 30 milliLiter(s) Oral every 4 hours PRN  atorvastatin 10 milliGRAM(s) Oral at bedtime  baclofen 5 milliGRAM(s) Oral every 12 hours PRN  chlorhexidine 4% Liquid 1 Application(s) Topical <User Schedule>  dextrose 5% + sodium chloride 0.9%. 1000 milliLiter(s) IV Continuous <Continuous>  finasteride 5 milliGRAM(s) Oral daily  insulin lispro (ADMELOG) corrective regimen sliding scale   SubCutaneous every 6 hours  melatonin 3 milliGRAM(s) Oral at bedtime PRN  ondansetron Injectable 4 milliGRAM(s) IV Push every 8 hours PRN  oxycodone    5 mG/acetaminophen 325 mG 1 Tablet(s) Oral every 4 hours  pantoprazole Infusion 8 mG/Hr IV Continuous <Continuous>  phenazopyridine 200 milliGRAM(s) Oral every 8 hours  pregabalin 100 milliGRAM(s) Oral three times a day  sodium chloride 0.9% lock flush 10 milliLiter(s) IV Push every 1 hour PRN  tamsulosin 0.4 milliGRAM(s) Oral at bedtime    aluminum hydroxide/magnesium hydroxide/simethicone Suspension 30 milliLiter(s) Oral every 4 hours PRN  atorvastatin 10 milliGRAM(s) Oral at bedtime  baclofen 5 milliGRAM(s) Oral every 12 hours PRN  chlorhexidine 4% Liquid 1 Application(s) Topical <User Schedule>  dextrose 5% + sodium chloride 0.9%. 1000 milliLiter(s) IV Continuous <Continuous>  finasteride 5 milliGRAM(s) Oral daily  insulin lispro (ADMELOG) corrective regimen sliding scale   SubCutaneous every 6 hours  melatonin 3 milliGRAM(s) Oral at bedtime PRN  meropenem  IVPB 1000 milliGRAM(s) IV Intermittent every 8 hours  ondansetron Injectable 4 milliGRAM(s) IV Push every 8 hours PRN  oxycodone    5 mG/acetaminophen 325 mG 1 Tablet(s) Oral every 4 hours  pantoprazole Infusion 8 mG/Hr IV Continuous <Continuous>  phenazopyridine 200 milliGRAM(s) Oral every 8 hours  pregabalin 100 milliGRAM(s) Oral three times a day  sodium chloride 0.9% lock flush 10 milliLiter(s) IV Push every 1 hour PRN  tamsulosin 0.4 milliGRAM(s) Oral at bedtime    Drug Dosing Weight  Height (cm): 188 (22 Dec 2022 07:05)  Weight (kg): 83.2 (22 Dec 2022 17:50)  BMI (kg/m2): 23.5 (22 Dec 2022 17:50)  BSA (m2): 2.09 (22 Dec 2022 17:50)    CENTRAL LINE: [x ] YES [ ] NO  LOCATION:    [facility]   [facility]: [ ] YES [x ] NO      A-LINE:  [ ] YES [ x] NO  LOCATION:         ICU Vital Signs Last 24 Hrs  T(C): 36 (23 Dec 2022 07:19), Max: 38 (22 Dec 2022 11:46)  T(F): 96.8 (23 Dec 2022 07:19), Max: 100.4 (22 Dec 2022 11:46)  HR: 91 (23 Dec 2022 07:00) (88 - 118)  BP: 108/53 (23 Dec 2022 07:00) (79/55 - 122/60)  BP(mean): 66 (23 Dec 2022 07:00) (59 - 76)  ABP: --  ABP(mean): --  RR: 20 (23 Dec 2022 07:00) (16 - 24)  SpO2: 98% (23 Dec 2022 07:00) (97% - 100%)    O2 Parameters below as of 23 Dec 2022 06:00  Patient On (Oxygen Delivery Method): nasal cannula  O2 Flow (L/min): 2                 @ 07:01  -   @ 07:00  --------------------------------------------------------  IN: 2034 mL / OUT: 1010 mL / NET: 1024 mL            PHYSICAL EXAM:    GENERAL: NAD, well-groomed, well-developed,  tremors noted   EYES: EOMI, PERRLA,   NECK: Supple, No JVD; Normal thyroid; Trachea midline  NERVOUS SYSTEM:  Alert & Oriented X3,  Motor Strength 5/5 B/L upper and lower extremities; DTRs 2+ intact and symmetric  CHEST/LUNG: No rales, rhonchi, wheezing   HEART: Regular rate and rhythm; No murmurs,   ABDOMEN: Soft, tender , Nondistended; Bowel sounds present  EXTREMITIES:  2+ Peripheral Pulses, No clubbing, cyanosis, or edema      LABS:                        9.9    7.76  )-----------( 109      ( 23 Dec 2022 03:25 )             29.9         136  |  100  |  27<H>  ----------------------------<  97  3.9   |  28  |  0.61    Ca    8.1<L>      23 Dec 2022 03:25  Phos  2.9       Mg     1.7         TPro  6.4  /  Alb  2.0<L>  /  TBili  0.3  /  DBili  0.1  /  AST  53<H>  /  ALT  55  /  AlkPhos  237<H>      PT/INR - ( 22 Dec 2022 14:36 )   PT: 19.5 sec;   INR: 1.63 ratio         PTT - ( 22 Dec 2022 14:36 )  PTT:34.4 sec  Urinalysis Basic - ( 22 Dec 2022 09:20 )    Color: Yellow / Appearance: Clear / S.020 / pH: x  Gluc: x / Ketone: Trace  / Bili: Small / Urobili: 8   Blood: x / Protein: 30 mg/dL / Nitrite: Positive   Leuk Esterase: Trace / RBC: 25-50 /HPF / WBC 3-5 /HPF   Sq Epi: x / Non Sq Epi: Occasional /HPF / Bacteria: Many /HPF      CAPILLARY BLOOD GLUCOSE      POCT Blood Glucose.: 70 mg/dL (23 Dec 2022 05:12)  POCT Blood Glucose.: 110 mg/dL (22 Dec 2022 23:00)  POCT Blood Glucose.: 132 mg/dL (22 Dec 2022 19:03)  POCT Blood Glucose.: 143 mg/dL (22 Dec 2022 14:09)        RADIOLOGY & ADDITIONAL STUDIES REVIEWED:  ***

## 2022-12-23 NOTE — PROGRESS NOTE ADULT - GASTROINTESTINAL
soft/nondistended/normal active bowel sounds/no guarding/no rigidity/no organomegaly/tender details…

## 2022-12-23 NOTE — PROGRESS NOTE ADULT - ASSESSMENT
Recurrent Cholangitis  Sepsis  Pneumonia ? - likely just atelectasis given complete lack of symptoms  Fevers - improved  Leukocytosis - normalized      Plan - Cont Meropenem 1 gm iv q8hrs  Time spent - 31 mins

## 2022-12-23 NOTE — PROCEDURE NOTE - NSSITEPREP_SKIN_A_CORE
chlorhexidine/Adherence to aseptic technique: hand hygiene prior to donning barriers (gown, gloves), don cap and mask, sterile drape over patient
alcohol

## 2022-12-23 NOTE — CHART NOTE - NSCHARTNOTEFT_GEN_A_CORE
Patient seen and examined at bedside for removal of central line. Chart and labs reviewed prior to removal, no contraindication to procedure. Area cleaned with Alcohol swabs and suture removal kit used to remove sutures holding central line in place using. Patient then placed in reverse Trendelenburg position, asked to hold breath/hum, and then central line removed. Pressure applied for 5-10 minutes with gauze. No signs of active bleeding noted. No hematoma formation noted. Tegaderm and gauze/tape used to create pressure dressing to maintain pressure on central line site. Patient tolerated well without complications.   Discussed procedure with RN who will monitor and notify the provider for bleeding, hematoma formation, or other procedural complications.

## 2022-12-23 NOTE — PROCEDURE NOTE - NSPROCDETAILS_GEN_ALL_CORE
location identified, draped/prepped, sterile technique used/blood seen on insertion/dressing applied/flushes easily/secured in place/sterile technique, catheter placed
guidewire recovered/lumen(s) aspirated and flushed/sterile dressing applied/sterile technique, catheter placed/ultrasound guidance with use of sterile gel and probe cove

## 2022-12-24 LAB
-  BLOOD PCR PANEL: SIGNIFICANT CHANGE UP
ALBUMIN SERPL ELPH-MCNC: 1.7 G/DL — LOW (ref 3.5–5)
ALBUMIN SERPL ELPH-MCNC: 1.9 G/DL — LOW (ref 3.5–5)
ALP SERPL-CCNC: 191 U/L — HIGH (ref 40–120)
ALP SERPL-CCNC: 195 U/L — HIGH (ref 40–120)
ALT FLD-CCNC: 42 U/L DA — SIGNIFICANT CHANGE UP (ref 10–60)
ALT FLD-CCNC: 43 U/L DA — SIGNIFICANT CHANGE UP (ref 10–60)
ANION GAP SERPL CALC-SCNC: 5 MMOL/L — SIGNIFICANT CHANGE UP (ref 5–17)
AST SERPL-CCNC: 38 U/L — SIGNIFICANT CHANGE UP (ref 10–40)
AST SERPL-CCNC: 39 U/L — SIGNIFICANT CHANGE UP (ref 10–40)
BASOPHILS # BLD AUTO: 0.02 K/UL — SIGNIFICANT CHANGE UP (ref 0–0.2)
BASOPHILS NFR BLD AUTO: 0.5 % — SIGNIFICANT CHANGE UP (ref 0–2)
BILIRUB DIRECT SERPL-MCNC: 0.1 MG/DL — SIGNIFICANT CHANGE UP (ref 0–0.3)
BILIRUB INDIRECT FLD-MCNC: 0.1 MG/DL — LOW (ref 0.2–1)
BILIRUB SERPL-MCNC: 0.2 MG/DL — SIGNIFICANT CHANGE UP (ref 0.2–1.2)
BILIRUB SERPL-MCNC: 0.2 MG/DL — SIGNIFICANT CHANGE UP (ref 0.2–1.2)
BLD GP AB SCN SERPL QL: SIGNIFICANT CHANGE UP
BUN SERPL-MCNC: 10 MG/DL — SIGNIFICANT CHANGE UP (ref 7–18)
CALCIUM SERPL-MCNC: 7.9 MG/DL — LOW (ref 8.4–10.5)
CHLORIDE SERPL-SCNC: 100 MMOL/L — SIGNIFICANT CHANGE UP (ref 96–108)
CO2 SERPL-SCNC: 31 MMOL/L — SIGNIFICANT CHANGE UP (ref 22–31)
CREAT SERPL-MCNC: 0.5 MG/DL — SIGNIFICANT CHANGE UP (ref 0.5–1.3)
CULTURE RESULTS: SIGNIFICANT CHANGE UP
EGFR: 111 ML/MIN/1.73M2 — SIGNIFICANT CHANGE UP
EOSINOPHIL # BLD AUTO: 0.06 K/UL — SIGNIFICANT CHANGE UP (ref 0–0.5)
EOSINOPHIL NFR BLD AUTO: 1.4 % — SIGNIFICANT CHANGE UP (ref 0–6)
GLUCOSE SERPL-MCNC: 98 MG/DL — SIGNIFICANT CHANGE UP (ref 70–99)
GRAM STN FLD: SIGNIFICANT CHANGE UP
HCT VFR BLD CALC: 28.3 % — LOW (ref 39–50)
HGB BLD-MCNC: 9.1 G/DL — LOW (ref 13–17)
IMM GRANULOCYTES NFR BLD AUTO: 0.7 % — SIGNIFICANT CHANGE UP (ref 0–0.9)
LYMPHOCYTES # BLD AUTO: 0.61 K/UL — LOW (ref 1–3.3)
LYMPHOCYTES # BLD AUTO: 13.9 % — SIGNIFICANT CHANGE UP (ref 13–44)
MAGNESIUM SERPL-MCNC: 1.4 MG/DL — LOW (ref 1.6–2.6)
MCHC RBC-ENTMCNC: 31.2 PG — SIGNIFICANT CHANGE UP (ref 27–34)
MCHC RBC-ENTMCNC: 32.2 GM/DL — SIGNIFICANT CHANGE UP (ref 32–36)
MCV RBC AUTO: 96.9 FL — SIGNIFICANT CHANGE UP (ref 80–100)
METHOD TYPE: SIGNIFICANT CHANGE UP
MONOCYTES # BLD AUTO: 0.39 K/UL — SIGNIFICANT CHANGE UP (ref 0–0.9)
MONOCYTES NFR BLD AUTO: 8.9 % — SIGNIFICANT CHANGE UP (ref 2–14)
NEUTROPHILS # BLD AUTO: 3.29 K/UL — SIGNIFICANT CHANGE UP (ref 1.8–7.4)
NEUTROPHILS NFR BLD AUTO: 74.6 % — SIGNIFICANT CHANGE UP (ref 43–77)
NRBC # BLD: 0 /100 WBCS — SIGNIFICANT CHANGE UP (ref 0–0)
PHOSPHATE SERPL-MCNC: 2.7 MG/DL — SIGNIFICANT CHANGE UP (ref 2.5–4.5)
PLATELET # BLD AUTO: 105 K/UL — LOW (ref 150–400)
POTASSIUM SERPL-MCNC: 4.2 MMOL/L — SIGNIFICANT CHANGE UP (ref 3.5–5.3)
POTASSIUM SERPL-SCNC: 4.2 MMOL/L — SIGNIFICANT CHANGE UP (ref 3.5–5.3)
PROT SERPL-MCNC: 6.4 G/DL — SIGNIFICANT CHANGE UP (ref 6–8.3)
PROT SERPL-MCNC: 6.7 G/DL — SIGNIFICANT CHANGE UP (ref 6–8.3)
RBC # BLD: 2.92 M/UL — LOW (ref 4.2–5.8)
RBC # FLD: 15.9 % — HIGH (ref 10.3–14.5)
SODIUM SERPL-SCNC: 136 MMOL/L — SIGNIFICANT CHANGE UP (ref 135–145)
SPECIMEN SOURCE: SIGNIFICANT CHANGE UP
WBC # BLD: 4.4 K/UL — SIGNIFICANT CHANGE UP (ref 3.8–10.5)
WBC # FLD AUTO: 4.4 K/UL — SIGNIFICANT CHANGE UP (ref 3.8–10.5)

## 2022-12-24 RX ORDER — MAGNESIUM SULFATE 500 MG/ML
2 VIAL (ML) INJECTION ONCE
Refills: 0 | Status: COMPLETED | OUTPATIENT
Start: 2022-12-24 | End: 2022-12-24

## 2022-12-24 RX ORDER — VANCOMYCIN HCL 1 G
1000 VIAL (EA) INTRAVENOUS EVERY 12 HOURS
Refills: 0 | Status: DISCONTINUED | OUTPATIENT
Start: 2022-12-24 | End: 2022-12-27

## 2022-12-24 RX ORDER — PANTOPRAZOLE SODIUM 20 MG/1
40 TABLET, DELAYED RELEASE ORAL EVERY 12 HOURS
Refills: 0 | Status: DISCONTINUED | OUTPATIENT
Start: 2022-12-24 | End: 2022-12-24

## 2022-12-24 RX ORDER — OXYCODONE AND ACETAMINOPHEN 5; 325 MG/1; MG/1
1 TABLET ORAL ONCE
Refills: 0 | Status: DISCONTINUED | OUTPATIENT
Start: 2022-12-24 | End: 2022-12-24

## 2022-12-24 RX ORDER — PANTOPRAZOLE SODIUM 20 MG/1
8 TABLET, DELAYED RELEASE ORAL
Qty: 80 | Refills: 0 | Status: DISCONTINUED | OUTPATIENT
Start: 2022-12-24 | End: 2022-12-25

## 2022-12-24 RX ADMIN — Medication 25 GRAM(S): at 04:05

## 2022-12-24 RX ADMIN — Medication 100 MILLIGRAM(S): at 23:43

## 2022-12-24 RX ADMIN — OXYCODONE AND ACETAMINOPHEN 1 TABLET(S): 5; 325 TABLET ORAL at 11:00

## 2022-12-24 RX ADMIN — Medication 100 MILLIGRAM(S): at 13:45

## 2022-12-24 RX ADMIN — MEROPENEM 100 MILLIGRAM(S): 1 INJECTION INTRAVENOUS at 13:46

## 2022-12-24 RX ADMIN — CHLORHEXIDINE GLUCONATE 1 APPLICATION(S): 213 SOLUTION TOPICAL at 05:38

## 2022-12-24 RX ADMIN — Medication 200 MILLIGRAM(S): at 21:23

## 2022-12-24 RX ADMIN — Medication 200 MILLIGRAM(S): at 13:45

## 2022-12-24 RX ADMIN — Medication 250 MILLIGRAM(S): at 18:12

## 2022-12-24 RX ADMIN — ATORVASTATIN CALCIUM 10 MILLIGRAM(S): 80 TABLET, FILM COATED ORAL at 21:23

## 2022-12-24 RX ADMIN — PANTOPRAZOLE SODIUM 10 MG/HR: 20 TABLET, DELAYED RELEASE ORAL at 14:35

## 2022-12-24 RX ADMIN — OXYCODONE AND ACETAMINOPHEN 1 TABLET(S): 5; 325 TABLET ORAL at 21:30

## 2022-12-24 RX ADMIN — OXYCODONE AND ACETAMINOPHEN 1 TABLET(S): 5; 325 TABLET ORAL at 05:58

## 2022-12-24 RX ADMIN — OXYCODONE AND ACETAMINOPHEN 1 TABLET(S): 5; 325 TABLET ORAL at 05:06

## 2022-12-24 RX ADMIN — TAMSULOSIN HYDROCHLORIDE 0.4 MILLIGRAM(S): 0.4 CAPSULE ORAL at 21:23

## 2022-12-24 RX ADMIN — Medication 100 MILLIGRAM(S): at 05:06

## 2022-12-24 RX ADMIN — OXYCODONE AND ACETAMINOPHEN 1 TABLET(S): 5; 325 TABLET ORAL at 21:23

## 2022-12-24 RX ADMIN — FINASTERIDE 5 MILLIGRAM(S): 5 TABLET, FILM COATED ORAL at 11:40

## 2022-12-24 RX ADMIN — MEROPENEM 100 MILLIGRAM(S): 1 INJECTION INTRAVENOUS at 05:06

## 2022-12-24 RX ADMIN — MEROPENEM 100 MILLIGRAM(S): 1 INJECTION INTRAVENOUS at 21:23

## 2022-12-24 RX ADMIN — Medication 250 MILLIGRAM(S): at 06:06

## 2022-12-24 RX ADMIN — OXYCODONE AND ACETAMINOPHEN 1 TABLET(S): 5; 325 TABLET ORAL at 13:03

## 2022-12-24 RX ADMIN — OXYCODONE AND ACETAMINOPHEN 1 TABLET(S): 5; 325 TABLET ORAL at 19:45

## 2022-12-24 RX ADMIN — Medication 200 MILLIGRAM(S): at 05:08

## 2022-12-24 RX ADMIN — OXYCODONE AND ACETAMINOPHEN 1 TABLET(S): 5; 325 TABLET ORAL at 18:47

## 2022-12-24 NOTE — DIETITIAN INITIAL EVALUATION ADULT - FACTORS AFF FOOD INTAKE
acute on chronic comorbidities including altered GI function of s/p GI bleeding, cholangitis/pain/Catholic/ethnic/cultural/personal food preferences

## 2022-12-24 NOTE — PROGRESS NOTE ADULT - ASSESSMENT
Patient is a 67 year old male, vaccinated, from [facility] with PMHx of DM, HTN, HLD, AF on Eliquis, PVD, presents with c/o vomiting and right sided abdominal pain. Admitted to ICU for hypovolemic vs septic shock     Assessment:  - Septic shock   - Cholangitis   - Melena   - Diverticulosis   - Afib   - HTN  - DM    Plan:  Neuro:  - AAOx3    CV  # Septic shock vs hypovolemic shock    - 2 episodes of melena   - US Abdomen: Biliary dilatation and hepatic steatosis  - CT A/P: dilation of the common bile duct and main pancreatic duct again noted.. New RLL opacity  - WBC trended down to 7.76  - c/w Meropenem   - repeat ERCP on 12/23 performed, 2 clips and 1 stent placed  - f/u BCUX and UCX  - GI consulted Dr Villanueva/ Judy(ERCP)  - ID Dr. Novoa.    # Afib  - continue holding Eliquis    # HTN  - on admission low  BP: 81/59mmHg  S/P 1L NS bolus   - home medications: nifedipine and metoprolol, hold as blood pressure on the softer side   - c/w IVF   monitor BP    Pulm:  # Aspiration PNA   - CT abd: New airspace opacifications in the right lower lung zone  - C/w Meropenem   - ID Dr. Novoa following     ID:  # Septic shock    - WBC trending down, on 12/23: 7.76   - Lactate trended down on 12/21 to 1.8   - f/u BCUX and UCX  - GI consulted Dr. Kim(ERCP)  - ID Dr. Novoa.      Nephro:  No active issues     GI:  # Cholangitis   - Fever, RUQ pain, jaundice, leukocytosis, Transaminitis   - US Abdomen: Biliary dilatation and hepatic steatosis  - CT A/P: dilation of the common bile duct and main pancreatic duct again noted.. New RLL opacity  - ERCP 12/16: biliary sphincterotomy and sludge removal    - repeat ERCP on 12/23 performed, 2 clips and 1 stent placed  - Continue Meropenem   - Follow Bili and T bili     # GI Bleed   - 2 episodes of melena  - H/H stable, 11.4/34.1  - likely post sphincterotomy bleed resulting in cholangitis as per GI   - CT: colonic diverticulosis   - c/w Protonix  - Hb trended down , on 12/23: 9.9 ( no active bleeding)   - repeat CBC   - Transfuse for Hb<7   - started on clear diet , pt tolerating well, will advance as tolerated       Heme:  - no indication for transfusion       Endo:  # DM:   - ISS Low scale        Prophy:  - SCD for DVT ppx  - Protonix     Dispo:  - monitor in the ICU

## 2022-12-24 NOTE — CONSULT NOTE ADULT - NEGATIVE ENMT SYMPTOMS
no hearing difficulty/no ear pain/no tinnitus/no vertigo/no sinus symptoms/no nasal congestion/no nose bleeds/no gum bleeding/no dry mouth/no throat pain/no dysphagia

## 2022-12-24 NOTE — DIETITIAN INITIAL EVALUATION ADULT - PERTINENT LABORATORY DATA
12-24    136  |  100  |  10  ----------------------------<  98  4.2   |  31  |  0.50    Ca    7.9<L>      24 Dec 2022 03:23  Phos  2.7     12-24  Mg     1.4     12-24    TPro  6.7  /  Alb  1.9<L>  /  TBili  0.2  /  DBili  0.1  /  AST  39  /  ALT  43  /  AlkPhos  195<H>  12-24  POCT Blood Glucose.: 80 mg/dL (12-24-22 @ 05:10)  A1C with Estimated Average Glucose Result: 5.5 % (12-09-22 @ 06:00)  A1C with Estimated Average Glucose Result: 6.7 % (02-01-22 @ 08:57)

## 2022-12-24 NOTE — CONSULT NOTE ADULT - NEGATIVE CARDIOVASCULAR SYMPTOMS
no chest pain/no palpitations/no orthopnea
no chest pain/no palpitations/no dyspnea on exertion/no peripheral edema

## 2022-12-24 NOTE — PROGRESS NOTE ADULT - SUBJECTIVE AND OBJECTIVE BOX
PATIENT SEEN AND EXAMINED ON :- 12/24/22  DATE OF SERVICE: 12/24/22             Interim events noted,Labs ,Radiological studies and Cardiology tests reviewed .       HOSPITAL COURSE: HPI:  Patient is a 67 year old male, vaccinated, from [facility] with PMHx of DM, HTN, HLD, AF on Eliquis, PVD, presents with c/o vomiting and right sided abdominal pain. patient states yesterday after lunch he had a sharp right sided abdominal pain 8/10 persistent with nausea and vomiting. He also endorsed chills and rigors, unsure if he spiked a fever. He denies any changes in bowel habits, chest pain, shortness of breath or palpitations. Patient had two episodes of melena in ED. He was discharged yesterday from hospital. He had choledocholithiasis,  CT A/P showed: Cholecystectomy with dilated CBD up to 1.7 cm abrupt cut off of the distal CBD and  mild pancreatic duct dilatation. 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 He underwent  ERCP on 12/16 biliary sludge removal with biliary sphincterotomy and balloon extraction. He was discharged after symptom improvement and out patient gastric emptying study.     In ED:   Vitals: BP: 81/59mmHg  HR:  118  RR: 97% RA   WBC 14.7 k  elevated LFT, lactate   s/p  Rocephin 1LNS and zofran  (22 Dec 2022 12:00)      INTERIM EVENTS:Patient seen at bedside ,interim events noted.      PMH -reviewed admission note, no change since admission  HEART FAILURE: Acute[ ]Chronic[ ] Systolic[ ] Diastolic[ ] Combined Systolic and Diastolic[ ]  CAD[ ] CABG[ ] PCI[ ]  DEVICES[ ] PPM[ ] ICD[ ] ILR[ ]  ATRIAL FIBRILLATION[ ] Paroxysmal[ ] Permanent[ ] CHADS2-[  ]  DILCIA[ ] CKD1[ ] CKD2[ ] CKD3[ ] CKD4[ ] ESRD[ ]  COPD[ ] HTN[ ]   DM[ ] Type1[ ] Type 2[ ]   CVA[ ] Paresis[ ]    AMBULATION: Assisted[ ] Cane/walker[ ] Independent[ ]    MEDICATIONS  (STANDING):  atorvastatin 10 milliGRAM(s) Oral at bedtime  chlorhexidine 4% Liquid 1 Application(s) Topical <User Schedule>  finasteride 5 milliGRAM(s) Oral daily  insulin lispro (ADMELOG) corrective regimen sliding scale   SubCutaneous every 6 hours  meropenem  IVPB 1000 milliGRAM(s) IV Intermittent every 8 hours  oxycodone    5 mG/acetaminophen 325 mG 1 Tablet(s) Oral every 4 hours  pantoprazole Infusion 8 mG/Hr (10 mL/Hr) IV Continuous <Continuous>  phenazopyridine 200 milliGRAM(s) Oral every 8 hours  pregabalin 100 milliGRAM(s) Oral three times a day  tamsulosin 0.4 milliGRAM(s) Oral at bedtime  vancomycin  IVPB 1000 milliGRAM(s) IV Intermittent every 12 hours    MEDICATIONS  (PRN):  aluminum hydroxide/magnesium hydroxide/simethicone Suspension 30 milliLiter(s) Oral every 4 hours PRN Dyspepsia  baclofen 5 milliGRAM(s) Oral every 12 hours PRN Musculoskeletal Pain  melatonin 3 milliGRAM(s) Oral at bedtime PRN Insomnia  ondansetron Injectable 4 milliGRAM(s) IV Push every 8 hours PRN Nausea and/or Vomiting  sodium chloride 0.9% lock flush 10 milliLiter(s) IV Push every 1 hour PRN Pre/post blood products, medications, blood draw, and to maintain line patency            REVIEW OF SYSTEMS:  Constitutional: [ ] fever, [ ]weight loss,  [ ]fatigue [ ]weight gain  Eyes: [ ] visual changes  Respiratory: [ ]shortness of breath;  [ ] cough, [ ]wheezing, [ ]chills, [ ]hemoptysis  Cardiovascular: [ ] chest pain, [ ]palpitations, [ ]dizziness,  [ ]leg swelling[ ]orthopnea[ ]PND  Gastrointestinal: [ ] abdominal pain, [ ]nausea, [ ]vomiting,  [ ]diarrhea [ ]Constipation [ ]Melena  Genitourinary: [ ] dysuria, [ ] hematuria [ ]Cardoza  Neurologic: [ ] headaches [ ] tremors[ ]weakness [ ]Paralysis Right[ ] Left[ ]  Skin: [ ] itching, [ ]burning, [ ] rashes  Endocrine: [ ] heat or cold intolerance  Musculoskeletal: [ ] joint pain or swelling; [ ] muscle, back, or extremity pain  Psychiatric: [ ] depression, [ ]anxiety, [ ]mood swings, or [ ]difficulty sleeping  Hematologic: [ ] easy bruising, [ ] bleeding gums    [ ] All remaining systems negative except as per above.   [ ]Unable to obtain.  [x] No change in ROS since admission      Vital Signs Last 24 Hrs  T(C): 36.4 (24 Dec 2022 16:00), Max: 36.4 (24 Dec 2022 00:00)  T(F): 97.6 (24 Dec 2022 16:00), Max: 97.6 (24 Dec 2022 00:00)  HR: 92 (24 Dec 2022 19:00) (76 - 114)  BP: 95/58 (24 Dec 2022 19:00) (78/53 - 125/100)  BP(mean): 66 (24 Dec 2022 19:00) (59 - 106)  RR: 25 (24 Dec 2022 19:00) (15 - 27)  SpO2: 100% (24 Dec 2022 19:00) (92% - 100%)    Parameters below as of 24 Dec 2022 11:46  Patient On (Oxygen Delivery Method): room air      I&O's Summary    23 Dec 2022 07:01  -  24 Dec 2022 07:00  --------------------------------------------------------  IN: 830 mL / OUT: 2300 mL / NET: -1470 mL    24 Dec 2022 07:01  -  24 Dec 2022 20:16  --------------------------------------------------------  IN: 1760 mL / OUT: 1910 mL / NET: -150 mL        PHYSICAL EXAM:  General: No acute distress BMI-  HEENT: EOMI, PERRL  Neck: Supple, [ ] JVD  Lungs: Equal air entry bilaterally; [ ] rales [ ] wheezing [ ] rhonchi  Heart: Regular rate and rhythm; [x ] murmur   2/6 [ x] systolic [ ] diastolic [ ] radiation[ ] rubs [ ]  gallops  Abdomen: Nontender, bowel sounds present  Extremities: No clubbing, cyanosis, [ ] edema [ ]Pulses  equal and intact  Nervous system:  Alert & Oriented X3, no focal deficits  Psychiatric: Normal affect  Skin: No rashes or lesions    LABS:  12-24    136  |  100  |  10  ----------------------------<  98  4.2   |  31  |  0.50    Ca    7.9<L>      24 Dec 2022 03:23  Phos  2.7     12-24  Mg     1.4     12-24    TPro  6.7  /  Alb  1.9<L>  /  TBili  0.2  /  DBili  0.1  /  AST  39  /  ALT  43  /  AlkPhos  195<H>  12-24    Creatinine Trend: 0.50<--, 0.58<--, 0.61<--, 1.04<--, 1.19<--, 0.44<--                        9.1    4.40  )-----------( 105      ( 24 Dec 2022 03:23 )             28.3     PT/INR - ( 23 Dec 2022 19:06 )   PT: 17.0 sec;   INR: 1.42 ratio         PTT - ( 23 Dec 2022 19:06 )  PTT:33.0 sec

## 2022-12-24 NOTE — CONSULT NOTE ADULT - SUBJECTIVE AND OBJECTIVE BOX
[  ] STAT REQUEST              [ X ] ROUTINE REQUEST    Patient is a 67 year old male with abdominal pain. GI consulted to evaluate.       HPI:  Patient is a 67 year old male, with past medical history significant for DM, HTN, HLD, AF on Eliquis, PVD, presents with c/o vomiting and right sided abdominal pain. Patient recently had cholecystectomy and ERCP with sphincterotomy prior to discharge. patient presented with c/o sharp right sided abdominal pain 8/10 persistent with nausea, vomiting and melena. Patient had repeat ERCP found to have bleeding from sphincterotomy site. CBD stended and clips place to control bleeding. No hematemesis, hematochezia, chest pain, SOB, cough, hematuria or dysuria reported.       PAIN MANAGEMENT:  Pain Scale:                 /10  Pain Location:         PAST MEDICAL HISTORY  COPD (chronic obstructive pulmonary disease)    Diabetes    PVD (peripheral vascular disease)    Chronic atrial fibrillation    GERD (gastroesophageal reflux disease)    MDD (major depressive disorder)    BPH (benign prostatic hyperplasia)        PAST SURGICAL HISTORY  Cholecystectomy    Allergies    morphine (Unknown)  penicillin (Unknown)  shellfish (Unknown)    Intolerances  None         MEDICATIONS  (STANDING):  atorvastatin 10 milliGRAM(s) Oral at bedtime  chlorhexidine 4% Liquid 1 Application(s) Topical <User Schedule>  finasteride 5 milliGRAM(s) Oral daily  insulin lispro (ADMELOG) corrective regimen sliding scale   SubCutaneous every 6 hours  meropenem  IVPB 1000 milliGRAM(s) IV Intermittent every 8 hours  oxycodone    5 mG/acetaminophen 325 mG 1 Tablet(s) Oral every 4 hours  pantoprazole Infusion 8 mG/Hr (10 mL/Hr) IV Continuous <Continuous>  phenazopyridine 200 milliGRAM(s) Oral every 8 hours  pregabalin 100 milliGRAM(s) Oral three times a day  tamsulosin 0.4 milliGRAM(s) Oral at bedtime  vancomycin  IVPB 1000 milliGRAM(s) IV Intermittent every 12 hours    MEDICATIONS  (PRN):  aluminum hydroxide/magnesium hydroxide/simethicone Suspension 30 milliLiter(s) Oral every 4 hours PRN Dyspepsia  baclofen 5 milliGRAM(s) Oral every 12 hours PRN Musculoskeletal Pain  melatonin 3 milliGRAM(s) Oral at bedtime PRN Insomnia  ondansetron Injectable 4 milliGRAM(s) IV Push every 8 hours PRN Nausea and/or Vomiting  sodium chloride 0.9% lock flush 10 milliLiter(s) IV Push every 1 hour PRN Pre/post blood products, medications, blood draw, and to maintain line patency      SOCIAL HISTORY  Advanced Directives:       [ X ] Full Code       [  ] DNR  Marital Status:         [  ] M      [ X ] S      [  ] D       [  ] W  Children:       [ X ] Yes      [  ] No  Occupation:        [  ] Employed       [  X] Unemployed       [  ] Retired  Diet:       [ X ] Regular       [  ] PEG feeding          [  ] NG tube feeding  Drug Use:           [ X ] Patient denied          [  ] Yes  Alcohol:           [ X ] No             [  ] Yes (socially)         [  ] Yes (chronic)  Tobacco:           [  ] Yes           [ X ] No      FAMILY HISTORY  [ X ] Heart Disease            [ X ] Diabetes             [ X ] HTN             [  ] Colon Cancer             [  ] Stomach Cancer              [  ] Pancreatic Cancer      VITAL SIGNS   Vital Signs Last 24 Hrs  T(C): 36.1 (24 Dec 2022 12:00), Max: 36.4 (24 Dec 2022 00:00)  T(F): 97 (24 Dec 2022 12:00), Max: 97.6 (24 Dec 2022 00:00)  HR: 94 (24 Dec 2022 14:00) (76 - 104)  BP: 89/57 (24 Dec 2022 14:00) (78/53 - 125/100)  BP(mean): 64 (24 Dec 2022 14:00) (59 - 106)  RR: 17 (24 Dec 2022 14:00) (11 - 27)  SpO2: 96% (24 Dec 2022 14:00) (90% - 100%)  Parameters below as of 24 Dec 2022 11:46  Patient On (Oxygen Delivery Method): room air  Daily Weight in k.9 (24 Dec 2022 07:22)         CBC Full  -  ( 24 Dec 2022 03:23 )  WBC Count : 4.40 K/uL  RBC Count : 2.92 M/uL  Hemoglobin : 9.1 g/dL  Hematocrit : 28.3 %  Platelet Count - Automated : 105 K/uL  Mean Cell Volume : 96.9 fl  Mean Cell Hemoglobin : 31.2 pg  Mean Cell Hemoglobin Concentration : 32.2 gm/dL  Auto Neutrophil # : 3.29 K/uL  Auto Lymphocyte # : 0.61 K/uL  Auto Monocyte # : 0.39 K/uL  Auto Eosinophil # : 0.06 K/uL  Auto Basophil # : 0.02 K/uL  Auto Neutrophil % : 74.6 %  Auto Lymphocyte % : 13.9 %  Auto Monocyte % : 8.9 %  Auto Eosinophil % : 1.4 %  Auto Basophil % : 0.5 %          136  |  100  |  10  ----------------------------<  98  4.2   |  31  |  0.50    Ca    7.9<L>      24 Dec 2022 03:23  Phos  2.7       Mg     1.4         TPro  6.7  /  Alb  1.9<L>  /  TBili  0.2  /  DBili  0.1  /  AST  39  /  ALT  43  /  AlkPhos  195<H>       PT/INR - ( 23 Dec 2022 19:06 )   PT: 17.0 sec;   INR: 1.42 ratio       PTT - ( 23 Dec 2022 19:06 )  PTT:33.0 sec    Urinalysis + Microscopic Examination (22 @ 09:20)   Glucose Qualitative, Urine: Negative   Blood, Urine: Large   Color: Yellow   Protein, Urine: 30 mg/dL   Urine Appearance: Clear   Urobilinogen: 8   Specific Gravity: 1.020   Leukocyte Esterase Concentration: Trace   pH Urine: 6.0   Ketone - Urine: Trace   Nitrite: Positive   Bilirubin: Small   Red Blood Cell - Urine: 25-50 /HPF   White Blood Cell - Urine: 3-5 /HPF   Epithelial Cells: Occasional /HPF   Bacteria: Many /HPF     RADIOLOGY/IMAGING    ACC: [id number] EXAM:  CT ABDOMEN AND PELVIS                          PROCEDURE DATE:  2022          INTERPRETATION:  CLINICAL INFORMATION: Abdominal pain    COMPARISON: Abdominal CT dated 2022    CONTRAST/COMPLICATIONS:  IV Contrast: NONE  Oral Contrast: NONE  Complications: None reported at time of study completion    PROCEDURE:  CT of the Abdomen and Pelvis was performed.  Sagittal and coronal reformats were performed.    FINDINGS: Evaluation of the abdomen and pelvis is limited by lack of IV   contrast.  LOWER CHEST: New airspace opacifications in the right lower lung zone for   which clinical correlation with pneumonia is recommended.    LIVER: Stable small hypodense area in the left hepatic lobe. Please see   recent abdominalMR dated 2022 for further characterization.  BILE DUCTS: Dilatation of the common bile duct is again noted.  GALLBLADDER: Stable cholecystectomy clips.  SPLEEN: Within normal limits.  PANCREAS: The pancreatic parenchyma appears unremarkable. Mild dilatation   of the main pancreatic duct in the pancreatic head is again noted.  ADRENALS: Within normal limits.  KIDNEYS/URETERS: Small hypodense lesion in the left kidney, likely   representing a cyst. The right kidney appears unremarkable. No evidence   for a calculus in the kidneys or ureters. No hydronephrosis.    BLADDER: Underdistended.  REPRODUCTIVE ORGANS: The prostate and seminal vesicles appear grossly   unremarkable.    BOWEL: No bowel obstruction or grossly thickened bowel wall. Colonic   diverticulosis without evidence for diverticulitis. Moderate amount of   stool in the rectum and distal sigmoid colon.. Appendix not visualized.   No secondary signs for acute appendicitis.  PERITONEUM: No free air or ascites.  VESSELS: Calcified atherosclerotic disease.  RETROPERITONEUM/LYMPH NODES: No lymphadenopathy.  ABDOMINAL WALL: Small fat-containing ventral hernia.  BONES: Degenerative spondylosis.    IMPRESSION: No bowel obstruction or grossly thickened bowel wall. Colonic   diverticulosis without evidence for diverticulitis. Moderate amount of   stool in the rectum and distal sigmoid colon.. Appendix not visualized.   No secondary signs for acute appendicitis.    Dilatation of the common bile duct and main pancreatic duct again noted..   Please see recent abdominal MR dated 2022. If clinically indicated,   endoscopic ultrasound may be pursued to rule out a small obstructive   ampullary tumor or pancreatic head mass.    New airspace opacifications in the right lower lung zone for which   clinical correlation with pneumonia is recommended.              `    ACC: [id number] EXAM:  US ABDOMEN COMPLETE                          PROCEDURE DATE:  2022          INTERPRETATION:  CLINICAL INFORMATION: Abdominal pain and vomiting.    COMPARISON: CT and MRI dated 2022 and 2022.    TECHNIQUE: Sonography of the abdomen. Technically difficult and limited   study due to patient's body habitus.    FINDINGS:  Liver: Increased echogenicity. No evidence of a focal hepatic mass   lesion. Normal portal and hepatic venous flow.  Bile ducts: Dilated. Common bile duct measures 1.5 cm.  Gallbladder: Cholecystectomy.  Pancreas: Visualized portions are within normal limits.  Spleen: Not visualized.  Right kidney: 8.6 cm. No hydronephrosis.  Left kidney: 10 cm. No hydronephrosis.  Ascites: None.  Aorta andIVC: Visualized portions are within normal limits.    IMPRESSION:  Biliary dilatation in this patient with prior cholecystectomy. 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  Hepatic steatosis.  Nonvisualized spleen.         ERCP Procedure:        The procedure, indications, preparation and potential complications were    explained to the patient, who indicated understanding and signed the    corresponding consent forms. IV anesthesia was administered by anesthesiologist.    Continuous pulse oximetry and blood pressure monitoring were used throughout the    procedure. Supplemental oxygen was used. Patient was placed in prone position.    The duodenoscope was introduced through mouth and advanced under direct    visualization until ampulla was reached. Patient tolerance to the procedure was    excellent. The procedure was not difficult. Blood loss was minimal.        EUS Procedure:        Theechoendoscope was passed with ease under direct visualization to the second    part of duodenum. Sedation was required. The patient tolerated the procedure    well.. There were no complications..        Limitations/Complications:        There were noapparent limitations or complications        ERCP Findings:         film showed right upper quadrant clips from prior cholecystectomy. The    duodenoscope was advanced into the second portion of the duodenum. The major    papilla was identified with evidence of prior biliary sphincterotomy. The    sphincterotomy site was ulcerated with a large overlying clot. A 0.035 inch    guidewire was passed into the biliary tree. The bile duct was then deeply    cannulated using a Hydratome sphincterotome. Contrast was injected. The bile    duct was dilated with filling defect in the distal bile duct at the ampulla. The    biliary tree was swept using a biliary extraction balloon. The clot at the bile    duct orifice was removed revealing an oozing vessel at the sphincterotomy apex.    To tamponade the bleeding and facilitate drainage, one 10 mm by 4 cm fully    covered metal stent (Viabil) was placed into the common bile duct. The stent was    in good position. Two hemostatic clips (MR conditional) were then placed over    the vessel. The clips were in good position. There was no bleeding at    conclusion. There was excellent drainage of bile and contrast at conclusion.                EUS Findings:        EGD Findings:        Esophagus: Onesmall tongue of salmon colored mucosa, not biopsied as purpose of    exam was for GI bleeding.        Stomach: Small hiatal hernia. Mild erythema throughout.        Duodenum: Normal.        EUS Findings:        Pancreas: The pancreatic duct was mildly dilated at the head measuring up to 4.5    mm in maximal caliber. No mass was seen. The pancreatic duct was otherwise    normal in the body and tail with normal surrounding parenchyma.        Biliary tree: The common bile duct was dilated measuring up to 11 mm in caliber.    There was an intraductal hyperechoic lesion at the ampulla with surrounding    duodenal wall thickening consistent with a clot at the biliary orifice and    suspected inflammatory changes.        Ampulla: As described above.No mass seen.        Liver: Normal visualized portions of the left lobe.        Lymph nodes: No lymphadenopathy seen.                Summary:        Evidence of post-sphincterotomy bleed as described above. Treated with two    hemostatic clips and one 10mm/4cm fully covered metal biliary stent.        Plan:        Return to ICU for further management        Advance diet as tolerated

## 2022-12-24 NOTE — DIETITIAN INITIAL EVALUATION ADULT - OTHER INFO
Pt from skilled nursing facility, recently hospitalized with RD Assessment 12/14/22, discharged 12/21/22, readmitted for abdominal pain/vomiting x 1d PTA; Pt visited in ICU, asleep, tolerating 100% clear liquid diet today per RN; NPO/Clear Liquid diet x 2 to 3d in-house; GI consult noted; Wt data in EMR reviewed: 199.7 lb 12/12/22; 195.3 lb 12/21//22; 183.4 lb 12/22/22; 198.7 lb 12/24/22, slightly fluctuated, may due to scale/fluid variance; Allergy to Shellfish

## 2022-12-24 NOTE — CONSULT NOTE ADULT - NEGATIVE RESPIRATORY AND THORAX SYMPTOMS
no wheezing/no dyspnea/no cough/no hemoptysis/no pleuritic chest pain
no dyspnea/no cough/no hemoptysis/no pleuritic chest pain

## 2022-12-24 NOTE — CONSULT NOTE ADULT - NEGATIVE GASTROINTESTINAL SYMPTOMS
no nausea/no vomiting/no diarrhea
no nausea/no vomiting/no diarrhea/no constipation/no hematochezia/no steatorrhea/no jaundice/no hiccoughs

## 2022-12-24 NOTE — PHYSICAL THERAPY INITIAL EVALUATION ADULT - ACTIVE RANGE OF MOTION EXAMINATION, REHAB EVAL
B/L LE AAROM is WFL; c/o pain with movement; can actively mobilize both ankle/bilateral upper extremity Active ROM was WFL (within functional limits)

## 2022-12-24 NOTE — CONSULT NOTE ADULT - NEGATIVE NEUROLOGICAL SYMPTOMS
no syncope/no tremors/no vertigo/no loss of sensation/no difficulty walking/no headache
no headache/no confusion

## 2022-12-24 NOTE — CONSULT NOTE ADULT - ASSESSMENT
1. Abdominal pain improving  2. Post sphincterotomy bleeding(clips placed)  3. S/p CBD stent placemrnt  4. No bleeding at present time  5. Anemia    Suggestions:    1. Monitor H/H  2. Transfuse PRBC as needed  3. Protonix daily  4. Hold anticoagulation  5. Antibiotics as per ID  6. Diet as tolerated  7. Avoid NSAID  8. DVT prophylaxis    1. Abdominal pain improving  2. Post sphincterotomy bleeding(clips placed)  3. S/p CBD stent placement  4. No bleeding at present time  5. Anemia    Suggestions:    1. Monitor H/H  2. Transfuse PRBC as needed  3. Protonix daily  4. Hold anticoagulation  5. Antibiotics as per ID  6. Diet as tolerated  7. Avoid NSAID  8. DVT prophylaxis

## 2022-12-24 NOTE — DIETITIAN INITIAL EVALUATION ADULT - NSFNSPHYEXAMSKINFT_GEN_A_CORE
Tara Reynolds (:  2008) is a 14 y.o. female,Established patient, here for evaluation of the following chief complaint(s): Cough (WET COUGH, RUNNY NOSE, STARTED LAST Thursday. NO TEMP )      ASSESSMENT/PLAN:  1. Acute bacterial sinusitis  -Presentation is consistent with bacterial sinusitis. Beola Stamp is being sent to the pharmacy. Educated on the medication use as well as side effects. Follow-up as needed if no improvement. -     azithromycin (ZITHROMAX) 250 MG tablet; Take 1 tablet by mouth See Admin Instructions for 5 days 500mg on day 1 followed by 250mg on days 2 - 5, Disp-6 tablet, R-0Normal    -The patient is overdue for well-child check and is overdue on vaccines. Encouraged the patient and mom to schedule. Return if symptoms worsen or fail to improve. SUBJECTIVE/OBJECTIVE:  SHOSHANA Meza presents today with her mom regarding sinus symptoms. She states she has had a cough and runny nose since last Thursday. Denies any fevers. They did do a COVID-19 test which was negative. She has tried multiple supplements as well as allergy medication without alleviation of symptoms. She denies any shortness of breath. Review of Systems   Constitutional:  Negative for chills and fever. HENT:  Positive for congestion, postnasal drip, rhinorrhea, sinus pressure and sinus pain. Respiratory:  Positive for cough. Negative for shortness of breath and wheezing. Cardiovascular:  Negative for chest pain and palpitations. Neurological:  Negative for dizziness, weakness, light-headedness, numbness and headaches. Psychiatric/Behavioral:  Negative for decreased concentration, dysphoric mood, self-injury, sleep disturbance and suicidal ideas. The patient is not nervous/anxious. Patient-Reported Vitals 2020   Patient-Reported Weight 95LB   Patient-Reported Height 61\"        Physical Exam  Constitutional:       General: She is not in acute distress. Appearance: Normal appearance.  She is normal weight. HENT:      Nose: Congestion and rhinorrhea present. Pulmonary:      Effort: Pulmonary effort is normal.      Comments: Congested cough  Neurological:      Mental Status: She is alert and oriented to person, place, and time. Psychiatric:         Mood and Affect: Mood normal.         Behavior: Behavior normal.         Thought Content: Thought content normal.         Judgment: Judgment normal.           On this date 9/8/2022 I have spent 20 minutes reviewing previous notes, test results and face to face (virtual) with the patient discussing the diagnosis and importance of compliance with the treatment plan as well as documenting on the day of the visit. Jeffrey Yolanad, was evaluated through a synchronous (real-time) audio-video encounter. The patient (or guardian if applicable) is aware that this is a billable service. Verbal consent to proceed has been obtained within the past 12 months. The visit was conducted pursuant to the emergency declaration under the [address] [place] [address], [address] [place] [address] authority and the [employer] General Act. Patient identification was verified, and a caregiver was present when appropriate. The patient was located in a state where the provider was credentialed to provide care. This dictation was generated by voice recognition computer software. Although all attempts are made to edit the dictation for accuracy, there may be errors in the transcription that are not intended. An electronic signature was used to authenticate this note.     --TANISHA Renee - CNP skin intact

## 2022-12-24 NOTE — PROGRESS NOTE ADULT - ASSESSMENT
Patient is a 67 year old male, vaccinated, from [facility] with PMHx of DM, HTN, HLD, AF on Eliquis, PVD, presents with c/o vomiting and right sided abdominal pain. Admitted to ICU for hypovolemic vs septic shock     Assessment:  - Septic shock   - Cholangitis   - Melena   - Diverticulosis   - Afib   - HTN  - DM    Plan:  Neuro:  - AAOx3    CV  # Septic shock vs hypovolemic shock    - 2 episodes of melena   - US Abdomen: Biliary dilatation and hepatic steatosis  - CT A/P: dilation of the common bile duct and main pancreatic duct again noted.. New RLL opacity  - WBC trended down to 7.76  - c/w Meropenem   - repeat ERCP on 12/23 performed, 2 clips and 1 stent placed  - f/u BCUX and UCX  - GI consulted Dr Villanueva/ Judy(ERCP)  - ID Dr. Novoa.    # Afib  - Dr. Sharma consulted who recommended to continue holding Eliquis    # HTN  - on admission low  BP: 81/59mmHg  S/P 1L NS bolus   - home medications: nifedipine and metoprolol, hold as blood pressure on the softer side   - c/w IVF   monitor BP    Pulm:  # Aspiration PNA   - CT abd: New airspace opacifications in the right lower lung zone  - C/w Meropenem   - ID Dr. Novoa following     ID:  # Septic shock    - WBC trending down, on 12/23: 7.76   - Lactate trended down on 12/21 to 1.8   - f/u BCUX and UCX  - GI consulted Dr. Kim(ERCP)  - ID Dr. Novoa.      Nephro:  No active issues     GI:  # Cholangitis   - Fever, RUQ pain, jaundice, leukocytosis, Transaminitis   - US Abdomen: Biliary dilatation and hepatic steatosis  - CT A/P: dilation of the common bile duct and main pancreatic duct again noted.. New RLL opacity  - ERCP 12/16: biliary sphincterotomy and sludge removal    - repeat ERCP on 12/23 performed, 2 clips and 1 stent placed  - Continue Meropenem   - Follow Bili and T bili     # GI Bleed   - 2 episodes of melena  - H/H stable, 11.4/34.1  - likely post sphincterotomy bleed resulting in cholangitis as per GI   - CT: colonic diverticulosis   - c/w Protonix  - Hb trended down , on 12/23: 9.9 ( no active bleeding)   - repeat CBC   - Transfuse for Hb<7   - started on clear diet , pt tolerating well, will advance as tolerated       Heme:  - no indication for transfusion       Endo:  # DM:   - ISS Low scale        Prophy:  - SCD for DVT ppx  - Protonix     Dispo:  - monitor in the ICU     Patient is a 67 year old male, vaccinated, from [facility] with PMHx of DM, HTN, HLD, AF on Eliquis, PVD, presents with c/o vomiting and right sided abdominal pain. Admitted to ICU for hypovolemic vs septic shock     Assessment:  - Septic shock   - Cholangitis   - Melena   - Diverticulosis   - Afib   - HTN  - DM    Plan:  Neuro:  - AAOx3    CV  # Septic shock vs hypovolemic shock    - 2 episodes of melena   - US Abdomen: Biliary dilatation and hepatic steatosis  - CT A/P: dilation of the common bile duct and main pancreatic duct again noted.. New RLL opacity  - WBC trended down to 7.76  - c/w Meropenem   - repeat ERCP on 12/23 performed, 2 clips and 1 stent placed  - f/u  UCX  - bacterial culture: positive gram variable coccobacilli  - GI consulted Dr Villanueva/ Judy(ERCP)  - ID Dr. Novoa.    # Afib  - Dr. Sharma consulted who recommended to continue holding Eliquis    # HTN  - on admission low  BP: 81/59mmHg  S/P 1L NS bolus   - home medications: nifedipine and metoprolol, hold as blood pressure on the softer side   - c/w IVF   monitor BP    Pulm:  # Aspiration PNA   - CT abd: New airspace opacifications in the right lower lung zone  - C/w Meropenem   - ID Dr. Novoa following     ID:  # Septic shock    - WBC trending down, on 12/23: 7.76   - Lactate trended down on 12/21 to 1.8   - f/u UCX  -  bacterial culture: positive gram variable coccobacilli   - f/u repeat blood cultures on 12/25 AM   - GI consulted Dr. Kim(ERCP)  - ID Dr. Novoa.      Nephro:  No active issues     GI:  # Cholangitis   - Fever, RUQ pain, jaundice, leukocytosis, Transaminitis   - US Abdomen: Biliary dilatation and hepatic steatosis  - CT A/P: dilation of the common bile duct and main pancreatic duct again noted.. New RLL opacity  - ERCP 12/16: biliary sphincterotomy and sludge removal    - repeat ERCP on 12/23 performed, 2 clips and 1 stent placed  - Continue Meropenem   - started on vancomycin on 12/24  - bacterial culture: positive gram variable coccobacilli   - f/u repeat blood cultures on 12/25 AM   - Follow Bili and T bili     # GI Bleed   - 2 episodes of melena  - H/H stable, 11.4/34.1  - likely post sphincterotomy bleed resulting in cholangitis as per GI   - CT: colonic diverticulosis   - c/w Protonix drip   - will change to Protonix BID after 72 hrs as per GI recs   - Hb trended down , on 12/23: 9.9 ( no active bleeding)   - repeat CBC   - Transfuse for Hb<7   - started on clear diet , pt tolerating well, will advance as tolerated       Heme:  - no indication for transfusion       Endo:  # DM:   - ISS Low scale        Prophy:  - SCD for DVT ppx  - Protonix     Dispo:  - monitor in the ICU

## 2022-12-24 NOTE — PROGRESS NOTE ADULT - SUBJECTIVE AND OBJECTIVE BOX
Patient is a 67y old  Male who presents with a chief complaint of Abdominal pain     (24 Dec 2022 10:03)    INTERVAL HISTORY/ OVERNIGHT EVENTS:   PRESSORS: [ ] YES [ ] NO  WHICH:    ANTIBIOTICS:                  DATE STARTED:  ANTIBIOTICS:                  DATE STARTED:  ANTIBIOTICS:                  DATE STARTED:    Antimicrobial:  meropenem  IVPB 1000 milliGRAM(s) IV Intermittent every 8 hours  vancomycin  IVPB 1000 milliGRAM(s) IV Intermittent every 12 hours    Cardiovascular:    Pulmonary:    Hematalogic:    Other:  aluminum hydroxide/magnesium hydroxide/simethicone Suspension 30 milliLiter(s) Oral every 4 hours PRN  atorvastatin 10 milliGRAM(s) Oral at bedtime  baclofen 5 milliGRAM(s) Oral every 12 hours PRN  chlorhexidine 4% Liquid 1 Application(s) Topical <User Schedule>  finasteride 5 milliGRAM(s) Oral daily  insulin lispro (ADMELOG) corrective regimen sliding scale   SubCutaneous every 6 hours  melatonin 3 milliGRAM(s) Oral at bedtime PRN  ondansetron Injectable 4 milliGRAM(s) IV Push every 8 hours PRN  oxycodone    5 mG/acetaminophen 325 mG 1 Tablet(s) Oral every 4 hours  pantoprazole Infusion 8 mG/Hr IV Continuous <Continuous>  phenazopyridine 200 milliGRAM(s) Oral every 8 hours  pregabalin 100 milliGRAM(s) Oral three times a day  sodium chloride 0.9% lock flush 10 milliLiter(s) IV Push every 1 hour PRN  tamsulosin 0.4 milliGRAM(s) Oral at bedtime    aluminum hydroxide/magnesium hydroxide/simethicone Suspension 30 milliLiter(s) Oral every 4 hours PRN  atorvastatin 10 milliGRAM(s) Oral at bedtime  baclofen 5 milliGRAM(s) Oral every 12 hours PRN  chlorhexidine 4% Liquid 1 Application(s) Topical <User Schedule>  finasteride 5 milliGRAM(s) Oral daily  insulin lispro (ADMELOG) corrective regimen sliding scale   SubCutaneous every 6 hours  melatonin 3 milliGRAM(s) Oral at bedtime PRN  meropenem  IVPB 1000 milliGRAM(s) IV Intermittent every 8 hours  ondansetron Injectable 4 milliGRAM(s) IV Push every 8 hours PRN  oxycodone    5 mG/acetaminophen 325 mG 1 Tablet(s) Oral every 4 hours  pantoprazole Infusion 8 mG/Hr IV Continuous <Continuous>  phenazopyridine 200 milliGRAM(s) Oral every 8 hours  pregabalin 100 milliGRAM(s) Oral three times a day  sodium chloride 0.9% lock flush 10 milliLiter(s) IV Push every 1 hour PRN  tamsulosin 0.4 milliGRAM(s) Oral at bedtime  vancomycin  IVPB 1000 milliGRAM(s) IV Intermittent every 12 hours    Drug Dosing Weight  Height (cm): 188 (22 Dec 2022 07:05)  Weight (kg): 83.2 (22 Dec 2022 17:50)  BMI (kg/m2): 23.5 (22 Dec 2022 17:50)  BSA (m2): 2.09 (22 Dec 2022 17:50)    CENTRAL LINE: [ ] YES [ ] NO  LOCATION:     ALVAREZ: [ ] YES [ ] NO      A-LINE:  [ ] YES [ ] NO  LOCATION:         ICU Vital Signs Last 24 Hrs  T(C): 36.3 (24 Dec 2022 08:00), Max: 36.4 (24 Dec 2022 00:00)  T(F): 97.4 (24 Dec 2022 08:00), Max: 97.6 (24 Dec 2022 00:00)  HR: 78 (24 Dec 2022 11:00) (76 - 97)  BP: 107/66 (24 Dec 2022 11:00) (78/53 - 113/55)  BP(mean): 76 (24 Dec 2022 11:00) (59 - 83)  ABP: --  ABP(mean): --  RR: 17 (24 Dec 2022 11:00) (11 - 27)  SpO2: 98% (24 Dec 2022 11:00) (59% - 100%)    O2 Parameters below as of 24 Dec 2022 06:00  Patient On (Oxygen Delivery Method): nasal cannula  O2 Flow (L/min): 2                12-23 @ 07:01  -  12-24 @ 07:00  --------------------------------------------------------  IN: 830 mL / OUT: 2300 mL / NET: -1470 mL            PHYSICAL EXAM:    GENERAL: NAD, well-groomed, well-developed  EYES: EOMI, PERRLA,   NECK: Supple, No JVD; Normal thyroid; Trachea midline  NERVOUS SYSTEM:  Alert & Oriented X3,  Motor Strength 5/5 B/L upper and lower extremities; DTRs 2+ intact and symmetric  CHEST/LUNG: No rales, rhonchi, wheezing   HEART: Regular rate and rhythm; No murmurs,   ABDOMEN: Soft, Nontender, Nondistended; Bowel sounds present  EXTREMITIES:  2+ Peripheral Pulses, No clubbing, cyanosis, or edema      LABS:                        9.1    4.40  )-----------( 105      ( 24 Dec 2022 03:23 )             28.3     12-24    136  |  100  |  10  ----------------------------<  98  4.2   |  31  |  0.50    Ca    7.9<L>      24 Dec 2022 03:23  Phos  2.7     12-24  Mg     1.4     12-24    TPro  6.7  /  Alb  1.9<L>  /  TBili  0.2  /  DBili  0.1  /  AST  39  /  ALT  43  /  AlkPhos  195<H>  12-24    PT/INR - ( 23 Dec 2022 19:06 )   PT: 17.0 sec;   INR: 1.42 ratio         PTT - ( 23 Dec 2022 19:06 )  PTT:33.0 sec    CAPILLARY BLOOD GLUCOSE      POCT Blood Glucose.: 81 mg/dL (24 Dec 2022 11:13)  POCT Blood Glucose.: 80 mg/dL (24 Dec 2022 05:10)  POCT Blood Glucose.: 100 mg/dL (23 Dec 2022 23:05)  POCT Blood Glucose.: 104 mg/dL (23 Dec 2022 17:31)    Culture Results:   No enteric pathogens to date: Final culture pending (12-22 @ 20:30)  Culture Results:   Testing in progress (12-22 @ 20:20)  Culture Results:   Growth in aerobic bottle: Gram Variable coccobacili  ***Blood Panel PCR results on this specimen are available  approximately 3 hours after the Gram stain result.***  Gram stain, PCR, and/or culture results may not always  correspond due to difference in methodologies.  ************************************************************  This PCR assay was performed by multiplex PCR. This  Assay tests for 66 bacterial and resistance gene targets.  Please refer to the [facility] test directory  at [email or web address] for details. (12-22 @ 14:41)  Culture Results:   No growth to date. (12-22 @ 14:36)  Culture Results:   >100,000 CFU/ml Aerococcus species  "Aerococcus spp. are predictably susceptible to penicillin,  ampicillin, tetracycline, and vancomycin.  All isolates are  resistant to sulfonamides" (12-22 @ 09:20)      RADIOLOGY & ADDITIONAL STUDIES REVIEWED:  ***     Patient is a 67y old  Male who presents with a chief complaint of Abdominal pain     (24 Dec 2022 10:03)    INTERVAL HISTORY/ OVERNIGHT EVENTS:   Patient had one episode of melena   PRESSORS: [ ] YES [ ] NO  WHICH:    ANTIBIOTICS:                  DATE STARTED:  ANTIBIOTICS:                  DATE STARTED:  ANTIBIOTICS:                  DATE STARTED:    Antimicrobial:  meropenem  IVPB 1000 milliGRAM(s) IV Intermittent every 8 hours  vancomycin  IVPB 1000 milliGRAM(s) IV Intermittent every 12 hours    Cardiovascular:    Pulmonary:    Hematalogic:    Other:  aluminum hydroxide/magnesium hydroxide/simethicone Suspension 30 milliLiter(s) Oral every 4 hours PRN  atorvastatin 10 milliGRAM(s) Oral at bedtime  baclofen 5 milliGRAM(s) Oral every 12 hours PRN  chlorhexidine 4% Liquid 1 Application(s) Topical <User Schedule>  finasteride 5 milliGRAM(s) Oral daily  insulin lispro (ADMELOG) corrective regimen sliding scale   SubCutaneous every 6 hours  melatonin 3 milliGRAM(s) Oral at bedtime PRN  ondansetron Injectable 4 milliGRAM(s) IV Push every 8 hours PRN  oxycodone    5 mG/acetaminophen 325 mG 1 Tablet(s) Oral every 4 hours  pantoprazole Infusion 8 mG/Hr IV Continuous <Continuous>  phenazopyridine 200 milliGRAM(s) Oral every 8 hours  pregabalin 100 milliGRAM(s) Oral three times a day  sodium chloride 0.9% lock flush 10 milliLiter(s) IV Push every 1 hour PRN  tamsulosin 0.4 milliGRAM(s) Oral at bedtime    aluminum hydroxide/magnesium hydroxide/simethicone Suspension 30 milliLiter(s) Oral every 4 hours PRN  atorvastatin 10 milliGRAM(s) Oral at bedtime  baclofen 5 milliGRAM(s) Oral every 12 hours PRN  chlorhexidine 4% Liquid 1 Application(s) Topical <User Schedule>  finasteride 5 milliGRAM(s) Oral daily  insulin lispro (ADMELOG) corrective regimen sliding scale   SubCutaneous every 6 hours  melatonin 3 milliGRAM(s) Oral at bedtime PRN  meropenem  IVPB 1000 milliGRAM(s) IV Intermittent every 8 hours  ondansetron Injectable 4 milliGRAM(s) IV Push every 8 hours PRN  oxycodone    5 mG/acetaminophen 325 mG 1 Tablet(s) Oral every 4 hours  pantoprazole Infusion 8 mG/Hr IV Continuous <Continuous>  phenazopyridine 200 milliGRAM(s) Oral every 8 hours  pregabalin 100 milliGRAM(s) Oral three times a day  sodium chloride 0.9% lock flush 10 milliLiter(s) IV Push every 1 hour PRN  tamsulosin 0.4 milliGRAM(s) Oral at bedtime  vancomycin  IVPB 1000 milliGRAM(s) IV Intermittent every 12 hours    Drug Dosing Weight  Height (cm): 188 (22 Dec 2022 07:05)  Weight (kg): 83.2 (22 Dec 2022 17:50)  BMI (kg/m2): 23.5 (22 Dec 2022 17:50)  BSA (m2): 2.09 (22 Dec 2022 17:50)    CENTRAL LINE: [ ] YES [ ] NO  LOCATION:     ALVAREZ: [ ] YES [ ] NO      A-LINE:  [ ] YES [ ] NO  LOCATION:         ICU Vital Signs Last 24 Hrs  T(C): 36.3 (24 Dec 2022 08:00), Max: 36.4 (24 Dec 2022 00:00)  T(F): 97.4 (24 Dec 2022 08:00), Max: 97.6 (24 Dec 2022 00:00)  HR: 78 (24 Dec 2022 11:00) (76 - 97)  BP: 107/66 (24 Dec 2022 11:00) (78/53 - 113/55)  BP(mean): 76 (24 Dec 2022 11:00) (59 - 83)  ABP: --  ABP(mean): --  RR: 17 (24 Dec 2022 11:00) (11 - 27)  SpO2: 98% (24 Dec 2022 11:00) (59% - 100%)    O2 Parameters below as of 24 Dec 2022 06:00  Patient On (Oxygen Delivery Method): nasal cannula  O2 Flow (L/min): 2                12-23 @ 07:01  -  12-24 @ 07:00  --------------------------------------------------------  IN: 830 mL / OUT: 2300 mL / NET: -1470 mL            PHYSICAL EXAM:    GENERAL: NAD, well-groomed, well-developed  EYES: EOMI, PERRLA,   NECK: Supple, No JVD; Normal thyroid; Trachea midline  NERVOUS SYSTEM:  Alert & Oriented X3,  Motor Strength 5/5 B/L upper and lower extremities; DTRs 2+ intact and symmetric  CHEST/LUNG: No rales, rhonchi, wheezing   HEART: Regular rate and rhythm; No murmurs,   ABDOMEN: Soft, Nontender, Nondistended; Bowel sounds present  EXTREMITIES:  2+ Peripheral Pulses, No clubbing, cyanosis, or edema      LABS:                        9.1    4.40  )-----------( 105      ( 24 Dec 2022 03:23 )             28.3     12-24    136  |  100  |  10  ----------------------------<  98  4.2   |  31  |  0.50    Ca    7.9<L>      24 Dec 2022 03:23  Phos  2.7     12-24  Mg     1.4     12-24    TPro  6.7  /  Alb  1.9<L>  /  TBili  0.2  /  DBili  0.1  /  AST  39  /  ALT  43  /  AlkPhos  195<H>  12-24    PT/INR - ( 23 Dec 2022 19:06 )   PT: 17.0 sec;   INR: 1.42 ratio         PTT - ( 23 Dec 2022 19:06 )  PTT:33.0 sec    CAPILLARY BLOOD GLUCOSE      POCT Blood Glucose.: 81 mg/dL (24 Dec 2022 11:13)  POCT Blood Glucose.: 80 mg/dL (24 Dec 2022 05:10)  POCT Blood Glucose.: 100 mg/dL (23 Dec 2022 23:05)  POCT Blood Glucose.: 104 mg/dL (23 Dec 2022 17:31)    Culture Results:   No enteric pathogens to date: Final culture pending (12-22 @ 20:30)  Culture Results:   Testing in progress (12-22 @ 20:20)  Culture Results:   Growth in aerobic bottle: Gram Variable coccobacili  ***Blood Panel PCR results on this specimen are available  approximately 3 hours after the Gram stain result.***  Gram stain, PCR, and/or culture results may not always  correspond due to difference in methodologies.  ************************************************************  This PCR assay was performed by multiplex PCR. This  Assay tests for 66 bacterial and resistance gene targets.  Please refer to the [facility] Labs test directory  at [email or web address] for details. (12-22 @ 14:41)  Culture Results:   No growth to date. (12-22 @ 14:36)  Culture Results:   >100,000 CFU/ml Aerococcus species  "Aerococcus spp. are predictably susceptible to penicillin,  ampicillin, tetracycline, and vancomycin.  All isolates are  resistant to sulfonamides" (12-22 @ 09:20)      RADIOLOGY & ADDITIONAL STUDIES REVIEWED:  ***     Patient is a 67y old  Male who presents with a chief complaint of Abdominal pain     (24 Dec 2022 10:03)    INTERVAL HISTORY/ OVERNIGHT EVENTS:   Patient had one episode of melena last night. Pt was seen and examined at bedside. He reported of chronic pain in both legs in the setting of neuropathy, along with sharp abdominal pain without N/V/D. Patient denied chest pain, palpitation, dyspnea.     PRESSORS: [ ] YES [x ] NO  WHICH:    ANTIBIOTICS:                  DATE STARTED:  ANTIBIOTICS:                  DATE STARTED:  ANTIBIOTICS:                  DATE STARTED:    Antimicrobial:  meropenem  IVPB 1000 milliGRAM(s) IV Intermittent every 8 hours  vancomycin  IVPB 1000 milliGRAM(s) IV Intermittent every 12 hours    Cardiovascular:    Pulmonary:    Hematalogic:    Other:  aluminum hydroxide/magnesium hydroxide/simethicone Suspension 30 milliLiter(s) Oral every 4 hours PRN  atorvastatin 10 milliGRAM(s) Oral at bedtime  baclofen 5 milliGRAM(s) Oral every 12 hours PRN  chlorhexidine 4% Liquid 1 Application(s) Topical <User Schedule>  finasteride 5 milliGRAM(s) Oral daily  insulin lispro (ADMELOG) corrective regimen sliding scale   SubCutaneous every 6 hours  melatonin 3 milliGRAM(s) Oral at bedtime PRN  ondansetron Injectable 4 milliGRAM(s) IV Push every 8 hours PRN  oxycodone    5 mG/acetaminophen 325 mG 1 Tablet(s) Oral every 4 hours  pantoprazole Infusion 8 mG/Hr IV Continuous <Continuous>  phenazopyridine 200 milliGRAM(s) Oral every 8 hours  pregabalin 100 milliGRAM(s) Oral three times a day  sodium chloride 0.9% lock flush 10 milliLiter(s) IV Push every 1 hour PRN  tamsulosin 0.4 milliGRAM(s) Oral at bedtime    aluminum hydroxide/magnesium hydroxide/simethicone Suspension 30 milliLiter(s) Oral every 4 hours PRN  atorvastatin 10 milliGRAM(s) Oral at bedtime  baclofen 5 milliGRAM(s) Oral every 12 hours PRN  chlorhexidine 4% Liquid 1 Application(s) Topical <User Schedule>  finasteride 5 milliGRAM(s) Oral daily  insulin lispro (ADMELOG) corrective regimen sliding scale   SubCutaneous every 6 hours  melatonin 3 milliGRAM(s) Oral at bedtime PRN  meropenem  IVPB 1000 milliGRAM(s) IV Intermittent every 8 hours  ondansetron Injectable 4 milliGRAM(s) IV Push every 8 hours PRN  oxycodone    5 mG/acetaminophen 325 mG 1 Tablet(s) Oral every 4 hours  pantoprazole Infusion 8 mG/Hr IV Continuous <Continuous>  phenazopyridine 200 milliGRAM(s) Oral every 8 hours  pregabalin 100 milliGRAM(s) Oral three times a day  sodium chloride 0.9% lock flush 10 milliLiter(s) IV Push every 1 hour PRN  tamsulosin 0.4 milliGRAM(s) Oral at bedtime  vancomycin  IVPB 1000 milliGRAM(s) IV Intermittent every 12 hours    Drug Dosing Weight  Height (cm): 188 (22 Dec 2022 07:05)  Weight (kg): 83.2 (22 Dec 2022 17:50)  BMI (kg/m2): 23.5 (22 Dec 2022 17:50)  BSA (m2): 2.09 (22 Dec 2022 17:50)    CENTRAL LINE: [ ] YES [x ] NO  LOCATION:     ALVAREZ: [ x] YES [ ] NO      A-LINE:  [ ] YES [x ] NO  LOCATION:         ICU Vital Signs Last 24 Hrs  T(C): 36.3 (24 Dec 2022 08:00), Max: 36.4 (24 Dec 2022 00:00)  T(F): 97.4 (24 Dec 2022 08:00), Max: 97.6 (24 Dec 2022 00:00)  HR: 78 (24 Dec 2022 11:00) (76 - 97)  BP: 107/66 (24 Dec 2022 11:00) (78/53 - 113/55)  BP(mean): 76 (24 Dec 2022 11:00) (59 - 83)  ABP: --  ABP(mean): --  RR: 17 (24 Dec 2022 11:00) (11 - 27)  SpO2: 98% (24 Dec 2022 11:00) (59% - 100%)    O2 Parameters below as of 24 Dec 2022 06:00  Patient On (Oxygen Delivery Method): nasal cannula  O2 Flow (L/min): 2                12-23 @ 07:01  -  12-24 @ 07:00  --------------------------------------------------------  IN: 830 mL / OUT: 2300 mL / NET: -1470 mL            PHYSICAL EXAM:    GENERAL: NAD, well-groomed, well-developed  EYES: EOMI, PERRLA,   NECK: Supple, No JVD; Normal thyroid; Trachea midline  NERVOUS SYSTEM:  Alert & Oriented X3,  Motor Strength 5/5 B/L upper , pt does nlower extremities   CHEST/LUNG: No rales, rhonchi, wheezing   HEART: Regular rate and rhythm; No murmurs,   ABDOMEN: Soft, Nontender, Nondistended; Bowel sounds present  EXTREMITIES:  2+ Peripheral Pulses, No clubbing, cyanosis, or edema      LABS:                        9.1    4.40  )-----------( 105      ( 24 Dec 2022 03:23 )             28.3     12-24    136  |  100  |  10  ----------------------------<  98  4.2   |  31  |  0.50    Ca    7.9<L>      24 Dec 2022 03:23  Phos  2.7     12-24  Mg     1.4     12-24    TPro  6.7  /  Alb  1.9<L>  /  TBili  0.2  /  DBili  0.1  /  AST  39  /  ALT  43  /  AlkPhos  195<H>  12-24    PT/INR - ( 23 Dec 2022 19:06 )   PT: 17.0 sec;   INR: 1.42 ratio         PTT - ( 23 Dec 2022 19:06 )  PTT:33.0 sec    CAPILLARY BLOOD GLUCOSE      POCT Blood Glucose.: 81 mg/dL (24 Dec 2022 11:13)  POCT Blood Glucose.: 80 mg/dL (24 Dec 2022 05:10)  POCT Blood Glucose.: 100 mg/dL (23 Dec 2022 23:05)  POCT Blood Glucose.: 104 mg/dL (23 Dec 2022 17:31)    Culture Results:   No enteric pathogens to date: Final culture pending (12-22 @ 20:30)  Culture Results:   Testing in progress (12-22 @ 20:20)  Culture Results:   Growth in aerobic bottle: Gram Variable coccobacili  ***Blood Panel PCR results on this specimen are available  approximately 3 hours after the Gram stain result.***  Gram stain, PCR, and/or culture results may not always  correspond due to difference in methodologies.  ************************************************************  This PCR assay was performed by multiplex PCR. This  Assay tests for 66 bacterial and resistance gene targets.  Please refer to the [facility] Labs test directory  at [email or web address] for details. (12-22 @ 14:41)  Culture Results:   No growth to date. (12-22 @ 14:36)  Culture Results:   >100,000 CFU/ml Aerococcus species  "Aerococcus spp. are predictably susceptible to penicillin,  ampicillin, tetracycline, and vancomycin.  All isolates are  resistant to sulfonamides" (12-22 @ 09:20)      RADIOLOGY & ADDITIONAL STUDIES REVIEWED:  ***

## 2022-12-24 NOTE — DIETITIAN INITIAL EVALUATION ADULT - PERTINENT MEDS FT
MEDICATIONS  (STANDING):  atorvastatin 10 milliGRAM(s) Oral at bedtime  chlorhexidine 4% Liquid 1 Application(s) Topical <User Schedule>  finasteride 5 milliGRAM(s) Oral daily  insulin lispro (ADMELOG) corrective regimen sliding scale   SubCutaneous every 6 hours  meropenem  IVPB 1000 milliGRAM(s) IV Intermittent every 8 hours  oxycodone    5 mG/acetaminophen 325 mG 1 Tablet(s) Oral every 4 hours  pantoprazole Infusion 8 mG/Hr (10 mL/Hr) IV Continuous <Continuous>  phenazopyridine 200 milliGRAM(s) Oral every 8 hours  pregabalin 100 milliGRAM(s) Oral three times a day  tamsulosin 0.4 milliGRAM(s) Oral at bedtime  vancomycin  IVPB 1000 milliGRAM(s) IV Intermittent every 12 hours    MEDICATIONS  (PRN):  aluminum hydroxide/magnesium hydroxide/simethicone Suspension 30 milliLiter(s) Oral every 4 hours PRN Dyspepsia  baclofen 5 milliGRAM(s) Oral every 12 hours PRN Musculoskeletal Pain  melatonin 3 milliGRAM(s) Oral at bedtime PRN Insomnia  ondansetron Injectable 4 milliGRAM(s) IV Push every 8 hours PRN Nausea and/or Vomiting  sodium chloride 0.9% lock flush 10 milliLiter(s) IV Push every 1 hour PRN Pre/post blood products, medications, blood draw, and to maintain line patency

## 2022-12-24 NOTE — DIETITIAN INITIAL EVALUATION ADULT - PROBLEM/PLAN-1
Physical Therapy Treatment Note    Date: 2021  Patient Name: Kat Cedeño  : 1964   MRN: [id number]  DOInjury: 2020  DOSx: --    Referring Provider:   Darlene Potter, DO  [facility], [address]        Medical Diagnosis:    Diagnosis Orders   1. DDD (degenerative disc disease), lumbar     Also L greater trochanter pain     Outcome Measure:  [place]estry Low Back Disability Questionnaire 56% disability    S: Patient reports no new complaints. O:  Time [id number]     Visit  Repeat outcome measure at mid point and end. Pain Pain 5/10     ROM Dec 50%     Low back/Gluteal soft tissue massage   MT         Modalities      MH + ES 15 min Prone  MO   Stretch      PPT 20x  NR   SKTC   TE   Piriformis stretch 3 x 30 sec hold Assisted MT   Seated flexion Discussed for HEP  TE      TE   Exercise      Bridging 20x   TE   S-lying hip ABD  2 x 10  TE   Clamshell s-lying (using L leg)  2 x 10  TE         ROWS: H   TA   ROWS: M   TA   ROWS: L   TA   Obliques - high   TA   Lawnmower Pulls   TA   Standing rectus pull down   TA   Marching   TA   Squats   TA      TA      TA      TA               A:  Discussed anatomy, physiology, body mechanics, principles of loading, and progressive loading/activity. P: Continue with rehab plan.     [place]a [place], PTA    Treatment Charges: Mins Units   Initial Evaluation     Re-Evaluation     Ther Exercise         TE 35 2   Manual Therapy     MT     Ther Activities        TA     Gait Training          GT     Neuro Re-education NR     Modalities 20 1   Non-Billable Service Time     Other     Total Time/Units 55 3 DISPLAY PLAN FREE TEXT

## 2022-12-24 NOTE — PHYSICAL THERAPY INITIAL EVALUATION ADULT - PERTINENT HX OF CURRENT PROBLEM, REHAB EVAL
Pt admitted from [facility] with c/o abdominal pain s/p ERCP and was found to have a clot at the ampulla causing his abdominal pain

## 2022-12-24 NOTE — PROGRESS NOTE ADULT - SUBJECTIVE AND OBJECTIVE BOX
ICU VISIT  67y Male    Meds:  meropenem  IVPB 1000 milliGRAM(s) IV Intermittent every 8 hours  vancomycin  IVPB 1000 milliGRAM(s) IV Intermittent every 12 hours    Allergies    morphine (Unknown)  penicillin (Unknown)  shellfish (Unknown)    Intolerances        VITALS:  Vital Signs Last 24 Hrs  T(C): 36.1 (24 Dec 2022 12:00), Max: 36.4 (24 Dec 2022 00:00)  T(F): 97 (24 Dec 2022 12:00), Max: 97.6 (24 Dec 2022 00:00)  HR: 77 (24 Dec 2022 16:00) (76 - 104)  BP: 99/59 (24 Dec 2022 16:00) (78/53 - 125/100)  BP(mean): 69 (24 Dec 2022 16:00) (59 - 106)  RR: 16 (24 Dec 2022 16:00) (14 - 27)  SpO2: 99% (24 Dec 2022 16:00) (95% - 100%)    Parameters below as of 24 Dec 2022 11:46  Patient On (Oxygen Delivery Method): room air        LABS/DIAGNOSTIC TESTS:                          9.1    4.40  )-----------( 105      ( 24 Dec 2022 03:23 )             28.3         12-24    136  |  100  |  10  ----------------------------<  98  4.2   |  31  |  0.50    Ca    7.9<L>      24 Dec 2022 03:23  Phos  2.7     12-24  Mg     1.4     12-24    TPro  6.7  /  Alb  1.9<L>  /  TBili  0.2  /  DBili  0.1  /  AST  39  /  ALT  43  /  AlkPhos  195<H>  12-24      LIVER FUNCTIONS - ( 24 Dec 2022 05:45 )  Alb: 1.9 g/dL / Pro: 6.7 g/dL / ALK PHOS: 195 U/L / ALT: 43 U/L DA / AST: 39 U/L / GGT: x             CULTURES: .Stool Feces  12-22 @ 20:30   No enteric pathogens isolated.  (Stool culture examined for Salmonella,  Shigella, Campylobacter, Aeromonas, Plesiomonas,  Vibrio, E.coli O157 and Yersinia)  --  --      .Stool Feces  12-22 @ 20:20   Testing in progress  --  --      .Blood Blood  12-22 @ 14:41   Growth in aerobic bottle: Gram Variable coccobacili      .Blood Blood  12-22 @ 14:36   No growth to date.  --  --      Catheterized Catheterized  12-22 @ 09:20   >100,000 CFU/ml Aerococcus species  "Aerococcus spp. are predictably susceptible to penicillin,  ampicillin, tetracycline, and vancomycin.  All isolates are  resistant to sulfonamides"  --  --            RADIOLOGY:      ROS:  [  ] UNABLE TO ELICIT ICU VISIT  67y Male who remains in the ICU , he is still c/o abdominal pain and leg pain but is tolerating a solid diet, he is not nauseous or vomiting , no diarrhea, no fevers or chills, no other complaints. His blood cultures are growing out gram variable coccobacilli and so Vancomycin was added to his meropenem.     Meds:  meropenem  IVPB 1000 milliGRAM(s) IV Intermittent every 8 hours  vancomycin  IVPB 1000 milliGRAM(s) IV Intermittent every 12 hours    Allergies    morphine (Unknown)  penicillin (Unknown)  shellfish (Unknown)    Intolerances        VITALS:  Vital Signs Last 24 Hrs  T(C): 36.1 (24 Dec 2022 12:00), Max: 36.4 (24 Dec 2022 00:00)  T(F): 97 (24 Dec 2022 12:00), Max: 97.6 (24 Dec 2022 00:00)  HR: 77 (24 Dec 2022 16:00) (76 - 104)  BP: 99/59 (24 Dec 2022 16:00) (78/53 - 125/100)  BP(mean): 69 (24 Dec 2022 16:00) (59 - 106)  RR: 16 (24 Dec 2022 16:00) (14 - 27)  SpO2: 99% (24 Dec 2022 16:00) (95% - 100%)    Parameters below as of 24 Dec 2022 11:46  Patient On (Oxygen Delivery Method): room air        LABS/DIAGNOSTIC TESTS:                          9.1    4.40  )-----------( 105      ( 24 Dec 2022 03:23 )             28.3         12-24    136  |  100  |  10  ----------------------------<  98  4.2   |  31  |  0.50    Ca    7.9<L>      24 Dec 2022 03:23  Phos  2.7     12-24  Mg     1.4     12-24    TPro  6.7  /  Alb  1.9<L>  /  TBili  0.2  /  DBili  0.1  /  AST  39  /  ALT  43  /  AlkPhos  195<H>  12-24      LIVER FUNCTIONS - ( 24 Dec 2022 05:45 )  Alb: 1.9 g/dL / Pro: 6.7 g/dL / ALK PHOS: 195 U/L / ALT: 43 U/L DA / AST: 39 U/L / GGT: x             CULTURES: .Stool Feces  12-22 @ 20:30   No enteric pathogens isolated.  (Stool culture examined for Salmonella,  Shigella, Campylobacter, Aeromonas, Plesiomonas,  Vibrio, E.coli O157 and Yersinia)  --  --      .Stool Feces  12-22 @ 20:20   Testing in progress  --  --      .Blood Blood  12-22 @ 14:41   Growth in aerobic bottle: Gram Variable coccobacili      .Blood Blood  12-22 @ 14:36   No growth to date.  --  --      Catheterized Catheterized  12-22 @ 09:20   >100,000 CFU/ml Aerococcus species  "Aerococcus spp. are predictably susceptible to penicillin,  ampicillin, tetracycline, and vancomycin.  All isolates are  resistant to sulfonamides"  --  --            RADIOLOGY:      ROS:  [  ] UNABLE TO ELICIT

## 2022-12-24 NOTE — CONSULT NOTE ADULT - GASTROINTESTINAL
soft/nontender/nondistended/normal active bowel sounds/no guarding/no rigidity/no organomegaly/no palpable jania/no masses palpable details…

## 2022-12-24 NOTE — PHYSICAL THERAPY INITIAL EVALUATION ADULT - GENERAL OBSERVATIONS, REHAB EVAL
Pt seen supine in bed w/IV, yuan, cardiac monitor, c/o both ankle pain (8/10) with mobility-RN aware

## 2022-12-24 NOTE — PROGRESS NOTE ADULT - ASSESSMENT
Recurrent Cholangitis  Bacteremia   Fevers - resolved  Leukocytosis - normalized      Plan - Cont Meropenem 1 gm iv q8hrs  Time spent - 31 mins

## 2022-12-25 LAB
ALBUMIN SERPL ELPH-MCNC: 1.9 G/DL — LOW (ref 3.5–5)
ALP SERPL-CCNC: 169 U/L — HIGH (ref 40–120)
ALT FLD-CCNC: 39 U/L DA — SIGNIFICANT CHANGE UP (ref 10–60)
ANION GAP SERPL CALC-SCNC: 7 MMOL/L — SIGNIFICANT CHANGE UP (ref 5–17)
AST SERPL-CCNC: 32 U/L — SIGNIFICANT CHANGE UP (ref 10–40)
BASOPHILS # BLD AUTO: 0.02 K/UL — SIGNIFICANT CHANGE UP (ref 0–0.2)
BASOPHILS NFR BLD AUTO: 0.8 % — SIGNIFICANT CHANGE UP (ref 0–2)
BILIRUB SERPL-MCNC: 0.2 MG/DL — SIGNIFICANT CHANGE UP (ref 0.2–1.2)
BUN SERPL-MCNC: 7 MG/DL — SIGNIFICANT CHANGE UP (ref 7–18)
CALCIUM SERPL-MCNC: 8 MG/DL — LOW (ref 8.4–10.5)
CHLORIDE SERPL-SCNC: 95 MMOL/L — LOW (ref 96–108)
CO2 SERPL-SCNC: 31 MMOL/L — SIGNIFICANT CHANGE UP (ref 22–31)
CREAT SERPL-MCNC: 0.53 MG/DL — SIGNIFICANT CHANGE UP (ref 0.5–1.3)
EGFR: 109 ML/MIN/1.73M2 — SIGNIFICANT CHANGE UP
EOSINOPHIL # BLD AUTO: 0.02 K/UL — SIGNIFICANT CHANGE UP (ref 0–0.5)
EOSINOPHIL NFR BLD AUTO: 0.8 % — SIGNIFICANT CHANGE UP (ref 0–6)
GLUCOSE BLDC GLUCOMTR-MCNC: 137 MG/DL — HIGH (ref 70–99)
GLUCOSE SERPL-MCNC: 101 MG/DL — HIGH (ref 70–99)
HCT VFR BLD CALC: 27.8 % — LOW (ref 39–50)
HGB BLD-MCNC: 9.1 G/DL — LOW (ref 13–17)
IMM GRANULOCYTES NFR BLD AUTO: 0.8 % — SIGNIFICANT CHANGE UP (ref 0–0.9)
LYMPHOCYTES # BLD AUTO: 0.58 K/UL — LOW (ref 1–3.3)
LYMPHOCYTES # BLD AUTO: 22.6 % — SIGNIFICANT CHANGE UP (ref 13–44)
MAGNESIUM SERPL-MCNC: 1.8 MG/DL — SIGNIFICANT CHANGE UP (ref 1.6–2.6)
MCHC RBC-ENTMCNC: 31.6 PG — SIGNIFICANT CHANGE UP (ref 27–34)
MCHC RBC-ENTMCNC: 32.7 GM/DL — SIGNIFICANT CHANGE UP (ref 32–36)
MCV RBC AUTO: 96.5 FL — SIGNIFICANT CHANGE UP (ref 80–100)
MONOCYTES # BLD AUTO: 0.27 K/UL — SIGNIFICANT CHANGE UP (ref 0–0.9)
MONOCYTES NFR BLD AUTO: 10.5 % — SIGNIFICANT CHANGE UP (ref 2–14)
NEUTROPHILS # BLD AUTO: 1.66 K/UL — LOW (ref 1.8–7.4)
NEUTROPHILS NFR BLD AUTO: 64.5 % — SIGNIFICANT CHANGE UP (ref 43–77)
NRBC # BLD: 0 /100 WBCS — SIGNIFICANT CHANGE UP (ref 0–0)
PHOSPHATE SERPL-MCNC: 2.7 MG/DL — SIGNIFICANT CHANGE UP (ref 2.5–4.5)
PLATELET # BLD AUTO: 136 K/UL — LOW (ref 150–400)
POTASSIUM SERPL-MCNC: 4.1 MMOL/L — SIGNIFICANT CHANGE UP (ref 3.5–5.3)
POTASSIUM SERPL-SCNC: 4.1 MMOL/L — SIGNIFICANT CHANGE UP (ref 3.5–5.3)
PROT SERPL-MCNC: 6.4 G/DL — SIGNIFICANT CHANGE UP (ref 6–8.3)
RBC # BLD: 2.88 M/UL — LOW (ref 4.2–5.8)
RBC # FLD: 15.7 % — HIGH (ref 10.3–14.5)
SODIUM SERPL-SCNC: 133 MMOL/L — LOW (ref 135–145)
WBC # BLD: 2.57 K/UL — LOW (ref 3.8–10.5)
WBC # FLD AUTO: 2.57 K/UL — LOW (ref 3.8–10.5)

## 2022-12-25 PROCEDURE — 71045 X-RAY EXAM CHEST 1 VIEW: CPT | Mod: 26

## 2022-12-25 RX ORDER — PANTOPRAZOLE SODIUM 20 MG/1
40 TABLET, DELAYED RELEASE ORAL
Refills: 0 | Status: DISCONTINUED | OUTPATIENT
Start: 2022-12-25 | End: 2023-01-06

## 2022-12-25 RX ORDER — INSULIN LISPRO 100/ML
VIAL (ML) SUBCUTANEOUS AT BEDTIME
Refills: 0 | Status: DISCONTINUED | OUTPATIENT
Start: 2022-12-25 | End: 2023-01-06

## 2022-12-25 RX ORDER — OXYCODONE AND ACETAMINOPHEN 5; 325 MG/1; MG/1
1 TABLET ORAL EVERY 4 HOURS
Refills: 0 | Status: DISCONTINUED | OUTPATIENT
Start: 2022-12-25 | End: 2023-01-01

## 2022-12-25 RX ORDER — INSULIN LISPRO 100/ML
VIAL (ML) SUBCUTANEOUS
Refills: 0 | Status: DISCONTINUED | OUTPATIENT
Start: 2022-12-25 | End: 2023-01-06

## 2022-12-25 RX ORDER — CHLORHEXIDINE GLUCONATE 213 G/1000ML
1 SOLUTION TOPICAL DAILY
Refills: 0 | Status: DISCONTINUED | OUTPATIENT
Start: 2022-12-25 | End: 2022-12-29

## 2022-12-25 RX ADMIN — OXYCODONE AND ACETAMINOPHEN 1 TABLET(S): 5; 325 TABLET ORAL at 17:51

## 2022-12-25 RX ADMIN — OXYCODONE AND ACETAMINOPHEN 1 TABLET(S): 5; 325 TABLET ORAL at 06:22

## 2022-12-25 RX ADMIN — Medication 100 MILLIGRAM(S): at 06:38

## 2022-12-25 RX ADMIN — Medication 250 MILLIGRAM(S): at 19:04

## 2022-12-25 RX ADMIN — FINASTERIDE 5 MILLIGRAM(S): 5 TABLET, FILM COATED ORAL at 12:35

## 2022-12-25 RX ADMIN — Medication 250 MILLIGRAM(S): at 06:38

## 2022-12-25 RX ADMIN — OXYCODONE AND ACETAMINOPHEN 1 TABLET(S): 5; 325 TABLET ORAL at 07:34

## 2022-12-25 RX ADMIN — MEROPENEM 100 MILLIGRAM(S): 1 INJECTION INTRAVENOUS at 21:25

## 2022-12-25 RX ADMIN — PANTOPRAZOLE SODIUM 10 MG/HR: 20 TABLET, DELAYED RELEASE ORAL at 12:35

## 2022-12-25 RX ADMIN — Medication 100 MILLIGRAM(S): at 21:24

## 2022-12-25 RX ADMIN — Medication 200 MILLIGRAM(S): at 06:51

## 2022-12-25 RX ADMIN — MEROPENEM 100 MILLIGRAM(S): 1 INJECTION INTRAVENOUS at 06:38

## 2022-12-25 RX ADMIN — CHLORHEXIDINE GLUCONATE 1 APPLICATION(S): 213 SOLUTION TOPICAL at 16:44

## 2022-12-25 RX ADMIN — MEROPENEM 100 MILLIGRAM(S): 1 INJECTION INTRAVENOUS at 14:36

## 2022-12-25 RX ADMIN — Medication 100 MILLIGRAM(S): at 14:35

## 2022-12-25 RX ADMIN — OXYCODONE AND ACETAMINOPHEN 1 TABLET(S): 5; 325 TABLET ORAL at 02:27

## 2022-12-25 RX ADMIN — ATORVASTATIN CALCIUM 10 MILLIGRAM(S): 80 TABLET, FILM COATED ORAL at 21:24

## 2022-12-25 RX ADMIN — TAMSULOSIN HYDROCHLORIDE 0.4 MILLIGRAM(S): 0.4 CAPSULE ORAL at 21:24

## 2022-12-25 RX ADMIN — OXYCODONE AND ACETAMINOPHEN 1 TABLET(S): 5; 325 TABLET ORAL at 06:38

## 2022-12-25 RX ADMIN — OXYCODONE AND ACETAMINOPHEN 1 TABLET(S): 5; 325 TABLET ORAL at 19:03

## 2022-12-25 NOTE — PROGRESS NOTE ADULT - ASSESSMENT
Patient is a 67 year old male, vaccinated, from [facility] with PMHx of DM, HTN, HLD, AF on Eliquis, PVD, presents with c/o vomiting and right sided abdominal pain. Admitted to ICU for hypovolemic vs septic shock     Assessment:  - Septic shock   - Cholangitis   - Melena   - Diverticulosis   - Afib   - HTN  - DM    Plan:  Neuro:  - AAOx3    CV  # Septic shock vs hypovolemic shock    - 2 episodes of melena   - US Abdomen: Biliary dilatation and hepatic steatosis  - CT A/P: dilation of the common bile duct and main pancreatic duct again noted.. New RLL opacity  - WBC trended down to 7.76  - c/w Meropenem   - repeat ERCP on 12/23 performed, 2 clips and 1 stent placed  - f/u  UCX  - bacterial culture: positive gram variable coccobacilli  - GI consulted Dr Villanueva/ Judy(ERCP)  - ID Dr. Novoa.    # Afib  - Dr. Sharma consulted who recommended to continue holding Eliquis    # HTN  - on admission low  BP: 81/59mmHg  S/P 1L NS bolus   - home medications: nifedipine and metoprolol, hold as blood pressure on the softer side   - c/w IVF   monitor BP    Pulm:  # Aspiration PNA   - CT abd: New airspace opacifications in the right lower lung zone  - C/w Meropenem   - ID Dr. Novoa following     ID:  # Septic shock    - WBC trending down, on 12/23: 7.76   - Lactate trended down on 12/21 to 1.8   - f/u UCX  -  bacterial culture: positive gram variable coccobacilli   - f/u repeat blood cultures on 12/25 AM   - GI consulted Dr. Kim(ERCP)  - ID Dr. Novoa.      Nephro:  No active issues     GI:  # Cholangitis   - Fever, RUQ pain, jaundice, leukocytosis, Transaminitis   - US Abdomen: Biliary dilatation and hepatic steatosis  - CT A/P: dilation of the common bile duct and main pancreatic duct again noted.. New RLL opacity  - ERCP 12/16: biliary sphincterotomy and sludge removal    - repeat ERCP on 12/23 performed, 2 clips and 1 stent placed  - Continue Meropenem   - started on vancomycin on 12/24  - bacterial culture: positive gram variable coccobacilli   - f/u repeat blood cultures on 12/25 AM   - Follow Bili and T bili     # GI Bleed   - 2 episodes of melena  - H/H stable, 11.4/34.1  - likely post sphincterotomy bleed resulting in cholangitis as per GI   - CT: colonic diverticulosis   - c/w Protonix drip   - will change to Protonix BID after 72 hrs as per GI recs   - Hb trended down , on 12/23: 9.9 ( no active bleeding)   - repeat CBC   - Transfuse for Hb<7   - started on clear diet , pt tolerating well, will advance as tolerated       Heme:  - no indication for transfusion       Endo:  # DM:   - ISS Low scale        Prophy:  - SCD for DVT ppx  - Protonix     Dispo:  - monitor in the ICU

## 2022-12-25 NOTE — PROGRESS NOTE ADULT - ATTENDING COMMENTS
67 year old male, vaccinated, from [facility] [place] with PMHx of DM, HTN, HLD, AF on Eliquis, PVD, presents with c/o vomiting and right sided abdominal pain. Admitted to ICU for septic shock due to bacteremia     Assessment:  - Shock - Septic   - Bacteremic   - CBD dilation -  cholangitis  - Transaminitis   - Acute renal failure   - Melena   - Diverticulosis   - Afib   - HTN  - DM type 2    Plan:  - S/p ERCP with evidence of ductal bleeding s/p biliary stent placement   - Cont. IV fluid hydration  - Creatinine downtrending  - Suspect possibly biliary sepsis  - Broad spectrum antibiotics for cholangitis   - Bacteremia , repeat BC in AM   - Monitor for further bleeding  - Trend hgb  - Hold Eliquis for now  - GI eval for melena   - PPI infusion changed to BID  since H/H relatively stable   - No chemical anticoag due to melena with drop in H/H   - Patient refuse yuan catheter  - Pain control  - Close glucose monitoring with coverage for hyperglycemia .
67 year old male, vaccinated, from [facility] with PMHx of DM, HTN, HLD, AF on Eliquis, PVD, presents with c/o vomiting and right sided abdominal pain. Admitted to ICU for septic shock due to bacteremia     Assessment:  - Shock - Septic   - Bacteremic   - CBD dilation -  cholangitis  - Transaminitis   - Acute renal failure   - Melena   - Diverticulosis   - Afib   - HTN  - DM type 2    Plan:  - S/p ERCP with evidence of ductal bleeding s/p biliary stent placement   - Cont. IV fluid hydration  - Creatinine downtrending  - Suspect possibly biliary sepsis  - Broad spectrum antibiotics for cholangitis   - Bacteremia , f/u repeat BC   - Monitor for further bleeding  - Trend hgb  - Anticoag  held as per GI   - GI eval for melena   - PPI infusion changed to BID  since H/H relatively stable   - No chemical anticoag due to melena with drop in H/H   - Patient refuse yuan catheter  - Pain control  - Close glucose monitoring with coverage for hyperglycemia .
67 year old male, vaccinated, from [facility] with PMHx of DM, HTN, HLD, AF on Eliquis, PVD, presents with c/o vomiting and right sided abdominal pain. Admitted to ICU for hypovolemic vs septic shock     Assessment:  - Shock - Septic vs. hypovolemic   - CBD dilation - r/o cholangitis  - Transaminitis   - Acute renal failure   - Melena   - Diverticulosis   - Afib   - HTN  - DM type 2    Plan:  - S/p ERCP with evidence of ductal bleeding s/p biliary stent placement   - Cont. IV fluid hydration  - Creatinine downtrending  - Suspect possibly biliary sepsis  - Broad spectrum antibiotics for cholangitis   - Follow up cultures  - Monitor for further bleeding  - Trend hgb  - Hold Eliquis for now  - GI consult appreciated   - PPI infusion   - Patient refuse yuan catheter  - Pain control  - Close glucose monitoring with coverage for hyperglycemia

## 2022-12-25 NOTE — PROGRESS NOTE ADULT - ASSESSMENT
Patient is a 67 year old male, vaccinated, from [facility] with PMHx of DM, HTN, HLD, AF on Eliquis, PVD, presents with c/o vomiting and right sided abdominal pain. Admitted to ICU for hypovolemic vs septic shock     Assessment:  - Septic shock   - Cholangitis   - Melena   - Diverticulosis   - Afib   - HTN  - DM    Plan:  Neuro:  - AAOx3    CV  # Septic shock vs hypovolemic shock    - 2 episodes of melena   - US Abdomen: Biliary dilatation and hepatic steatosis  - CT A/P: dilation of the common bile duct and main pancreatic duct again noted.. New RLL opacity  - WBC trended down to 7.76  - c/w Meropenem   - repeat ERCP on 12/23 performed, 2 clips and 1 stent placed  - f/u  UCX  - bacterial culture: positive gram variable coccobacilli  - GI consulted Dr Villanueva/ Judy(ERCP)  - ID Dr. Novoa.    # Afib  - Dr. Sharma consulted who recommended to continue holding Eliquis    # HTN  - on admission low  BP: 81/59mmHg  S/P 1L NS bolus   - home medications: nifedipine and metoprolol, hold as blood pressure on the softer side   - c/w IVF   monitor BP    Pulm:  # Aspiration PNA   - CT abd: New airspace opacifications in the right lower lung zone  - C/w Meropenem   - ID Dr. Novoa following     ID:  # Septic shock    - WBC trending down, on 12/23: 7.76   - Lactate trended down on 12/21 to 1.8   - f/u UCX  -  bacterial culture: positive gram variable coccobacilli   - f/u repeat blood cultures on 12/25 AM   - GI consulted Dr. Kim(ERCP)  - ID Dr. Novoa.      Nephro:  No active issues     GI:  # Cholangitis   - Fever, RUQ pain, jaundice, leukocytosis, Transaminitis   - US Abdomen: Biliary dilatation and hepatic steatosis  - CT A/P: dilation of the common bile duct and main pancreatic duct again noted.. New RLL opacity  - ERCP 12/16: biliary sphincterotomy and sludge removal    - repeat ERCP on 12/23 performed, 2 clips and 1 stent placed  - Continue Meropenem   - started on vancomycin on 12/24  - bacterial culture: positive gram variable coccobacilli   - f/u repeat blood cultures on 12/25 AM   - Follow Bili and T bili     # GI Bleed   - 2 episodes of melena  - H/H stable, 11.4/34.1  - likely post sphincterotomy bleed resulting in cholangitis as per GI   - CT: colonic diverticulosis   - c/w Protonix drip   - will change to Protonix BID after 72 hrs as per GI recs   - Hb trended down , on 12/23: 9.9 ( no active bleeding)   - repeat CBC   - Transfuse for Hb<7   - started on clear diet , pt tolerating well, will advance as tolerated       Heme:  - no indication for transfusion       Endo:  # DM:   - ISS Low scale        Prophy:  - SCD for DVT ppx  - Protonix     Dispo:  - monitor in the ICU

## 2022-12-25 NOTE — PROGRESS NOTE ADULT - SUBJECTIVE AND OBJECTIVE BOX
[   ] ICU                                          [   ] CCU                                      [  X ] Medical Floor      Patient is a 67 year old male with abdominal pain. GI consulted to evaluate.        Patient is a 67 year old male, with past medical history significant for DM, HTN, HLD, AF on Eliquis, PVD, presents with c/o vomiting and right sided abdominal pain. Patient recently had cholecystectomy and ERCP with sphincterotomy prior to discharge. patient presented with c/o sharp right sided abdominal pain 8/10 persistent with nausea, vomiting and melena. Patient had repeat ERCP found to have bleeding from sphincterotomy site. CBD stended and clips place to control bleeding. No hematemesis, hematochezia, chest pain, SOB, cough, hematuria or dysuria reported.    Patient appears comfortable. No new complaints reported, No abdominal pain, N/V, hematemesis, hematochezia, melena, fever, chills, chest pain, SOB, cough or diarrhea reported.       PAIN MANAGEMENT:  Pain Scale:                 /10  Pain Location:         PAST MEDICAL HISTORY  COPD (chronic obstructive pulmonary disease)    Diabetes    PVD (peripheral vascular disease)    Chronic atrial fibrillation    GERD (gastroesophageal reflux disease)    MDD (major depressive disorder)    BPH (benign prostatic hyperplasia)        PAST SURGICAL HISTORY  Cholecystectomy    Allergies    morphine (Unknown)  penicillin (Unknown)  shellfish (Unknown)    Intolerances  None       SOCIAL HISTORY  Advanced Directives:       [ X ] Full Code       [  ] DNR  Marital Status:         [  ] M      [ X ] S      [  ] D       [  ] W  Children:       [ X ] Yes      [  ] No  Occupation:        [  ] Employed       [  X] Unemployed       [  ] Retired  Diet:       [ X ] Regular       [  ] PEG feeding          [  ] NG tube feeding  Drug Use:           [ X ] Patient denied          [  ] Yes  Alcohol:           [ X ] No             [  ] Yes (socially)         [  ] Yes (chronic)  Tobacco:           [  ] Yes           [ X ] No      FAMILY HISTORY  [ X ] Heart Disease            [ X ] Diabetes             [ X ] HTN             [  ] Colon Cancer             [  ] Stomach Cancer              [  ] Pancreatic Cancer      VITALS  Vital Signs Last 24 Hrs  T(C): 36.1 (25 Dec 2022 12:00), Max: 36.9 (25 Dec 2022 00:00)  T(F): 97 (25 Dec 2022 12:00), Max: 98.5 (25 Dec 2022 00:00)  HR: 92 (25 Dec 2022 12:00) (77 - 114)  BP: 108/68 (25 Dec 2022 12:00) (88/52 - 120/71)  BP(mean): 77 (25 Dec 2022 12:00) (61 - 82)  RR: 21 (25 Dec 2022 12:00) (15 - 25)  SpO2: 96% (25 Dec 2022 12:00) (92% - 100%)  Parameters below as of 25 Dec 2022 08:00  Patient On (Oxygen Delivery Method): nasal cannula       MEDICATIONS  (STANDING):  atorvastatin 10 milliGRAM(s) Oral at bedtime  chlorhexidine 2% Cloths 1 Application(s) Topical daily  finasteride 5 milliGRAM(s) Oral daily  insulin lispro (ADMELOG) corrective regimen sliding scale   SubCutaneous every 6 hours  meropenem  IVPB 1000 milliGRAM(s) IV Intermittent every 8 hours  pantoprazole Infusion 8 mG/Hr (10 mL/Hr) IV Continuous <Continuous>  pregabalin 100 milliGRAM(s) Oral three times a day  tamsulosin 0.4 milliGRAM(s) Oral at bedtime  vancomycin  IVPB 1000 milliGRAM(s) IV Intermittent every 12 hours    MEDICATIONS  (PRN):  aluminum hydroxide/magnesium hydroxide/simethicone Suspension 30 milliLiter(s) Oral every 4 hours PRN Dyspepsia  baclofen 5 milliGRAM(s) Oral every 12 hours PRN Musculoskeletal Pain  melatonin 3 milliGRAM(s) Oral at bedtime PRN Insomnia  ondansetron Injectable 4 milliGRAM(s) IV Push every 8 hours PRN Nausea and/or Vomiting  oxycodone    5 mG/acetaminophen 325 mG 1 Tablet(s) Oral every 4 hours PRN Severe Pain (7 - 10)  sodium chloride 0.9% lock flush 10 milliLiter(s) IV Push every 1 hour PRN Pre/post blood products, medications, blood draw, and to maintain line patency                            9.1    2.57  )-----------( 136      ( 25 Dec 2022 04:01 )             27.8       12-25    133<L>  |  95<L>  |  7   ----------------------------<  101<H>  4.1   |  31  |  0.53    Ca    8.0<L>      25 Dec 2022 04:01  Phos  2.7     12-25  Mg     1.8     12-25    TPro  6.4  /  Alb  1.9<L>  /  TBili  0.2  /  DBili  x   /  AST  32  /  ALT  39  /  AlkPhos  169<H>  12-25      PT/INR - ( 23 Dec 2022 19:06 )   PT: 17.0 sec;   INR: 1.42 ratio         PTT - ( 23 Dec 2022 19:06 )  PTT:33.0 sec

## 2022-12-25 NOTE — PROGRESS NOTE ADULT - SUBJECTIVE AND OBJECTIVE BOX
INTERVAL HPI/OVERNIGHT EVENTS: Patient was examined at bedside, stable, NAD. No episodes of melena overnight. Hb stable. No acute events overnight.    PRESSORS: [ ] YES [x] NO  WHICH:    ANTIBIOTICS:                      Antimicrobial:  meropenem  IVPB 1000 milliGRAM(s) IV Intermittent every 8 hours  vancomycin  IVPB 1000 milliGRAM(s) IV Intermittent every 12 hours    Cardiovascular:    Pulmonary:    Hematalogic:    Other:  aluminum hydroxide/magnesium hydroxide/simethicone Suspension 30 milliLiter(s) Oral every 4 hours PRN  atorvastatin 10 milliGRAM(s) Oral at bedtime  baclofen 5 milliGRAM(s) Oral every 12 hours PRN  chlorhexidine 4% Liquid 1 Application(s) Topical <User Schedule>  finasteride 5 milliGRAM(s) Oral daily  insulin lispro (ADMELOG) corrective regimen sliding scale   SubCutaneous every 6 hours  melatonin 3 milliGRAM(s) Oral at bedtime PRN  ondansetron Injectable 4 milliGRAM(s) IV Push every 8 hours PRN  oxycodone    5 mG/acetaminophen 325 mG 1 Tablet(s) Oral every 4 hours  pantoprazole Infusion 8 mG/Hr IV Continuous <Continuous>  phenazopyridine 200 milliGRAM(s) Oral every 8 hours  pregabalin 100 milliGRAM(s) Oral three times a day  sodium chloride 0.9% lock flush 10 milliLiter(s) IV Push every 1 hour PRN  tamsulosin 0.4 milliGRAM(s) Oral at bedtime    aluminum hydroxide/magnesium hydroxide/simethicone Suspension 30 milliLiter(s) Oral every 4 hours PRN  atorvastatin 10 milliGRAM(s) Oral at bedtime  baclofen 5 milliGRAM(s) Oral every 12 hours PRN  chlorhexidine 4% Liquid 1 Application(s) Topical <User Schedule>  finasteride 5 milliGRAM(s) Oral daily  insulin lispro (ADMELOG) corrective regimen sliding scale   SubCutaneous every 6 hours  melatonin 3 milliGRAM(s) Oral at bedtime PRN  meropenem  IVPB 1000 milliGRAM(s) IV Intermittent every 8 hours  ondansetron Injectable 4 milliGRAM(s) IV Push every 8 hours PRN  oxycodone    5 mG/acetaminophen 325 mG 1 Tablet(s) Oral every 4 hours  pantoprazole Infusion 8 mG/Hr IV Continuous <Continuous>  phenazopyridine 200 milliGRAM(s) Oral every 8 hours  pregabalin 100 milliGRAM(s) Oral three times a day  sodium chloride 0.9% lock flush 10 milliLiter(s) IV Push every 1 hour PRN  tamsulosin 0.4 milliGRAM(s) Oral at bedtime  vancomycin  IVPB 1000 milliGRAM(s) IV Intermittent every 12 hours    Drug Dosing Weight  Height (cm): 188 (22 Dec 2022 07:05)  Weight (kg): 83.2 (22 Dec 2022 17:50)  BMI (kg/m2): 23.5 (22 Dec 2022 17:50)  BSA (m2): 2.09 (22 Dec 2022 17:50)    CENTRAL LINE: [ ] YES [x] NO  LOCATION:   DATE INSERTED:  REMOVE: [x] YES [ ] NO  EXPLAIN: 12/23/22    ALVAREZ: [x] YES [ ] NO    DATE INSERTED: 12/23/22  REMOVE:  [ ] YES [ ] NO  EXPLAIN:    A-LINE:  [ ] YES [ ] NO  LOCATION:   DATE INSERTED:  REMOVE:  [ ] YES [ ] NO  EXPLAIN:    PMH -reviewed admission note, no change since admission    ICU Vital Signs Last 24 Hrs  T(C): 36.9 (25 Dec 2022 00:00), Max: 36.9 (25 Dec 2022 00:00)  T(F): 98.5 (25 Dec 2022 00:00), Max: 98.5 (25 Dec 2022 00:00)  HR: 90 (25 Dec 2022 01:00) (76 - 114)  BP: 96/55 (25 Dec 2022 01:00) (78/53 - 125/100)  BP(mean): 65 (25 Dec 2022 01:00) (59 - 106)  ABP: --  ABP(mean): --  RR: 19 (25 Dec 2022 01:00) (15 - 26)  SpO2: 96% (25 Dec 2022 01:00) (92% - 100%)    O2 Parameters below as of 25 Dec 2022 01:00  Patient On (Oxygen Delivery Method): nasal cannula  O2 Flow (L/min): 3                12-23 @ 07:01  -  12-24 @ 07:00  --------------------------------------------------------  IN: 830 mL / OUT: 2300 mL / NET: -1470 mL            PHYSICAL EXAM:    GENERAL: NAD, well-groomed, well-developed  HEAD:  Atraumatic, Normocephalic  EYES: EOMI, PERRLA, conjunctiva and sclera clear  ENMT: No tonsillar erythema, exudates, or enlargement; Moist mucous membranes, Good dentition, No lesions  NECK: Supple, normal appearance, No JVD; Normal thyroid; Trachea midline  NERVOUS SYSTEM:  Alert & Oriented X3, Good concentration; Motor Strength 5/5 B/L upper and lower extremities; DTRs 2+ intact and symmetric  CHEST/LUNG: No chest deformity; Normal percussion bilaterally; No rales, rhonchi, wheezing   HEART: Regular rate and rhythm; No murmurs, rubs, or gallops  ABDOMEN: Soft, Nontender, Nondistended; Bowel sounds present  EXTREMITIES:  2+ Peripheral Pulses, No clubbing, cyanosis, or edema  LYMPH: No lymphadenopathy noted  SKIN: No rashes or lesions; Good capillary refill      LABS:  CBC Full  -  ( 24 Dec 2022 03:23 )  WBC Count : 4.40 K/uL  RBC Count : 2.92 M/uL  Hemoglobin : 9.1 g/dL  Hematocrit : 28.3 %  Platelet Count - Automated : 105 K/uL  Mean Cell Volume : 96.9 fl  Mean Cell Hemoglobin : 31.2 pg  Mean Cell Hemoglobin Concentration : 32.2 gm/dL  Auto Neutrophil # : 3.29 K/uL  Auto Lymphocyte # : 0.61 K/uL  Auto Monocyte # : 0.39 K/uL  Auto Eosinophil # : 0.06 K/uL  Auto Basophil # : 0.02 K/uL  Auto Neutrophil % : 74.6 %  Auto Lymphocyte % : 13.9 %  Auto Monocyte % : 8.9 %  Auto Eosinophil % : 1.4 %  Auto Basophil % : 0.5 %    12-24    136  |  100  |  10  ----------------------------<  98  4.2   |  31  |  0.50    Ca    7.9<L>      24 Dec 2022 03:23  Phos  2.7     12-24  Mg     1.4     12-24    TPro  6.7  /  Alb  1.9<L>  /  TBili  0.2  /  DBili  0.1  /  AST  39  /  ALT  43  /  AlkPhos  195<H>  12-24    PT/INR - ( 23 Dec 2022 19:06 )   PT: 17.0 sec;   INR: 1.42 ratio         PTT - ( 23 Dec 2022 19:06 )  PTT:33.0 sec    Culture Results:   No enteric pathogens isolated.  (Stool culture examined for Salmonella,  Shigella, Campylobacter, Aeromonas, Plesiomonas,  Vibrio, E.coli O157 and Yersinia) (12-22 @ 20:30)  Culture Results:   Testing in progress (12-22 @ 20:20)  Culture Results:   Growth in aerobic bottle: Gram Variable coccobacili  ***Blood Panel PCR results on this specimen are available  approximately 3 hours after the Gram stain result.***  Gram stain, PCR, and/or culture results may not always  correspond due to difference in methodologies.  ************************************************************  This PCR assay was performed by multiplex PCR. This  Assay tests for 66 bacterial and resistance gene targets.  Please refer to the [facility] test directory  at [email or web address] for details. (12-22 @ 14:41)  Culture Results:   No growth to date. (12-22 @ 14:36)  Culture Results:   >100,000 CFU/ml Aerococcus species  "Aerococcus spp. are predictably susceptible to penicillin,  ampicillin, tetracycline, and vancomycin.  All isolates are  resistant to sulfonamides" (12-22 @ 09:20)      RADIOLOGY & ADDITIONAL STUDIES REVIEWED:  ***    GOALS OF CARE DISCUSSION WITH PATIENT/FAMILY/PROXY:    CRITICAL CARE TIME SPENT: 35 minutes

## 2022-12-25 NOTE — CHART NOTE - NSCHARTNOTEFT_GEN_A_CORE
67 year old male from [facility] with PMHx of DM, HTN, HLD, AF on Eliquis, PVD, and recent choledocholithiasis status post ERCP (12/16) re-presented won 12/22 with abdominal pain found to have sepsis secondary to UTI and/or cholangitis. Admitted to ICU on 12/22 for enhanced monitoring, underwent ERCP/EUS with Dr. Kim on 12/23, 2 clips and 1 stent placed, after procedure remained normotensive and with stable vitals. Blood cultures returned with Gram Variable coccobacilli, on course of Meropenem and Vancomycin, full C&S pending, urine culture returned with Aerococcus sp, Dr. Novoa from ID following. For transfer to medical floor for continued monitoring and treatment.     For Accepting Team Follow Up:  - Follow BCx coccobacilli, C&S. Follow with Dr. Novoa to potentially de-escalate Meropenem/Vancomycin.   - Follow UCx Aerococcus  sensitivities.   - Failed TOV and Cardoza replaced on 12/24, re-try trial of void before discharge.   - Repeat BCx sent on 12/24, monitor for clearance.   - Advanced diet as tolerated.   - Follow with GI Dr. Villanueva.     Sign out provided to accepting attending Dr. Sharma and TEO Alvares.

## 2022-12-25 NOTE — PROGRESS NOTE ADULT - ASSESSMENT
1. Abdominal pain improving  2. Post sphincterotomy bleeding(clips placed)  3. S/p CBD stent placement  4. No bleeding at present time  5. Anemia  6. Cholangitis  7. Bacteriemia      Suggestions:    1. Monitor H/H  2. Transfuse PRBC as needed  3. Protonix daily  4. Antibiotics as per ID  5. follow up repeat cultures  6. Diet as tolerated  7. Avoid NSAID  8. DVT prophylaxis

## 2022-12-25 NOTE — PROGRESS NOTE ADULT - GASTROINTESTINAL
details… soft/nontender/nondistended/normal active bowel sounds/no guarding/no rigidity/no organomegaly/no palpable jania/no masses palpable

## 2022-12-25 NOTE — PROGRESS NOTE ADULT - SUBJECTIVE AND OBJECTIVE BOX
PATIENT SEEN AND EXAMINED ON :- 12/25/2022  DATE OF SERVICE:    12/25/2022         Interim events noted,Labs ,Radiological studies and Cardiology tests reviewed .       HOSPITAL COURSE: HPI:  Patient is a 67 year old male, vaccinated, from [facility] with PMHx of DM, HTN, HLD, AF on Eliquis, PVD, presents with c/o vomiting and right sided abdominal pain. patient states yesterday after lunch he had a sharp right sided abdominal pain 8/10 persistent with nausea and vomiting. He also endorsed chills and rigors, unsure if he spiked a fever. He denies any changes in bowel habits, chest pain, shortness of breath or palpitations. Patient had two episodes of melena in ED. He was discharged yesterday from hospital. He had choledocholithiasis,  CT A/P showed: Cholecystectomy with dilated CBD up to 1.7 cm abrupt cut off of the distal CBD and  mild pancreatic duct dilatation. 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 He underwent  ERCP on 12/16 biliary sludge removal with biliary sphincterotomy and balloon extraction. He was discharged after symptom improvement and out patient gastric emptying study.     In ED:   Vitals: BP: 81/59mmHg  HR:  118  RR: 97% RA   WBC 14.7 k  elevated LFT, lactate   s/p  Rocephin 1LNS and zofran  (22 Dec 2022 12:00)      INTERIM EVENTS:Patient seen at bedside ,interim events noted.      PMH -reviewed admission note, no change since admission  HEART FAILURE: Acute[ ]Chronic[ ] Systolic[ ] Diastolic[ ] Combined Systolic and Diastolic[ ]  CAD[ ] CABG[ ] PCI[ ]  DEVICES[ ] PPM[ ] ICD[ ] ILR[ ]  ATRIAL FIBRILLATION[ ] Paroxysmal[ ] Permanent[ ] CHADS2-[  ]  DILCIA[ ] CKD1[ ] CKD2[ ] CKD3[ ] CKD4[ ] ESRD[ ]  COPD[ ] HTN[ ]   DM[ ] Type1[ ] Type 2[ ]   CVA[ ] Paresis[ ]    AMBULATION: Assisted[ ] Cane/walker[ ] Independent[ ]    MEDICATIONS  (STANDING):  atorvastatin 10 milliGRAM(s) Oral at bedtime  chlorhexidine 2% Cloths 1 Application(s) Topical daily  finasteride 5 milliGRAM(s) Oral daily  insulin lispro (ADMELOG) corrective regimen sliding scale   SubCutaneous every 6 hours  meropenem  IVPB 1000 milliGRAM(s) IV Intermittent every 8 hours  pantoprazole    Tablet 40 milliGRAM(s) Oral before breakfast  pregabalin 100 milliGRAM(s) Oral three times a day  tamsulosin 0.4 milliGRAM(s) Oral at bedtime  vancomycin  IVPB 1000 milliGRAM(s) IV Intermittent every 12 hours    MEDICATIONS  (PRN):  aluminum hydroxide/magnesium hydroxide/simethicone Suspension 30 milliLiter(s) Oral every 4 hours PRN Dyspepsia  baclofen 5 milliGRAM(s) Oral every 12 hours PRN Musculoskeletal Pain  melatonin 3 milliGRAM(s) Oral at bedtime PRN Insomnia  ondansetron Injectable 4 milliGRAM(s) IV Push every 8 hours PRN Nausea and/or Vomiting  oxycodone    5 mG/acetaminophen 325 mG 1 Tablet(s) Oral every 4 hours PRN Severe Pain (7 - 10)  sodium chloride 0.9% lock flush 10 milliLiter(s) IV Push every 1 hour PRN Pre/post blood products, medications, blood draw, and to maintain line patency            REVIEW OF SYSTEMS:  Constitutional: [ ] fever, [ ]weight loss,  [ ]fatigue [ ]weight gain  Eyes: [ ] visual changes  Respiratory: [ ]shortness of breath;  [ ] cough, [ ]wheezing, [ ]chills, [ ]hemoptysis  Cardiovascular: [ ] chest pain, [ ]palpitations, [ ]dizziness,  [ ]leg swelling[ ]orthopnea[ ]PND  Gastrointestinal: [ ] abdominal pain, [ ]nausea, [ ]vomiting,  [ ]diarrhea [ ]Constipation [ ]Melena  Genitourinary: [ ] dysuria, [ ] hematuria [ ]Cardoza  Neurologic: [ ] headaches [ ] tremors[ ]weakness [ ]Paralysis Right[ ] Left[ ]  Skin: [ ] itching, [ ]burning, [ ] rashes  Endocrine: [ ] heat or cold intolerance  Musculoskeletal: [ ] joint pain or swelling; [ ] muscle, back, or extremity pain  Psychiatric: [ ] depression, [ ]anxiety, [ ]mood swings, or [ ]difficulty sleeping  Hematologic: [ ] easy bruising, [ ] bleeding gums    [ ] All remaining systems negative except as per above.   [ ]Unable to obtain.  [x] No change in ROS since admission      Vital Signs Last 24 Hrs  T(C): 37.1 (25 Dec 2022 16:00), Max: 37.1 (25 Dec 2022 16:00)  T(F): 98.8 (25 Dec 2022 16:00), Max: 98.8 (25 Dec 2022 16:00)  HR: 111 (25 Dec 2022 17:00) (78 - 114)  BP: 118/66 (25 Dec 2022 16:00) (88/52 - 118/66)  BP(mean): 79 (25 Dec 2022 16:00) (57 - 81)  RR: 20 (25 Dec 2022 17:00) (16 - 25)  SpO2: 85% (25 Dec 2022 17:00) (85% - 100%)    Parameters below as of 25 Dec 2022 08:00  Patient On (Oxygen Delivery Method): nasal cannula      I&O's Summary    24 Dec 2022 07:01  -  25 Dec 2022 07:00  --------------------------------------------------------  IN: 1920 mL / OUT: 2510 mL / NET: -590 mL    25 Dec 2022 07:01  -  25 Dec 2022 17:19  --------------------------------------------------------  IN: 860 mL / OUT: 1700 mL / NET: -840 mL        PHYSICAL EXAM:  General: No acute distress BMI-  HEENT: EOMI, PERRL  Neck: Supple, [ ] JVD  Lungs: Equal air entry bilaterally; [ ] rales [ ] wheezing [ ] rhonchi  Heart: Regular rate and rhythm; [x ] murmur   2/6 [ x] systolic [ ] diastolic [ ] radiation[ ] rubs [ ]  gallops  Abdomen: Nontender, bowel sounds present  Extremities: No clubbing, cyanosis, [ ] edema [ ]Pulses  equal and intact  Nervous system:  Alert & Oriented X3, no focal deficits  Psychiatric: Normal affect  Skin: No rashes or lesions    LABS:  12-25    133<L>  |  95<L>  |  7   ----------------------------<  101<H>  4.1   |  31  |  0.53    Ca    8.0<L>      25 Dec 2022 04:01  Phos  2.7     12-25  Mg     1.8     12-25    TPro  6.4  /  Alb  1.9<L>  /  TBili  0.2  /  DBili  x   /  AST  32  /  ALT  39  /  AlkPhos  169<H>  12-25    Creatinine Trend: 0.53<--, 0.50<--, 0.58<--, 0.61<--, 1.04<--, 1.19<--                        9.1    2.57  )-----------( 136      ( 25 Dec 2022 04:01 )             27.8     PT/INR - ( 23 Dec 2022 19:06 )   PT: 17.0 sec;   INR: 1.42 ratio         PTT - ( 23 Dec 2022 19:06 )  PTT:33.0 sec

## 2022-12-26 LAB
ALBUMIN SERPL ELPH-MCNC: 1.9 G/DL — LOW (ref 3.5–5)
ALP SERPL-CCNC: 165 U/L — HIGH (ref 40–120)
ALT FLD-CCNC: 35 U/L DA — SIGNIFICANT CHANGE UP (ref 10–60)
ANION GAP SERPL CALC-SCNC: 8 MMOL/L — SIGNIFICANT CHANGE UP (ref 5–17)
AST SERPL-CCNC: 30 U/L — SIGNIFICANT CHANGE UP (ref 10–40)
BILIRUB SERPL-MCNC: 0.2 MG/DL — SIGNIFICANT CHANGE UP (ref 0.2–1.2)
BUN SERPL-MCNC: 4 MG/DL — LOW (ref 7–18)
CALCIUM SERPL-MCNC: 8.5 MG/DL — SIGNIFICANT CHANGE UP (ref 8.4–10.5)
CHLORIDE SERPL-SCNC: 93 MMOL/L — LOW (ref 96–108)
CO2 SERPL-SCNC: 33 MMOL/L — HIGH (ref 22–31)
CREAT SERPL-MCNC: 0.4 MG/DL — LOW (ref 0.5–1.3)
EGFR: 119 ML/MIN/1.73M2 — SIGNIFICANT CHANGE UP
GLUCOSE BLDC GLUCOMTR-MCNC: 101 MG/DL — HIGH (ref 70–99)
GLUCOSE BLDC GLUCOMTR-MCNC: 129 MG/DL — HIGH (ref 70–99)
GLUCOSE BLDC GLUCOMTR-MCNC: 164 MG/DL — HIGH (ref 70–99)
GLUCOSE BLDC GLUCOMTR-MCNC: 94 MG/DL — SIGNIFICANT CHANGE UP (ref 70–99)
GLUCOSE SERPL-MCNC: 108 MG/DL — HIGH (ref 70–99)
HCT VFR BLD CALC: 28.3 % — LOW (ref 39–50)
HGB BLD-MCNC: 9.7 G/DL — LOW (ref 13–17)
MAGNESIUM SERPL-MCNC: 1.6 MG/DL — SIGNIFICANT CHANGE UP (ref 1.6–2.6)
MCHC RBC-ENTMCNC: 32.1 PG — SIGNIFICANT CHANGE UP (ref 27–34)
MCHC RBC-ENTMCNC: 34.3 GM/DL — SIGNIFICANT CHANGE UP (ref 32–36)
MCV RBC AUTO: 93.7 FL — SIGNIFICANT CHANGE UP (ref 80–100)
NRBC # BLD: 0 /100 WBCS — SIGNIFICANT CHANGE UP (ref 0–0)
PHOSPHATE SERPL-MCNC: 3.2 MG/DL — SIGNIFICANT CHANGE UP (ref 2.5–4.5)
PLATELET # BLD AUTO: 177 K/UL — SIGNIFICANT CHANGE UP (ref 150–400)
POTASSIUM SERPL-MCNC: 4.3 MMOL/L — SIGNIFICANT CHANGE UP (ref 3.5–5.3)
POTASSIUM SERPL-SCNC: 4.3 MMOL/L — SIGNIFICANT CHANGE UP (ref 3.5–5.3)
PROT SERPL-MCNC: 6.5 G/DL — SIGNIFICANT CHANGE UP (ref 6–8.3)
RBC # BLD: 3.02 M/UL — LOW (ref 4.2–5.8)
RBC # FLD: 15.1 % — HIGH (ref 10.3–14.5)
SODIUM SERPL-SCNC: 134 MMOL/L — LOW (ref 135–145)
VANCOMYCIN TROUGH SERPL-MCNC: 8.8 UG/ML — LOW (ref 10–20)
WBC # BLD: 2.86 K/UL — LOW (ref 3.8–10.5)
WBC # FLD AUTO: 2.86 K/UL — LOW (ref 3.8–10.5)

## 2022-12-26 RX ADMIN — Medication 100 MILLIGRAM(S): at 06:10

## 2022-12-26 RX ADMIN — PANTOPRAZOLE SODIUM 40 MILLIGRAM(S): 20 TABLET, DELAYED RELEASE ORAL at 06:11

## 2022-12-26 RX ADMIN — OXYCODONE AND ACETAMINOPHEN 1 TABLET(S): 5; 325 TABLET ORAL at 03:53

## 2022-12-26 RX ADMIN — OXYCODONE AND ACETAMINOPHEN 1 TABLET(S): 5; 325 TABLET ORAL at 18:59

## 2022-12-26 RX ADMIN — Medication 250 MILLIGRAM(S): at 08:10

## 2022-12-26 RX ADMIN — Medication 100 MILLIGRAM(S): at 22:24

## 2022-12-26 RX ADMIN — OXYCODONE AND ACETAMINOPHEN 1 TABLET(S): 5; 325 TABLET ORAL at 03:23

## 2022-12-26 RX ADMIN — MEROPENEM 100 MILLIGRAM(S): 1 INJECTION INTRAVENOUS at 13:22

## 2022-12-26 RX ADMIN — CHLORHEXIDINE GLUCONATE 1 APPLICATION(S): 213 SOLUTION TOPICAL at 12:17

## 2022-12-26 RX ADMIN — MEROPENEM 100 MILLIGRAM(S): 1 INJECTION INTRAVENOUS at 06:10

## 2022-12-26 RX ADMIN — Medication 100 MILLIGRAM(S): at 13:25

## 2022-12-26 RX ADMIN — OXYCODONE AND ACETAMINOPHEN 1 TABLET(S): 5; 325 TABLET ORAL at 08:14

## 2022-12-26 RX ADMIN — FINASTERIDE 5 MILLIGRAM(S): 5 TABLET, FILM COATED ORAL at 12:15

## 2022-12-26 RX ADMIN — TAMSULOSIN HYDROCHLORIDE 0.4 MILLIGRAM(S): 0.4 CAPSULE ORAL at 22:24

## 2022-12-26 RX ADMIN — ATORVASTATIN CALCIUM 10 MILLIGRAM(S): 80 TABLET, FILM COATED ORAL at 22:24

## 2022-12-26 RX ADMIN — Medication 5 MILLIGRAM(S): at 12:15

## 2022-12-26 RX ADMIN — MEROPENEM 100 MILLIGRAM(S): 1 INJECTION INTRAVENOUS at 22:23

## 2022-12-26 RX ADMIN — OXYCODONE AND ACETAMINOPHEN 1 TABLET(S): 5; 325 TABLET ORAL at 09:15

## 2022-12-26 RX ADMIN — Medication 250 MILLIGRAM(S): at 18:18

## 2022-12-26 RX ADMIN — OXYCODONE AND ACETAMINOPHEN 1 TABLET(S): 5; 325 TABLET ORAL at 19:19

## 2022-12-26 NOTE — PROGRESS NOTE ADULT - SUBJECTIVE AND OBJECTIVE BOX
[   ] ICU                                          [   ] CCU                                      [ X  ] Medical Floor      Patient is a 67 year old male with abdominal pain. GI consulted to evaluate.        Patient is a 67 year old male, with past medical history significant for DM, HTN, HLD, AF on Eliquis, PVD, presents with c/o vomiting and right sided abdominal pain. Patient recently had cholecystectomy and ERCP with sphincterotomy prior to discharge. patient presented with c/o sharp right sided abdominal pain 8/10 persistent with nausea, vomiting and melena. Patient had repeat ERCP found to have bleeding from sphincterotomy site. CBD stended and clips place to control bleeding. No hematemesis, hematochezia, chest pain, SOB, cough, hematuria or dysuria reported.    Patient appears comfortable. No new complaints reported, No abdominal pain, N/V, hematemesis, hematochezia, melena, fever, chills, chest pain, SOB, cough or diarrhea reported.       PAIN MANAGEMENT:  Pain Scale:                 /10  Pain Location:         PAST MEDICAL HISTORY  COPD (chronic obstructive pulmonary disease)    Diabetes    PVD (peripheral vascular disease)    Chronic atrial fibrillation    GERD (gastroesophageal reflux disease)    MDD (major depressive disorder)    BPH (benign prostatic hyperplasia)        PAST SURGICAL HISTORY  Cholecystectomy    Allergies    morphine (Unknown)  penicillin (Unknown)  shellfish (Unknown)    Intolerances  None       SOCIAL HISTORY  Advanced Directives:       [ X ] Full Code       [  ] DNR  Marital Status:         [  ] M      [ X ] S      [  ] D       [  ] W  Children:       [ X ] Yes      [  ] No  Occupation:        [  ] Employed       [  X] Unemployed       [  ] Retired  Diet:       [ X ] Regular       [  ] PEG feeding          [  ] NG tube feeding  Drug Use:           [ X ] Patient denied          [  ] Yes  Alcohol:           [ X ] No             [  ] Yes (socially)         [  ] Yes (chronic)  Tobacco:           [  ] Yes           [ X ] No      FAMILY HISTORY  [ X ] Heart Disease            [ X ] Diabetes             [ X ] HTN             [  ] Colon Cancer             [  ] Stomach Cancer              [  ] Pancreatic Cancer      VITALS  Vital Signs Last 24 Hrs  T(C): 36.2 (26 Dec 2022 13:00), Max: 37.2 (25 Dec 2022 21:03)  T(F): 97.2 (26 Dec 2022 13:00), Max: 98.9 (25 Dec 2022 21:03)  HR: 93 (26 Dec 2022 13:00) (89 - 111)  BP: 114/76 (26 Dec 2022 13:00) (101/64 - 121/79)  BP(mean): 85 (26 Dec 2022 06:06) (76 - 85)  RR: 18 (26 Dec 2022 13:00) (18 - 21)  SpO2: 97% (26 Dec 2022 13:00) (85% - 98%)    Parameters below as of 26 Dec 2022 13:00  Patient On (Oxygen Delivery Method): nasal cannula  O2 Flow (L/min): 4       MEDICATIONS  (STANDING):  atorvastatin 10 milliGRAM(s) Oral at bedtime  chlorhexidine 2% Cloths 1 Application(s) Topical daily  finasteride 5 milliGRAM(s) Oral daily  insulin lispro (ADMELOG) corrective regimen sliding scale   SubCutaneous three times a day before meals  insulin lispro (ADMELOG) corrective regimen sliding scale   SubCutaneous at bedtime  meropenem  IVPB 1000 milliGRAM(s) IV Intermittent every 8 hours  pantoprazole    Tablet 40 milliGRAM(s) Oral before breakfast  pregabalin 100 milliGRAM(s) Oral three times a day  tamsulosin 0.4 milliGRAM(s) Oral at bedtime  vancomycin  IVPB 1000 milliGRAM(s) IV Intermittent every 12 hours    MEDICATIONS  (PRN):  aluminum hydroxide/magnesium hydroxide/simethicone Suspension 30 milliLiter(s) Oral every 4 hours PRN Dyspepsia  baclofen 5 milliGRAM(s) Oral every 12 hours PRN Musculoskeletal Pain  melatonin 3 milliGRAM(s) Oral at bedtime PRN Insomnia  ondansetron Injectable 4 milliGRAM(s) IV Push every 8 hours PRN Nausea and/or Vomiting  oxycodone    5 mG/acetaminophen 325 mG 1 Tablet(s) Oral every 4 hours PRN Severe Pain (7 - 10)  sodium chloride 0.9% lock flush 10 milliLiter(s) IV Push every 1 hour PRN Pre/post blood products, medications, blood draw, and to maintain line patency                            9.7    2.86  )-----------( 177      ( 26 Dec 2022 07:10 )             28.3       12-26    134<L>  |  93<L>  |  4<L>  ----------------------------<  108<H>  4.3   |  33<H>  |  0.40<L>    Ca    8.5      26 Dec 2022 07:10  Phos  3.2     12-26  Mg     1.6     12-26    TPro  6.5  /  Alb  1.9<L>  /  TBili  0.2  /  DBili  x   /  AST  30  /  ALT  35  /  AlkPhos  165<H>  12-26

## 2022-12-26 NOTE — PROGRESS NOTE ADULT - SUBJECTIVE AND OBJECTIVE BOX
PATIENT SEEN AND EXAMINED ON :- 12/26/22  DATE OF SERVICE:  12/26/22           Interim events noted,Labs ,Radiological studies and Cardiology tests reviewed .       HOSPITAL COURSE: HPI:  Patient is a 67 year old male, vaccinated, from [facility] with PMHx of DM, HTN, HLD, AF on Eliquis, PVD, presents with c/o vomiting and right sided abdominal pain. patient states yesterday after lunch he had a sharp right sided abdominal pain 8/10 persistent with nausea and vomiting. He also endorsed chills and rigors, unsure if he spiked a fever. He denies any changes in bowel habits, chest pain, shortness of breath or palpitations. Patient had two episodes of melena in ED. He was discharged yesterday from hospital. He had choledocholithiasis,  CT A/P showed: Cholecystectomy with dilated CBD up to 1.7 cm abrupt cut off of the distal CBD and  mild pancreatic duct dilatation. 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 He underwent  ERCP on 12/16 biliary sludge removal with biliary sphincterotomy and balloon extraction. He was discharged after symptom improvement and out patient gastric emptying study.     In ED:   Vitals: BP: 81/59mmHg  HR:  118  RR: 97% RA   WBC 14.7 k  elevated LFT, lactate   s/p  Rocephin 1LNS and zofran  (22 Dec 2022 12:00)      INTERIM EVENTS:Patient seen at bedside ,interim events noted.      PMH -reviewed admission note, no change since admission  HEART FAILURE: Acute[ ]Chronic[ ] Systolic[ ] Diastolic[ ] Combined Systolic and Diastolic[ ]  CAD[ ] CABG[ ] PCI[ ]  DEVICES[ ] PPM[ ] ICD[ ] ILR[ ]  ATRIAL FIBRILLATION[ ] Paroxysmal[ ] Permanent[ ] CHADS2-[  ]  DILCIA[ ] CKD1[ ] CKD2[ ] CKD3[ ] CKD4[ ] ESRD[ ]  COPD[ ] HTN[ ]   DM[ ] Type1[ ] Type 2[ ]   CVA[ ] Paresis[ ]    AMBULATION: Assisted[ ] Cane/walker[ ] Independent[ ]    MEDICATIONS  (STANDING):  atorvastatin 10 milliGRAM(s) Oral at bedtime  chlorhexidine 2% Cloths 1 Application(s) Topical daily  finasteride 5 milliGRAM(s) Oral daily  insulin lispro (ADMELOG) corrective regimen sliding scale   SubCutaneous three times a day before meals  insulin lispro (ADMELOG) corrective regimen sliding scale   SubCutaneous at bedtime  meropenem  IVPB 1000 milliGRAM(s) IV Intermittent every 8 hours  pantoprazole    Tablet 40 milliGRAM(s) Oral before breakfast  pregabalin 100 milliGRAM(s) Oral three times a day  tamsulosin 0.4 milliGRAM(s) Oral at bedtime  vancomycin  IVPB 1000 milliGRAM(s) IV Intermittent every 12 hours    MEDICATIONS  (PRN):  aluminum hydroxide/magnesium hydroxide/simethicone Suspension 30 milliLiter(s) Oral every 4 hours PRN Dyspepsia  baclofen 5 milliGRAM(s) Oral every 12 hours PRN Musculoskeletal Pain  melatonin 3 milliGRAM(s) Oral at bedtime PRN Insomnia  ondansetron Injectable 4 milliGRAM(s) IV Push every 8 hours PRN Nausea and/or Vomiting  oxycodone    5 mG/acetaminophen 325 mG 1 Tablet(s) Oral every 4 hours PRN Severe Pain (7 - 10)  sodium chloride 0.9% lock flush 10 milliLiter(s) IV Push every 1 hour PRN Pre/post blood products, medications, blood draw, and to maintain line patency            REVIEW OF SYSTEMS:  Constitutional: [ ] fever, [ ]weight loss,  [ ]fatigue [ ]weight gain  Eyes: [ ] visual changes  Respiratory: [ ]shortness of breath;  [ ] cough, [ ]wheezing, [ ]chills, [ ]hemoptysis  Cardiovascular: [ ] chest pain, [ ]palpitations, [ ]dizziness,  [ ]leg swelling[ ]orthopnea[ ]PND  Gastrointestinal: [ ] abdominal pain, [ ]nausea, [ ]vomiting,  [ ]diarrhea [ ]Constipation [ ]Melena  Genitourinary: [ ] dysuria, [ ] hematuria [ ]Cardoza  Neurologic: [ ] headaches [ ] tremors[ ]weakness [ ]Paralysis Right[ ] Left[ ]  Skin: [ ] itching, [ ]burning, [ ] rashes  Endocrine: [ ] heat or cold intolerance  Musculoskeletal: [ ] joint pain or swelling; [ ] muscle, back, or extremity pain  Psychiatric: [ ] depression, [ ]anxiety, [ ]mood swings, or [ ]difficulty sleeping  Hematologic: [ ] easy bruising, [ ] bleeding gums    [ ] All remaining systems negative except as per above.   [ ]Unable to obtain.  [x] No change in ROS since admission      Vital Signs Last 24 Hrs  T(C): 36.2 (26 Dec 2022 13:00), Max: 37.2 (25 Dec 2022 21:03)  T(F): 97.2 (26 Dec 2022 13:00), Max: 98.9 (25 Dec 2022 21:03)  HR: 93 (26 Dec 2022 13:00) (90 - 105)  BP: 114/76 (26 Dec 2022 13:00) (101/64 - 121/79)  BP(mean): 85 (26 Dec 2022 06:06) (76 - 85)  RR: 18 (26 Dec 2022 13:00) (18 - 18)  SpO2: 97% (26 Dec 2022 13:00) (94% - 97%)    Parameters below as of 26 Dec 2022 13:00  Patient On (Oxygen Delivery Method): nasal cannula  O2 Flow (L/min): 4    I&O's Summary    25 Dec 2022 07:01  -  26 Dec 2022 07:00  --------------------------------------------------------  IN: 860 mL / OUT: 3200 mL / NET: -2340 mL    26 Dec 2022 07:01  -  26 Dec 2022 18:46  --------------------------------------------------------  IN: 0 mL / OUT: 800 mL / NET: -800 mL        PHYSICAL EXAM:  General: No acute distress BMI-  HEENT: EOMI, PERRL  Neck: Supple, [ ] JVD  Lungs: Equal air entry bilaterally; [ ] rales [ ] wheezing [ ] rhonchi  Heart: Regular rate and rhythm; [x ] murmur   2/6 [ x] systolic [ ] diastolic [ ] radiation[ ] rubs [ ]  gallops  Abdomen: Nontender, bowel sounds present  Extremities: No clubbing, cyanosis, [ ] edema [ ]Pulses  equal and intact  Nervous system:  Alert & Oriented X3, no focal deficits  Psychiatric: Normal affect  Skin: No rashes or lesions    LABS:  12-26    134<L>  |  93<L>  |  4<L>  ----------------------------<  108<H>  4.3   |  33<H>  |  0.40<L>    Ca    8.5      26 Dec 2022 07:10  Phos  3.2     12-26  Mg     1.6     12-26    TPro  6.5  /  Alb  1.9<L>  /  TBili  0.2  /  DBili  x   /  AST  30  /  ALT  35  /  AlkPhos  165<H>  12-26    Creatinine Trend: 0.40<--, 0.53<--, 0.50<--, 0.58<--, 0.61<--, 1.04<--                        9.7    2.86  )-----------( 177      ( 26 Dec 2022 07:10 )             28.3

## 2022-12-26 NOTE — PROGRESS NOTE ADULT - ASSESSMENT
Patient is a 67 year old male, vaccinated, from [facility] with PMHx of DM, HTN, HLD, AF on Eliquis, PVD, presents with c/o vomiting and right sided abdominal pain. Admitted to ICU for hypovolemic vs septic shock     Assessment:  - Septic shock   - Cholangitis   - Melena   - Diverticulosis   - Afib   - HTN  - DM    Plan:  Neuro:  - AAOx3    CV  # Septic shock vs hypovolemic shock    - 2 episodes of melena   - US Abdomen: Biliary dilatation and hepatic steatosis  - CT A/P: dilation of the common bile duct and main pancreatic duct again noted.. New RLL opacity  - WBC trended down to 7.76  - c/w Meropenem   - repeat ERCP on 12/23 performed, 2 clips and 1 stent placed  - f/u  UCX  - bacterial culture: positive gram variable coccobacilli  - GI consulted Dr Villanueva/ Judy(ERCP)  - ID Dr. Novoa.    # Afib  - Dr. Sharma consulted who recommended to continue holding Eliquis    # HTN  - on admission low  BP: 81/59mmHg  S/P 1L NS bolus   - home medications: nifedipine and metoprolol, hold as blood pressure on the softer side   - c/w IVF   monitor BP    Pulm:  # Aspiration PNA   - CT abd: New airspace opacifications in the right lower lung zone  - C/w Meropenem   - ID Dr. Novoa following     ID:  # Septic shock    - WBC trending down, on 12/23: 7.76   - Lactate trended down on 12/21 to 1.8   - f/u UCX  -  bacterial culture: positive gram variable coccobacilli   - f/u repeat blood cultures on 12/25 AM   - GI consulted Dr. Kim(ERCP)  - ID Dr. Novoa.      Nephro:  No active issues     GI:  # Cholangitis   - Fever, RUQ pain, jaundice, leukocytosis, Transaminitis   - US Abdomen: Biliary dilatation and hepatic steatosis  - CT A/P: dilation of the common bile duct and main pancreatic duct again noted.. New RLL opacity  - ERCP 12/16: biliary sphincterotomy and sludge removal    - repeat ERCP on 12/23 performed, 2 clips and 1 stent placed  - Continue Meropenem   - started on vancomycin on 12/24  - bacterial culture: positive gram variable coccobacilli   - f/u repeat blood cultures on 12/25 AM   - Follow Bili and T bili     # GI Bleed   - 2 episodes of melena  - H/H stable, 11.4/34.1  - likely post sphincterotomy bleed resulting in cholangitis as per GI   - CT: colonic diverticulosis   - c/w Protonix drip   - will change to Protonix BID after 72 hrs as per GI recs   - Hb trended down , on 12/23: 9.9 ( no active bleeding)   - repeat CBC   - Transfuse for Hb<7   - started on clear diet , pt tolerating well, will advance as tolerated       Heme:  - no indication for transfusion       Endo:  # DM:   - ISS Low scale        Prophy:  - SCD for DVT ppx  - Protonix     Dispo:  - monitor in the ICU

## 2022-12-27 ENCOUNTER — TRANSCRIPTION ENCOUNTER (OUTPATIENT)
Age: 67
End: 2022-12-27

## 2022-12-27 DIAGNOSIS — I48.0 PAROXYSMAL ATRIAL FIBRILLATION: ICD-10-CM

## 2022-12-27 DIAGNOSIS — N40.0 BENIGN PROSTATIC HYPERPLASIA WITHOUT LOWER URINARY TRACT SYMPTOMS: ICD-10-CM

## 2022-12-27 DIAGNOSIS — R78.81 BACTEREMIA: ICD-10-CM

## 2022-12-27 DIAGNOSIS — Z02.9 ENCOUNTER FOR ADMINISTRATIVE EXAMINATIONS, UNSPECIFIED: ICD-10-CM

## 2022-12-27 DIAGNOSIS — J69.0 PNEUMONITIS DUE TO INHALATION OF FOOD AND VOMIT: ICD-10-CM

## 2022-12-27 DIAGNOSIS — K83.09 OTHER CHOLANGITIS: ICD-10-CM

## 2022-12-27 LAB
ALBUMIN SERPL ELPH-MCNC: 1.7 G/DL — LOW (ref 3.5–5)
ALP SERPL-CCNC: 154 U/L — HIGH (ref 40–120)
ALT FLD-CCNC: 31 U/L DA — SIGNIFICANT CHANGE UP (ref 10–60)
ANION GAP SERPL CALC-SCNC: 6 MMOL/L — SIGNIFICANT CHANGE UP (ref 5–17)
AST SERPL-CCNC: 29 U/L — SIGNIFICANT CHANGE UP (ref 10–40)
BILIRUB SERPL-MCNC: 0.3 MG/DL — SIGNIFICANT CHANGE UP (ref 0.2–1.2)
BUN SERPL-MCNC: 6 MG/DL — LOW (ref 7–18)
CALCIUM SERPL-MCNC: 8.3 MG/DL — LOW (ref 8.4–10.5)
CHLORIDE SERPL-SCNC: 96 MMOL/L — SIGNIFICANT CHANGE UP (ref 96–108)
CO2 SERPL-SCNC: 32 MMOL/L — HIGH (ref 22–31)
CREAT SERPL-MCNC: 0.47 MG/DL — LOW (ref 0.5–1.3)
CULTURE RESULTS: SIGNIFICANT CHANGE UP
CULTURE RESULTS: SIGNIFICANT CHANGE UP
EGFR: 113 ML/MIN/1.73M2 — SIGNIFICANT CHANGE UP
GLUCOSE BLDC GLUCOMTR-MCNC: 102 MG/DL — HIGH (ref 70–99)
GLUCOSE BLDC GLUCOMTR-MCNC: 117 MG/DL — HIGH (ref 70–99)
GLUCOSE BLDC GLUCOMTR-MCNC: 123 MG/DL — HIGH (ref 70–99)
GLUCOSE BLDC GLUCOMTR-MCNC: 127 MG/DL — HIGH (ref 70–99)
GLUCOSE SERPL-MCNC: 125 MG/DL — HIGH (ref 70–99)
POTASSIUM SERPL-MCNC: 4.4 MMOL/L — SIGNIFICANT CHANGE UP (ref 3.5–5.3)
POTASSIUM SERPL-SCNC: 4.4 MMOL/L — SIGNIFICANT CHANGE UP (ref 3.5–5.3)
PROT SERPL-MCNC: 6.6 G/DL — SIGNIFICANT CHANGE UP (ref 6–8.3)
SODIUM SERPL-SCNC: 134 MMOL/L — LOW (ref 135–145)
SPECIMEN SOURCE: SIGNIFICANT CHANGE UP
SPECIMEN SOURCE: SIGNIFICANT CHANGE UP
VANCOMYCIN TROUGH SERPL-MCNC: 11.5 UG/ML — SIGNIFICANT CHANGE UP (ref 10–20)

## 2022-12-27 RX ORDER — SODIUM CHLORIDE 9 MG/ML
1000 INJECTION INTRAMUSCULAR; INTRAVENOUS; SUBCUTANEOUS ONCE
Refills: 0 | Status: COMPLETED | OUTPATIENT
Start: 2022-12-27 | End: 2022-12-27

## 2022-12-27 RX ORDER — DIGOXIN 250 MCG
250 TABLET ORAL DAILY
Refills: 0 | Status: DISCONTINUED | OUTPATIENT
Start: 2022-12-27 | End: 2023-01-06

## 2022-12-27 RX ADMIN — MEROPENEM 100 MILLIGRAM(S): 1 INJECTION INTRAVENOUS at 21:13

## 2022-12-27 RX ADMIN — OXYCODONE AND ACETAMINOPHEN 1 TABLET(S): 5; 325 TABLET ORAL at 12:57

## 2022-12-27 RX ADMIN — Medication 250 MICROGRAM(S): at 12:05

## 2022-12-27 RX ADMIN — MEROPENEM 100 MILLIGRAM(S): 1 INJECTION INTRAVENOUS at 05:20

## 2022-12-27 RX ADMIN — Medication 100 MILLIGRAM(S): at 13:29

## 2022-12-27 RX ADMIN — PANTOPRAZOLE SODIUM 40 MILLIGRAM(S): 20 TABLET, DELAYED RELEASE ORAL at 05:55

## 2022-12-27 RX ADMIN — OXYCODONE AND ACETAMINOPHEN 1 TABLET(S): 5; 325 TABLET ORAL at 18:00

## 2022-12-27 RX ADMIN — FINASTERIDE 5 MILLIGRAM(S): 5 TABLET, FILM COATED ORAL at 12:04

## 2022-12-27 RX ADMIN — Medication 250 MILLIGRAM(S): at 05:50

## 2022-12-27 RX ADMIN — OXYCODONE AND ACETAMINOPHEN 1 TABLET(S): 5; 325 TABLET ORAL at 03:50

## 2022-12-27 RX ADMIN — CHLORHEXIDINE GLUCONATE 1 APPLICATION(S): 213 SOLUTION TOPICAL at 12:59

## 2022-12-27 RX ADMIN — Medication 100 MILLIGRAM(S): at 05:54

## 2022-12-27 RX ADMIN — OXYCODONE AND ACETAMINOPHEN 1 TABLET(S): 5; 325 TABLET ORAL at 04:20

## 2022-12-27 RX ADMIN — Medication 100 MILLIGRAM(S): at 21:24

## 2022-12-27 RX ADMIN — OXYCODONE AND ACETAMINOPHEN 1 TABLET(S): 5; 325 TABLET ORAL at 17:20

## 2022-12-27 RX ADMIN — OXYCODONE AND ACETAMINOPHEN 1 TABLET(S): 5; 325 TABLET ORAL at 09:20

## 2022-12-27 RX ADMIN — ATORVASTATIN CALCIUM 10 MILLIGRAM(S): 80 TABLET, FILM COATED ORAL at 21:14

## 2022-12-27 RX ADMIN — SODIUM CHLORIDE 1000 MILLILITER(S): 9 INJECTION INTRAMUSCULAR; INTRAVENOUS; SUBCUTANEOUS at 09:47

## 2022-12-27 RX ADMIN — MEROPENEM 100 MILLIGRAM(S): 1 INJECTION INTRAVENOUS at 13:29

## 2022-12-27 RX ADMIN — OXYCODONE AND ACETAMINOPHEN 1 TABLET(S): 5; 325 TABLET ORAL at 08:48

## 2022-12-27 RX ADMIN — OXYCODONE AND ACETAMINOPHEN 1 TABLET(S): 5; 325 TABLET ORAL at 13:30

## 2022-12-27 RX ADMIN — TAMSULOSIN HYDROCHLORIDE 0.4 MILLIGRAM(S): 0.4 CAPSULE ORAL at 21:14

## 2022-12-27 NOTE — CHART NOTE - NSCHARTNOTEFT_GEN_A_CORE
Reassessment: Nutrition consult requested for assessment on 22    Patient is a 67y old  Male who presents with a chief complaint of Abdominal pain (27 Dec 2022 09:37)      Factors impacting intake: [ ] none [ ] nausea  [ ] vomiting [ ] diarrhea [ ] constipation  [ ]chewing problems [ ] swallowing issues  [X ] other: abdominal pain, hypotensive and tachycardic,  DM, HTN, HLD, Afib, PVD     Diet Prescription:   Intake:     Daily Weight in k.3 (25 Dec 2022 08:00)  Weight in k.9 (24 Dec 2022 07:22)  Weight in k.4 (23 Dec 2022 07:00)    % Weight Change    Pertinent Medications: MEDICATIONS  (STANDING):  atorvastatin 10 milliGRAM(s) Oral at bedtime  chlorhexidine 2% Cloths 1 Application(s) Topical daily  digoxin     Tablet 250 MICROGram(s) Oral daily  finasteride 5 milliGRAM(s) Oral daily  insulin lispro (ADMELOG) corrective regimen sliding scale   SubCutaneous three times a day before meals  insulin lispro (ADMELOG) corrective regimen sliding scale   SubCutaneous at bedtime  meropenem  IVPB 1000 milliGRAM(s) IV Intermittent every 8 hours  pantoprazole    Tablet 40 milliGRAM(s) Oral before breakfast  pregabalin 100 milliGRAM(s) Oral three times a day  tamsulosin 0.4 milliGRAM(s) Oral at bedtime  vancomycin  IVPB 1000 milliGRAM(s) IV Intermittent every 12 hours    MEDICATIONS  (PRN):  aluminum hydroxide/magnesium hydroxide/simethicone Suspension 30 milliLiter(s) Oral every 4 hours PRN Dyspepsia  baclofen 5 milliGRAM(s) Oral every 12 hours PRN Musculoskeletal Pain  melatonin 3 milliGRAM(s) Oral at bedtime PRN Insomnia  ondansetron Injectable 4 milliGRAM(s) IV Push every 8 hours PRN Nausea and/or Vomiting  oxycodone    5 mG/acetaminophen 325 mG 1 Tablet(s) Oral every 4 hours PRN Severe Pain (7 - 10)  sodium chloride 0.9% lock flush 10 milliLiter(s) IV Push every 1 hour PRN Pre/post blood products, medications, blood draw, and to maintain line patency    Pertinent Labs:  Na134 mmol/L<L> Glu 125 mg/dL<H> K+ 4.4 mmol/L Cr  0.47 mg/dL<L> BUN 6 mg/dL<L>  Phos 3.2 mg/dL  Alb 1.7 g/dL<L>     CAPILLARY BLOOD GLUCOSE      POCT Blood Glucose.: 117 mg/dL (27 Dec 2022 11:27)  POCT Blood Glucose.: 102 mg/dL (27 Dec 2022 08:01)  POCT Blood Glucose.: 101 mg/dL (26 Dec 2022 21:12)  POCT Blood Glucose.: 164 mg/dL (26 Dec 2022 16:44)    Skin:     Estimated Needs:   [ ] no change since previous assessment  [ ] recalculated:     Previous Nutrition Diagnosis:   [ ] Inadequate Energy Intake [ ]Inadequate Oral Intake [ ] Excessive Energy Intake   [ ] Underweight [ ] Increased Nutrient Needs [ ] Overweight/Obesity   [ ] Altered GI Function [ ] Unintended Weight Loss [ ] Food & Nutrition Related Knowledge Deficit [ ] Malnutrition     Nutrition Diagnosis is [ ] ongoing  [ ] resolved [ ] not applicable     New Nutrition Diagnosis: [ ] not applicable       Interventions:   Recommend  [ ] Change Diet To:  [ ] Nutrition Supplement  [ ] Nutrition Support  [ ] Other:     Monitoring and Evaluation:   [ ] PO intake [ x ] Tolerance to diet prescription [ x ] weights [ x ] labs[ x ] follow up per protocol  [ ] other: Reassessment: Nutrition consult requested for assessment on 22    Patient is a 67y old  Male who presents with a chief complaint of Abdominal pain (27 Dec 2022 09:37)      Factors impacting intake: [ ] none [ ] nausea  [ ] vomiting [ ] diarrhea [ ] constipation  [ ]chewing problems [ ] swallowing issues  [X ] other: abdominal pain, septic shock, recently cholecystectomy and ERCP with sphincterotomy 22    Diet Prescription: Soft & Bite Sized, consistent carbohydrate (no snack), Glucerna Shake, 1 serving TID - 22    Intake: Patient seen by RD with initial assessment completed on 22 & down grade from ICU to medical floor on 22. S/p repeat ERCP found to have bleeding from sphincterotomy site, CBD stented and clips place to control bleeding, GI/team following. Visited pt. alert & awake, states tolerated "soft & bite" consistency, no reports of n/vomiting or abdominal pain, requesting Glucerna Shakes at mealtimes, obtained food preferences & allergic to fish/shellfish noted, updated kitchen/Diet Tech, consuming ~50% of breakfast/lunch meals, per flow sheet intake 26-50%, rec. c/w diet as ordered & Glucerna Shake TID daily. On IV Abx. tx. RD available.       Daily Weight in k.3 (25 Dec 2022 08:00)  Weight in k.9 (24 Dec 2022 07:22)  Weight in k.4 (23 Dec 2022 07:00)    % Weight Change: wt. 4.5% wt. increased noted possible to fluid shifts?    Pertinent Medications: MEDICATIONS  (STANDING):  atorvastatin 10 milliGRAM(s) Oral at bedtime  chlorhexidine 2% Cloths 1 Application(s) Topical daily  digoxin     Tablet 250 MICROGram(s) Oral daily  finasteride 5 milliGRAM(s) Oral daily  insulin lispro (ADMELOG) corrective regimen sliding scale   SubCutaneous three times a day before meals  insulin lispro (ADMELOG) corrective regimen sliding scale   SubCutaneous at bedtime  meropenem  IVPB 1000 milliGRAM(s) IV Intermittent every 8 hours  pantoprazole    Tablet 40 milliGRAM(s) Oral before breakfast  pregabalin 100 milliGRAM(s) Oral three times a day  tamsulosin 0.4 milliGRAM(s) Oral at bedtime  vancomycin  IVPB 1000 milliGRAM(s) IV Intermittent every 12 hours    MEDICATIONS  (PRN):  aluminum hydroxide/magnesium hydroxide/simethicone Suspension 30 milliLiter(s) Oral every 4 hours PRN Dyspepsia  baclofen 5 milliGRAM(s) Oral every 12 hours PRN Musculoskeletal Pain  melatonin 3 milliGRAM(s) Oral at bedtime PRN Insomnia  ondansetron Injectable 4 milliGRAM(s) IV Push every 8 hours PRN Nausea and/or Vomiting  oxycodone    5 mG/acetaminophen 325 mG 1 Tablet(s) Oral every 4 hours PRN Severe Pain (7 - 10)  sodium chloride 0.9% lock flush 10 milliLiter(s) IV Push every 1 hour PRN Pre/post blood products, medications, blood draw, and to maintain line patency    Pertinent Labs:  Na134 mmol/L<L> Glu 125 mg/dL<H> K+ 4.4 mmol/L Cr  0.47 mg/dL<L> BUN 6 mg/dL<L>  Phos 3.2 mg/dL  Alb 1.7 g/dL<L>     CAPILLARY BLOOD GLUCOSE      POCT Blood Glucose.: 117 mg/dL (27 Dec 2022 11:27)  POCT Blood Glucose.: 102 mg/dL (27 Dec 2022 08:01)  POCT Blood Glucose.: 101 mg/dL (26 Dec 2022 21:12)  POCT Blood Glucose.: 164 mg/dL (26 Dec 2022 16:44)    Skin: Intact    Estimated Needs:   [X ] no change since previous assessment  [ ] recalculated:     Previous Nutrition Diagnosis:   [ ] Inadequate Energy Intake [X ]Inadequate Oral Intake [ ] Excessive Energy Intake   [ ] Underweight [ ] Increased Nutrient Needs [ ] Overweight/Obesity   [ ] Altered GI Function [ ] Unintended Weight Loss [ ] Food & Nutrition Related Knowledge Deficit [ ] Malnutrition     Nutrition Diagnosis is [X ] ongoing  [ ] resolved [ ] not applicable     Interventions: To meet nutrition needs     Recommend  [ ] Change Diet To:  [X ] Nutrition Supplement: Add MVI/minerals daily   [ ] Nutrition Support  [X ] Other: Nursing to continue with meal set up and encouragement with aspiration precaution     Monitoring and Evaluation:   [X] PO intake [ x ] Tolerance to diet prescription [ x ] weights [ x ] labs[ x ] follow up per protocol  [ ] other:

## 2022-12-27 NOTE — PROGRESS NOTE ADULT - PROBLEM SELECTOR PLAN 6
- c/w protonix for GI prophylaxis   - c/w SCD for DVT prophylaxis - c/w protonix for GI prophylaxis   - c/w SCD for DVT prophylaxis  - ERCP procedure performed 12/23, eliquis hold per GI Dr. Kim due to

## 2022-12-27 NOTE — PROGRESS NOTE ADULT - PROBLEM SELECTOR PLAN 6
- c/w protonix for GI prophylaxis   - c/w SCD for DVT prophylaxis  - ERCP procedure performed 12/23, eliquis hold per GI Dr. Kim due to

## 2022-12-27 NOTE — PROGRESS NOTE ADULT - PROBLEM SELECTOR PLAN 7
- Patient is from [facility]  - pending final repeat blood cultures 12/25  - remains on IV meropenem and vancomycin   - will likely return back to [facility] once optimized.

## 2022-12-27 NOTE — PROGRESS NOTE ADULT - PROBLEM SELECTOR PLAN 3
-   - EKG AM 12/27 sinus tachycardia   - Likely secondary to beta blockers being held at this time   - Started on Digoxin 250 mcg daily 12/27  - Cardio Dr. Sharma follows

## 2022-12-27 NOTE — PROGRESS NOTE ADULT - SUBJECTIVE AND OBJECTIVE BOX
67y Male    Meds:  meropenem  IVPB 1000 milliGRAM(s) IV Intermittent every 8 hours  vancomycin  IVPB 1000 milliGRAM(s) IV Intermittent every 12 hours    Allergies    morphine (Unknown)  penicillin (Unknown)  shellfish (Unknown)    Intolerances        VITALS:  Vital Signs Last 24 Hrs  T(C): 36.9 (27 Dec 2022 12:40), Max: 37.8 (26 Dec 2022 20:36)  T(F): 98.5 (27 Dec 2022 12:40), Max: 100 (26 Dec 2022 20:36)  HR: 112 (27 Dec 2022 15:30) (106 - 119)  BP: 113/64 (27 Dec 2022 15:30) (99/67 - 121/75)  BP(mean): 83 (27 Dec 2022 12:40) (73 - 83)  RR: 18 (27 Dec 2022 12:40) (18 - 18)  SpO2: 95% (27 Dec 2022 12:40) (95% - 98%)    Parameters below as of 27 Dec 2022 12:40  Patient On (Oxygen Delivery Method): nasal cannula  O2 Flow (L/min): 3      LABS/DIAGNOSTIC TESTS:                          9.7    2.86  )-----------( 177      ( 26 Dec 2022 07:10 )             28.3         12-27    134<L>  |  96  |  6<L>  ----------------------------<  125<H>  4.4   |  32<H>  |  0.47<L>    Ca    8.3<L>      27 Dec 2022 10:44  Phos  3.2     12-26  Mg     1.6     12-26    TPro  6.6  /  Alb  1.7<L>  /  TBili  0.3  /  DBili  x   /  AST  29  /  ALT  31  /  AlkPhos  154<H>  12-27      LIVER FUNCTIONS - ( 27 Dec 2022 10:44 )  Alb: 1.7 g/dL / Pro: 6.6 g/dL / ALK PHOS: 154 U/L / ALT: 31 U/L DA / AST: 29 U/L / GGT: x             CULTURES: .Blood Blood-Peripheral  12-25 @ 06:45   No growth to date.  --  --      .Blood Blood-Peripheral  12-25 @ 05:59   No growth to date.  --  --      .Stool Feces  12-22 @ 20:30   No enteric pathogens isolated.  (Stool culture examined for Salmonella,  Shigella, Campylobacter, Aeromonas, Plesiomonas,  Vibrio, E.coli O157 and Yersinia)  --  --      .Stool Feces  12-22 @ 20:20   No Protozoa seen by trichrome stain  No Helminths or Protozoa seen in formalin concentrate  performed by iodine stain  (routine O+P not evaluated for Microsporidia,  Cryptosporidia or Cyclospora)  One negative sample does not necessarily rule  out the presence of a parasitic infection.  --  --      .Blood Blood  12-22 @ 14:41   Growth in aerobic bottle: Gram Variable coccobacili  ***Blood Panel PCR results on this specimen are available  approximately 3 hours after the Gram stain result.***  Gram stain, PCR, and/or culture results may not always  correspond due to difference in methodologies.  ************************************************************  This PCR assay was performed by multiplex PCR. This  Assay tests for 66 bacterial and resistance gene targets.  Please refer to the [facility] test directory  at [email or web address] for details.  --  Blood Culture PCR      .Blood Blood  12-22 @ 14:36   No Growth Final  --  --      Catheterized Catheterized  12-22 @ 09:20   >100,000 CFU/ml Aerococcus species  "Aerococcus spp. are predictably susceptible to penicillin,  ampicillin, tetracycline, and vancomycin.  All isolates are  resistant to sulfonamides"  --  --      Clean Catch Clean Catch (Midstream)  12-09 @ 02:46   >100,000 CFU/ml Aerococcus species  "Aerococcus spp. are predictably susceptible to penicillin,  ampicillin, tetracycline, and vancomycin.  All isolates are  resistant to sulfonamides"  --  --            RADIOLOGY:      ROS:  [  ] UNABLE TO ELICIT 67y Male who is doing well but still c/o abdominal pain and chronic leg pain, he has no fevers( tmax 100) or chills, no diarrhea or other complaints. His repeat blood cultures are negative so far and his initial blood culture has yet to be identified.    Meds:  meropenem  IVPB 1000 milliGRAM(s) IV Intermittent every 8 hours  vancomycin  IVPB 1000 milliGRAM(s) IV Intermittent every 12 hours    Allergies    morphine (Unknown)  penicillin (Unknown)  shellfish (Unknown)    Intolerances        VITALS:  Vital Signs Last 24 Hrs  T(C): 36.9 (27 Dec 2022 12:40), Max: 37.8 (26 Dec 2022 20:36)  T(F): 98.5 (27 Dec 2022 12:40), Max: 100 (26 Dec 2022 20:36)  HR: 112 (27 Dec 2022 15:30) (106 - 119)  BP: 113/64 (27 Dec 2022 15:30) (99/67 - 121/75)  BP(mean): 83 (27 Dec 2022 12:40) (73 - 83)  RR: 18 (27 Dec 2022 12:40) (18 - 18)  SpO2: 95% (27 Dec 2022 12:40) (95% - 98%)    Parameters below as of 27 Dec 2022 12:40  Patient On (Oxygen Delivery Method): nasal cannula  O2 Flow (L/min): 3      LABS/DIAGNOSTIC TESTS:                          9.7    2.86  )-----------( 177      ( 26 Dec 2022 07:10 )             28.3         12-27    134<L>  |  96  |  6<L>  ----------------------------<  125<H>  4.4   |  32<H>  |  0.47<L>    Ca    8.3<L>      27 Dec 2022 10:44  Phos  3.2     12-26  Mg     1.6     12-26    TPro  6.6  /  Alb  1.7<L>  /  TBili  0.3  /  DBili  x   /  AST  29  /  ALT  31  /  AlkPhos  154<H>  12-27      LIVER FUNCTIONS - ( 27 Dec 2022 10:44 )  Alb: 1.7 g/dL / Pro: 6.6 g/dL / ALK PHOS: 154 U/L / ALT: 31 U/L DA / AST: 29 U/L / GGT: x             CULTURES: .Blood Blood-Peripheral  12-25 @ 06:45   No growth to date.  --  --      .Blood Blood-Peripheral  12-25 @ 05:59   No growth to date.  --  --      .Stool Feces  12-22 @ 20:30   No enteric pathogens isolated.  (Stool culture examined for Salmonella,  Shigella, Campylobacter, Aeromonas, Plesiomonas,  Vibrio, E.coli O157 and Yersinia)  --  --      .Stool Feces  12-22 @ 20:20   No Protozoa seen by trichrome stain  No Helminths or Protozoa seen in formalin concentrate  performed by iodine stain  (routine O+P not evaluated for Microsporidia,  Cryptosporidia or Cyclospora)  One negative sample does not necessarily rule  out the presence of a parasitic infection.  --  --      .Blood Blood  12-22 @ 14:41   Growth in aerobic bottle: Gram Variable coccobacili  ***Blood Panel PCR results on this specimen are available  approximately 3 hours after the Gram stain result.***  Gram stain, PCR, and/or culture results may not always  correspond due to difference in methodologies.  ************************************************************  This PCR assay was performed by multiplex PCR. This  Assay tests for 66 bacterial and resistance gene targets.  Please refer to the [facility] test directory  at [email or web address] for details.  --  Blood Culture PCR      .Blood Blood  12-22 @ 14:36   No Growth Final  --  --      Catheterized Catheterized  12-22 @ 09:20   >100,000 CFU/ml Aerococcus species  "Aerococcus spp. are predictably susceptible to penicillin,  ampicillin, tetracycline, and vancomycin.  All isolates are  resistant to sulfonamides"  --  --      Clean Catch Clean Catch (Midstream)  12-09 @ 02:46   >100,000 CFU/ml Aerococcus species  "Aerococcus spp. are predictably susceptible to penicillin,  ampicillin, tetracycline, and vancomycin.  All isolates are  resistant to sulfonamides"  --  --            RADIOLOGY:      ROS:  [  ] UNABLE TO ELICIT

## 2022-12-27 NOTE — DISCHARGE NOTE PROVIDER - CARE PROVIDER_API CALL
Raphael Lassiter  INTERNAL MEDICINE  [id number] [place] [address]  Phone: [phone]  Fax: [phone]  Follow Up Time: 1 week

## 2022-12-27 NOTE — PROGRESS NOTE ADULT - ASSESSMENT
67 year old male from [facility] with past medical history of DM, HTN, HLD, AF on Eliquis, PVD, presents with c/o 8/10 sharp right sided abdominal pain with nausea, vomiting, and diarrhea.  Pt recently had cholecystectomy and ERCP with sphincterotomy 12/16/22. Gi Judy consulted, s/p  repeat ERCP 12/23/22 found to have bleeding from spincterotomy site. CBD stented and clips place to control bleeding. Admitted to ICU for septic shock, found positive variable coccibacilli bacteremia, and aerococcus UTI.  COLLIN Nava consulted started on meropenem and vancomycin, repeat blood cx 12/25 NGTD. Follow up with TTE Downgraded to medicine 12/25. Hospital course complicated by failed TOV 12/24 also with hypotension and tachycardia this am 12/27.  EKG sinus tachycardia. Lopressor held and started on digoxin 250 mcg. Will trial fluid bolus and monitor response.

## 2022-12-27 NOTE — PROGRESS NOTE ADULT - ASSESSMENT
Marcos Jeffers is a 67 year old male with past medical history of DM, HTN, HLD, AF on Eliquis, PVD, presents with c/o 8/10 sharp right sided abdominal pain with nausea, vomiting, and diarrhea.  Pt recently had cholecystectomy and ERCP with sphincterotomy prior to discharge. Patient received a repeat ERCP found to have bleeding from spincterotomy site. CBD stended and clips place to control bleeding  Marcos Jeffers is a 67 year old male with past medical history of DM, HTN, HLD, AF on Eliquis, PVD, presents with c/o 8/10 sharp right sided abdominal pain with nausea, vomiting, and diarrhea.  Pt recently had cholecystectomy and ERCP with sphincterotomy prior to discharge. Patient received a repeat ERCP found to have bleeding from spincterotomy site. CBD stended and clips place to control bleeding. He denies any change in bowel habits, chest pain, SOB, palpitations, no hematemesis, hematochezia, hematuria, or dysuria.       67 year old male from [facility] with past medical history of DM, HTN, HLD, AF on Eliquis, PVD, presents with c/o 8/10 sharp right sided abdominal pain with nausea, vomiting, and diarrhea.  Pt recently had cholecystectomy and ERCP with sphincterotomy 12/16/22. Gi Judy consulted, s/p  repeat ERCP 12/23/22 found to have bleeding from spincterotomy site. CBD stented and clips place to control bleeding. Admitted to ICU for septic shock, found positive variable coccibacilli bacteremia, and aerococcus UTI.  COLLIN Nava consulted started on meropenem and vancomycin, repeat blood cx 12/25 NGTD. Follow up with TTE Downgraded to medicine 12/25. Hospital course complicated by failed TOV 12/24 also with hypotension and tachycardia this am 12/27.  EKG sinus tachycardia. Lopressor held and started on digoxin 250 mcg. Will trial fluid bolus and monitor response.

## 2022-12-27 NOTE — PROGRESS NOTE ADULT - PROBLEM SELECTOR PLAN 3
-   - EKG AM 12/27 sinus tachycardia   - Likely secondary to beta blockers being held at this time   - Started on Digoxin 250 mcg daily 12/27

## 2022-12-27 NOTE — PHARMACOTHERAPY INTERVENTION NOTE - COMMENTS
Modified penicillin allergy to state patient tolerated meropenem and cefepime during this admission.    Geovanni Persaud, Isabelle  Clinical Pharmacy Specialist, Infectious Diseases  Tele-Antimicrobial Stewardship Program (Tele-ASP)  Tele-ASP Phone: [phone]

## 2022-12-27 NOTE — DISCHARGE NOTE PROVIDER - NSDCCPCAREPLAN_GEN_ALL_CORE_FT
PRINCIPAL DISCHARGE DIAGNOSIS  Diagnosis: Bacteremia  Assessment and Plan of Treatment: You presented to the hospital with septic shock. You were admitted to the intensive care unit and blood cultures were positive for variable cocca bacilli was followed by infectious diseases. Repeat blood cultures were negative. Pt was stabilized and was admitted to internal medicine.  You were given IV antibiotics and IV fluids during your hospital stay. Follow up with your primary care doctor within a week. Wash your hands often.  Care for your drains and catheters as directed and ask about vaccines you may need.      SECONDARY DISCHARGE DIAGNOSES  Diagnosis: Pneumonia, aspiration  Assessment and Plan of Treatment:     Diagnosis: Cholangitis  Assessment and Plan of Treatment:     Diagnosis: BPH (benign prostatic hyperplasia)  Assessment and Plan of Treatment:     Diagnosis: Paroxysmal atrial fibrillation  Assessment and Plan of Treatment:      PRINCIPAL DISCHARGE DIAGNOSIS  Diagnosis: Bacteremia  Assessment and Plan of Treatment: You presented to the hospital with septic shock. You were admitted to the intensive care unit and blood cultures were positive for variable cocca bacilli was followed by infectious diseases. Repeat blood cultures were negative. You were given IV antibiotics and IV fluids during your hospital stay. Follow up with your primary care doctor within a week.      SECONDARY DISCHARGE DIAGNOSES  Diagnosis: Cholangitis  Assessment and Plan of Treatment:     Diagnosis: Pneumonia, aspiration  Assessment and Plan of Treatment:     Diagnosis: Paroxysmal atrial fibrillation  Assessment and Plan of Treatment:     Diagnosis: BPH (benign prostatic hyperplasia)  Assessment and Plan of Treatment:      PRINCIPAL DISCHARGE DIAGNOSIS  Diagnosis: Septic shock  Assessment and Plan of Treatment: You presented to the hospital with abd pain and were found to be in spetic shock, You were admitted to the intensive care unit and blood cultures were positive for variable cocca bacilli and your urine culture was also positive for Aerococcus sppm, was followed by infectious diseases and was treated with IV antibiotics and IV fluids. Repeat blood cultures were negative. Follow up with your primary care doctor within a week.      SECONDARY DISCHARGE DIAGNOSES  Diagnosis: Cholangitis  Assessment and Plan of Treatment: You had a recent hospitalization for abd pain and you had an ERCP and you presented to the hospital with abd pain and there was concern for Cholangitis. The cat scan of abdomen showed dilation of the common bile duct and main pancreatic duct again noted, a repeat ERCP on 12/23 performed, found with bleeding to sphincterotomy site, 2 clips and 1 stent placed in common bile duct to facilitate tamponade the bleeding and facilitate drainage. You were treated with IV antibiotics and your symptoms resolved.    Diagnosis: Pneumonia, aspiration  Assessment and Plan of Treatment: While in the hospital there was also concern for PNA. You were treated with IV antibiotics and completed the course of IV abx in the hospital.  Pneumonia is a lung infection that can cause a fever, cough, and trouble breathing.  Nutrition is important, eat small frequent meals.  Get lots of rest and drink fluids.  Call your health care provider upon arrival home from hospital and make a follow up appointment for one week.  If your cough worsens, you develop fever greater than 101', you have shaking chills, a fast heartbeat, trouble breathing and/or feel your are breathing much faster than usual, call your healthcare provider.  Make sure you wash your hands frequently.      Diagnosis: Paroxysmal atrial fibrillation  Assessment and Plan of Treatment: Your heart rate has been controlled while you have been in the hospital and you should continue to take your Metoprolol. A medication called Digoxin was started and you should continue to take Digoxin and metoprolr for heart rate control. You should follow up with your PCP for continued management.    Diagnosis: BPH (benign prostatic hyperplasia)  Assessment and Plan of Treatment: You had a yuan during your admission and, the 1 st time the yuan was removed you were unable to void freely and the yuan was replaced. The yuan was removed again and you were able to void freely the 2nd time. You have not shown any signs of retaining urine and you are voiding freely. You should continue to take your Finasteride and follow up with your PCP for continued management.    Diagnosis: Septic shock  Assessment and Plan of Treatment: You presented to the hospital with septic shock. You were admitted to the intensive care unit and blood cultures were positive for variable cocca bacilli was followed by infectious diseases. Repeat blood cultures were negative. You were given IV antibiotics and IV fluids during your hospital stay. Follow up with your primary care doctor within a week.

## 2022-12-27 NOTE — PROGRESS NOTE ADULT - SUBJECTIVE AND OBJECTIVE BOX
PATIENT SEEN AND EXAMINED ON :- 12/27/22  DATE OF SERVICE:      12/27/22       Interim events noted,Labs ,Radiological studies and Cardiology tests reviewed     HOSPITAL COURSE: HPI:  Patient is a 67 year old male, vaccinated, from [facility] with PMHx of DM, HTN, HLD, AF on Eliquis, PVD, presents with c/o vomiting and right sided abdominal pain. patient states yesterday after lunch he had a sharp right sided abdominal pain 8/10 persistent with nausea and vomiting. He also endorsed chills and rigors, unsure if he spiked a fever. He denies any changes in bowel habits, chest pain, shortness of breath or palpitations. Patient had two episodes of melena in ED. He was discharged yesterday from hospital. He had choledocholithiasis,  CT A/P showed: Cholecystectomy with dilated CBD up to 1.7 cm abrupt cut off of the distal CBD and  mild pancreatic duct dilatation. 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 He underwent  ERCP on 12/16 biliary sludge removal with biliary sphincterotomy and balloon extraction. He was discharged after symptom improvement and out patient gastric emptying study.     In ED:   Vitals: BP: 81/59mmHg  HR:  118  RR: 97% RA   WBC 14.7 k  elevated LFT, lactate   s/p  Rocephin 1LNS and zofran  (22 Dec 2022 12:00)      INTERIM EVENTS:Patient seen at bedside ,interim events noted.      PMH -reviewed admission note, no change since admission  HEART FAILURE: Acute[ ]Chronic[ ] Systolic[ ] Diastolic[ ] Combined Systolic and Diastolic[ ]  CAD[ ] CABG[ ] PCI[ ]  DEVICES[ ] PPM[ ] ICD[ ] ILR[ ]  ATRIAL FIBRILLATION[ ] Paroxysmal[ ] Permanent[ ] CHADS2-[  ]  DILCIA[ ] CKD1[ ] CKD2[ ] CKD3[ ] CKD4[ ] ESRD[ ]  COPD[ ] HTN[ ]   DM[ ] Type1[ ] Type 2[ ]   CVA[ ] Paresis[ ]    AMBULATION: Assisted[ ] Cane/walker[ ] Independent[ ]    MEDICATIONS  (STANDING):  atorvastatin 10 milliGRAM(s) Oral at bedtime  chlorhexidine 2% Cloths 1 Application(s) Topical daily  digoxin     Tablet 250 MICROGram(s) Oral daily  finasteride 5 milliGRAM(s) Oral daily  insulin lispro (ADMELOG) corrective regimen sliding scale   SubCutaneous three times a day before meals  insulin lispro (ADMELOG) corrective regimen sliding scale   SubCutaneous at bedtime  meropenem  IVPB 1000 milliGRAM(s) IV Intermittent every 8 hours  pantoprazole    Tablet 40 milliGRAM(s) Oral before breakfast  pregabalin 100 milliGRAM(s) Oral three times a day  tamsulosin 0.4 milliGRAM(s) Oral at bedtime    MEDICATIONS  (PRN):  aluminum hydroxide/magnesium hydroxide/simethicone Suspension 30 milliLiter(s) Oral every 4 hours PRN Dyspepsia  baclofen 5 milliGRAM(s) Oral every 12 hours PRN Musculoskeletal Pain  melatonin 3 milliGRAM(s) Oral at bedtime PRN Insomnia  ondansetron Injectable 4 milliGRAM(s) IV Push every 8 hours PRN Nausea and/or Vomiting  oxycodone    5 mG/acetaminophen 325 mG 1 Tablet(s) Oral every 4 hours PRN Severe Pain (7 - 10)  sodium chloride 0.9% lock flush 10 milliLiter(s) IV Push every 1 hour PRN Pre/post blood products, medications, blood draw, and to maintain line patency            REVIEW OF SYSTEMS:  Constitutional: [ ] fever, [ ]weight loss,  [ ]fatigue [ ]weight gain  Eyes: [ ] visual changes  Respiratory: [ ]shortness of breath;  [ ] cough, [ ]wheezing, [ ]chills, [ ]hemoptysis  Cardiovascular: [ ] chest pain, [ ]palpitations, [ ]dizziness,  [ ]leg swelling[ ]orthopnea[ ]PND  Gastrointestinal: [ ] abdominal pain, [ ]nausea, [ ]vomiting,  [ ]diarrhea [ ]Constipation [ ]Melena  Genitourinary: [ ] dysuria, [ ] hematuria [ ]Cardoza  Neurologic: [ ] headaches [ ] tremors[ ]weakness [ ]Paralysis Right[ ] Left[ ]  Skin: [ ] itching, [ ]burning, [ ] rashes  Endocrine: [ ] heat or cold intolerance  Musculoskeletal: [ ] joint pain or swelling; [ ] muscle, back, or extremity pain  Psychiatric: [ ] depression, [ ]anxiety, [ ]mood swings, or [ ]difficulty sleeping  Hematologic: [ ] easy bruising, [ ] bleeding gums    [ ] All remaining systems negative except as per above.   [ ]Unable to obtain.  [x] No change in ROS since admission      Vital Signs Last 24 Hrs  T(C): 36.9 (27 Dec 2022 12:40), Max: 37.8 (26 Dec 2022 20:36)  T(F): 98.5 (27 Dec 2022 12:40), Max: 100 (26 Dec 2022 20:36)  HR: 112 (27 Dec 2022 15:30) (106 - 119)  BP: 113/64 (27 Dec 2022 15:30) (99/67 - 121/75)  BP(mean): 83 (27 Dec 2022 12:40) (73 - 83)  RR: 18 (27 Dec 2022 12:40) (18 - 18)  SpO2: 95% (27 Dec 2022 12:40) (95% - 98%)    Parameters below as of 27 Dec 2022 12:40  Patient On (Oxygen Delivery Method): nasal cannula  O2 Flow (L/min): 3    I&O's Summary    26 Dec 2022 07:01  -  27 Dec 2022 07:00  --------------------------------------------------------  IN: 0 mL / OUT: 3200 mL / NET: -3200 mL    27 Dec 2022 07:01  -  27 Dec 2022 19:28  --------------------------------------------------------  IN: 0 mL / OUT: 1000 mL / NET: -1000 mL        PHYSICAL EXAM:  General: No acute distress BMI-  HEENT: EOMI, PERRL  Neck: Supple, [ ] JVD  Lungs: Equal air entry bilaterally; [ ] rales [ ] wheezing [ ] rhonchi  Heart: Regular rate and rhythm; [x ] murmur   2/6 [ x] systolic [ ] diastolic [ ] radiation[ ] rubs [ ]  gallops  Abdomen: Nontender, bowel sounds present  Extremities: No clubbing, cyanosis, [ ] edema [ ]Pulses  equal and intact  Nervous system:  Alert & Oriented X3, no focal deficits  Psychiatric: Normal affect  Skin: No rashes or lesions    LABS:  12-27    134<L>  |  96  |  6<L>  ----------------------------<  125<H>  4.4   |  32<H>  |  0.47<L>    Ca    8.3<L>      27 Dec 2022 10:44  Phos  3.2     12-26  Mg     1.6     12-26    TPro  6.6  /  Alb  1.7<L>  /  TBili  0.3  /  DBili  x   /  AST  29  /  ALT  31  /  AlkPhos  154<H>  12-27    Creatinine Trend: 0.47<--, 0.40<--, 0.53<--, 0.50<--, 0.58<--, 0.61<--                        9.7    2.86  )-----------( 177      ( 26 Dec 2022 07:10 )             28.3

## 2022-12-27 NOTE — DISCHARGE NOTE PROVIDER - HOSPITAL COURSE
67 year old male from [facility] with past medical history of DM, HTN, HLD, AF on Eliquis, PVD, presents with c/o 8/10 sharp right sided abdominal pain with nausea, vomiting, and diarrhea.  Pt recently had cholecystectomy and ERCP with sphincterotomy 12/16/22. Gi Judy consulted, s/p  repeat ERCP 12/23/22 found to have bleeding from spincterotomy site. CBD stented and clips place to control bleeding. Admitted to ICU for septic shock, found positive variable coccibacilli bacteremia, and aerococcus UTI.  COLLIN Nava consulted started on meropenem and vancomycin, repeat blood cx 12/25 NGTD. Follow up with TTE Downgraded to medicine 12/25. Hospital course complicated by failed TOV 12/24 also with hypotension and tachycardia this am 12/27.  EKG sinus tachycardia. Lopressor held and started on digoxin 250 mcg. Will trial fluid bolus and monitor response.     -*-*-*---**- incomplete 12/27    Given clinical course, decision was made to discarge pt with outpatient follow up.   Discharge plan discussed with attending   This is only a brief summary for the whole hospital course, please follow up with the EMAR 67 year old male from [facility] with past medical history of DM, HTN, HLD, AF on Eliquis, PVD, presents with c/o 8/10 sharp right sided abdominal pain with nausea, vomiting, and diarrhea.  Pt recently had cholecystectomy and ERCP with sphincterotomy 12/16/22. GI Judy consulted, s/p  repeat ERCP 12/23/22 found to have bleeding from spincterotomy site. CBD stented and clips place to control bleeding. Admitted to ICU for septic shock, found positive variable coccibacilli bacteremia, and aerococcus UTI. Hospital course complicated by failed TOV 12/24 repeat TOV will be done today 12/29. Hypotension and tachycardia resolved. Continue with digoxin 250 mcg for rate control. TIM Villanueva and COLLIN Novoa following . COLLIN Nava consulted started on meropenem and vancomycin, repeat blood cx 12/25 NGTD.  Downgraded to medicine 12/25 .TTE not successful 12/27 . Midline infiltrated/removed, new peripheral line inserted.     Scheduled for JANINE 12/30, but noted positive for COVID 19 12/29. Cancelled by dept. diet resumed. will need to reschedule.   Pt will need to quarantine  until 1/6/23 before discharge back to [facility]  Patient noted with red rash to posterior trunk, reports improved symptoms with hydrocortisone cream, also started on Prednisone 20mg PO for 5 days per attending.       Given clinical course, decision was made to discarge pt with outpatient follow up.   Discharge plan discussed with attending   This is only a brief summary for the whole hospital course, please follow up with the EMAR

## 2022-12-27 NOTE — PROGRESS NOTE ADULT - SUBJECTIVE AND OBJECTIVE BOX
[   ] ICU                                          [   ] CCU                                      [ X  ] Medical Floor      Patient is a 67 year old male with abdominal pain. GI consulted to evaluate.        Patient is a 67 year old male, with past medical history significant for DM, HTN, HLD, AF on Eliquis, PVD, presents with c/o vomiting and right sided abdominal pain. Patient recently had cholecystectomy and ERCP with sphincterotomy prior to discharge. patient presented with c/o sharp right sided abdominal pain 8/10 persistent with nausea, vomiting and melena. Patient had repeat ERCP found to have bleeding from sphincterotomy site. CBD stended and clips place to control bleeding. No hematemesis, hematochezia, chest pain, SOB, cough, hematuria or dysuria reported.    Patient appears comfortable. No new complaints reported, No abdominal pain, N/V, hematemesis, hematochezia, melena, fever, chills, chest pain, SOB, cough or diarrhea reported.       PAIN MANAGEMENT:  Pain Scale:                 /10  Pain Location:         PAST MEDICAL HISTORY  COPD (chronic obstructive pulmonary disease)    Diabetes    PVD (peripheral vascular disease)    Chronic atrial fibrillation    GERD (gastroesophageal reflux disease)    MDD (major depressive disorder)    BPH (benign prostatic hyperplasia)        PAST SURGICAL HISTORY  Cholecystectomy    Allergies    morphine (Unknown)  penicillin (Unknown)  shellfish (Unknown)    Intolerances  None       SOCIAL HISTORY  Advanced Directives:       [ X ] Full Code       [  ] DNR  Marital Status:         [  ] M      [ X ] S      [  ] D       [  ] W  Children:       [ X ] Yes      [  ] No  Occupation:        [  ] Employed       [  X] Unemployed       [  ] Retired  Diet:       [ X ] Regular       [  ] PEG feeding          [  ] NG tube feeding  Drug Use:           [ X ] Patient denied          [  ] Yes  Alcohol:           [ X ] No             [  ] Yes (socially)         [  ] Yes (chronic)  Tobacco:           [  ] Yes           [ X ] No      FAMILY HISTORY  [ X ] Heart Disease            [ X ] Diabetes             [ X ] HTN             [  ] Colon Cancer             [  ] Stomach Cancer              [  ] Pancreatic Cancer        VITALS  Vital Signs Last 24 Hrs  T(C): 36.9 (27 Dec 2022 12:40), Max: 37.8 (26 Dec 2022 20:36)  T(F): 98.5 (27 Dec 2022 12:40), Max: 100 (26 Dec 2022 20:36)  HR: 112 (27 Dec 2022 15:30) (106 - 119)  BP: 113/64 (27 Dec 2022 15:30) (99/67 - 121/75)  BP(mean): 83 (27 Dec 2022 12:40) (73 - 83)  RR: 18 (27 Dec 2022 12:40) (18 - 18)  SpO2: 95% (27 Dec 2022 12:40) (95% - 98%)  Parameters below as of 27 Dec 2022 12:40  Patient On (Oxygen Delivery Method): nasal cannula  O2 Flow (L/min): 3       MEDICATIONS  (STANDING):  atorvastatin 10 milliGRAM(s) Oral at bedtime  chlorhexidine 2% Cloths 1 Application(s) Topical daily  digoxin     Tablet 250 MICROGram(s) Oral daily  finasteride 5 milliGRAM(s) Oral daily  insulin lispro (ADMELOG) corrective regimen sliding scale   SubCutaneous three times a day before meals  insulin lispro (ADMELOG) corrective regimen sliding scale   SubCutaneous at bedtime  meropenem  IVPB 1000 milliGRAM(s) IV Intermittent every 8 hours  pantoprazole    Tablet 40 milliGRAM(s) Oral before breakfast  pregabalin 100 milliGRAM(s) Oral three times a day  tamsulosin 0.4 milliGRAM(s) Oral at bedtime    MEDICATIONS  (PRN):  aluminum hydroxide/magnesium hydroxide/simethicone Suspension 30 milliLiter(s) Oral every 4 hours PRN Dyspepsia  baclofen 5 milliGRAM(s) Oral every 12 hours PRN Musculoskeletal Pain  melatonin 3 milliGRAM(s) Oral at bedtime PRN Insomnia  ondansetron Injectable 4 milliGRAM(s) IV Push every 8 hours PRN Nausea and/or Vomiting  oxycodone    5 mG/acetaminophen 325 mG 1 Tablet(s) Oral every 4 hours PRN Severe Pain (7 - 10)  sodium chloride 0.9% lock flush 10 milliLiter(s) IV Push every 1 hour PRN Pre/post blood products, medications, blood draw, and to maintain line patency                            9.7    2.86  )-----------( 177      ( 26 Dec 2022 07:10 )             28.3       12-27    134<L>  |  96  |  6<L>  ----------------------------<  125<H>  4.4   |  32<H>  |  0.47<L>    Ca    8.3<L>      27 Dec 2022 10:44  Phos  3.2     12-26  Mg     1.6     12-26    TPro  6.6  /  Alb  1.7<L>  /  TBili  0.3  /  DBili  x   /  AST  29  /  ALT  31  /  AlkPhos  154<H>  12-27

## 2022-12-27 NOTE — PROGRESS NOTE ADULT - PROBLEM SELECTOR PLAN 1
septic shock on admission  - admitted to ICU  - Blood culture positive cocobacilli 12/22   - Repeat blood cx 12/25 no growth   - Continue w/ cefepime (Day 5) and vancomycin (Day 4)   - N/S bolus 12/27  - COLLIN jones

## 2022-12-27 NOTE — DISCHARGE NOTE PROVIDER - NSDCMRMEDTOKEN_GEN_ALL_CORE_FT
atorvastatin 10 mg oral tablet: 1 tab(s) orally once a day  baclofen 5 mg oral tablet: 1 tab(s) orally 2 times a day  Eliquis 5 mg oral tablet: 1 tab(s) orally 2 times a day  Flomax 0.4 mg oral capsule: 1 cap(s) orally once a day  Lyrica 100 mg oral capsule: 1 cap(s) orally 3 times a day  Metoprolol Tartrate 25 mg oral tablet: 1 tab(s) orally 2 times a day  MiraLax oral powder for reconstitution: 17 gram(s) orally once a day, As Needed  NIFEdipine 60 mg oral tablet, extended release: 1 tab(s) orally once a day  ondansetron 4 mg oral tablet: 1 tab(s) orally every 6 hours, As needed, Nausea  pantoprazole 40 mg oral delayed release tablet: 1 tab(s) orally once a day (before a meal)  Percocet 5/325 oral tablet: 1 tab(s) orally every 4 hours  senna 8.6 mg oral tablet: 1 tab(s) orally once a day (at bedtime)   atorvastatin 10 mg oral tablet: 1 tab(s) orally once a day  baclofen 5 mg oral tablet: 1 tab(s) orally 2 times a day  digoxin 250 mcg (0.25 mg) oral tablet: 1 tab(s) orally once a day  Eliquis 5 mg oral tablet: 1 tab(s) orally 2 times a day  finasteride 5 mg oral tablet: 1 tab(s) orally once a day  Flomax 0.4 mg oral capsule: 1 cap(s) orally once a day  Lyrica 100 mg oral capsule: 1 cap(s) orally 3 times a day  Metoprolol Tartrate 25 mg oral tablet: 1 tab(s) orally 2 times a day  MiraLax oral powder for reconstitution: 17 gram(s) orally once a day, As Needed  ondansetron 4 mg oral tablet: 1 tab(s) orally every 6 hours, As needed, Nausea  pantoprazole 40 mg oral delayed release tablet: 1 tab(s) orally once a day (before a meal)  Percocet 5/325 oral tablet: 1 tab(s) orally every 4 hours  predniSONE 20 mg oral tablet: 1 tab(s) orally once a day  senna 8.6 mg oral tablet: 1 tab(s) orally once a day (at bedtime)  zinc oxide 20% topical ointment: 1 application topically 2 times a day

## 2022-12-27 NOTE — PROGRESS NOTE ADULT - PROBLEM SELECTOR PLAN 2
- Continue with meropenem   - US Abdomen: Biliary dilatation and hepatic steatosis  - CT A/P: dilation of the common bile duct and main pancreatic duct again noted.. New RLL opacity  - repeat ERCP on 12/23 performed, found with bleeding to sphincterotomy site, 2 clips and 1 stent placed in common bile duct to facilitate tamponade the bleeding and facilitate drainage.   - RUQ pain, continue with percocet  - GI f/u

## 2022-12-27 NOTE — PROGRESS NOTE ADULT - PROBLEM SELECTOR PLAN 4
- Continue with N/C 3 liters   - Chest X ray 12/25 increasing right lower lobe diffuse   - Continue with ABX

## 2022-12-27 NOTE — PROGRESS NOTE ADULT - PROBLEM SELECTOR PLAN 2
- Continue with meropenem   - US Abdomen: Biliary dilatation and hepatic steatosis  - CT A/P: dilation of the common bile duct and main pancreatic duct again noted.. New RLL opacity  - repeat ERCP on 12/23 performed, 2 clips and 1 stent placed  - RUQ pain, continue with percocet  - GI Golyan following - Continue with meropenem   - US Abdomen: Biliary dilatation and hepatic steatosis  - CT A/P: dilation of the common bile duct and main pancreatic duct again noted.. New RLL opacity  - repeat ERCP on 12/23 performed, found with bleeding to sphincterotomy site, 2 clips and 1 stent placed in common bile duct to facilitate tamponade the bleeding and facilitate drainage.   - RUQ pain, continue with percocet  - GI Golyan following

## 2022-12-27 NOTE — PROGRESS NOTE ADULT - PROBLEM SELECTOR PLAN 1
septic shock on admission  - admitted to ICU  - Blood culture positive cocobacilli 12/22   - Repeat blood cx 12/25 no growth   - Continue w/ cefepime (Day 5) and vancomycin (Day 4)   - N/S bolus 12/27  - ID f/u

## 2022-12-27 NOTE — PROGRESS NOTE ADULT - PROBLEM SELECTOR PLAN 7
- Patient is from [facility]  - pending final repeat blood cultures 12/25  - remains on IV meropenem and vancomycin   - will likely return back to [facility] once optimized.

## 2022-12-27 NOTE — PROGRESS NOTE ADULT - ASSESSMENT
Recurrent Cholangitis  Bacteremia   Fevers - resolved  Leukocytosis - normalized      Plan - Cont Meropenem 1 gm iv q8hrs  Vancomycin DCed.

## 2022-12-27 NOTE — PROGRESS NOTE ADULT - SUBJECTIVE AND OBJECTIVE BOX
NP Note discussed with  primary attending    Patient is a 67y old  Male who presents with a chief complaint of Abdominal pain (26 Dec 2022 18:45)      INTERVAL HPI/OVERNIGHT EVENTS: Pt complained of RUQ abdominal pain, hypotensive and tachycardic     MEDICATIONS  (STANDING):  atorvastatin 10 milliGRAM(s) Oral at bedtime  chlorhexidine 2% Cloths 1 Application(s) Topical daily  digoxin     Tablet 250 MICROGram(s) Oral daily  finasteride 5 milliGRAM(s) Oral daily  insulin lispro (ADMELOG) corrective regimen sliding scale   SubCutaneous three times a day before meals  insulin lispro (ADMELOG) corrective regimen sliding scale   SubCutaneous at bedtime  meropenem  IVPB 1000 milliGRAM(s) IV Intermittent every 8 hours  pantoprazole    Tablet 40 milliGRAM(s) Oral before breakfast  pregabalin 100 milliGRAM(s) Oral three times a day  sodium chloride 0.9% Bolus 1000 milliLiter(s) IV Bolus once  tamsulosin 0.4 milliGRAM(s) Oral at bedtime  vancomycin  IVPB 1000 milliGRAM(s) IV Intermittent every 12 hours    MEDICATIONS  (PRN):  aluminum hydroxide/magnesium hydroxide/simethicone Suspension 30 milliLiter(s) Oral every 4 hours PRN Dyspepsia  baclofen 5 milliGRAM(s) Oral every 12 hours PRN Musculoskeletal Pain  melatonin 3 milliGRAM(s) Oral at bedtime PRN Insomnia  ondansetron Injectable 4 milliGRAM(s) IV Push every 8 hours PRN Nausea and/or Vomiting  oxycodone    5 mG/acetaminophen 325 mG 1 Tablet(s) Oral every 4 hours PRN Severe Pain (7 - 10)  sodium chloride 0.9% lock flush 10 milliLiter(s) IV Push every 1 hour PRN Pre/post blood products, medications, blood draw, and to maintain line patency      __________________________________________________  REVIEW OF SYSTEMS:    CONSTITUTIONAL: No fever,   EYES: no acute visual disturbances  NECK: No pain or stiffness  RESPIRATORY: No cough; No shortness of breath  CARDIOVASCULAR: No chest pain, no palpitations  GASTROINTESTINAL: +pain. No nausea or vomiting; No diarrhea   NEUROLOGICAL: No headache or numbness, no tremors  MUSCULOSKELETAL: No joint pain, no muscle pain  GENITOURINARY: no dysuria, no frequency, no hesitancy  PSYCHIATRY: no depression , no anxiety  ALL OTHER  ROS negative        Vital Signs Last 24 Hrs  T(C): 36.9 (27 Dec 2022 05:10), Max: 37.8 (26 Dec 2022 20:36)  T(F): 98.5 (27 Dec 2022 05:10), Max: 100 (26 Dec 2022 20:36)  HR: 115 (27 Dec 2022 07:00) (93 - 115)  BP: 99/67 (27 Dec 2022 07:00) (99/67 - 121/75)  BP(mean): 73 (27 Dec 2022 07:00) (73 - 83)  RR: 18 (27 Dec 2022 05:10) (18 - 18)  SpO2: 98% (27 Dec 2022 05:10) (96% - 98%)    Parameters below as of 27 Dec 2022 05:10  Patient On (Oxygen Delivery Method): nasal cannula  O2 Flow (L/min): 3      ________________________________________________  PHYSICAL EXAM:  GENERAL: Generalized weakness.   HEENT: Normocephalic;  conjunctivae and sclerae clear; moist mucous membranes;   NECK : supple  CHEST/LUNG: expiratory wheezing   HEART: S1 S2  regular; no murmurs, gallops or rubs  ABDOMEN: Soft, tender, Nondistended; Bowel sounds present, + pain   EXTREMITIES: no cyanosis; no edema; no calf tenderness  SKIN: warm and dry; no rash  NERVOUS SYSTEM:  Awake and alert; Oriented  to place, person and time ; no new deficits    _________________________________________________  LABS:                        9.7    2.86  )-----------( 177      ( 26 Dec 2022 07:10 )             28.3     12-26    134<L>  |  93<L>  |  4<L>  ----------------------------<  108<H>  4.3   |  33<H>  |  0.40<L>    Ca    8.5      26 Dec 2022 07:10  Phos  3.2     12-26  Mg     1.6     12-26    TPro  6.5  /  Alb  1.9<L>  /  TBili  0.2  /  DBili  x   /  AST  30  /  ALT  35  /  AlkPhos  165<H>  12-26        CAPILLARY BLOOD GLUCOSE      POCT Blood Glucose.: 102 mg/dL (27 Dec 2022 08:01)  POCT Blood Glucose.: 101 mg/dL (26 Dec 2022 21:12)  POCT Blood Glucose.: 164 mg/dL (26 Dec 2022 16:44)  POCT Blood Glucose.: 94 mg/dL (26 Dec 2022 11:23)        RADIOLOGY & ADDITIONAL TESTS: < from: Xray Chest 1 View- PORTABLE-Routine (Xray Chest 1 View- PORTABLE-Routine in AM.) (12.25.22 @ 09:41) >    ACC: [id number] EXAM:  XR CHEST PORTABLE ROUTINE 1V                          PROCEDURE DATE:  12/25/2022          INTERPRETATION:  Portable chest radiograph    CLINICAL INFORMATION: ICU follow-up.    TECHNIQUE:  Portable  AP chest radiograph.    COMPARISON: 12/22/2022 chest x-ray .    FINDINGS:  CATHETERS AND TUBES: None    PULMONARY: Increasing RIGHT lower lobe diffuse airspace disease.  Remaining lung segments grossly clear..   No pneumothorax.    HEART/VASCULAR: The heart and mediastinum size and configuration are   within normal limits.    BONES: Visualized osseous structures are intact.    IMPRESSION:   Increasing RIGHT lower zone diffuse airspace   disease/consolidation...    --- End of Report ---    < end of copied text >  < from: Xray Chest 1 View- PORTABLE-Urgent (Xray Chest 1 View- PORTABLE-Urgent .) (12.22.22 @ 19:44) >  ACC: [id number] EXAM:  XR CHEST PORTABLE URGENT 1V                          PROCEDURE DATE:  12/22/2022          INTERPRETATION:  Chest one view    HISTORY: Line placement    COMPARISON STUDY: Earlier the same day    Frontal expiratory view of the chest shows the heart to be similar in   size. Right jugular central line reaches the superior vena cava.    The lungs show partial clearing of the right base and there is no   evidence of pneumothorax nor pleural effusion.    IMPRESSION:  No pneumothorax.        Thank you for the courtesy of this referral.    --- End of Report ---            KRISS ESTRELLA MD; Attending Interventional Radiologist  This document has     < end of copied text >  < from: Xray Chest 1 View- PORTABLE-Routine (Xray Chest 1 View- PORTABLE-Routine .) (12.22.22 @ 16:02) >    IMPRESSION:   Multifocal RIGHT lower zone airspace   disease/consolidations..    --- End of Report ---      < end of copied text >  < from: CT Abdomen and Pelvis No Cont (12.22.22 @ 10:37) >    IMPRESSION: No bowel obstruction or grossly thickened bowel wall. Colonic   diverticulosis without evidence for diverticulitis. Moderate amount of   stool in the rectum and distal sigmoid colon.. Appendix not visualized.   No secondary signs for acute appendicitis.    Dilatation of the common bile duct and main pancreatic duct again noted..   Please see recent abdominal MR dated 12/12/2022. If clinically indicated,   endoscopic ultrasound may be pursued to rule out a small obstructive   ampullary tumor or pancreatic head mass.    New airspace opacifications in the right lower lung zone for which   clinical correlation with pneumonia is recommended.    --- End of Report ---    < end of copied text >  < from: US Abdomen Complete (US Abdomen Complete .) (12.22.22 @ 09:48) >    IMPRESSION:  Biliary dilatation in this patient with prior cholecystectomy. 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  Hepatic steatosis.  Nonvisualized spleen.        --- End of Report ---    < end of copied text >      Imaging Personally Reviewed:  YES      Consultant(s) Notes Reviewed:   YES          Plan of care was discussed with patient and /or primary care giver; all questions and concerns were addressed and care was aligned with patient's wishes.

## 2022-12-28 DIAGNOSIS — J18.9 PNEUMONIA, UNSPECIFIED ORGANISM: ICD-10-CM

## 2022-12-28 LAB
ALBUMIN SERPL ELPH-MCNC: 1.9 G/DL — LOW (ref 3.5–5)
ALP SERPL-CCNC: 153 U/L — HIGH (ref 40–120)
ALT FLD-CCNC: 32 U/L DA — SIGNIFICANT CHANGE UP (ref 10–60)
ANION GAP SERPL CALC-SCNC: 3 MMOL/L — LOW (ref 5–17)
ANION GAP SERPL CALC-SCNC: 4 MMOL/L — LOW (ref 5–17)
AST SERPL-CCNC: 31 U/L — SIGNIFICANT CHANGE UP (ref 10–40)
BILIRUB SERPL-MCNC: 0.2 MG/DL — SIGNIFICANT CHANGE UP (ref 0.2–1.2)
BUN SERPL-MCNC: 9 MG/DL — SIGNIFICANT CHANGE UP (ref 7–18)
BUN SERPL-MCNC: 9 MG/DL — SIGNIFICANT CHANGE UP (ref 7–18)
CALCIUM SERPL-MCNC: 8.5 MG/DL — SIGNIFICANT CHANGE UP (ref 8.4–10.5)
CALCIUM SERPL-MCNC: 9 MG/DL — SIGNIFICANT CHANGE UP (ref 8.4–10.5)
CHLORIDE SERPL-SCNC: 98 MMOL/L — SIGNIFICANT CHANGE UP (ref 96–108)
CHLORIDE SERPL-SCNC: 99 MMOL/L — SIGNIFICANT CHANGE UP (ref 96–108)
CO2 SERPL-SCNC: 34 MMOL/L — HIGH (ref 22–31)
CO2 SERPL-SCNC: 36 MMOL/L — HIGH (ref 22–31)
CREAT SERPL-MCNC: 0.51 MG/DL — SIGNIFICANT CHANGE UP (ref 0.5–1.3)
CREAT SERPL-MCNC: 0.56 MG/DL — SIGNIFICANT CHANGE UP (ref 0.5–1.3)
EGFR: 107 ML/MIN/1.73M2 — SIGNIFICANT CHANGE UP
EGFR: 110 ML/MIN/1.73M2 — SIGNIFICANT CHANGE UP
GLUCOSE BLDC GLUCOMTR-MCNC: 110 MG/DL — HIGH (ref 70–99)
GLUCOSE BLDC GLUCOMTR-MCNC: 116 MG/DL — HIGH (ref 70–99)
GLUCOSE BLDC GLUCOMTR-MCNC: 126 MG/DL — HIGH (ref 70–99)
GLUCOSE BLDC GLUCOMTR-MCNC: 132 MG/DL — HIGH (ref 70–99)
GLUCOSE SERPL-MCNC: 118 MG/DL — HIGH (ref 70–99)
GLUCOSE SERPL-MCNC: 120 MG/DL — HIGH (ref 70–99)
HCT VFR BLD CALC: 33.2 % — LOW (ref 39–50)
HGB BLD-MCNC: 10.9 G/DL — LOW (ref 13–17)
MCHC RBC-ENTMCNC: 31.8 PG — SIGNIFICANT CHANGE UP (ref 27–34)
MCHC RBC-ENTMCNC: 32.8 GM/DL — SIGNIFICANT CHANGE UP (ref 32–36)
MCV RBC AUTO: 96.8 FL — SIGNIFICANT CHANGE UP (ref 80–100)
NRBC # BLD: 0 /100 WBCS — SIGNIFICANT CHANGE UP (ref 0–0)
PLATELET # BLD AUTO: 289 K/UL — SIGNIFICANT CHANGE UP (ref 150–400)
POTASSIUM SERPL-MCNC: 4.9 MMOL/L — SIGNIFICANT CHANGE UP (ref 3.5–5.3)
POTASSIUM SERPL-MCNC: 5.5 MMOL/L — HIGH (ref 3.5–5.3)
POTASSIUM SERPL-SCNC: 4.9 MMOL/L — SIGNIFICANT CHANGE UP (ref 3.5–5.3)
POTASSIUM SERPL-SCNC: 5.5 MMOL/L — HIGH (ref 3.5–5.3)
PROT SERPL-MCNC: 7.3 G/DL — SIGNIFICANT CHANGE UP (ref 6–8.3)
RBC # BLD: 3.43 M/UL — LOW (ref 4.2–5.8)
RBC # FLD: 15.5 % — HIGH (ref 10.3–14.5)
SODIUM SERPL-SCNC: 136 MMOL/L — SIGNIFICANT CHANGE UP (ref 135–145)
SODIUM SERPL-SCNC: 138 MMOL/L — SIGNIFICANT CHANGE UP (ref 135–145)
WBC # BLD: 3.77 K/UL — LOW (ref 3.8–10.5)
WBC # FLD AUTO: 3.77 K/UL — LOW (ref 3.8–10.5)

## 2022-12-28 RX ORDER — SENNA PLUS 8.6 MG/1
2 TABLET ORAL AT BEDTIME
Refills: 0 | Status: DISCONTINUED | OUTPATIENT
Start: 2022-12-28 | End: 2023-01-06

## 2022-12-28 RX ADMIN — OXYCODONE AND ACETAMINOPHEN 1 TABLET(S): 5; 325 TABLET ORAL at 02:09

## 2022-12-28 RX ADMIN — OXYCODONE AND ACETAMINOPHEN 1 TABLET(S): 5; 325 TABLET ORAL at 21:48

## 2022-12-28 RX ADMIN — TAMSULOSIN HYDROCHLORIDE 0.4 MILLIGRAM(S): 0.4 CAPSULE ORAL at 21:36

## 2022-12-28 RX ADMIN — OXYCODONE AND ACETAMINOPHEN 1 TABLET(S): 5; 325 TABLET ORAL at 05:42

## 2022-12-28 RX ADMIN — OXYCODONE AND ACETAMINOPHEN 1 TABLET(S): 5; 325 TABLET ORAL at 06:12

## 2022-12-28 RX ADMIN — SENNA PLUS 2 TABLET(S): 8.6 TABLET ORAL at 21:36

## 2022-12-28 RX ADMIN — MEROPENEM 100 MILLIGRAM(S): 1 INJECTION INTRAVENOUS at 05:08

## 2022-12-28 RX ADMIN — Medication 100 MILLIGRAM(S): at 05:07

## 2022-12-28 RX ADMIN — Medication 100 MILLIGRAM(S): at 21:37

## 2022-12-28 RX ADMIN — CHLORHEXIDINE GLUCONATE 1 APPLICATION(S): 213 SOLUTION TOPICAL at 13:11

## 2022-12-28 RX ADMIN — OXYCODONE AND ACETAMINOPHEN 1 TABLET(S): 5; 325 TABLET ORAL at 17:50

## 2022-12-28 RX ADMIN — OXYCODONE AND ACETAMINOPHEN 1 TABLET(S): 5; 325 TABLET ORAL at 21:18

## 2022-12-28 RX ADMIN — OXYCODONE AND ACETAMINOPHEN 1 TABLET(S): 5; 325 TABLET ORAL at 01:39

## 2022-12-28 RX ADMIN — ATORVASTATIN CALCIUM 10 MILLIGRAM(S): 80 TABLET, FILM COATED ORAL at 21:37

## 2022-12-28 RX ADMIN — Medication 100 MILLIGRAM(S): at 13:09

## 2022-12-28 RX ADMIN — OXYCODONE AND ACETAMINOPHEN 1 TABLET(S): 5; 325 TABLET ORAL at 11:03

## 2022-12-28 RX ADMIN — MEROPENEM 100 MILLIGRAM(S): 1 INJECTION INTRAVENOUS at 21:37

## 2022-12-28 RX ADMIN — MEROPENEM 100 MILLIGRAM(S): 1 INJECTION INTRAVENOUS at 13:10

## 2022-12-28 RX ADMIN — Medication 250 MICROGRAM(S): at 05:08

## 2022-12-28 RX ADMIN — OXYCODONE AND ACETAMINOPHEN 1 TABLET(S): 5; 325 TABLET ORAL at 17:17

## 2022-12-28 RX ADMIN — OXYCODONE AND ACETAMINOPHEN 1 TABLET(S): 5; 325 TABLET ORAL at 11:40

## 2022-12-28 RX ADMIN — PANTOPRAZOLE SODIUM 40 MILLIGRAM(S): 20 TABLET, DELAYED RELEASE ORAL at 05:08

## 2022-12-28 RX ADMIN — FINASTERIDE 5 MILLIGRAM(S): 5 TABLET, FILM COATED ORAL at 11:03

## 2022-12-28 NOTE — PROGRESS NOTE ADULT - SUBJECTIVE AND OBJECTIVE BOX
[   ] ICU                                          [   ] CCU                                      [ X  ] Medical Floor      Patient is a 67 year old male with abdominal pain. GI consulted to evaluate.        Patient is a 67 year old male, with past medical history significant for DM, HTN, HLD, AF on Eliquis, PVD, presents with c/o vomiting and right sided abdominal pain. Patient recently had cholecystectomy and ERCP with sphincterotomy prior to discharge. patient presented with c/o sharp right sided abdominal pain 8/10 persistent with nausea, vomiting and melena. Patient had repeat ERCP found to have bleeding from sphincterotomy site. CBD stended and clips place to control bleeding. No hematemesis, hematochezia, chest pain, SOB, cough, hematuria or dysuria reported.    Patient appears comfortable. No new complaints reported, No abdominal pain, N/V, hematemesis, hematochezia, melena, fever, chills, chest pain, SOB, cough or diarrhea reported.       PAIN MANAGEMENT:  Pain Scale:                 /10  Pain Location:         PAST MEDICAL HISTORY  COPD (chronic obstructive pulmonary disease)    Diabetes    PVD (peripheral vascular disease)    Chronic atrial fibrillation    GERD (gastroesophageal reflux disease)    MDD (major depressive disorder)    BPH (benign prostatic hyperplasia)        PAST SURGICAL HISTORY  Cholecystectomy    Allergies    morphine (Unknown)  penicillin (Unknown)  shellfish (Unknown)    Intolerances  None       SOCIAL HISTORY  Advanced Directives:       [ X ] Full Code       [  ] DNR  Marital Status:         [  ] M      [ X ] S      [  ] D       [  ] W  Children:       [ X ] Yes      [  ] No  Occupation:        [  ] Employed       [  X] Unemployed       [  ] Retired  Diet:       [ X ] Regular       [  ] PEG feeding          [  ] NG tube feeding  Drug Use:           [ X ] Patient denied          [  ] Yes  Alcohol:           [ X ] No             [  ] Yes (socially)         [  ] Yes (chronic)  Tobacco:           [  ] Yes           [ X ] No      FAMILY HISTORY  [ X ] Heart Disease            [ X ] Diabetes             [ X ] HTN             [  ] Colon Cancer             [  ] Stomach Cancer              [  ] Pancreatic Cancer      VITALS  Vital Signs Last 24 Hrs  T(C): 36.4 (28 Dec 2022 13:00), Max: 36.8 (27 Dec 2022 20:35)  T(F): 97.5 (28 Dec 2022 13:00), Max: 98.2 (27 Dec 2022 20:35)  HR: 107 (28 Dec 2022 13:00) (100 - 112)  BP: 108/72 (28 Dec 2022 13:00) (100/65 - 116/73)  BP(mean): 81 (28 Dec 2022 13:00) (81 - 81)  RR: 18 (28 Dec 2022 13:00) (16 - 18)  SpO2: 98% (28 Dec 2022 13:00) (94% - 98%)    Parameters below as of 28 Dec 2022 13:00  Patient On (Oxygen Delivery Method): nasal cannula  O2 Flow (L/min): 3       MEDICATIONS  (STANDING):  atorvastatin 10 milliGRAM(s) Oral at bedtime  chlorhexidine 2% Cloths 1 Application(s) Topical daily  digoxin     Tablet 250 MICROGram(s) Oral daily  finasteride 5 milliGRAM(s) Oral daily  insulin lispro (ADMELOG) corrective regimen sliding scale   SubCutaneous three times a day before meals  insulin lispro (ADMELOG) corrective regimen sliding scale   SubCutaneous at bedtime  meropenem  IVPB 1000 milliGRAM(s) IV Intermittent every 8 hours  pantoprazole    Tablet 40 milliGRAM(s) Oral before breakfast  pregabalin 100 milliGRAM(s) Oral three times a day  tamsulosin 0.4 milliGRAM(s) Oral at bedtime    MEDICATIONS  (PRN):  aluminum hydroxide/magnesium hydroxide/simethicone Suspension 30 milliLiter(s) Oral every 4 hours PRN Dyspepsia  baclofen 5 milliGRAM(s) Oral every 12 hours PRN Musculoskeletal Pain  melatonin 3 milliGRAM(s) Oral at bedtime PRN Insomnia  ondansetron Injectable 4 milliGRAM(s) IV Push every 8 hours PRN Nausea and/or Vomiting  oxycodone    5 mG/acetaminophen 325 mG 1 Tablet(s) Oral every 4 hours PRN Severe Pain (7 - 10)  sodium chloride 0.9% lock flush 10 milliLiter(s) IV Push every 1 hour PRN Pre/post blood products, medications, blood draw, and to maintain line patency                            10.9   3.77  )-----------( 289      ( 28 Dec 2022 06:55 )             33.2       12-28    138  |  98  |  9   ----------------------------<  120<H>  4.9   |  36<H>  |  0.56    Ca    8.5      28 Dec 2022 10:45    TPro  7.3  /  Alb  1.9<L>  /  TBili  0.2  /  DBili  x   /  AST  31  /  ALT  32  /  AlkPhos  153<H>  12-28

## 2022-12-28 NOTE — PROGRESS NOTE ADULT - PROBLEM SELECTOR PLAN 1
septic shock on admission  - admitted to ICU  - Blood culture positive cocobacilli 12/22   - Repeat blood cx 12/25 no growth   - Continue w/ cefepime (Day 6) s/p vancomycin  -TTE unsuccessful, plan for JANINE 12/29  - ID f/u

## 2022-12-28 NOTE — PROGRESS NOTE ADULT - PROBLEM SELECTOR PLAN 2
- Continue with meropenem DAY 6   - US Abdomen: Biliary dilatation and hepatic steatosis  - CT A/P: dilation of the common bile duct and main pancreatic duct again noted.. New RLL opacity  - repeat ERCP on 12/23 performed, found with bleeding to sphincterotomy site, 2 clips and 1 stent placed in common bile duct to facilitate tamponade the bleeding and facilitate drainage.   - RUQ pain, continue with percocet  - GI Golyan following

## 2022-12-28 NOTE — PROGRESS NOTE ADULT - PROBLEM SELECTOR PLAN 1
septic shock on admission  - admitted to ICU  - Blood culture positive cocobacilli 12/22   - Repeat blood cx 12/25 no growth   - Continue w/ cefepime (Day 5) and vancomycin (Day 4)   - N/S bolus 12/27  - COLLIN jones septic shock on admission  - admitted to ICU  - Blood culture positive cocobacilli 12/22   - Repeat blood cx 12/25 no growth   - Continue w/ cefepime (Day 6) s/p vancomycin  -TTE unsuccessful, plan for JANINE 12/29  - COLLIN jones

## 2022-12-28 NOTE — PROGRESS NOTE ADULT - PROBLEM SELECTOR PLAN 3
-   - Likely secondary to beta blockers being held at this time   - Continue w/ Digoxin 250 mcg daily 12/27

## 2022-12-28 NOTE — PROGRESS NOTE ADULT - PROBLEM SELECTOR PLAN 4
- Continue with N/C 3 liters   - Chest X ray 12/25 increasing right lower lobe diffuse   - Continue with ABX - continue with meropenem IV   - N/C 3 liters   - incentive spirometer

## 2022-12-28 NOTE — PROGRESS NOTE ADULT - ASSESSMENT
67 year old male from [facility] with past medical history of DM, HTN, HLD, AF on Eliquis, PVD, presents with c/o 8/10 sharp right sided abdominal pain with nausea, vomiting, and diarrhea.  Pt recently had cholecystectomy and ERCP with sphincterotomy 12/16/22. TIM Kim consulted, s/p  repeat ERCP 12/23/22 found to have bleeding from spincterotomy site. CBD stented and clips place to control bleeding. Admitted to ICU for septic shock, found positive variable coccibacilli bacteremia, and aerococcus UTI.  COLLIN Nava consulted started on meropenem and vancomycin, repeat blood cx 12/25 NGTD.  Downgraded to medicine 12/25 .TTE not successful 12/27 . Repeat JANINE will be done 12/29. Pt will remain npo at midnight.  Hospital course complicated by failed TOV 12/24. Hypotension and tachycardia resolved. Continue with digoxin 250 mcg for rate control. TIM Villanueva and COLLIN Novoa following . 67 year old male from [facility] with past medical history of DM, HTN, HLD, AF on Eliquis, PVD, presents with c/o 8/10 sharp right sided abdominal pain with nausea, vomiting, and diarrhea.  Pt recently had cholecystectomy and ERCP with sphincterotomy 12/16/22. GI Judy consulted, s/p  repeat ERCP 12/23/22 found to have bleeding from spincterotomy site. CBD stented and clips place to control bleeding. Admitted to ICU for septic shock, found positive variable coccibacilli bacteremia, and aerococcus UTI. Hospital course complicated by failed TOV 12/24. Hypotension and tachycardia resolved. Continue with digoxin 250 mcg for rate control. TIM Villanueva and COLLIN Novoa following .   COLLIN Nava consulted started on meropenem and vancomycin, repeat blood cx 12/25 NGTD.  Downgraded to medicine 12/25 .TTE not successful 12/27 . Plan for JANINE to evaluate will be done 12/29. Pt will remain npo at midnight.

## 2022-12-28 NOTE — PROGRESS NOTE ADULT - PROBLEM SELECTOR PLAN 7
- Patient is from [facility]  - pending final repeat blood cultures 12/25  - remains on IV meropenem  - will likely return back to [facility] once optimized. - Patient is from [facility]  - pending final repeat blood cultures 12/25  - remains on IV meropenem  -JANINE pending 12/29  - will likely return back to [facility] once optimized.

## 2022-12-28 NOTE — PROGRESS NOTE ADULT - ASSESSMENT
67 year old male from [facility] with past medical history of DM, HTN, HLD, AF on Eliquis, PVD, presents with c/o 8/10 sharp right sided abdominal pain with nausea, vomiting, and diarrhea.  Pt recently had cholecystectomy and ERCP with sphincterotomy 12/16/22. GI Judy consulted, s/p  repeat ERCP 12/23/22 found to have bleeding from spincterotomy site. CBD stented and clips place to control bleeding. Admitted to ICU for septic shock, found positive variable coccibacilli bacteremia, and aerococcus UTI. Hospital course complicated by failed TOV 12/24. Hypotension and tachycardia resolved. Continue with digoxin 250 mcg for rate control. TIM Villanueva and COLLIN Novoa following .   COLLIN Nava consulted started on meropenem and vancomycin, repeat blood cx 12/25 NGTD.  Downgraded to medicine 12/25 .TTE not successful 12/27 . Plan for JANINE to evaluate will be done 12/29. Pt will remain npo at midnight.

## 2022-12-28 NOTE — PROGRESS NOTE ADULT - SUBJECTIVE AND OBJECTIVE BOX
PATIENT SEEN AND EXAMINED ON :- 12/28/22  DATE OF SERVICE:  12/28/22           Interim events noted,Labs ,Radiological studies and Cardiology tests reviewed .       HOSPITAL COURSE: HPI:  Patient is a 67 year old male, vaccinated, from [facility] with PMHx of DM, HTN, HLD, AF on Eliquis, PVD, presents with c/o vomiting and right sided abdominal pain. patient states yesterday after lunch he had a sharp right sided abdominal pain 8/10 persistent with nausea and vomiting. He also endorsed chills and rigors, unsure if he spiked a fever. He denies any changes in bowel habits, chest pain, shortness of breath or palpitations. Patient had two episodes of melena in ED. He was discharged yesterday from hospital. He had choledocholithiasis,  CT A/P showed: Cholecystectomy with dilated CBD up to 1.7 cm abrupt cut off of the distal CBD and  mild pancreatic duct dilatation. 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 He underwent  ERCP on 12/16 biliary sludge removal with biliary sphincterotomy and balloon extraction. He was discharged after symptom improvement and out patient gastric emptying study.     In ED:   Vitals: BP: 81/59mmHg  HR:  118  RR: 97% RA   WBC 14.7 k  elevated LFT, lactate   s/p  Rocephin 1LNS and zofran  (22 Dec 2022 12:00)      INTERIM EVENTS:Patient seen at bedside ,interim events noted.      PMH -reviewed admission note, no change since admission  HEART FAILURE: Acute[ ]Chronic[ ] Systolic[ ] Diastolic[ ] Combined Systolic and Diastolic[ ]  CAD[ ] CABG[ ] PCI[ ]  DEVICES[ ] PPM[ ] ICD[ ] ILR[ ]  ATRIAL FIBRILLATION[ ] Paroxysmal[ ] Permanent[ ] CHADS2-[  ]  DILCIA[ ] CKD1[ ] CKD2[ ] CKD3[ ] CKD4[ ] ESRD[ ]  COPD[ ] HTN[ ]   DM[ ] Type1[ ] Type 2[ ]   CVA[ ] Paresis[ ]    AMBULATION: Assisted[ ] Cane/walker[ ] Independent[ ]    MEDICATIONS  (STANDING):  atorvastatin 10 milliGRAM(s) Oral at bedtime  chlorhexidine 2% Cloths 1 Application(s) Topical daily  digoxin     Tablet 250 MICROGram(s) Oral daily  finasteride 5 milliGRAM(s) Oral daily  insulin lispro (ADMELOG) corrective regimen sliding scale   SubCutaneous three times a day before meals  insulin lispro (ADMELOG) corrective regimen sliding scale   SubCutaneous at bedtime  meropenem  IVPB 1000 milliGRAM(s) IV Intermittent every 8 hours  pantoprazole    Tablet 40 milliGRAM(s) Oral before breakfast  pregabalin 100 milliGRAM(s) Oral three times a day  senna 2 Tablet(s) Oral at bedtime  tamsulosin 0.4 milliGRAM(s) Oral at bedtime    MEDICATIONS  (PRN):  aluminum hydroxide/magnesium hydroxide/simethicone Suspension 30 milliLiter(s) Oral every 4 hours PRN Dyspepsia  baclofen 5 milliGRAM(s) Oral every 12 hours PRN Musculoskeletal Pain  melatonin 3 milliGRAM(s) Oral at bedtime PRN Insomnia  ondansetron Injectable 4 milliGRAM(s) IV Push every 8 hours PRN Nausea and/or Vomiting  oxycodone    5 mG/acetaminophen 325 mG 1 Tablet(s) Oral every 4 hours PRN Severe Pain (7 - 10)  sodium chloride 0.9% lock flush 10 milliLiter(s) IV Push every 1 hour PRN Pre/post blood products, medications, blood draw, and to maintain line patency            REVIEW OF SYSTEMS:  Constitutional: [ ] fever, [ ]weight loss,  [ ]fatigue [ ]weight gain  Eyes: [ ] visual changes  Respiratory: [ ]shortness of breath;  [ ] cough, [ ]wheezing, [ ]chills, [ ]hemoptysis  Cardiovascular: [ ] chest pain, [ ]palpitations, [ ]dizziness,  [ ]leg swelling[ ]orthopnea[ ]PND  Gastrointestinal: [ ] abdominal pain, [ ]nausea, [ ]vomiting,  [ ]diarrhea [ ]Constipation [ ]Melena  Genitourinary: [ ] dysuria, [ ] hematuria [ ]Cardoza  Neurologic: [ ] headaches [ ] tremors[ ]weakness [ ]Paralysis Right[ ] Left[ ]  Skin: [ ] itching, [ ]burning, [ ] rashes  Endocrine: [ ] heat or cold intolerance  Musculoskeletal: [ ] joint pain or swelling; [ ] muscle, back, or extremity pain  Psychiatric: [ ] depression, [ ]anxiety, [ ]mood swings, or [ ]difficulty sleeping  Hematologic: [ ] easy bruising, [ ] bleeding gums    [ ] All remaining systems negative except as per above.   [ ]Unable to obtain.  [x] No change in ROS since admission      Vital Signs Last 24 Hrs  T(C): 36.4 (28 Dec 2022 13:00), Max: 36.8 (27 Dec 2022 20:35)  T(F): 97.5 (28 Dec 2022 13:00), Max: 98.2 (27 Dec 2022 20:35)  HR: 107 (28 Dec 2022 13:00) (100 - 111)  BP: 108/72 (28 Dec 2022 13:00) (100/65 - 116/73)  BP(mean): 81 (28 Dec 2022 13:00) (81 - 81)  RR: 18 (28 Dec 2022 13:00) (16 - 18)  SpO2: 98% (28 Dec 2022 13:00) (94% - 98%)    Parameters below as of 28 Dec 2022 13:00  Patient On (Oxygen Delivery Method): nasal cannula  O2 Flow (L/min): 3    I&O's Summary    27 Dec 2022 07:01  -  28 Dec 2022 07:00  --------------------------------------------------------  IN: 0 mL / OUT: 1550 mL / NET: -1550 mL        PHYSICAL EXAM:  General: No acute distress BMI-  HEENT: EOMI, PERRL  Neck: Supple, [ ] JVD  Lungs: Equal air entry bilaterally; [ ] rales [ ] wheezing [ ] rhonchi  Heart: Regular rate and rhythm; [x ] murmur   2/6 [ x] systolic [ ] diastolic [ ] radiation[ ] rubs [ ]  gallops  Abdomen: Nontender, bowel sounds present  Extremities: No clubbing, cyanosis, [ ] edema [ ]Pulses  equal and intact  Nervous system:  Alert & Oriented X3, no focal deficits  Psychiatric: Normal affect  Skin: No rashes or lesions    LABS:  12-28    138  |  98  |  9   ----------------------------<  120<H>  4.9   |  36<H>  |  0.56    Ca    8.5      28 Dec 2022 10:45    TPro  7.3  /  Alb  1.9<L>  /  TBili  0.2  /  DBili  x   /  AST  31  /  ALT  32  /  AlkPhos  153<H>  12-28    Creatinine Trend: 0.56<--, 0.51<--, 0.47<--, 0.40<--, 0.53<--, 0.50<--                        10.9   3.77  )-----------( 289      ( 28 Dec 2022 06:55 )             33.2

## 2022-12-28 NOTE — PROGRESS NOTE ADULT - PROBLEM SELECTOR PLAN 2
- Continue with meropenem DAY 6   - US Abdomen: Biliary dilatation and hepatic steatosis  - CT A/P: dilation of the common bile duct and main pancreatic duct again noted.. New RLL opacity  - repeat ERCP on 12/23 performed, found with bleeding to sphincterotomy site, 2 clips and 1 stent placed in common bile duct to facilitate tamponade the bleeding and facilitate drainage.   - RUQ pain, continue with percocet  - GI f/u

## 2022-12-28 NOTE — PROGRESS NOTE ADULT - PROBLEM SELECTOR PLAN 7
- Patient is from [facility] ([place]  - pending final repeat blood cultures 12/25  - remains on IV meropenem  -JANINE pending 12/29  - will likely return back to [facility] once optimized.

## 2022-12-28 NOTE — PROGRESS NOTE ADULT - PROBLEM SELECTOR PLAN 6
- c/w protonix for GI prophylaxis   - c/w SCD for DVT prophylaxis  - ERCP procedure performed 12/23, eliquis hold for 7 days post procedure

## 2022-12-28 NOTE — PROGRESS NOTE ADULT - SUBJECTIVE AND OBJECTIVE BOX
NP Note discussed with  primary attending    Patient is a 67y old  Male who presents with a chief complaint of Abdominal pain (28 Dec 2022 13:56)      INTERVAL HPI/OVERNIGHT EVENTS: no new complaints, continues to have RUQ pain     MEDICATIONS  (STANDING):  atorvastatin 10 milliGRAM(s) Oral at bedtime  chlorhexidine 2% Cloths 1 Application(s) Topical daily  digoxin     Tablet 250 MICROGram(s) Oral daily  finasteride 5 milliGRAM(s) Oral daily  insulin lispro (ADMELOG) corrective regimen sliding scale   SubCutaneous three times a day before meals  insulin lispro (ADMELOG) corrective regimen sliding scale   SubCutaneous at bedtime  meropenem  IVPB 1000 milliGRAM(s) IV Intermittent every 8 hours  pantoprazole    Tablet 40 milliGRAM(s) Oral before breakfast  pregabalin 100 milliGRAM(s) Oral three times a day  tamsulosin 0.4 milliGRAM(s) Oral at bedtime    MEDICATIONS  (PRN):  aluminum hydroxide/magnesium hydroxide/simethicone Suspension 30 milliLiter(s) Oral every 4 hours PRN Dyspepsia  baclofen 5 milliGRAM(s) Oral every 12 hours PRN Musculoskeletal Pain  melatonin 3 milliGRAM(s) Oral at bedtime PRN Insomnia  ondansetron Injectable 4 milliGRAM(s) IV Push every 8 hours PRN Nausea and/or Vomiting  oxycodone    5 mG/acetaminophen 325 mG 1 Tablet(s) Oral every 4 hours PRN Severe Pain (7 - 10)  sodium chloride 0.9% lock flush 10 milliLiter(s) IV Push every 1 hour PRN Pre/post blood products, medications, blood draw, and to maintain line patency      __________________________________________________  REVIEW OF SYSTEMS:    CONSTITUTIONAL: No fever,   EYES: no acute visual disturbances  NECK: No pain or stiffness  RESPIRATORY: No cough; No shortness of breath  CARDIOVASCULAR: No chest pain, no palpitations  GASTROINTESTINAL: 8/10 right abdomina pain. No nausea or vomiting; No diarrhea   NEUROLOGICAL: No headache or numbness, no tremors  MUSCULOSKELETAL: No joint pain, no muscle pain  GENITOURINARY: no dysuria, no frequency, no hesitancy  PSYCHIATRY: no depression , no anxiety  ALL OTHER  ROS negative        Vital Signs Last 24 Hrs  T(C): 36.4 (28 Dec 2022 13:00), Max: 36.8 (27 Dec 2022 20:35)  T(F): 97.5 (28 Dec 2022 13:00), Max: 98.2 (27 Dec 2022 20:35)  HR: 107 (28 Dec 2022 13:00) (100 - 112)  BP: 108/72 (28 Dec 2022 13:00) (100/65 - 116/73)  BP(mean): 81 (28 Dec 2022 13:00) (81 - 81)  RR: 18 (28 Dec 2022 13:00) (16 - 18)  SpO2: 98% (28 Dec 2022 13:00) (94% - 98%)    Parameters below as of 28 Dec 2022 13:00  Patient On (Oxygen Delivery Method): nasal cannula  O2 Flow (L/min): 3      ________________________________________________  PHYSICAL EXAM:  GENERAL: NAD  HEENT: Normocephalic;  conjunctivae and sclerae clear; moist mucous membranes;   NECK : supple  CHEST/LUNG: Crackles upon auscultation   HEART: S1 S2  regular; no murmurs, gallops or rubs  ABDOMEN: Soft, Nontender, Nondistended; Bowel sounds present  EXTREMITIES: no cyanosis; no edema; no calf tenderness  SKIN: warm and dry; no rash  NERVOUS SYSTEM:  Awake and alert; Oriented  to place, person and time ; no new deficits    _________________________________________________  LABS:                        10.9   3.77  )-----------( 289      ( 28 Dec 2022 06:55 )             33.2     12-28    138  |  98  |  9   ----------------------------<  120<H>  4.9   |  36<H>  |  0.56    Ca    8.5      28 Dec 2022 10:45    TPro  7.3  /  Alb  1.9<L>  /  TBili  0.2  /  DBili  x   /  AST  31  /  ALT  32  /  AlkPhos  153<H>  12-28        CAPILLARY BLOOD GLUCOSE      POCT Blood Glucose.: 110 mg/dL (28 Dec 2022 11:19)  POCT Blood Glucose.: 116 mg/dL (28 Dec 2022 07:41)  POCT Blood Glucose.: 123 mg/dL (27 Dec 2022 21:10)  POCT Blood Glucose.: 127 mg/dL (27 Dec 2022 16:28)        RADIOLOGY & ADDITIONAL TESTS:  < from: Xray Chest 1 View- PORTABLE-Routine (Xray Chest 1 View- PORTABLE-Routine in AM.) (12.25.22 @ 09:41) >  INTERPRETATION:  Portable chest radiograph    CLINICAL INFORMATION: ICU follow-up.    TECHNIQUE:  Portable  AP chest radiograph.    COMPARISON: 12/22/2022 chest x-ray .    FINDINGS:  CATHETERS AND TUBES: None    PULMONARY: Increasing RIGHT lower lobe diffuse airspace disease.  Remaining lung segments grossly clear..   No pneumothorax.    HEART/VASCULAR: The heart and mediastinum size and configuration are   within normal limits.    BONES: Visualized osseous structures are intact.    IMPRESSION:   Increasing RIGHT lower zone diffuse airspace   disease/consolidation...    < end of copied text >  < from: Xray Chest 1 View- PORTABLE-Routine (Xray Chest 1 View- PORTABLE-Routine .) (12.22.22 @ 16:02) >  ROCEDURE DATE:  12/22/2022          INTERPRETATION:  Portable chest radiograph    CLINICAL INFORMATION: Dyspnea, shortness of breath.    TECHNIQUE:  Portable  AP chest radiograph.    COMPARISON: 2/4/2022 chest x-ray . Abdominal CT scan 12/22/2022    FINDINGS:  CATHETERS AND TUBES: None    PULMONARY: Multifocal RIGHT lower zone airspace disease/consolidations.   Remaining lung parenchyma clear. No pneumothorax.    HEART/VASCULAR: The heart and mediastinum size and configuration are   within normal limits.    BONES: Visualized osseous structures are intact.    IMPRESSION:   Multifocal RIGHT lower zone airspace   disease/consolidations..    < end of copied text >  < from: CT Abdomen and Pelvis No Cont (12.22.22 @ 10:37) >  I  IMPRESSION: No bowel obstruction or grossly thickened bowel wall. Colonic   diverticulosis without evidence for diverticulitis. Moderate amount of   stool in the rectum and distal sigmoid colon.. Appendix not visualized.   No secondary signs for acute appendicitis.    Dilatation of the common bile duct and main pancreatic duct again noted..   Please see recent abdominal MR dated 12/12/2022. If clinically indicated,   endoscopic ultrasound may be pursued to rule out a small obstructive   ampullary tumor or pancreatic head mass.    New airspace opacifications in the right lower lung zone for which   clinical correlation with pneumonia is recommended.    < end of copied text >  < from: US Abdomen Complete (US Abdomen Complete .) (12.22.22 @ 09:48) >  IMPRESSION:  Biliary dilatation in this patient with prior cholecystectomy. 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  Hepatic steatosis.  Nonvisualized spleen.      < end of copied text >  < from: MR MRCP w/wo IV Cont (12.12.22 @ 16:03) >  IMPRESSION:  1.  Intrahepatic and extrahepatic biliary dilatation with tapering of the   common bile duct at the ampulla suggesting ampullary stenosis. Decreased   signal at the level of the ampulla may represent a small amount of   sludge/debris.  2.  Diffuse hepatic steatosis with focal hepatic steatosis within the   LEFT lobe.    < end of copied text >      Imaging Personally Reviewed:  YES    Consultant(s) Notes Reviewed:   YES    Care Discussed with Consultants :     Plan of care was discussed with patient and /or primary care giver; all questions and concerns were addressed and care was aligned with patient's wishes.     NP Note discussed with  primary attending    Patient is a 67y old  Male who presents with a chief complaint of Abdominal pain (28 Dec 2022 13:56)      INTERVAL HPI/OVERNIGHT EVENTS: no new complaints, continues to have RUQ pain     MEDICATIONS  (STANDING):  atorvastatin 10 milliGRAM(s) Oral at bedtime  chlorhexidine 2% Cloths 1 Application(s) Topical daily  digoxin     Tablet 250 MICROGram(s) Oral daily  finasteride 5 milliGRAM(s) Oral daily  insulin lispro (ADMELOG) corrective regimen sliding scale   SubCutaneous three times a day before meals  insulin lispro (ADMELOG) corrective regimen sliding scale   SubCutaneous at bedtime  meropenem  IVPB 1000 milliGRAM(s) IV Intermittent every 8 hours  pantoprazole    Tablet 40 milliGRAM(s) Oral before breakfast  pregabalin 100 milliGRAM(s) Oral three times a day  tamsulosin 0.4 milliGRAM(s) Oral at bedtime    MEDICATIONS  (PRN):  aluminum hydroxide/magnesium hydroxide/simethicone Suspension 30 milliLiter(s) Oral every 4 hours PRN Dyspepsia  baclofen 5 milliGRAM(s) Oral every 12 hours PRN Musculoskeletal Pain  melatonin 3 milliGRAM(s) Oral at bedtime PRN Insomnia  ondansetron Injectable 4 milliGRAM(s) IV Push every 8 hours PRN Nausea and/or Vomiting  oxycodone    5 mG/acetaminophen 325 mG 1 Tablet(s) Oral every 4 hours PRN Severe Pain (7 - 10)  sodium chloride 0.9% lock flush 10 milliLiter(s) IV Push every 1 hour PRN Pre/post blood products, medications, blood draw, and to maintain line patency      __________________________________________________  REVIEW OF SYSTEMS:    CONSTITUTIONAL: No fever,   EYES: no acute visual disturbances  NECK: No pain or stiffness  RESPIRATORY: No cough; No shortness of breath  CARDIOVASCULAR: No chest pain, no palpitations  GASTROINTESTINAL: 8/10 right abdomina pain. No nausea or vomiting; No diarrhea   NEUROLOGICAL: No headache or numbness, no tremors  MUSCULOSKELETAL: No joint pain, no muscle pain  GENITOURINARY: no dysuria, no frequency, no hesitancy  PSYCHIATRY: no depression , no anxiety  ALL OTHER  ROS negative        Vital Signs Last 24 Hrs  T(C): 36.4 (28 Dec 2022 13:00), Max: 36.8 (27 Dec 2022 20:35)  T(F): 97.5 (28 Dec 2022 13:00), Max: 98.2 (27 Dec 2022 20:35)  HR: 107 (28 Dec 2022 13:00) (100 - 112)  BP: 108/72 (28 Dec 2022 13:00) (100/65 - 116/73)  BP(mean): 81 (28 Dec 2022 13:00) (81 - 81)  RR: 18 (28 Dec 2022 13:00) (16 - 18)  SpO2: 98% (28 Dec 2022 13:00) (94% - 98%)    Parameters below as of 28 Dec 2022 13:00  Patient On (Oxygen Delivery Method): nasal cannula  O2 Flow (L/min): 3      ________________________________________________  PHYSICAL EXAM:  GENERAL: NAD  HEENT: Normocephalic;  conjunctivae and sclerae clear; moist mucous membranes;   NECK : supple  CHEST/LUNG: Crackles upon auscultation , nasal cannula 3 liters   HEART: S1 S2  regular; no murmurs, gallops or rubs  ABDOMEN: Soft,+tender mild RUQ, Nondistended; Bowel sounds present  : +yuan,  draining clear yellow urine   EXTREMITIES: no cyanosis; no edema; no calf tenderness  SKIN: warm and dry; no rash  NERVOUS SYSTEM:  Awake and alert; Oriented  to place, person and time ; no new deficits    _________________________________________________  LABS:                        10.9   3.77  )-----------( 289      ( 28 Dec 2022 06:55 )             33.2     12-28    138  |  98  |  9   ----------------------------<  120<H>  4.9   |  36<H>  |  0.56    Ca    8.5      28 Dec 2022 10:45    TPro  7.3  /  Alb  1.9<L>  /  TBili  0.2  /  DBili  x   /  AST  31  /  ALT  32  /  AlkPhos  153<H>  12-28        CAPILLARY BLOOD GLUCOSE      POCT Blood Glucose.: 110 mg/dL (28 Dec 2022 11:19)  POCT Blood Glucose.: 116 mg/dL (28 Dec 2022 07:41)  POCT Blood Glucose.: 123 mg/dL (27 Dec 2022 21:10)  POCT Blood Glucose.: 127 mg/dL (27 Dec 2022 16:28)        RADIOLOGY & ADDITIONAL TESTS:  < from: Xray Chest 1 View- PORTABLE-Routine (Xray Chest 1 View- PORTABLE-Routine in AM.) (12.25.22 @ 09:41) >  INTERPRETATION:  Portable chest radiograph    CLINICAL INFORMATION: ICU follow-up.    TECHNIQUE:  Portable  AP chest radiograph.    COMPARISON: 12/22/2022 chest x-ray .    FINDINGS:  CATHETERS AND TUBES: None    PULMONARY: Increasing RIGHT lower lobe diffuse airspace disease.  Remaining lung segments grossly clear..   No pneumothorax.    HEART/VASCULAR: The heart and mediastinum size and configuration are   within normal limits.    BONES: Visualized osseous structures are intact.    IMPRESSION:   Increasing RIGHT lower zone diffuse airspace   disease/consolidation...    < end of copied text >  < from: Xray Chest 1 View- PORTABLE-Routine (Xray Chest 1 View- PORTABLE-Routine .) (12.22.22 @ 16:02) >  ROCEDURE DATE:  12/22/2022          INTERPRETATION:  Portable chest radiograph    CLINICAL INFORMATION: Dyspnea, shortness of breath.    TECHNIQUE:  Portable  AP chest radiograph.    COMPARISON: 2/4/2022 chest x-ray . Abdominal CT scan 12/22/2022    FINDINGS:  CATHETERS AND TUBES: None    PULMONARY: Multifocal RIGHT lower zone airspace disease/consolidations.   Remaining lung parenchyma clear. No pneumothorax.    HEART/VASCULAR: The heart and mediastinum size and configuration are   within normal limits.    BONES: Visualized osseous structures are intact.    IMPRESSION:   Multifocal RIGHT lower zone airspace   disease/consolidations..    < end of copied text >  < from: CT Abdomen and Pelvis No Cont (12.22.22 @ 10:37) >  I  IMPRESSION: No bowel obstruction or grossly thickened bowel wall. Colonic   diverticulosis without evidence for diverticulitis. Moderate amount of   stool in the rectum and distal sigmoid colon.. Appendix not visualized.   No secondary signs for acute appendicitis.    Dilatation of the common bile duct and main pancreatic duct again noted..   Please see recent abdominal MR dated 12/12/2022. If clinically indicated,   endoscopic ultrasound may be pursued to rule out a small obstructive   ampullary tumor or pancreatic head mass.    New airspace opacifications in the right lower lung zone for which   clinical correlation with pneumonia is recommended.    < end of copied text >  < from: US Abdomen Complete (US Abdomen Complete .) (12.22.22 @ 09:48) >  IMPRESSION:  Biliary dilatation in this patient with prior cholecystectomy. 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  Hepatic steatosis.  Nonvisualized spleen.      < end of copied text >  < from: MR MRCP w/wo IV Cont (12.12.22 @ 16:03) >  IMPRESSION:  1.  Intrahepatic and extrahepatic biliary dilatation with tapering of the   common bile duct at the ampulla suggesting ampullary stenosis. Decreased   signal at the level of the ampulla may represent a small amount of   sludge/debris.  2.  Diffuse hepatic steatosis with focal hepatic steatosis within the   LEFT lobe.    < end of copied text >      Imaging Personally Reviewed:  YES    Consultant(s) Notes Reviewed:   YES    Care Discussed with Consultants :     Plan of care was discussed with patient and /or primary care giver; all questions and concerns were addressed and care was aligned with patient's wishes.

## 2022-12-28 NOTE — PROGRESS NOTE ADULT - PROBLEM SELECTOR PLAN 6
- c/w protonix for GI prophylaxis   - c/w SCD for DVT prophylaxis  - ERCP procedure performed 12/23, eliquis hold per GI Dr. Kim due to - c/w protonix for GI prophylaxis   - c/w SCD for DVT prophylaxis  - ERCP procedure performed 12/23, eliquis hold for 7 days post procedure

## 2022-12-28 NOTE — PROGRESS NOTE ADULT - PROBLEM SELECTOR PLAN 3
-   - Likely secondary to beta blockers being held at this time   - Continue w/ Digoxin 250 mcg daily 12/27  - Cardio Dr. Sharma follows

## 2022-12-29 LAB
ALBUMIN SERPL ELPH-MCNC: 1.8 G/DL — LOW (ref 3.5–5)
ALP SERPL-CCNC: 126 U/L — HIGH (ref 40–120)
ALT FLD-CCNC: 36 U/L DA — SIGNIFICANT CHANGE UP (ref 10–60)
ANION GAP SERPL CALC-SCNC: 6 MMOL/L — SIGNIFICANT CHANGE UP (ref 5–17)
AST SERPL-CCNC: 39 U/L — SIGNIFICANT CHANGE UP (ref 10–40)
BILIRUB SERPL-MCNC: 0.4 MG/DL — SIGNIFICANT CHANGE UP (ref 0.2–1.2)
BUN SERPL-MCNC: 10 MG/DL — SIGNIFICANT CHANGE UP (ref 7–18)
CALCIUM SERPL-MCNC: 8.8 MG/DL — SIGNIFICANT CHANGE UP (ref 8.4–10.5)
CHLORIDE SERPL-SCNC: 95 MMOL/L — LOW (ref 96–108)
CO2 SERPL-SCNC: 31 MMOL/L — SIGNIFICANT CHANGE UP (ref 22–31)
CREAT SERPL-MCNC: 0.39 MG/DL — LOW (ref 0.5–1.3)
EGFR: 120 ML/MIN/1.73M2 — SIGNIFICANT CHANGE UP
GLUCOSE BLDC GLUCOMTR-MCNC: 110 MG/DL — HIGH (ref 70–99)
GLUCOSE BLDC GLUCOMTR-MCNC: 113 MG/DL — HIGH (ref 70–99)
GLUCOSE BLDC GLUCOMTR-MCNC: 126 MG/DL — HIGH (ref 70–99)
GLUCOSE BLDC GLUCOMTR-MCNC: 129 MG/DL — HIGH (ref 70–99)
GLUCOSE SERPL-MCNC: 124 MG/DL — HIGH (ref 70–99)
HCT VFR BLD CALC: 29.1 % — LOW (ref 39–50)
HGB BLD-MCNC: 9.3 G/DL — LOW (ref 13–17)
MCHC RBC-ENTMCNC: 30.5 PG — SIGNIFICANT CHANGE UP (ref 27–34)
MCHC RBC-ENTMCNC: 32 GM/DL — SIGNIFICANT CHANGE UP (ref 32–36)
MCV RBC AUTO: 95.4 FL — SIGNIFICANT CHANGE UP (ref 80–100)
NRBC # BLD: 1 /100 WBCS — HIGH (ref 0–0)
PLATELET # BLD AUTO: 356 K/UL — SIGNIFICANT CHANGE UP (ref 150–400)
POTASSIUM SERPL-MCNC: 5.1 MMOL/L — SIGNIFICANT CHANGE UP (ref 3.5–5.3)
POTASSIUM SERPL-SCNC: 5.1 MMOL/L — SIGNIFICANT CHANGE UP (ref 3.5–5.3)
PROT SERPL-MCNC: 6.8 G/DL — SIGNIFICANT CHANGE UP (ref 6–8.3)
RBC # BLD: 3.05 M/UL — LOW (ref 4.2–5.8)
RBC # FLD: 15.3 % — HIGH (ref 10.3–14.5)
SODIUM SERPL-SCNC: 132 MMOL/L — LOW (ref 135–145)
WBC # BLD: 4.65 K/UL — SIGNIFICANT CHANGE UP (ref 3.8–10.5)
WBC # FLD AUTO: 4.65 K/UL — SIGNIFICANT CHANGE UP (ref 3.8–10.5)

## 2022-12-29 RX ORDER — METRONIDAZOLE 500 MG
500 TABLET ORAL EVERY 8 HOURS
Refills: 0 | Status: COMPLETED | OUTPATIENT
Start: 2022-12-29 | End: 2023-01-03

## 2022-12-29 RX ORDER — CEFUROXIME AXETIL 250 MG
500 TABLET ORAL EVERY 12 HOURS
Refills: 0 | Status: COMPLETED | OUTPATIENT
Start: 2022-12-29 | End: 2023-01-03

## 2022-12-29 RX ORDER — POLYETHYLENE GLYCOL 3350 17 G/17G
17 POWDER, FOR SOLUTION ORAL
Refills: 0 | Status: DISCONTINUED | OUTPATIENT
Start: 2022-12-29 | End: 2023-01-01

## 2022-12-29 RX ADMIN — OXYCODONE AND ACETAMINOPHEN 1 TABLET(S): 5; 325 TABLET ORAL at 01:54

## 2022-12-29 RX ADMIN — OXYCODONE AND ACETAMINOPHEN 1 TABLET(S): 5; 325 TABLET ORAL at 18:38

## 2022-12-29 RX ADMIN — Medication 5 MILLIGRAM(S): at 11:58

## 2022-12-29 RX ADMIN — TAMSULOSIN HYDROCHLORIDE 0.4 MILLIGRAM(S): 0.4 CAPSULE ORAL at 22:42

## 2022-12-29 RX ADMIN — Medication 100 MILLIGRAM(S): at 14:08

## 2022-12-29 RX ADMIN — Medication 250 MICROGRAM(S): at 05:22

## 2022-12-29 RX ADMIN — MEROPENEM 100 MILLIGRAM(S): 1 INJECTION INTRAVENOUS at 05:21

## 2022-12-29 RX ADMIN — POLYETHYLENE GLYCOL 3350 17 GRAM(S): 17 POWDER, FOR SOLUTION ORAL at 22:44

## 2022-12-29 RX ADMIN — PANTOPRAZOLE SODIUM 40 MILLIGRAM(S): 20 TABLET, DELAYED RELEASE ORAL at 05:22

## 2022-12-29 RX ADMIN — OXYCODONE AND ACETAMINOPHEN 1 TABLET(S): 5; 325 TABLET ORAL at 01:24

## 2022-12-29 RX ADMIN — Medication 500 MILLIGRAM(S): at 18:35

## 2022-12-29 RX ADMIN — OXYCODONE AND ACETAMINOPHEN 1 TABLET(S): 5; 325 TABLET ORAL at 23:13

## 2022-12-29 RX ADMIN — OXYCODONE AND ACETAMINOPHEN 1 TABLET(S): 5; 325 TABLET ORAL at 05:25

## 2022-12-29 RX ADMIN — ATORVASTATIN CALCIUM 10 MILLIGRAM(S): 80 TABLET, FILM COATED ORAL at 22:43

## 2022-12-29 RX ADMIN — OXYCODONE AND ACETAMINOPHEN 1 TABLET(S): 5; 325 TABLET ORAL at 11:13

## 2022-12-29 RX ADMIN — OXYCODONE AND ACETAMINOPHEN 1 TABLET(S): 5; 325 TABLET ORAL at 19:15

## 2022-12-29 RX ADMIN — OXYCODONE AND ACETAMINOPHEN 1 TABLET(S): 5; 325 TABLET ORAL at 22:43

## 2022-12-29 RX ADMIN — OXYCODONE AND ACETAMINOPHEN 1 TABLET(S): 5; 325 TABLET ORAL at 11:58

## 2022-12-29 RX ADMIN — FINASTERIDE 5 MILLIGRAM(S): 5 TABLET, FILM COATED ORAL at 11:58

## 2022-12-29 RX ADMIN — OXYCODONE AND ACETAMINOPHEN 1 TABLET(S): 5; 325 TABLET ORAL at 05:55

## 2022-12-29 RX ADMIN — Medication 100 MILLIGRAM(S): at 22:43

## 2022-12-29 RX ADMIN — Medication 100 MILLIGRAM(S): at 05:22

## 2022-12-29 RX ADMIN — SENNA PLUS 2 TABLET(S): 8.6 TABLET ORAL at 22:43

## 2022-12-29 NOTE — PROGRESS NOTE ADULT - ASSESSMENT
67 year old male from [facility] with past medical history of DM, HTN, HLD, AF on Eliquis, PVD, presents with c/o 8/10 sharp right sided abdominal pain with nausea, vomiting, and diarrhea.  Pt recently had cholecystectomy and ERCP with sphincterotomy 12/16/22. TIM Kim consulted, s/p  repeat ERCP 12/23/22 found to have bleeding from spincterotomy site. CBD stented and clips place to control bleeding. Admitted to ICU for septic shock, found positive variable coccibacilli bacteremia, and aerococcus UTI. Hospital course complicated by failed TOV 12/24 repeat TOV will be done today 12/29. Hypotension and tachycardia resolved. Continue with digoxin 250 mcg for rate control. TIM Villanueva and COLLIN Novoa following . COLLIN Nava consulted started on meropenem and vancomycin, repeat blood cx 12/25 NGTD.  Downgraded to medicine 12/25 .TTE not successful 12/27 . Midline infiltrated overnight. Plan for JANINE to evaluate will be done 12/30. Pt will remain npo at midnight.

## 2022-12-29 NOTE — PROGRESS NOTE ADULT - SUBJECTIVE AND OBJECTIVE BOX
67y Male    Meds:  cefuroxime   Tablet 500 milliGRAM(s) Oral every 12 hours  metroNIDAZOLE    Tablet 500 milliGRAM(s) Oral every 8 hours    Allergies    morphine (Unknown)  penicillin (Unknown)  shellfish (Unknown)    Intolerances        VITALS:  Vital Signs Last 24 Hrs  T(C): 36.8 (29 Dec 2022 12:50), Max: 36.8 (29 Dec 2022 12:50)  T(F): 98.2 (29 Dec 2022 12:50), Max: 98.2 (29 Dec 2022 12:50)  HR: 107 (29 Dec 2022 12:50) (97 - 107)  BP: 107/69 (29 Dec 2022 12:50) (95/60 - 111/71)  BP(mean): 79 (29 Dec 2022 12:50) (69 - 79)  RR: 18 (29 Dec 2022 12:50) (16 - 18)  SpO2: 96% (29 Dec 2022 12:50) (95% - 97%)    Parameters below as of 29 Dec 2022 12:50  Patient On (Oxygen Delivery Method): nasal cannula  O2 Flow (L/min): 3      LABS/DIAGNOSTIC TESTS:                          9.3    4.65  )-----------( 356      ( 29 Dec 2022 07:10 )             29.1         12-29    132<L>  |  95<L>  |  10  ----------------------------<  124<H>  5.1   |  31  |  0.39<L>    Ca    8.8      29 Dec 2022 07:10    TPro  6.8  /  Alb  1.8<L>  /  TBili  0.4  /  DBili  x   /  AST  39  /  ALT  36  /  AlkPhos  126<H>  12-29      LIVER FUNCTIONS - ( 29 Dec 2022 07:10 )  Alb: 1.8 g/dL / Pro: 6.8 g/dL / ALK PHOS: 126 U/L / ALT: 36 U/L DA / AST: 39 U/L / GGT: x             CULTURES: .Blood Blood-Peripheral  12-25 @ 06:45   No growth to date.  --  --      .Blood Blood-Peripheral  12-25 @ 05:59   No growth to date.  --  --      .Stool Feces  12-22 @ 20:30   No enteric pathogens isolated.  (Stool culture examined for Salmonella,  Shigella, Campylobacter, Aeromonas, Plesiomonas,  Vibrio, E.coli O157 and Yersinia)  --  --      .Stool Feces  12-22 @ 20:20   No Protozoa seen by trichrome stain  No Helminths or Protozoa seen in formalin concentrate  performed by iodine stain  (routine O+P not evaluated for Microsporidia,  Cryptosporidia or Cyclospora)  One negative sample does not necessarily rule  out the presence of a parasitic infection.  --  --      .Blood Blood  12-22 @ 14:41   Growth in aerobic bottle: Gram Variable coccobacili Sent to  [facility]  for Identification  .  ***Blood Panel PCR results on this specimen are available  approximately 3 hours after the Gram stain result.***  Gram stain, PCR, and/or culture results may not always  correspond due to difference in methodologies.  ************************************************************  This PCR assay was performed by multiplex PCR. This  Assay tests for 66 bacterial and resistance gene targets.  Please refer to the [facility] test directory  at [email or web address] for details.  --  Blood Culture PCR      .Blood Blood  12-22 @ 14:36   No Growth Final  --  --      Catheterized Catheterized  12-22 @ 09:20   >100,000 CFU/ml Aerococcus species  "Aerococcus spp. are predictably susceptible to penicillin,  ampicillin, tetracycline, and vancomycin.  All isolates are  resistant to sulfonamides"  --  --      Clean Catch Clean Catch (Midstream)  12-09 @ 02:46   >100,000 CFU/ml Aerococcus species  "Aerococcus spp. are predictably susceptible to penicillin,  ampicillin, tetracycline, and vancomycin.  All isolates are  resistant to sulfonamides"  --  --            RADIOLOGY:      ROS:  [  ] UNABLE TO ELICIT 67y Male who is doing much better today, he has minimal abdominal pain, he still has leg pain but is stable with his current pain meds. He has no fevers or chills, no diarrhea , nausea or vomiting. He lost his IV access and so switched him to po antibiotics. His gram variable coccobacillus was never identified and repeat cultures were all negative and so might have been a contaminant.    Meds:  cefuroxime   Tablet 500 milliGRAM(s) Oral every 12 hours  metroNIDAZOLE    Tablet 500 milliGRAM(s) Oral every 8 hours    Allergies    morphine (Unknown)  penicillin (Unknown)  shellfish (Unknown)    Intolerances        VITALS:  Vital Signs Last 24 Hrs  T(C): 36.8 (29 Dec 2022 12:50), Max: 36.8 (29 Dec 2022 12:50)  T(F): 98.2 (29 Dec 2022 12:50), Max: 98.2 (29 Dec 2022 12:50)  HR: 107 (29 Dec 2022 12:50) (97 - 107)  BP: 107/69 (29 Dec 2022 12:50) (95/60 - 111/71)  BP(mean): 79 (29 Dec 2022 12:50) (69 - 79)  RR: 18 (29 Dec 2022 12:50) (16 - 18)  SpO2: 96% (29 Dec 2022 12:50) (95% - 97%)    Parameters below as of 29 Dec 2022 12:50  Patient On (Oxygen Delivery Method): nasal cannula  O2 Flow (L/min): 3      LABS/DIAGNOSTIC TESTS:                          9.3    4.65  )-----------( 356      ( 29 Dec 2022 07:10 )             29.1         12-29    132<L>  |  95<L>  |  10  ----------------------------<  124<H>  5.1   |  31  |  0.39<L>    Ca    8.8      29 Dec 2022 07:10    TPro  6.8  /  Alb  1.8<L>  /  TBili  0.4  /  DBili  x   /  AST  39  /  ALT  36  /  AlkPhos  126<H>  12-29      LIVER FUNCTIONS - ( 29 Dec 2022 07:10 )  Alb: 1.8 g/dL / Pro: 6.8 g/dL / ALK PHOS: 126 U/L / ALT: 36 U/L DA / AST: 39 U/L / GGT: x             CULTURES: .Blood Blood-Peripheral  12-25 @ 06:45   No growth to date.  --  --      .Blood Blood-Peripheral  12-25 @ 05:59   No growth to date.  --  --      .Stool Feces  12-22 @ 20:30   No enteric pathogens isolated.  (Stool culture examined for Salmonella,  Shigella, Campylobacter, Aeromonas, Plesiomonas,  Vibrio, E.coli O157 and Yersinia)  --  --      .Stool Feces  12-22 @ 20:20   No Protozoa seen by trichrome stain  No Helminths or Protozoa seen in formalin concentrate  performed by iodine stain  (routine O+P not evaluated for Microsporidia,  Cryptosporidia or Cyclospora)  One negative sample does not necessarily rule  out the presence of a parasitic infection.  --  --      .Blood Blood  12-22 @ 14:41   Growth in aerobic bottle: Gram Variable coccobacili Sent to  [facility]  for Identification  .  ***Blood Panel PCR results on this specimen are available  approximately 3 hours after the Gram stain result.***  Gram stain, PCR, and/or culture results may not always  correspond due to difference in methodologies.  ************************************************************  This PCR assay was performed by multiplex PCR. This  Assay tests for 66 bacterial and resistance gene targets.  Please refer to the [facility] test directory  at [email or web address] for details.  --  Blood Culture PCR      .Blood Blood  12-22 @ 14:36   No Growth Final  --  --      Catheterized Catheterized  12-22 @ 09:20   >100,000 CFU/ml Aerococcus species  "Aerococcus spp. are predictably susceptible to penicillin,  ampicillin, tetracycline, and vancomycin.  All isolates are  resistant to sulfonamides"  --  --      Clean Catch Clean Catch (Midstream)  12-09 @ 02:46   >100,000 CFU/ml Aerococcus species  "Aerococcus spp. are predictably susceptible to penicillin,  ampicillin, tetracycline, and vancomycin.  All isolates are  resistant to sulfonamides"  --  --            RADIOLOGY:      ROS:  [  ] UNABLE TO ELICIT

## 2022-12-29 NOTE — PROGRESS NOTE ADULT - PROBLEM SELECTOR PLAN 7
- [facility]  - repeat blood cultures 12/25 NGTD   - remains on IV meropenem  -JANINE pending 12/30, npo at midnight   - will likely return back to [facility] [place] once optimized.

## 2022-12-29 NOTE — PROGRESS NOTE ADULT - SUBJECTIVE AND OBJECTIVE BOX
[   ] ICU                                          [   ] CCU                                      [  X ] Medical Floor      Patient is a 67 year old male with abdominal pain. GI consulted to evaluate.        Patient is a 67 year old male, with past medical history significant for DM, HTN, HLD, AF on Eliquis, PVD, presents with c/o vomiting and right sided abdominal pain. Patient recently had cholecystectomy and ERCP with sphincterotomy prior to discharge. patient presented with c/o sharp right sided abdominal pain 8/10 persistent with nausea, vomiting and melena. Patient had repeat ERCP found to have bleeding from sphincterotomy site. CBD stended and clips place to control bleeding. No hematemesis, hematochezia, chest pain, SOB, cough, hematuria or dysuria reported.    Patient appears comfortable. No new complaints reported, No abdominal pain, N/V, hematemesis, hematochezia, melena, fever, chills, chest pain, SOB, cough or diarrhea reported.       PAIN MANAGEMENT:  Pain Scale:                 /10  Pain Location:         PAST MEDICAL HISTORY  COPD (chronic obstructive pulmonary disease)    Diabetes    PVD (peripheral vascular disease)    Chronic atrial fibrillation    GERD (gastroesophageal reflux disease)    MDD (major depressive disorder)    BPH (benign prostatic hyperplasia)        PAST SURGICAL HISTORY  Cholecystectomy    Allergies    morphine (Unknown)  penicillin (Unknown)  shellfish (Unknown)    Intolerances  None       SOCIAL HISTORY  Advanced Directives:       [ X ] Full Code       [  ] DNR  Marital Status:         [  ] M      [ X ] S      [  ] D       [  ] W  Children:       [ X ] Yes      [  ] No  Occupation:        [  ] Employed       [  X] Unemployed       [  ] Retired  Diet:       [ X ] Regular       [  ] PEG feeding          [  ] NG tube feeding  Drug Use:           [ X ] Patient denied          [  ] Yes  Alcohol:           [ X ] No             [  ] Yes (socially)         [  ] Yes (chronic)  Tobacco:           [  ] Yes           [ X ] No      FAMILY HISTORY  [ X ] Heart Disease            [ X ] Diabetes             [ X ] HTN             [  ] Colon Cancer             [  ] Stomach Cancer              [  ] Pancreatic Cancer      VITALS  Vital Signs Last 24 Hrs  T(C): 36.8 (29 Dec 2022 12:50), Max: 36.8 (29 Dec 2022 12:50)  T(F): 98.2 (29 Dec 2022 12:50), Max: 98.2 (29 Dec 2022 12:50)  HR: 107 (29 Dec 2022 12:50) (97 - 107)  BP: 107/69 (29 Dec 2022 12:50) (95/60 - 111/71)  BP(mean): 79 (29 Dec 2022 12:50) (69 - 79)  RR: 18 (29 Dec 2022 12:50) (16 - 18)  SpO2: 96% (29 Dec 2022 12:50) (95% - 97%)  Parameters below as of 29 Dec 2022 12:50  Patient On (Oxygen Delivery Method): nasal cannula  O2 Flow (L/min): 3       MEDICATIONS  (STANDING):  atorvastatin 10 milliGRAM(s) Oral at bedtime  cefuroxime   Tablet 500 milliGRAM(s) Oral every 12 hours  chlorhexidine 2% Cloths 1 Application(s) Topical daily  digoxin     Tablet 250 MICROGram(s) Oral daily  finasteride 5 milliGRAM(s) Oral daily  insulin lispro (ADMELOG) corrective regimen sliding scale   SubCutaneous at bedtime  insulin lispro (ADMELOG) corrective regimen sliding scale   SubCutaneous three times a day before meals  metroNIDAZOLE    Tablet 500 milliGRAM(s) Oral every 8 hours  pantoprazole    Tablet 40 milliGRAM(s) Oral before breakfast  pregabalin 100 milliGRAM(s) Oral three times a day  senna 2 Tablet(s) Oral at bedtime  tamsulosin 0.4 milliGRAM(s) Oral at bedtime    MEDICATIONS  (PRN):  aluminum hydroxide/magnesium hydroxide/simethicone Suspension 30 milliLiter(s) Oral every 4 hours PRN Dyspepsia  baclofen 5 milliGRAM(s) Oral every 12 hours PRN Musculoskeletal Pain  melatonin 3 milliGRAM(s) Oral at bedtime PRN Insomnia  ondansetron Injectable 4 milliGRAM(s) IV Push every 8 hours PRN Nausea and/or Vomiting  oxycodone    5 mG/acetaminophen 325 mG 1 Tablet(s) Oral every 4 hours PRN Severe Pain (7 - 10)  sodium chloride 0.9% lock flush 10 milliLiter(s) IV Push every 1 hour PRN Pre/post blood products, medications, blood draw, and to maintain line patency                            9.3    4.65  )-----------( 356      ( 29 Dec 2022 07:10 )             29.1       12-29    132<L>  |  95<L>  |  10  ----------------------------<  124<H>  5.1   |  31  |  0.39<L>    Ca    8.8      29 Dec 2022 07:10    TPro  6.8  /  Alb  1.8<L>  /  TBili  0.4  /  DBili  x   /  AST  39  /  ALT  36  /  AlkPhos  126<H>  12-29

## 2022-12-29 NOTE — PROGRESS NOTE ADULT - ASSESSMENT
Recurrent Cholangitis  Bacteremia - possibly a contaminant, repeat blood cultures are negative  Fevers - resolved  Leukocytosis - normalized      Plan - Pt switched to ceftin 500mgs po bid and Flagyl 500mgs po TID both for 4-5 days more  DC planning.

## 2022-12-29 NOTE — PROGRESS NOTE ADULT - SUBJECTIVE AND OBJECTIVE BOX
PATIENT SEEN AND EXAMINED ON :- 12/29/22  DATE OF SERVICE: 12/29/22            Interim events noted,Labs ,Radiological studies and Cardiology tests reviewed .       HOSPITAL COURSE: HPI:  Patient is a 67 year old male, vaccinated, from [facility] with PMHx of DM, HTN, HLD, AF on Eliquis, PVD, presents with c/o vomiting and right sided abdominal pain. patient states yesterday after lunch he had a sharp right sided abdominal pain 8/10 persistent with nausea and vomiting. He also endorsed chills and rigors, unsure if he spiked a fever. He denies any changes in bowel habits, chest pain, shortness of breath or palpitations. Patient had two episodes of melena in ED. He was discharged yesterday from hospital. He had choledocholithiasis,  CT A/P showed: Cholecystectomy with dilated CBD up to 1.7 cm abrupt cut off of the distal CBD and  mild pancreatic duct dilatation. 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 He underwent  ERCP on 12/16 biliary sludge removal with biliary sphincterotomy and balloon extraction. He was discharged after symptom improvement and out patient gastric emptying study.     In ED:   Vitals: BP: 81/59mmHg  HR:  118  RR: 97% RA   WBC 14.7 k  elevated LFT, lactate   s/p  Rocephin 1LNS and zofran  (22 Dec 2022 12:00)      INTERIM EVENTS:Patient seen at bedside ,interim events noted.      PMH -reviewed admission note, no change since admission  HEART FAILURE: Acute[ ]Chronic[ ] Systolic[ ] Diastolic[ ] Combined Systolic and Diastolic[ ]  CAD[ ] CABG[ ] PCI[ ]  DEVICES[ ] PPM[ ] ICD[ ] ILR[ ]  ATRIAL FIBRILLATION[ ] Paroxysmal[ ] Permanent[ ] CHADS2-[  ]  DILCIA[ ] CKD1[ ] CKD2[ ] CKD3[ ] CKD4[ ] ESRD[ ]  COPD[ ] HTN[ ]   DM[ ] Type1[ ] Type 2[ ]   CVA[ ] Paresis[ ]    AMBULATION: Assisted[ ] Cane/walker[ ] Independent[ ]    MEDICATIONS  (STANDING):  atorvastatin 10 milliGRAM(s) Oral at bedtime  cefuroxime   Tablet 500 milliGRAM(s) Oral every 12 hours  chlorhexidine 2% Cloths 1 Application(s) Topical daily  digoxin     Tablet 250 MICROGram(s) Oral daily  finasteride 5 milliGRAM(s) Oral daily  insulin lispro (ADMELOG) corrective regimen sliding scale   SubCutaneous three times a day before meals  insulin lispro (ADMELOG) corrective regimen sliding scale   SubCutaneous at bedtime  metroNIDAZOLE    Tablet 500 milliGRAM(s) Oral every 8 hours  pantoprazole    Tablet 40 milliGRAM(s) Oral before breakfast  pregabalin 100 milliGRAM(s) Oral three times a day  senna 2 Tablet(s) Oral at bedtime  tamsulosin 0.4 milliGRAM(s) Oral at bedtime    MEDICATIONS  (PRN):  aluminum hydroxide/magnesium hydroxide/simethicone Suspension 30 milliLiter(s) Oral every 4 hours PRN Dyspepsia  baclofen 5 milliGRAM(s) Oral every 12 hours PRN Musculoskeletal Pain  melatonin 3 milliGRAM(s) Oral at bedtime PRN Insomnia  ondansetron Injectable 4 milliGRAM(s) IV Push every 8 hours PRN Nausea and/or Vomiting  oxycodone    5 mG/acetaminophen 325 mG 1 Tablet(s) Oral every 4 hours PRN Severe Pain (7 - 10)  sodium chloride 0.9% lock flush 10 milliLiter(s) IV Push every 1 hour PRN Pre/post blood products, medications, blood draw, and to maintain line patency            REVIEW OF SYSTEMS:  Constitutional: [ ] fever, [ ]weight loss,  [ ]fatigue [ ]weight gain  Eyes: [ ] visual changes  Respiratory: [ ]shortness of breath;  [ ] cough, [ ]wheezing, [ ]chills, [ ]hemoptysis  Cardiovascular: [ ] chest pain, [ ]palpitations, [ ]dizziness,  [ ]leg swelling[ ]orthopnea[ ]PND  Gastrointestinal: [ ] abdominal pain, [ ]nausea, [ ]vomiting,  [ ]diarrhea [ ]Constipation [ ]Melena  Genitourinary: [ ] dysuria, [ ] hematuria [ ]Cardoza  Neurologic: [ ] headaches [ ] tremors[ ]weakness [ ]Paralysis Right[ ] Left[ ]  Skin: [ ] itching, [ ]burning, [ ] rashes  Endocrine: [ ] heat or cold intolerance  Musculoskeletal: [ ] joint pain or swelling; [ ] muscle, back, or extremity pain  Psychiatric: [ ] depression, [ ]anxiety, [ ]mood swings, or [ ]difficulty sleeping  Hematologic: [ ] easy bruising, [ ] bleeding gums    [ ] All remaining systems negative except as per above.   [ ]Unable to obtain.  [x] No change in ROS since admission      Vital Signs Last 24 Hrs  T(C): 36.8 (29 Dec 2022 12:50), Max: 36.8 (29 Dec 2022 12:50)  T(F): 98.2 (29 Dec 2022 12:50), Max: 98.2 (29 Dec 2022 12:50)  HR: 107 (29 Dec 2022 12:50) (97 - 107)  BP: 107/69 (29 Dec 2022 12:50) (95/60 - 111/71)  BP(mean): 79 (29 Dec 2022 12:50) (69 - 79)  RR: 18 (29 Dec 2022 12:50) (16 - 18)  SpO2: 96% (29 Dec 2022 12:50) (95% - 97%)    Parameters below as of 29 Dec 2022 12:50  Patient On (Oxygen Delivery Method): nasal cannula  O2 Flow (L/min): 3    I&O's Summary    28 Dec 2022 07:01  -  29 Dec 2022 07:00  --------------------------------------------------------  IN: 0 mL / OUT: 825 mL / NET: -825 mL    29 Dec 2022 07:01  -  29 Dec 2022 19:43  --------------------------------------------------------  IN: 0 mL / OUT: 600 mL / NET: -600 mL        PHYSICAL EXAM:  General: No acute distress BMI-  HEENT: EOMI, PERRL  Neck: Supple, [ ] JVD  Lungs: Equal air entry bilaterally; [ ] rales [ ] wheezing [ ] rhonchi  Heart: Regular rate and rhythm; [x ] murmur   2/6 [ x] systolic [ ] diastolic [ ] radiation[ ] rubs [ ]  gallops  Abdomen: Nontender, bowel sounds present  Extremities: No clubbing, cyanosis, [ ] edema [ ]Pulses  equal and intact  Nervous system:  Alert & Oriented X3, no focal deficits  Psychiatric: Normal affect  Skin: No rashes or lesions    LABS:  12-29    132<L>  |  95<L>  |  10  ----------------------------<  124<H>  5.1   |  31  |  0.39<L>    Ca    8.8      29 Dec 2022 07:10    TPro  6.8  /  Alb  1.8<L>  /  TBili  0.4  /  DBili  x   /  AST  39  /  ALT  36  /  AlkPhos  126<H>  12-29    Creatinine Trend: 0.39<--, 0.56<--, 0.51<--, 0.47<--, 0.40<--, 0.53<--                        9.3    4.65  )-----------( 356      ( 29 Dec 2022 07:10 )             29.1

## 2022-12-29 NOTE — PROGRESS NOTE ADULT - ASSESSMENT
67 year old male from [facility] with past medical history of DM, HTN, HLD, AF on Eliquis, PVD, presents with c/o 8/10 sharp right sided abdominal pain with nausea, vomiting, and diarrhea.  Pt recently had cholecystectomy and ERCP with sphincterotomy 12/16/22. TIM Kim consulted, s/p  repeat ERCP 12/23/22 found to have bleeding from spincterotomy site. CBD stented and clips place to control bleeding. Admitted to ICU for septic shock, found positive variable coccibacilli bacteremia, and aerococcus UTI. Hospital course complicated by failed TOV 12/24 repeat TOV will be done today 12/29. Hypotension and tachycardia resolved. Continue with digoxin 250 mcg for rate control. TIM Villanueva and COLLIN Novoa following . COLLIN Nava consulted started on meropenem and vancomycin, repeat blood cx 12/25 NGTD.  Downgraded to medicine 12/25 .TTE not successful 12/27 . Midline infiltrated overnight. Plan for JANINE to evaluate will be done 12/30. Pt will remain npo at midnight.

## 2022-12-29 NOTE — PROGRESS NOTE ADULT - PROBLEM SELECTOR PLAN 1
septic shock on admission  - admitted to ICU downgraded to medicine   - Repeat blood cx 12/25 no growth   - s/p cefepime and vancomycin   -TTE unsuccessful, plan for JANINE 12/29  - COLLIN jones septic shock on admission  - admitted to ICU downgraded to medicine   - Repeat blood cx 12/25 no growth   - s/p cefepime, vancomycin, and meropenem   -TTE unsuccessful, plan for JANINE 12/30  - IV access needed for anesthesia purpose   - Start ceftin and flagyl   - ID Elijah following

## 2022-12-29 NOTE — PROGRESS NOTE ADULT - SUBJECTIVE AND OBJECTIVE BOX
NP Note discussed with  primary attending    Patient is a 67y old  Male who presents with a chief complaint of Abdominal pain (28 Dec 2022 20:02)      INTERVAL HPI/OVERNIGHT EVENTS: no new complaints, midline infiltrated overnight. RUQ pain. JANINE scheduled 12/30    MEDICATIONS  (STANDING):  atorvastatin 10 milliGRAM(s) Oral at bedtime  chlorhexidine 2% Cloths 1 Application(s) Topical daily  digoxin     Tablet 250 MICROGram(s) Oral daily  finasteride 5 milliGRAM(s) Oral daily  insulin lispro (ADMELOG) corrective regimen sliding scale   SubCutaneous three times a day before meals  insulin lispro (ADMELOG) corrective regimen sliding scale   SubCutaneous at bedtime  meropenem  IVPB 1000 milliGRAM(s) IV Intermittent every 8 hours  pantoprazole    Tablet 40 milliGRAM(s) Oral before breakfast  pregabalin 100 milliGRAM(s) Oral three times a day  senna 2 Tablet(s) Oral at bedtime  tamsulosin 0.4 milliGRAM(s) Oral at bedtime    MEDICATIONS  (PRN):  aluminum hydroxide/magnesium hydroxide/simethicone Suspension 30 milliLiter(s) Oral every 4 hours PRN Dyspepsia  baclofen 5 milliGRAM(s) Oral every 12 hours PRN Musculoskeletal Pain  melatonin 3 milliGRAM(s) Oral at bedtime PRN Insomnia  ondansetron Injectable 4 milliGRAM(s) IV Push every 8 hours PRN Nausea and/or Vomiting  oxycodone    5 mG/acetaminophen 325 mG 1 Tablet(s) Oral every 4 hours PRN Severe Pain (7 - 10)  sodium chloride 0.9% lock flush 10 milliLiter(s) IV Push every 1 hour PRN Pre/post blood products, medications, blood draw, and to maintain line patency      __________________________________________________  REVIEW OF SYSTEMS:    CONSTITUTIONAL: No fever,   EYES: no acute visual disturbances  NECK: No pain or stiffness  RESPIRATORY: No cough; No shortness of breath  CARDIOVASCULAR: No chest pain, no palpitations  GASTROINTESTINAL: No pain. No nausea or vomiting; No diarrhea, RUQ pain   NEUROLOGICAL: No headache or numbness, no tremors  MUSCULOSKELETAL: No joint pain, no muscle pain  GENITOURINARY: no dysuria, no frequency, no hesitancy  Cardoza in place, clamped for 6 hours   PSYCHIATRY: no depression , no anxiety  ALL OTHER  ROS negative        Vital Signs Last 24 Hrs  T(C): 36.2 (29 Dec 2022 05:25), Max: 36.7 (28 Dec 2022 20:36)  T(F): 97.2 (29 Dec 2022 05:25), Max: 98.1 (28 Dec 2022 20:36)  HR: 97 (29 Dec 2022 05:25) (97 - 107)  BP: 95/60 (29 Dec 2022 05:25) (95/60 - 111/71)  BP(mean): 69 (29 Dec 2022 05:25) (69 - 81)  RR: 16 (29 Dec 2022 05:25) (16 - 18)  SpO2: 95% (29 Dec 2022 05:25) (95% - 98%)    Parameters below as of 29 Dec 2022 05:25  Patient On (Oxygen Delivery Method): nasal cannula  O2 Flow (L/min): 2      ________________________________________________  PHYSICAL EXAM:  GENERAL: NAD  HEENT: Normocephalic;  conjunctivae and sclerae clear; moist mucous membranes;   NECK : supple  CHEST/LUNG: Clear to auscultation bilaterally with good air entry   HEART: S1 S2  regular; no murmurs, gallops or rubs  ABDOMEN: Soft, Nontender, Nondistended; Bowel sounds present  : Cardoza   EXTREMITIES: no cyanosis; no edema; no calf tenderness  SKIN: warm and dry; no rash  NERVOUS SYSTEM:  Awake and alert; Oriented  to place, person and time ; no new deficits    _________________________________________________  LABS:                        9.3    4.65  )-----------( 356      ( 29 Dec 2022 07:10 )             29.1     12-29    132<L>  |  95<L>  |  10  ----------------------------<  124<H>  5.1   |  31  |  0.39<L>    Ca    8.8      29 Dec 2022 07:10    TPro  6.8  /  Alb  1.8<L>  /  TBili  0.4  /  DBili  x   /  AST  39  /  ALT  36  /  AlkPhos  126<H>  12-29        CAPILLARY BLOOD GLUCOSE      POCT Blood Glucose.: 110 mg/dL (29 Dec 2022 11:22)  POCT Blood Glucose.: 113 mg/dL (29 Dec 2022 07:46)  POCT Blood Glucose.: 126 mg/dL (28 Dec 2022 21:19)  POCT Blood Glucose.: 132 mg/dL (28 Dec 2022 16:39)        RADIOLOGY & ADDITIONAL TESTS:    < from: Xray Chest 1 View- PORTABLE-Routine (Xray Chest 1 View- PORTABLE-Routine in AM.) (12.25.22 @ 09:41) >  IMPRESSION:   Increasing RIGHT lower zone diffuse airspace   disease/consolidation...      < end of copied text >  < from: CT Abdomen and Pelvis No Cont (12.22.22 @ 10:37) >  INTERPRETATION:  CLINICAL INFORMATION: Abdominal pain    COMPARISON: Abdominal CT dated 12/8/2022    CONTRAST/COMPLICATIONS:  IV Contrast: NONE  Oral Contrast: NONE  Complications: None reported at time of study completion    PROCEDURE:  CT of the Abdomen and Pelvis was performed.  Sagittal and coronal reformats were performed.    FINDINGS: Evaluation of the abdomen and pelvis is limited by lack of IV   contrast.  LOWER CHEST: New airspace opacifications in the right lower lung zone for   which clinical correlation with pneumonia is recommended.    LIVER: Stable small hypodense area in the left hepatic lobe. Please see   recent abdominalMR dated 12/12/2022 for further characterization.  BILE DUCTS: Dilatation of the common bile duct is again noted.  GALLBLADDER: Stable cholecystectomy clips.  SPLEEN: Within normal limits.  PANCREAS: The pancreatic parenchyma appears unremarkable. Mild dilatation   of the main pancreatic duct in the pancreatic head is again noted.  ADRENALS: Within normal limits.  KIDNEYS/URETERS: Small hypodense lesion in the left kidney, likely   representing a cyst. The right kidney appears unremarkable. No evidence   for a calculus in the kidneys or ureters. No hydronephrosis.    BLADDER: Underdistended.  REPRODUCTIVE ORGANS: The prostate and seminal vesicles appear grossly   unremarkable.    BOWEL: No bowel obstruction or grossly thickened bowel wall. Colonic   diverticulosis without evidence for diverticulitis. Moderate amount of   stool in the rectum and distal sigmoid colon.. Appendix not visualized.   No secondary signs for acute appendicitis.  PERITONEUM: No free air or ascites.  VESSELS: Calcified atherosclerotic disease.  RETROPERITONEUM/LYMPH NODES: No lymphadenopathy.  ABDOMINAL WALL: Small fat-containing ventral hernia.  BONES: Degenerative spondylosis.    IMPRESSION: No bowel obstruction or grossly thickened bowel wall. Colonic   diverticulosis without evidence for diverticulitis. Moderate amount of   stool in the rectum and distal sigmoid colon.. Appendix not visualized.   No secondary signs for acute appendicitis.    Dilatation of the common bile duct and main pancreatic duct again noted..   Please see recent abdominal MR dated 12/12/2022. If clinically indicated,   endoscopic ultrasound may be pursued to rule out a small obstructive   ampullary tumor or pancreatic head mass.    New airspace opacifications in the right lower lung zone for which   clinical correlation with pneumonia is recommended.    < end of copied text >  < from: US Abdomen Complete (US Abdomen Complete .) (12.22.22 @ 09:48) >  IMPRESSION:  Biliary dilatation in this patient with prior cholecystectomy. 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  Hepatic steatosis.  Nonvisualized spleen.    < end of copied text >  < from: MR MRCP w/wo IV Cont (12.12.22 @ 16:03) >  IMPRESSION:  1.  Intrahepatic and extrahepatic biliary dilatation with tapering of the   common bile duct at the ampulla suggesting ampullary stenosis. Decreased   signal at the level of the ampulla may represent a small amount of   sludge/debris.  2.  Diffuse hepatic steatosis with focal hepatic steatosis within the   LEFT lobe.      < end of copied text >  < from: CT Abdomen and Pelvis w/ IV Cont (12.08.22 @ 17:20) >  IMPRESSION:  No evidence of bowel obstruction.    Dilated CBD up to 1.7 cm which can be seen with cholecystectomy, however   there is abrupt cut off ofthe distal CBD and  mild pancreatic duct   dilatation. Choledocholithiasis or a papillary process cannot be entirely   excluded. Correlate with liver function tests. If there is clinical   concern, consider MRI MRCP with contrast for further evaluation.    Hepatic steatosis with a focal area of increased hypoattenuation which   likely represents pronounced focal fatty changes, less likely underlying   mass. This finding could also be further evaluated on MRI/MRCP as   recommended above.    Non complicated colonic diverticulosis, no diverticulitis.    < end of copied text >      Imaging Personally Reviewed:  YES    Consultant(s) Notes Reviewed:   YES    Care Discussed with Consultants :   TIM Villanueva and COLLIN Nava following       Plan of care was discussed with patient and /or primary care giver; all questions and concerns were addressed and care was aligned with patient's wishes.

## 2022-12-29 NOTE — PROGRESS NOTE ADULT - PROBLEM SELECTOR PLAN 2
- Continue with meropenem day 7 . Continue meropenem 7 more days   - US Abdomen: Biliary dilatation and hepatic steatosis  - CT A/P: dilation of the common bile duct and main pancreatic duct again noted.. New RLL opacity  - repeat ERCP on 12/23 performed, found with bleeding to sphincterotomy site, 2 clips and 1 stent placed in common bile duct to facilitate tamponade the bleeding and facilitate drainage.   - RUQ pain, continue with percocet  - GI Golyan following -s/p meropenem day 6   - US Abdomen: Biliary dilatation and hepatic steatosis  - CT A/P: dilation of the common bile duct and main pancreatic duct again noted.. New RLL opacity  - repeat ERCP on 12/23 performed, found with bleeding to sphincterotomy site, 2 clips and 1 stent placed in common bile duct to facilitate tamponade the bleeding and facilitate drainage.   - RUQ pain, continue with percocet  - GI Golyan following

## 2022-12-29 NOTE — PROGRESS NOTE ADULT - PROBLEM SELECTOR PLAN 2
-s/p meropenem day 6   - US Abdomen: Biliary dilatation and hepatic steatosis  - CT A/P: dilation of the common bile duct and main pancreatic duct again noted.. New RLL opacity  - repeat ERCP on 12/23 performed, found with bleeding to sphincterotomy site, 2 clips and 1 stent placed in common bile duct to facilitate tamponade the bleeding and facilitate drainage.   - RUQ pain, continue with percocet  - GI Golyan following

## 2022-12-29 NOTE — PROGRESS NOTE ADULT - PROBLEM SELECTOR PLAN 1
septic shock on admission  - admitted to ICU downgraded to medicine   - Repeat blood cx 12/25 no growth   - s/p cefepime, vancomycin, and meropenem   -TTE unsuccessful, plan for JANINE 12/30  - IV access needed for anesthesia purpose   - Start ceftin and flagyl   - ID Elijah following

## 2022-12-29 NOTE — PROGRESS NOTE ADULT - PROBLEM SELECTOR PLAN 5
- continue with finasteride - continue with finasteride  - Hospital course complicated by failed TOV 12/24 repeat TOV will be done today 12/29.

## 2022-12-29 NOTE — PROGRESS NOTE ADULT - PROBLEM SELECTOR PLAN 5
- continue with finasteride  - Hospital course complicated by failed TOV 12/24 repeat TOV will be done today 12/29.

## 2022-12-29 NOTE — PROGRESS NOTE ADULT - PROBLEM SELECTOR PLAN 7
- [facility]  - repeat blood cultures 12/25 NGTD   - remains on IV meropenem  -JANINE pending 12/30, npo at midnight   - will likely return back to [facility] once optimized.

## 2022-12-30 LAB
ALBUMIN SERPL ELPH-MCNC: 1.9 G/DL — LOW (ref 3.5–5)
ALP SERPL-CCNC: 126 U/L — HIGH (ref 40–120)
ALT FLD-CCNC: 29 U/L DA — SIGNIFICANT CHANGE UP (ref 10–60)
ANION GAP SERPL CALC-SCNC: 7 MMOL/L — SIGNIFICANT CHANGE UP (ref 5–17)
AST SERPL-CCNC: 27 U/L — SIGNIFICANT CHANGE UP (ref 10–40)
BILIRUB SERPL-MCNC: 0.2 MG/DL — SIGNIFICANT CHANGE UP (ref 0.2–1.2)
BUN SERPL-MCNC: 11 MG/DL — SIGNIFICANT CHANGE UP (ref 7–18)
CALCIUM SERPL-MCNC: 8.6 MG/DL — SIGNIFICANT CHANGE UP (ref 8.4–10.5)
CHLORIDE SERPL-SCNC: 95 MMOL/L — LOW (ref 96–108)
CO2 SERPL-SCNC: 33 MMOL/L — HIGH (ref 22–31)
CREAT SERPL-MCNC: 0.4 MG/DL — LOW (ref 0.5–1.3)
CULTURE RESULTS: SIGNIFICANT CHANGE UP
CULTURE RESULTS: SIGNIFICANT CHANGE UP
EGFR: 119 ML/MIN/1.73M2 — SIGNIFICANT CHANGE UP
GLUCOSE BLDC GLUCOMTR-MCNC: 125 MG/DL — HIGH (ref 70–99)
GLUCOSE BLDC GLUCOMTR-MCNC: 128 MG/DL — HIGH (ref 70–99)
GLUCOSE BLDC GLUCOMTR-MCNC: 133 MG/DL — HIGH (ref 70–99)
GLUCOSE BLDC GLUCOMTR-MCNC: 187 MG/DL — HIGH (ref 70–99)
GLUCOSE SERPL-MCNC: 129 MG/DL — HIGH (ref 70–99)
HCT VFR BLD CALC: 29.4 % — LOW (ref 39–50)
HGB BLD-MCNC: 9.6 G/DL — LOW (ref 13–17)
MCHC RBC-ENTMCNC: 31.4 PG — SIGNIFICANT CHANGE UP (ref 27–34)
MCHC RBC-ENTMCNC: 32.7 GM/DL — SIGNIFICANT CHANGE UP (ref 32–36)
MCV RBC AUTO: 96.1 FL — SIGNIFICANT CHANGE UP (ref 80–100)
NRBC # BLD: 2 /100 WBCS — HIGH (ref 0–0)
PLATELET # BLD AUTO: 417 K/UL — HIGH (ref 150–400)
POTASSIUM SERPL-MCNC: 4.5 MMOL/L — SIGNIFICANT CHANGE UP (ref 3.5–5.3)
POTASSIUM SERPL-SCNC: 4.5 MMOL/L — SIGNIFICANT CHANGE UP (ref 3.5–5.3)
PROT SERPL-MCNC: 6.9 G/DL — SIGNIFICANT CHANGE UP (ref 6–8.3)
RBC # BLD: 3.06 M/UL — LOW (ref 4.2–5.8)
RBC # FLD: 15.5 % — HIGH (ref 10.3–14.5)
SARS-COV-2 RNA SPEC QL NAA+PROBE: DETECTED
SODIUM SERPL-SCNC: 135 MMOL/L — SIGNIFICANT CHANGE UP (ref 135–145)
SPECIMEN SOURCE: SIGNIFICANT CHANGE UP
SPECIMEN SOURCE: SIGNIFICANT CHANGE UP
WBC # BLD: 6.75 K/UL — SIGNIFICANT CHANGE UP (ref 3.8–10.5)
WBC # FLD AUTO: 6.75 K/UL — SIGNIFICANT CHANGE UP (ref 3.8–10.5)

## 2022-12-30 PROCEDURE — 99222 1ST HOSP IP/OBS MODERATE 55: CPT | Mod: FS

## 2022-12-30 RX ORDER — ACETAMINOPHEN 500 MG
650 TABLET ORAL EVERY 6 HOURS
Refills: 0 | Status: DISCONTINUED | OUTPATIENT
Start: 2022-12-30 | End: 2023-01-02

## 2022-12-30 RX ORDER — APIXABAN 2.5 MG/1
5 TABLET, FILM COATED ORAL EVERY 12 HOURS
Refills: 0 | Status: DISCONTINUED | OUTPATIENT
Start: 2022-12-30 | End: 2023-01-06

## 2022-12-30 RX ORDER — METOPROLOL TARTRATE 50 MG
25 TABLET ORAL
Refills: 0 | Status: DISCONTINUED | OUTPATIENT
Start: 2022-12-30 | End: 2023-01-06

## 2022-12-30 RX ORDER — LIDOCAINE 4 G/100G
1 CREAM TOPICAL DAILY
Refills: 0 | Status: DISCONTINUED | OUTPATIENT
Start: 2022-12-30 | End: 2023-01-02

## 2022-12-30 RX ADMIN — PANTOPRAZOLE SODIUM 40 MILLIGRAM(S): 20 TABLET, DELAYED RELEASE ORAL at 05:30

## 2022-12-30 RX ADMIN — OXYCODONE AND ACETAMINOPHEN 1 TABLET(S): 5; 325 TABLET ORAL at 04:31

## 2022-12-30 RX ADMIN — OXYCODONE AND ACETAMINOPHEN 1 TABLET(S): 5; 325 TABLET ORAL at 15:00

## 2022-12-30 RX ADMIN — FINASTERIDE 5 MILLIGRAM(S): 5 TABLET, FILM COATED ORAL at 11:55

## 2022-12-30 RX ADMIN — TAMSULOSIN HYDROCHLORIDE 0.4 MILLIGRAM(S): 0.4 CAPSULE ORAL at 22:34

## 2022-12-30 RX ADMIN — Medication 500 MILLIGRAM(S): at 05:29

## 2022-12-30 RX ADMIN — SENNA PLUS 2 TABLET(S): 8.6 TABLET ORAL at 22:35

## 2022-12-30 RX ADMIN — APIXABAN 5 MILLIGRAM(S): 2.5 TABLET, FILM COATED ORAL at 18:11

## 2022-12-30 RX ADMIN — OXYCODONE AND ACETAMINOPHEN 1 TABLET(S): 5; 325 TABLET ORAL at 05:01

## 2022-12-30 RX ADMIN — ATORVASTATIN CALCIUM 10 MILLIGRAM(S): 80 TABLET, FILM COATED ORAL at 22:34

## 2022-12-30 RX ADMIN — Medication 5 MILLIGRAM(S): at 05:29

## 2022-12-30 RX ADMIN — Medication 250 MICROGRAM(S): at 05:29

## 2022-12-30 RX ADMIN — OXYCODONE AND ACETAMINOPHEN 1 TABLET(S): 5; 325 TABLET ORAL at 19:00

## 2022-12-30 RX ADMIN — OXYCODONE AND ACETAMINOPHEN 1 TABLET(S): 5; 325 TABLET ORAL at 09:23

## 2022-12-30 RX ADMIN — Medication 1: at 11:55

## 2022-12-30 RX ADMIN — Medication 500 MILLIGRAM(S): at 18:11

## 2022-12-30 RX ADMIN — Medication 500 MILLIGRAM(S): at 15:11

## 2022-12-30 RX ADMIN — Medication 500 MILLIGRAM(S): at 22:34

## 2022-12-30 RX ADMIN — OXYCODONE AND ACETAMINOPHEN 1 TABLET(S): 5; 325 TABLET ORAL at 09:53

## 2022-12-30 RX ADMIN — Medication 25 MILLIGRAM(S): at 18:11

## 2022-12-30 RX ADMIN — OXYCODONE AND ACETAMINOPHEN 1 TABLET(S): 5; 325 TABLET ORAL at 14:40

## 2022-12-30 NOTE — PROGRESS NOTE ADULT - SUBJECTIVE AND OBJECTIVE BOX
PATIENT SEEN AND EXAMINED ON :- 12/30/22  DATE OF SERVICE:      12/30/33       Interim events noted,Labs ,Radiological studies and Cardiology tests reviewed .       HOSPITAL COURSE: HPI:  Patient is a 67 year old male, vaccinated, from [facility] with PMHx of DM, HTN, HLD, AF on Eliquis, PVD, presents with c/o vomiting and right sided abdominal pain. patient states yesterday after lunch he had a sharp right sided abdominal pain 8/10 persistent with nausea and vomiting. He also endorsed chills and rigors, unsure if he spiked a fever. He denies any changes in bowel habits, chest pain, shortness of breath or palpitations. Patient had two episodes of melena in ED. He was discharged yesterday from hospital. He had choledocholithiasis,  CT A/P showed: Cholecystectomy with dilated CBD up to 1.7 cm abrupt cut off of the distal CBD and  mild pancreatic duct dilatation. 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 He underwent  ERCP on 12/16 biliary sludge removal with biliary sphincterotomy and balloon extraction. He was discharged after symptom improvement and out patient gastric emptying study.     In ED:   Vitals: BP: 81/59mmHg  HR:  118  RR: 97% RA   WBC 14.7 k  elevated LFT, lactate   s/p  Rocephin 1LNS and zofran  (22 Dec 2022 12:00)      INTERIM EVENTS:Patient seen at bedside ,interim events noted.      PMH -reviewed admission note, no change since admission  HEART FAILURE: Acute[ ]Chronic[ ] Systolic[ ] Diastolic[ ] Combined Systolic and Diastolic[ ]  CAD[ ] CABG[ ] PCI[ ]  DEVICES[ ] PPM[ ] ICD[ ] ILR[ ]  ATRIAL FIBRILLATION[ ] Paroxysmal[ ] Permanent[ ] CHADS2-[  ]  DILCIA[ ] CKD1[ ] CKD2[ ] CKD3[ ] CKD4[ ] ESRD[ ]  COPD[ ] HTN[ ]   DM[ ] Type1[ ] Type 2[ ]   CVA[ ] Paresis[ ]    AMBULATION: Assisted[ ] Cane/walker[ ] Independent[ ]    MEDICATIONS  (STANDING):  apixaban 5 milliGRAM(s) Oral every 12 hours  atorvastatin 10 milliGRAM(s) Oral at bedtime  cefuroxime   Tablet 500 milliGRAM(s) Oral every 12 hours  digoxin     Tablet 250 MICROGram(s) Oral daily  finasteride 5 milliGRAM(s) Oral daily  insulin lispro (ADMELOG) corrective regimen sliding scale   SubCutaneous three times a day before meals  insulin lispro (ADMELOG) corrective regimen sliding scale   SubCutaneous at bedtime  lidocaine   4% Patch 1 Patch Transdermal daily  metoprolol tartrate 25 milliGRAM(s) Oral two times a day  metroNIDAZOLE    Tablet 500 milliGRAM(s) Oral every 8 hours  pantoprazole    Tablet 40 milliGRAM(s) Oral before breakfast  senna 2 Tablet(s) Oral at bedtime  tamsulosin 0.4 milliGRAM(s) Oral at bedtime    MEDICATIONS  (PRN):  acetaminophen     Tablet .. 650 milliGRAM(s) Oral every 6 hours PRN Moderate Pain (4 - 6)  aluminum hydroxide/magnesium hydroxide/simethicone Suspension 30 milliLiter(s) Oral every 4 hours PRN Dyspepsia  baclofen 5 milliGRAM(s) Oral every 12 hours PRN Musculoskeletal Pain  melatonin 3 milliGRAM(s) Oral at bedtime PRN Insomnia  ondansetron Injectable 4 milliGRAM(s) IV Push every 8 hours PRN Nausea and/or Vomiting  oxycodone    5 mG/acetaminophen 325 mG 1 Tablet(s) Oral every 4 hours PRN Severe Pain (7 - 10)  polyethylene glycol 3350 17 Gram(s) Oral two times a day PRN Constipation  sodium chloride 0.9% lock flush 10 milliLiter(s) IV Push every 1 hour PRN Pre/post blood products, medications, blood draw, and to maintain line patency            REVIEW OF SYSTEMS:  Constitutional: [ ] fever, [ ]weight loss,  [ ]fatigue [ ]weight gain  Eyes: [ ] visual changes  Respiratory: [ ]shortness of breath;  [ ] cough, [ ]wheezing, [ ]chills, [ ]hemoptysis  Cardiovascular: [ ] chest pain, [ ]palpitations, [ ]dizziness,  [ ]leg swelling[ ]orthopnea[ ]PND  Gastrointestinal: [ ] abdominal pain, [ ]nausea, [ ]vomiting,  [ ]diarrhea [ ]Constipation [ ]Melena  Genitourinary: [ ] dysuria, [ ] hematuria [ ]Cardoza  Neurologic: [ ] headaches [ ] tremors[ ]weakness [ ]Paralysis Right[ ] Left[ ]  Skin: [ ] itching, [ ]burning, [ ] rashes  Endocrine: [ ] heat or cold intolerance  Musculoskeletal: [ ] joint pain or swelling; [ ] muscle, back, or extremity pain  Psychiatric: [ ] depression, [ ]anxiety, [ ]mood swings, or [ ]difficulty sleeping  Hematologic: [ ] easy bruising, [ ] bleeding gums    [ ] All remaining systems negative except as per above.   [ ]Unable to obtain.  [x] No change in ROS since admission      Vital Signs Last 24 Hrs  T(C): 36.8 (30 Dec 2022 13:56), Max: 36.8 (30 Dec 2022 13:56)  T(F): 98.2 (30 Dec 2022 13:56), Max: 98.2 (30 Dec 2022 13:56)  HR: 106 (30 Dec 2022 13:56) (106 - 106)  BP: 113/70 (30 Dec 2022 13:56) (113/70 - 118/62)  BP(mean): --  RR: 18 (30 Dec 2022 13:56) (17 - 18)  SpO2: 96% (30 Dec 2022 13:56) (95% - 96%)    Parameters below as of 30 Dec 2022 13:56  Patient On (Oxygen Delivery Method): nasal cannula      I&O's Summary    29 Dec 2022 07:01  -  30 Dec 2022 07:00  --------------------------------------------------------  IN: 0 mL / OUT: 1000 mL / NET: -1000 mL        PHYSICAL EXAM:  General: No acute distress BMI-  HEENT: EOMI, PERRL  Neck: Supple, [ ] JVD  Lungs: Equal air entry bilaterally; [ ] rales [ ] wheezing [ ] rhonchi  Heart: Regular rate and rhythm; [x ] murmur   2/6 [ x] systolic [ ] diastolic [ ] radiation[ ] rubs [ ]  gallops  Abdomen: Nontender, bowel sounds present  Extremities: No clubbing, cyanosis, [ ] edema [ ]Pulses  equal and intact  Nervous system:  Alert & Oriented X3, no focal deficits  Psychiatric: Normal affect  Skin: No rashes or lesions    LABS:  12-30    135  |  95<L>  |  11  ----------------------------<  129<H>  4.5   |  33<H>  |  0.40<L>    Ca    8.6      30 Dec 2022 06:34    TPro  6.9  /  Alb  1.9<L>  /  TBili  0.2  /  DBili  x   /  AST  27  /  ALT  29  /  AlkPhos  126<H>  12-30    Creatinine Trend: 0.40<--, 0.39<--, 0.56<--, 0.51<--, 0.47<--, 0.40<--                        9.6    6.75  )-----------( 417      ( 30 Dec 2022 06:34 )             29.4

## 2022-12-30 NOTE — PROGRESS NOTE ADULT - PROBLEM SELECTOR PLAN 1
septic shock on admission  - admitted to ICU downgraded to medicine   - Repeat blood cx 12/25 no growth   - s/p cefepime, vancomycin, and meropenem   - IV access needed for anesthesia purpose   - Start ceftin and flagyl total x 5 days. (12/29-1/2)  - ID Elijah following  -TTE unsuccessful, plan for JANINE 12/30 but cancelled due to COVID +

## 2022-12-30 NOTE — PROGRESS NOTE ADULT - ASSESSMENT
67 year old male from [facility] with past medical history of DM, HTN, HLD, AF on Eliquis, PVD, presents with c/o 8/10 sharp right sided abdominal pain with nausea, vomiting, and diarrhea.  Pt recently had cholecystectomy and ERCP with sphincterotomy 12/16/22. GI Judy consulted, s/p  repeat ERCP 12/23/22 found to have bleeding from spincterotomy site. CBD stented and clips place to control bleeding. Admitted to ICU for septic shock, found positive variable coccibacilli bacteremia, and aerococcus UTI. Hospital course complicated by failed TOV 12/24 repeat TOV will be done today 12/29. Hypotension and tachycardia resolved. Continue with digoxin 250 mcg for rate control. TIM Villanueva and COLLIN Novoa following . COLLIN Nava consulted started on meropenem and vancomycin, repeat blood cx 12/25 NGTD.  Downgraded to medicine 12/25 .TTE not successful 12/27 . Midline infiltrated/removed, new peripheral line inserted.     Scheduled for JANINE 12/30, but noted positive for COVID 19 12/29. Cancelled by dept. diet resumed.    67 year old male from [facility] with past medical history of DM, HTN, HLD, AF on Eliquis, PVD, presents with c/o 8/10 sharp right sided abdominal pain with nausea, vomiting, and diarrhea.  Pt recently had cholecystectomy and ERCP with sphincterotomy 12/16/22. TIM Kim consulted, s/p  repeat ERCP 12/23/22 found to have bleeding from spincterotomy site. CBD stented and clips place to control bleeding. Admitted to ICU for septic shock, found positive variable coccibacilli bacteremia, and aerococcus UTI. Hospital course complicated by failed TOV 12/24 repeat TOV will be done today 12/29. Hypotension and tachycardia resolved. Continue with digoxin 250 mcg for rate control. TIM Villanueva and COLLIN Novoa following . COLLIN Nava consulted started on meropenem and vancomycin, repeat blood cx 12/25 NGTD.  Downgraded to medicine 12/25 .TTE not successful 12/27 . Midline infiltrated/removed, new peripheral line inserted.     Scheduled for JANINE 12/30, but noted positive for COVID 19 12/29. Cancelled by dept. diet resumed. will need to reschedule.   Pt will need 8 more days of quarantine for DC back to [facility]. (until 1/6/23).

## 2022-12-30 NOTE — PROGRESS NOTE ADULT - PROBLEM SELECTOR PLAN 6
- c/w protonix for GI prophylaxis   - c/w SCD for DVT prophylaxis  - ERCP procedure performed 12/23, resumed eliquis

## 2022-12-30 NOTE — CONSULT NOTE ADULT - PROBLEM SELECTOR RECOMMENDATION 9
Imaging  as above. CT AP finding: Pancreas: mild dilatation of main pancreatic duct.  US abd : 1.5 cm CBD dilation  LFT's elevated  Likely cholangitis in setting of recent ERCP  Continue Abx per primary team  IV Hydration  NPO  Pain control  Trend LFT's  Tentative ERCP tomorrow . NPO after midnight. Hold AC 24 hrs prior to procedure. COVID test within 72 hrs.
Pt with acute right sided abdominal pain which is somatic and neuropathic in nature due to biliary dilatation on US of abdomen. Hx of ERCP on 12/23, found to have bleeding to sphincterotomy site, 2 clips and 1 stent placed in common bile duct to facilitate tamponade the bleeding and facilitate drainage. Per Istop pt on Lyrica and Percocet.  High risk medications reviewed. Avoid polypharmacy. Avoid IV opioids. Avoid NSAIDs and benzodiazepines. Non-pharmacological sleep aides initiated. Non-opioid medications and non-pharmacological pain management measures initiated.     Opioid pain recommendations   - Continue Percocet 5 mg/325 mg Acetaminophen PO q 4 hours PRN severe pain. Monitor for sedation/ respiratory depression.   Non-opioid pain recommendations   - Continue Lyrica 100mg po q 8 hours (home dose per Istop). Monitor renal function.   - Lidoderm 5% patch daily started.  - Pt reports Tylenol doesn't help.   Bowel Regimen  - Continue Miralax 17G PO daily  - Continue Senna 2 tablets at bedtime for constipation  Mild pain   - Non-pharmacological pain treatment recommendations  - Warm/ Cool packs PRN   - Repositioning, imagery, relaxation, distraction.  - Physical therapy OOB if no contraindications   Recommendations discussed with primary team and RN

## 2022-12-30 NOTE — PROGRESS NOTE ADULT - SUBJECTIVE AND OBJECTIVE BOX
NP Note discussed with  Primary Attending    INTERVAL HPI/OVERNIGHT EVENTS: no new complaints    MEDICATIONS  (STANDING):  atorvastatin 10 milliGRAM(s) Oral at bedtime  cefuroxime   Tablet 500 milliGRAM(s) Oral every 12 hours  digoxin     Tablet 250 MICROGram(s) Oral daily  finasteride 5 milliGRAM(s) Oral daily  insulin lispro (ADMELOG) corrective regimen sliding scale   SubCutaneous three times a day before meals  insulin lispro (ADMELOG) corrective regimen sliding scale   SubCutaneous at bedtime  metroNIDAZOLE    Tablet 500 milliGRAM(s) Oral every 8 hours  pantoprazole    Tablet 40 milliGRAM(s) Oral before breakfast  senna 2 Tablet(s) Oral at bedtime  tamsulosin 0.4 milliGRAM(s) Oral at bedtime    MEDICATIONS  (PRN):  aluminum hydroxide/magnesium hydroxide/simethicone Suspension 30 milliLiter(s) Oral every 4 hours PRN Dyspepsia  baclofen 5 milliGRAM(s) Oral every 12 hours PRN Musculoskeletal Pain  melatonin 3 milliGRAM(s) Oral at bedtime PRN Insomnia  ondansetron Injectable 4 milliGRAM(s) IV Push every 8 hours PRN Nausea and/or Vomiting  oxycodone    5 mG/acetaminophen 325 mG 1 Tablet(s) Oral every 4 hours PRN Severe Pain (7 - 10)  polyethylene glycol 3350 17 Gram(s) Oral two times a day PRN Constipation  sodium chloride 0.9% lock flush 10 milliLiter(s) IV Push every 1 hour PRN Pre/post blood products, medications, blood draw, and to maintain line patency      __________________________________________________  REVIEW OF SYSTEMS:    CONSTITUTIONAL: No fever,   EYES: no acute visual disturbances  NECK: No pain or stiffness  RESPIRATORY: No cough; No shortness of breath  CARDIOVASCULAR: No chest pain, no palpitations  GASTROINTESTINAL: No pain. No nausea or vomiting; No diarrhea   NEUROLOGICAL: No headache or numbness, no tremors  MUSCULOSKELETAL: No joint pain, no muscle pain  GENITOURINARY: no dysuria, no frequency, no hesitancy  PSYCHIATRY: no depression , no anxiety  ALL OTHER  ROS negative        Vital Signs Last 24 Hrs  T(C): 36.4 (30 Dec 2022 05:09), Max: 36.9 (29 Dec 2022 20:47)  T(F): 97.5 (30 Dec 2022 05:09), Max: 98.4 (29 Dec 2022 20:47)  HR: 106 (30 Dec 2022 05:09) (101 - 107)  BP: 115/68 (30 Dec 2022 05:09) (107/69 - 123/68)  BP(mean): 79 (29 Dec 2022 12:50) (79 - 79)  RR: 17 (30 Dec 2022 05:09) (17 - 18)  SpO2: 95% (30 Dec 2022 05:09) (95% - 98%)    Parameters below as of 30 Dec 2022 05:09  Patient On (Oxygen Delivery Method): room air        ________________________________________________  PHYSICAL EXAM:  GENERAL: NAD  HEENT: Normocephalic;  conjunctivae and sclerae clear; moist mucous membranes;   NECK : supple  CHEST/LUNG: Clear to auscultation bilaterally with good air entry   HEART: S1 S2  regular; no murmurs, gallops or rubs  ABDOMEN: Soft, Nontender, Nondistended; Bowel sounds present  EXTREMITIES: no cyanosis; no edema; no calf tenderness  SKIN: warm and dry; no rash  NERVOUS SYSTEM:  Awake and alert; Oriented  to place, person and time ; no new deficits    _________________________________________________  LABS:                        9.6    6.75  )-----------( 417      ( 30 Dec 2022 06:34 )             29.4     12-30    135  |  95<L>  |  11  ----------------------------<  129<H>  4.5   |  33<H>  |  0.40<L>    Ca    8.6      30 Dec 2022 06:34    TPro  6.9  /  Alb  1.9<L>  /  TBili  0.2  /  DBili  x   /  AST  27  /  ALT  29  /  AlkPhos  126<H>  12-30    CAPILLARY BLOOD GLUCOSE    POCT Blood Glucose.: 129 mg/dL (29 Dec 2022 21:08)  POCT Blood Glucose.: 126 mg/dL (29 Dec 2022 16:31)  POCT Blood Glucose.: 110 mg/dL (29 Dec 2022 11:22)    RADIOLOGY & ADDITIONAL TESTS:    Imaging  Reviewed:  YES  < from: Xray Chest 1 View- PORTABLE-Routine (Xray Chest 1 View- PORTABLE-Routine in AM.) (12.25.22 @ 09:41) >  IMPRESSION:   Increasing RIGHT lower zone diffuse airspace   disease/consolidation...    < end of copied text >    < from: CT Abdomen and Pelvis No Cont (12.22.22 @ 10:37) >  IMPRESSION: No bowel obstruction or grossly thickened bowel wall. Colonic   diverticulosis without evidence for diverticulitis. Moderate amount of   stool in the rectum and distal sigmoid colon.. Appendix not visualized.   No secondary signs for acute appendicitis.    Dilatation of the common bile duct and main pancreatic duct again noted..   Please see recent abdominal MR dated 12/12/2022. If clinically indicated,   endoscopic ultrasound may be pursued to rule out a small obstructive   ampullary tumor or pancreatic head mass.    New airspace opacifications in the right lower lung zone for which   clinical correlation with pneumonia is recommended.    < end of copied text >    Consultant(s) Notes Reviewed:   YES      Plan of care was discussed with patient and /or primary care giver; all questions and concerns were addressed  NP Note discussed with  Primary Attending    INTERVAL HPI/OVERNIGHT EVENTS: no new complaints    MEDICATIONS  (STANDING):  atorvastatin 10 milliGRAM(s) Oral at bedtime  cefuroxime   Tablet 500 milliGRAM(s) Oral every 12 hours  digoxin     Tablet 250 MICROGram(s) Oral daily  finasteride 5 milliGRAM(s) Oral daily  insulin lispro (ADMELOG) corrective regimen sliding scale   SubCutaneous three times a day before meals  insulin lispro (ADMELOG) corrective regimen sliding scale   SubCutaneous at bedtime  metroNIDAZOLE    Tablet 500 milliGRAM(s) Oral every 8 hours  pantoprazole    Tablet 40 milliGRAM(s) Oral before breakfast  senna 2 Tablet(s) Oral at bedtime  tamsulosin 0.4 milliGRAM(s) Oral at bedtime    MEDICATIONS  (PRN):  aluminum hydroxide/magnesium hydroxide/simethicone Suspension 30 milliLiter(s) Oral every 4 hours PRN Dyspepsia  baclofen 5 milliGRAM(s) Oral every 12 hours PRN Musculoskeletal Pain  melatonin 3 milliGRAM(s) Oral at bedtime PRN Insomnia  ondansetron Injectable 4 milliGRAM(s) IV Push every 8 hours PRN Nausea and/or Vomiting  oxycodone    5 mG/acetaminophen 325 mG 1 Tablet(s) Oral every 4 hours PRN Severe Pain (7 - 10)  polyethylene glycol 3350 17 Gram(s) Oral two times a day PRN Constipation  sodium chloride 0.9% lock flush 10 milliLiter(s) IV Push every 1 hour PRN Pre/post blood products, medications, blood draw, and to maintain line patency      __________________________________________________  REVIEW OF SYSTEMS:    CONSTITUTIONAL: No fever,   EYES: no acute visual disturbances  NECK: No pain or stiffness  RESPIRATORY: No cough; No shortness of breath  CARDIOVASCULAR: No chest pain, no palpitations  GASTROINTESTINAL: No pain. No nausea or vomiting; No diarrhea   NEUROLOGICAL: No headache or numbness, no tremors  MUSCULOSKELETAL: No joint pain, no muscle pain  GENITOURINARY: no dysuria, no frequency, no hesitancy  PSYCHIATRY: no depression , no anxiety  ALL OTHER  ROS negative        Vital Signs Last 24 Hrs  T(C): 36.4 (30 Dec 2022 05:09), Max: 36.9 (29 Dec 2022 20:47)  T(F): 97.5 (30 Dec 2022 05:09), Max: 98.4 (29 Dec 2022 20:47)  HR: 106 (30 Dec 2022 05:09) (101 - 107)  BP: 115/68 (30 Dec 2022 05:09) (107/69 - 123/68)  BP(mean): 79 (29 Dec 2022 12:50) (79 - 79)  RR: 17 (30 Dec 2022 05:09) (17 - 18)  SpO2: 95% (30 Dec 2022 05:09) (95% - 98%)    Parameters below as of 30 Dec 2022 05:09  Patient On (Oxygen Delivery Method): room air        ________________________________________________  PHYSICAL EXAM:  GENERAL: NAD, conversant  HEENT: Normocephalic;  conjunctivae and sclerae clear; moist mucous membranes;   NECK : supple  CHEST/LUNG: Clear to auscultation bilaterally with good air entry   HEART: S1 S2  regular; no murmurs, gallops or rubs  ABDOMEN: Soft, Nontender, Nondistended; Bowel sounds present  EXTREMITIES: no cyanosis; no edema; no calf tenderness  SKIN: warm and dry; no rash  NERVOUS SYSTEM:  Awake and alert; Oriented  to place, person and time ; no new deficits    _________________________________________________  LABS:                        9.6    6.75  )-----------( 417      ( 30 Dec 2022 06:34 )             29.4     12-30    135  |  95<L>  |  11  ----------------------------<  129<H>  4.5   |  33<H>  |  0.40<L>    Ca    8.6      30 Dec 2022 06:34    TPro  6.9  /  Alb  1.9<L>  /  TBili  0.2  /  DBili  x   /  AST  27  /  ALT  29  /  AlkPhos  126<H>  12-30    CAPILLARY BLOOD GLUCOSE    POCT Blood Glucose.: 129 mg/dL (29 Dec 2022 21:08)  POCT Blood Glucose.: 126 mg/dL (29 Dec 2022 16:31)  POCT Blood Glucose.: 110 mg/dL (29 Dec 2022 11:22)    RADIOLOGY & ADDITIONAL TESTS:    Imaging  Reviewed:  YES  < from: Xray Chest 1 View- PORTABLE-Routine (Xray Chest 1 View- PORTABLE-Routine in AM.) (12.25.22 @ 09:41) >  IMPRESSION:   Increasing RIGHT lower zone diffuse airspace   disease/consolidation...    < end of copied text >    < from: CT Abdomen and Pelvis No Cont (12.22.22 @ 10:37) >  IMPRESSION: No bowel obstruction or grossly thickened bowel wall. Colonic   diverticulosis without evidence for diverticulitis. Moderate amount of   stool in the rectum and distal sigmoid colon.. Appendix not visualized.   No secondary signs for acute appendicitis.    Dilatation of the common bile duct and main pancreatic duct again noted..   Please see recent abdominal MR dated 12/12/2022. If clinically indicated,   endoscopic ultrasound may be pursued to rule out a small obstructive   ampullary tumor or pancreatic head mass.    New airspace opacifications in the right lower lung zone for which   clinical correlation with pneumonia is recommended.    < end of copied text >    Consultant(s) Notes Reviewed:   YES      Plan of care was discussed with patient and /or primary care giver; all questions and concerns were addressed

## 2022-12-30 NOTE — PROGRESS NOTE ADULT - PROBLEM SELECTOR PLAN 3
-Pt takes Metoprolol 25mg BID  - Continue w/ Digoxin 250 mcg daily (started 12/27)  - restart metoprolol.   - on Eliquis  - Cardio Dr. Sharma follows

## 2022-12-30 NOTE — PROGRESS NOTE ADULT - PROBLEM SELECTOR PLAN 5
- continue with finasteride  - Hospital course complicated by failed TOV 12/24 repeat TOV 12/29. passed/Voiding freely.  -monitor urinary retention with bladder scan prn

## 2022-12-30 NOTE — PROGRESS NOTE ADULT - PROBLEM SELECTOR PLAN 7
- Patient is from [facility]  - repeat blood cultures 12/25 NGTD   -need Ceftin and Flagyl x 5 days total per ID  - JANINE planned  12/30, but cancelled due to COVID + (12/29).    - will likely return back to [facility] once optimized. will need quarantine per facility. til 1/6/23 x 8 more days.

## 2022-12-30 NOTE — PROGRESS NOTE ADULT - ASSESSMENT
67 year old male from [facility] with past medical history of DM, HTN, HLD, AF on Eliquis, PVD, presents with c/o 8/10 sharp right sided abdominal pain with nausea, vomiting, and diarrhea.  Pt recently had cholecystectomy and ERCP with sphincterotomy 12/16/22. TIM Kim consulted, s/p  repeat ERCP 12/23/22 found to have bleeding from spincterotomy site. CBD stented and clips place to control bleeding. Admitted to ICU for septic shock, found positive variable coccibacilli bacteremia, and aerococcus UTI. Hospital course complicated by failed TOV 12/24 repeat TOV will be done today 12/29. Hypotension and tachycardia resolved. Continue with digoxin 250 mcg for rate control. TIM Villanueva and COLLIN Novoa following . COLLIN Nava consulted started on meropenem and vancomycin, repeat blood cx 12/25 NGTD.  Downgraded to medicine 12/25 .TTE not successful 12/27 . Midline infiltrated/removed, new peripheral line inserted.     Scheduled for JANINE 12/30, but noted positive for COVID 19 12/29. Cancelled by dept. diet resumed. will need to reschedule.   Pt will need 8 more days of quarantine for DC back to [facility]. (until 1/6/23).

## 2022-12-30 NOTE — CONSULT NOTE ADULT - ASSESSMENT
Confidential Drug Utilization Report  Search Terms: Zeyad Rodríguez, 1955Search Date: 12/30/2022 12:21:15 PM  The Drug Utilization Report below displays all of the controlled substance prescriptions, if any, that your patient has filled in the last twelve months. The information displayed on this report is compiled from pharmacy submissions to the Department, and accurately reflects the information as submitted by the pharmacies.    This report was requested by: Alice Velazquez | Reference #: [id number]    You have not added a MARY number. Keeping your MARY number(s) up to date on the My MARY # page will enable the separation of your prescriptions from others in the search results.    Others' Prescriptions  Patient Name: Zeyad RodríguezBirth Date: 1955  Address: [place], [place] [address]Vba: Male  Rx Written	Rx Dispensed	Drug	Quantity	Days Supply	Prescriber Name  12/01/2022	12/01/2022	oxycodone-acetaminophen 5-325 mg tab	30	5	Donnie Fletcher  Prescriber Mary # [id number]  Payment Method Insurance  [facility] [employer] [id number]  11/22/2022	11/24/2022	pregabalin 100 mg capsule	90	30	Raphael Lassiter MD  Prescriber Mary # [id number]  Payment Method Insurance  [employer] [id number]  11/15/2022	11/15/2022	oxycodone-acetaminophen 5-325 mg tab	90	15	Herminia Steele  Prescriber Mary # [id number]  Payment Method Insurance  [facility] [employer] [id number]  11/09/2022	11/10/2022	pregabalin 100 mg capsule	45	15	Donnie Fletcher  Prescriber Mary # [id number]  Payment Method Insurance  [employer] [employer] [id number]  11/01/2022	11/01/2022	oxycodone-acetaminophen 5-325 mg tab	90	15	Raphael Lassiter MD  Prescriber Mary # [id number]  Payment Method Insurance  [facility] [employer] [id number]  10/18/2022	10/18/2022	oxycodone-acetaminophen 5-325 mg tab	60	10	Herminia Steele  Prescriber Mary # [id number]  Payment Method Insurance  [employer] [id number]  10/18/2022	10/18/2022	pregabalin 100 mg capsule	60	20	Herminia Steele  Prescriber Mary # [id number]  Payment Method Insurance  [employer] [employer] [id number]  09/22/2022	09/23/2022	oxycodone-acetaminophen 5-325 mg tab	90	15	Herminia Steele  Prescriber Mary # [id number]  Payment Method Insurance  [employer] [id number]  09/19/2022	09/20/2022	pregabalin 100 mg capsule	90	30	Brooke Tipton MD  Prescriber Mary # [id number]  Payment Method Insurance  [employer] [employer] [id number]  09/06/2022	09/06/2022	oxycodone-acetaminophen 5-325 mg tab	90	15	Herminia Steele  Prescriber Mary # [id number]  Payment Method Insurance  [employer] [employer] [id number]  08/25/2022	08/27/2022	oxycodone-acetaminophen 5-325 mg tab	60	10	AvHerminia abel  Prescriber Mary # [id number]  Payment Method Insurance  [facility] [employer] [id number]  08/23/2022	08/23/2022	oxycodone-acetaminophen 5-325 mg tab	30	5	Herminia Steele  Prescriber Mary # [id number]  Payment Method Insurance  [facility] [employer] [id number]  08/18/2022	08/18/2022	pregabalin 100 mg capsule	90	30	SravaniRaphael crow MD  Prescriber Mary # [id number]  Payment Method Insurance  [facility] [employer] [id number]  08/10/2022	08/10/2022	pregabalin 100 mg capsule	30	10	Herminia Steele  Prescriber Mary # [id number]  Payment Method Insurance  [employer] [id number]  08/02/2022	08/02/2022	oxycodone-acetaminophen 5-325 mg tab	120	20	SravaniRaphael crow MD  Prescriber Mary # [id number]  Payment Method Insurance  [employer] [employer] [id number]  07/29/2022	07/29/2022	pregabalin 100 mg capsule	30	10	Herminia Steele  Prescriber Mary # [id number]  Payment Method Insurance  [employer] [employer] [id number]  07/19/2022	07/19/2022	oxycodone-acetaminophen 5-325 mg tab	90	15	SravaniRaphael crow MD  Prescriber Mary # [id number]  Payment Method Insurance  [facility] [employer] [id number]  07/11/2022	07/11/2022	pregabalin 100 mg capsule	60	20	Brooke Tipton MD  Prescriber Mary # [id number]  Payment Method Insurance  [employer] [employer] [id number]  07/09/2022	07/09/2022	oxycodone-acetaminophen 5-325 mg tab	40	6	Brooke Tipton MD  Prescriber Mary # [id number]  Payment Method Insurance  [employer] [employer] [id number]  06/30/2022	06/30/2022	oxycodone-acetaminophen 5-325 mg tab	45	8	Donnie Fletcher  Prescriber Mary # [id number]  Payment Method Insurance  [employer] [employer] [id number]  06/24/2022	06/24/2022	pregabalin 100 mg capsule	45	15	Donnie Fletcher  Prescriber Mary # [id number]  Payment Method Insurance  [facility] [employer] [id number]  06/21/2022	06/21/2022	oxycodone-acetaminophen 5-325 mg tab	45	15	Donnie Fletcher  Prescriber Mary # [id number]  Payment Method Insurance  [employer] [employer] [id number]  06/08/2022	06/08/2022	pregabalin 100 mg capsule	45	15	Donnie Fletcher  Prescriber Mary # [id number]  Payment Method Insurance  [facility] [employer] [id number]  06/07/2022	06/07/2022	oxycodone-acetaminophen 5-325 mg tab	90	15	SravaniRaphael crow MD  Prescriber Mary # [id number]  Payment Method Insurance  [facility] [employer] [id number]  05/26/2022	05/26/2022	oxycodone-acetaminophen 5-325 mg tab	60	10	Donnie Fletcher  Prescriber Mary # [id number]  Payment Method Insurance  [facility] [employer] [id number]  05/03/2022	05/11/2022	oxycodone-acetaminophen 5-325 mg tab	90	15	SravaniRaphael crow MD  Prescriber Mary # [id number]  Payment Method Insurance  [facility] [employer] [id number]  05/10/2022	05/10/2022	pregabalin 100 mg capsule	90	30	SravaniRaphael crow MD  Prescriber Mary # [id number]  Payment Method Insurance  [employer] [employer] [id number]  05/04/2022	05/05/2022	oxycodone-acetaminophen 5-325 mg tab	45	8	Donnie Fletcher  Prescriber Mary # [id number]  Payment Method Insurance  [employer] [employer] [id number]  04/28/2022	04/28/2022	oxycodone-acetaminophen 5-325 mg tab	30	5	Donnie Fletcher  Prescriber Mary # [id number]  Payment Method Insurance  [facility] [employer] [id number]  04/27/2022	04/27/2022	pregabalin 100 mg capsule	45	15	Donnie Fletcher  Prescriber Mary # [id number]  Payment Method Insurance  [facility] [employer] [id number]  04/12/2022	04/12/2022	oxycodone-acetaminophen 5-325 mg tab	90	15	SravaniRaphael rasgdale MD  Prescriber Mary # [id number]  Payment Method Insurance  Disp[facility] [employer] [id number]  04/12/2022	04/12/2022	pregabalin 100 mg capsule	45	15	SravaniRaphael crow MD  Prescriber Mary # [id number]  Payment Method Insurance  [employer]p[facility] [employer] [id number]  03/17/2022	03/27/2022	oxycodone-acetaminophen 5-325 mg tab	90	15	SravaniRaphael crow MD  Prescriber Mary # [id number]  Payment Method Other  Dispens[facility] [employer] [id number]  03/16/2022	03/19/2022	oxycodone-acetaminophen 5-325 mg tab	60	10	Donnie Fletcher  Prescriber Mary # [id number]  Payment Method Other  [employer]p[facility] [id number]  03/05/2022	03/06/2022	oxycodone-acetaminophen 5-325 mg tab	60	10	SravaniRaphael crow MD  Prescriber Mary # [id number]  Payment Method Other  [employer]p[facility] [employer] [id number]  03/06/2022	03/06/2022	pregabalin 100 mg capsule	15	5	Ramirez Martinez DMD  Prescriber Mary # [id number]  Payment Method Other  [employer]p[facility] [employer] [id number]  02/23/2022	02/24/2022	oxycodone-acetaminophen 5-325 mg tab	45	8	Donnie Fletcher  Prescriber Mary # [id number]  Payment Method Other  [employer] [employer] [id number]  02/21/2022	02/21/2022	pregabalin 100 mg capsule	45	15	Donnie Fletcher  Prescriber Mary # [id number]  Payment Method Other  [employer]pens[facility] [employer] [id number]  02/17/2022	02/18/2022	oxycodone-acetaminophen 5-325 mg tab	30	5	Donnie Fletcher  Prescriber Mary # [id number]  Payment Method Other  [employer] [employer] [id number]  02/08/2022	02/08/2022	oxycodone-acetaminophen 5-325 mg tab	60	10	Donnie Fletcher  Prescriber Mary # [id number]  Payment Method Other  [employer]pens[facility] [employer] [id number]  01/24/2022	01/24/2022	oxycodone-acetaminophen 5-325 mg tab	30	5	Donnie Fletcher  Prescriber Mary # [id number]  Payment Method Other  Dispens[facility] [employer] [id number]  12/17/2021	01/22/2022	pregabalin 100 mg capsule	45	15	Donnie Fletcher  Prescriber Mary # [id number]  Payment Method Other  Dispenser [employer] [id number]  01/13/2022	01/14/2022	oxycodone-acetaminophen 5-325 mg tab	60	10	Raphael Lassiter MD  Prescriber Mary # [id number]  Payment Method Other  [employer]pens[facility] [employer] [id number]  01/03/2022	01/03/2022	oxycodone-acetaminophen 5-325 mg tab	45	8	Donnie Fletcher  Prescriber Mary # [id number]  Payment Method Other  [employer] [employer] [id number]  * - Drugs marked with an asterisk are compound drugs. If the compound drug is made up of more than one controlled substance, then each controlled substance will be a separate row in the table.

## 2022-12-30 NOTE — PROGRESS NOTE ADULT - PROBLEM SELECTOR PLAN 2
-s/p meropenem    - US Abdomen: Biliary dilatation and hepatic steatosis  - CT A/P: dilation of the common bile duct and main pancreatic duct again noted.. New RLL opacity  - repeat ERCP on 12/23 performed, found with bleeding to sphincterotomy site, 2 clips and 1 stent placed in common bile duct to facilitate tamponade the bleeding and facilitate drainage.   - RUQ pain, continue with percocet  - GI Golyan following

## 2022-12-30 NOTE — PROGRESS NOTE ADULT - PROBLEM SELECTOR PLAN 3
-Pt takes Metoprolol 25mg BID  - Continue w/ Digoxin 250 mcg daily 12/27  - on Eliquis  - Cardio Dr. Sharma follows -Pt takes Metoprolol 25mg BID  - Continue w/ Digoxin 250 mcg daily (started 12/27)  - restart metoprolol.   - on Eliquis  - Cardio Dr. Sharma follows

## 2022-12-30 NOTE — PROGRESS NOTE ADULT - PROBLEM SELECTOR PLAN 5
- continue with finasteride  - Hospital course complicated by failed TOV 12/24 repeat TOV 12/29. passed/Voiding freely. - continue with finasteride  - Hospital course complicated by failed TOV 12/24 repeat TOV 12/29. passed/Voiding freely.  -monitor urinary retention with bladder scan prn

## 2022-12-30 NOTE — PROGRESS NOTE ADULT - PROBLEM SELECTOR PLAN 7
- Patient is from [facility]  - repeat blood cultures 12/25 NGTD   - remains on IV meropenem  - JANINE planned  12/30, but cancelled due to COVID + 12/29.    - will likely return back to [facility] once optimized. will need quarantine per facility. - Patient is from [facility]  - repeat blood cultures 12/25 NGTD   -need Ceftin and Flagyl x 5 days total per ID  - JANINE planned  12/30, but cancelled due to COVID + (12/29).    - will likely return back to [facility] once optimized. will need quarantine per facility. - Patient is from [facility]  - repeat blood cultures 12/25 NGTD   -need Ceftin and Flagyl x 5 days total per ID  - JANINE planned  12/30, but cancelled due to COVID + (12/29).    - will likely return back to [facility] once optimized. will need quarantine per facility. til 1/6/23 x 8 more days.

## 2022-12-30 NOTE — PROGRESS NOTE ADULT - SUBJECTIVE AND OBJECTIVE BOX
[   ] ICU                                          [   ] CCU                                      [  X ] Medical Floor      Patient is a 67 year old male with abdominal pain. GI consulted to evaluate.        Patient is a 67 year old male, with past medical history significant for DM, HTN, HLD, AF on Eliquis, PVD, presents with c/o vomiting and right sided abdominal pain. Patient recently had cholecystectomy and ERCP with sphincterotomy prior to discharge. patient presented with c/o sharp right sided abdominal pain 8/10 persistent with nausea, vomiting and melena. Patient had repeat ERCP found to have bleeding from sphincterotomy site. CBD stended and clips place to control bleeding. No hematemesis, hematochezia, chest pain, SOB, cough, hematuria or dysuria reported.    Patient appears comfortable. No new complaints reported, No abdominal pain, N/V, hematemesis, hematochezia, melena, fever, chills, chest pain, SOB, cough or diarrhea reported.       PAIN MANAGEMENT:  Pain Scale:                 /10  Pain Location:         PAST MEDICAL HISTORY  COPD (chronic obstructive pulmonary disease)    Diabetes    PVD (peripheral vascular disease)    Chronic atrial fibrillation    GERD (gastroesophageal reflux disease)    MDD (major depressive disorder)    BPH (benign prostatic hyperplasia)        PAST SURGICAL HISTORY  Cholecystectomy    Allergies    morphine (Unknown)  penicillin (Unknown)  shellfish (Unknown)    Intolerances  None       SOCIAL HISTORY  Advanced Directives:       [ X ] Full Code       [  ] DNR  Marital Status:         [  ] M      [ X ] S      [  ] D       [  ] W  Children:       [ X ] Yes      [  ] No  Occupation:        [  ] Employed       [  X] Unemployed       [  ] Retired  Diet:       [ X ] Regular       [  ] PEG feeding          [  ] NG tube feeding  Drug Use:           [ X ] Patient denied          [  ] Yes  Alcohol:           [ X ] No             [  ] Yes (socially)         [  ] Yes (chronic)  Tobacco:           [  ] Yes           [ X ] No      FAMILY HISTORY  [ X ] Heart Disease            [ X ] Diabetes             [ X ] HTN             [  ] Colon Cancer             [  ] Stomach Cancer              [  ] Pancreatic Cancer      VITALS  Vital Signs Last 24 Hrs  T(C): 36.8 (30 Dec 2022 13:56), Max: 36.9 (29 Dec 2022 20:47)  T(F): 98.2 (30 Dec 2022 13:56), Max: 98.4 (29 Dec 2022 20:47)  HR: 106 (30 Dec 2022 13:56) (101 - 106)  BP: 113/70 (30 Dec 2022 13:56) (113/70 - 123/68)     RR: 18 (30 Dec 2022 13:56) (17 - 18)  SpO2: 96% (30 Dec 2022 13:56) (95% - 98%)    Parameters below as of 30 Dec 2022 13:56  Patient On (Oxygen Delivery Method): nasal cannula       MEDICATIONS  (STANDING):  apixaban 5 milliGRAM(s) Oral every 12 hours  atorvastatin 10 milliGRAM(s) Oral at bedtime  cefuroxime   Tablet 500 milliGRAM(s) Oral every 12 hours  digoxin     Tablet 250 MICROGram(s) Oral daily  finasteride 5 milliGRAM(s) Oral daily  insulin lispro (ADMELOG) corrective regimen sliding scale   SubCutaneous three times a day before meals  insulin lispro (ADMELOG) corrective regimen sliding scale   SubCutaneous at bedtime  lidocaine   4% Patch 1 Patch Transdermal daily  metoprolol tartrate 25 milliGRAM(s) Oral two times a day  metroNIDAZOLE    Tablet 500 milliGRAM(s) Oral every 8 hours  pantoprazole    Tablet 40 milliGRAM(s) Oral before breakfast  senna 2 Tablet(s) Oral at bedtime  tamsulosin 0.4 milliGRAM(s) Oral at bedtime    MEDICATIONS  (PRN):  acetaminophen     Tablet .. 650 milliGRAM(s) Oral every 6 hours PRN Moderate Pain (4 - 6)  aluminum hydroxide/magnesium hydroxide/simethicone Suspension 30 milliLiter(s) Oral every 4 hours PRN Dyspepsia  baclofen 5 milliGRAM(s) Oral every 12 hours PRN Musculoskeletal Pain  melatonin 3 milliGRAM(s) Oral at bedtime PRN Insomnia  ondansetron Injectable 4 milliGRAM(s) IV Push every 8 hours PRN Nausea and/or Vomiting  oxycodone    5 mG/acetaminophen 325 mG 1 Tablet(s) Oral every 4 hours PRN Severe Pain (7 - 10)  polyethylene glycol 3350 17 Gram(s) Oral two times a day PRN Constipation  sodium chloride 0.9% lock flush 10 milliLiter(s) IV Push every 1 hour PRN Pre/post blood products, medications, blood draw, and to maintain line patency                            9.6    6.75  )-----------( 417      ( 30 Dec 2022 06:34 )             29.4       12-30    135  |  95<L>  |  11  ----------------------------<  129<H>  4.5   |  33<H>  |  0.40<L>    Ca    8.6      30 Dec 2022 06:34    TPro  6.9  /  Alb  1.9<L>  /  TBili  0.2  /  DBili  x   /  AST  27  /  ALT  29  /  AlkPhos  126<H>  12-30

## 2022-12-30 NOTE — CONSULT NOTE ADULT - SUBJECTIVE AND OBJECTIVE BOX
Source of information: IRENE MALHOTRA, Chart review  Patient language: English  : n/a    HPI:  Patient is a 67 year old male, vaccinated, from [facility] with PMHx of DM, HTN, HLD, AF on Eliquis, PVD, presents with c/o vomiting and right sided abdominal pain. patient states yesterday after lunch he had a sharp right sided abdominal pain 8/10 persistent with nausea and vomiting. He also endorsed chills and rigors, unsure if he spiked a fever. He denies any changes in bowel habits, chest pain, shortness of breath or palpitations. Patient had two episodes of melena in ED. He was discharged yesterday from hospital. He had choledocholithiasis,  CT A/P showed: Cholecystectomy with dilated CBD up to 1.7 cm abrupt cut off of the distal CBD and  mild pancreatic duct dilatation. 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 He underwent  ERCP on 12/16 biliary sludge removal with biliary sphincterotomy and balloon extraction. He was discharged after symptom improvement and out patient gastric emptying study.     In ED:   Vitals: BP: 81/59mmHg  HR:  118  RR: 97% RA   WBC 14.7 k  elevated LFT, lactate   s/p  Rocephin 1LNS and zofran  (22 Dec 2022 12:00)      Patient is a 67y old  Male who presents with a chief complaint of Abdominal pain (30 Dec 2022 09:13)  . Pt is admitted for ..., being treated with .... Pain consulted for .... Pt seen and examined at bedside. Reports pain score ***  SCALE USED: (1-10 VNRS). Pt describes pain as .... radiating to... alleviated by pain medication... exacerbated by movement... Pt tolerating PO diet. Denies lethargy, nausea, vomiting, constipation, itchiness. Reports last BM ***. Patient stated goal for pain control: to be able to take deep breaths, get out of bed to chair and ambulate with tolerable pain control. Pt denies taking medications for pain at home.     PAST MEDICAL & SURGICAL HISTORY:  COPD (chronic obstructive pulmonary disease)      Diabetes      PVD (peripheral vascular disease)      Chronic atrial fibrillation      GERD (gastroesophageal reflux disease)      MDD (major depressive disorder)      BPH (benign prostatic hyperplasia)          FAMILY HISTORY:      Social History:   [ ] Denies ETOH use, illicit drug use and smoking    Allergies    morphine (Unknown)  penicillin (Unknown)  shellfish (Unknown)    Intolerances        MEDICATIONS  (STANDING):  apixaban 5 milliGRAM(s) Oral every 12 hours  atorvastatin 10 milliGRAM(s) Oral at bedtime  cefuroxime   Tablet 500 milliGRAM(s) Oral every 12 hours  digoxin     Tablet 250 MICROGram(s) Oral daily  finasteride 5 milliGRAM(s) Oral daily  insulin lispro (ADMELOG) corrective regimen sliding scale   SubCutaneous three times a day before meals  insulin lispro (ADMELOG) corrective regimen sliding scale   SubCutaneous at bedtime  metoprolol tartrate 25 milliGRAM(s) Oral two times a day  metroNIDAZOLE    Tablet 500 milliGRAM(s) Oral every 8 hours  pantoprazole    Tablet 40 milliGRAM(s) Oral before breakfast  senna 2 Tablet(s) Oral at bedtime  tamsulosin 0.4 milliGRAM(s) Oral at bedtime    MEDICATIONS  (PRN):  aluminum hydroxide/magnesium hydroxide/simethicone Suspension 30 milliLiter(s) Oral every 4 hours PRN Dyspepsia  baclofen 5 milliGRAM(s) Oral every 12 hours PRN Musculoskeletal Pain  melatonin 3 milliGRAM(s) Oral at bedtime PRN Insomnia  ondansetron Injectable 4 milliGRAM(s) IV Push every 8 hours PRN Nausea and/or Vomiting  oxycodone    5 mG/acetaminophen 325 mG 1 Tablet(s) Oral every 4 hours PRN Severe Pain (7 - 10)  polyethylene glycol 3350 17 Gram(s) Oral two times a day PRN Constipation  sodium chloride 0.9% lock flush 10 milliLiter(s) IV Push every 1 hour PRN Pre/post blood products, medications, blood draw, and to maintain line patency      Vital Signs Last 24 Hrs  T(C): 36.4 (30 Dec 2022 05:09), Max: 36.9 (29 Dec 2022 20:47)  T(F): 97.5 (30 Dec 2022 05:09), Max: 98.4 (29 Dec 2022 20:47)  HR: 106 (30 Dec 2022 05:09) (101 - 107)  BP: 115/68 (30 Dec 2022 05:09) (107/69 - 123/68)  BP(mean): 79 (29 Dec 2022 12:50) (79 - 79)  RR: 17 (30 Dec 2022 05:09) (17 - 18)  SpO2: 95% (30 Dec 2022 05:09) (95% - 98%)    Parameters below as of 30 Dec 2022 05:09  Patient On (Oxygen Delivery Method): room air        LABS: Reviewed.                          9.6    6.75  )-----------( 417      ( 30 Dec 2022 06:34 )             29.4     12-30    135  |  95<L>  |  11  ----------------------------<  129<H>  4.5   |  33<H>  |  0.40<L>    Ca    8.6      30 Dec 2022 06:34    TPro  6.9  /  Alb  1.9<L>  /  TBili  0.2  /  DBili  x   /  AST  27  /  ALT  29  /  AlkPhos  126<H>  12-30      LIVER FUNCTIONS - ( 30 Dec 2022 06:34 )  Alb: 1.9 g/dL / Pro: 6.9 g/dL / ALK PHOS: 126 U/L / ALT: 29 U/L DA / AST: 27 U/L / GGT: x             CAPILLARY BLOOD GLUCOSE      POCT Blood Glucose.: 187 mg/dL (30 Dec 2022 11:21)  POCT Blood Glucose.: 125 mg/dL (30 Dec 2022 09:22)  POCT Blood Glucose.: 129 mg/dL (29 Dec 2022 21:08)  POCT Blood Glucose.: 126 mg/dL (29 Dec 2022 16:31)    COVID-19 PCR: Detected (29 Dec 2022 23:00)  COVID-19 PCR: NotDetec (20 Dec 2022 10:35)  COVID-19 PCR: NotDetec (13 Dec 2022 11:25)  COVID-19 PCR: NotDetec (08 Dec 2022 12:13)      Radiology: Reviewed.     ORT Score -   Family Hx of substance abuse	Female	      Male  Alcohol 	                                           1                     3  Illegal drugs	                                   2                     3  Rx drugs                                           4 	                  4  Personal Hx of substance abuse		  Alcohol 	                                          3	                  3  Illegal drugs                                     4	                  4  Rx drugs                                            5 	                  5  Age between 16- 45 years	           1                     1  hx preadolescent sexual abuse	   3 	                  0  Psychological disease		  ADD, OCD, bipolar, schizophrenia   2	          2  Depression                                           1 	          1  Total: 0    a score of 3 or lower indicates low risk for opioid abuse		  a score of 4-7 indicates moderate risk for opioid abuse		  a score of 8 or higher indicates high risk for opioid abuse  	  4AT (Assessment test for delirium & cognitive impairment)  _________________________________________________________  [1] ALERTNESS  This includes patients who may be markedly drowsy (eg. difficult to rouse and/or obviously sleepy  during assessment) or agitated/hyperactive. Observe the patient. If asleep, attempt to wake with  speech or gentle touch on shoulder. Ask the patient to state their name and address to assist rating.  Normal (fully alert, but not agitated, throughout assessment) 0  Mild sleepiness for <10 seconds after waking, then normal 0  Clearly abnormal 4    [2] AMT4  Age, date of birth, place (name of the hospital or building), current year.  No mistakes 0  1 mistake 1  2 or more mistakes/untestable 2    [3] ATTENTION  Ask the patient: “Please tell me the months of the year in backwards order, starting at December.”  To assist initial understanding one prompt of “what is the month before December?” is permitted.  Months of the year backwards Achieves 7 months or more correctly 0  Starts but scores <7 months / refuses to start 1   Untestable (cannot start because unwell, drowsy, inattentive) 2    [4] ACUTE CHANGE OR FLUCTUATING COURSE  Evidence of significant change or fluctuation in: alertness, cognition, other mental function  (eg. paranoia, hallucinations) arising over the last 2 weeks and still evident in last 24hrs  No 0  Yes 4    4 or above: possible delirium +/- cognitive impairment  1-3: possible cognitive impairment  0: delirium or severe cognitive impairment unlikely (but delirium still possible if [4] information incomplete)    4AT SCORE: 0    REVIEW OF SYSTEMS:  CONSTITUTIONAL: No fever or fatigue  HEENT:  No difficulty hearing, no change in vision  NECK: No pain or stiffness  RESPIRATORY: No cough, wheezing, chills or hemoptysis; No shortness of breath  CARDIOVASCULAR: No chest pain, palpitations, dizziness, or leg swelling  GASTROINTESTINAL: No loss of appetite, decreased PO intake. No abdominal or epigastric pain. No nausea, vomiting; No diarrhea or constipation.   GENITOURINARY: No dysuria, frequency, hematuria, retention or incontinence  MUSCULOSKELETAL: No joint pain or swelling; No muscle, back, or extremity pain, no upper or lower motor strength weakness, no saddle anesthesia, bowel/bladder incontinence, no falls   NEURO: No headaches, No numbness/tingling b/l LE, No weakness  ENDOCRINE: No polyuria, polydipsia, heat or cold intolerance; No hair loss  PSYCHIATRIC: No depression, anxiety or difficulty sleeping    PHYSICAL EXAM:  GENERAL:  Alert & Oriented X4, cooperative, NAD, Good concentration. Speech is clear.   RESPIRATORY: Respirations even and unlabored. Clear to auscultation bilaterally; No rales, rhonchi, wheezing, or rubs  CARDIOVASCULAR: Normal S1/S2, regular rate and rhythm; No murmurs, rubs, or gallops. No JVD.   GASTROINTESTINAL:  Soft, Nontender, Nondistended; Bowel sounds present  PERIPHERAL VASCULAR:  Extremities warm without edema. 2+ Peripheral Pulses, No cyanosis, No calf tenderness  MUSCULOSKELETAL: Motor Strength 5/5 B/L upper and lower extremities; moves all extremities equally against gravity; ROM intact; negative SLR; No tenderness on palpation of all joints.   SKIN: Warm, dry, intact. No rashes, lesions, scars or wounds.     Risk factors associated with adverse outcomes related to opioid treatment  [ ]  Concurrent benzodiazepine use  [ ]  History/ Active substance use or alcohol use disorder  [ ] Psychiatric co-morbidity  [ ] Sleep apnea  [ ] COPD  [ ] BMI> 35  [ ] Liver dysfunction  [ ] Renal dysfunction  [ ] CHF  [ ] Smoker  [ ]  Age > 60 years    [ ]  [place]  Reviewed and Copied to Chart. See below.    Plan of care and goal oriented pain management treatment options were discussed with patient and /or primary care giver; all questions and concerns were addressed and care was aligned with patient's wishes.    Educated patient on goal oriented pain management treatment options        Source of information: IRENE MALHOTRA, Chart review  Patient language: English  : n/a    HPI:  Patient is a 67 year old male, vaccinated, from [facility] with PMHx of DM, HTN, HLD, AF on Eliquis, PVD, presents with c/o vomiting and right sided abdominal pain. patient states yesterday after lunch he had a sharp right sided abdominal pain 8/10 persistent with nausea and vomiting. He also endorsed chills and rigors, unsure if he spiked a fever. He denies any changes in bowel habits, chest pain, shortness of breath or palpitations. Patient had two episodes of melena in ED. He was discharged yesterday from hospital. He had choledocholithiasis,  CT A/P showed: Cholecystectomy with dilated CBD up to 1.7 cm abrupt cut off of the distal CBD and  mild pancreatic duct dilatation. 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 He underwent  ERCP on 12/16 biliary sludge removal with biliary sphincterotomy and balloon extraction. He was discharged after symptom improvement and out patient gastric emptying study.     In ED:   Vitals: BP: 81/59mmHg  HR:  118  RR: 97% RA   WBC 14.7 k  elevated LFT, lactate   s/p  Rocephin 1LNS and zofran  (22 Dec 2022 12:00)    -TTE unsuccessful, plan for JANINE 12/30 but cancelled due to COVID +.  - US Abdomen: Biliary dilatation and hepatic steatosis  - CT A/P: dilation of the common bile duct and main pancreatic duct again noted.. New RLL opacity  - repeat ERCP on 12/23 performed, found with bleeding to sphincterotomy site, 2 clips and 1 stent placed in common bile duct to facilitate tamponade the bleeding and facilitate drainage.     Pt is admitted for right sided abdominal pain, hx of cholecystectomy and ERCP with sphincterotomy 12/16/22, s/p ERCP 12/23/22 found to have bleeding from sphincterotomy site. CBD stented and clips place to control bleeding. Admitted to ICU for septic shock and bacteremia, followed by ID. Pt now downgraded to medicine floor on 12/25/22. Pt currently being treated with Percocet 5mg/325 mg acetaminophen q4h PRN for pain. Pain consulted for RUQ abdominal pain.  Pt seen and examined at bedside this morning. Patient on Airborne/Contact precautions for COVID+ on 12/29/22. Reports pain score 5/10 on right sided of abdomen, reports tolerable, medicated with Percocet tablet this morning. SCALE USED: (1-10 VNRS). Pt describes pain as aching, non radiating and alleviated by pain medication, exacerbated by palpation. Pt tolerating PO diet. Denies lethargy, nausea, vomiting, constipation, itchiness. Reports last BM 12/29 yesterday. Patient stated goal for pain control: to be able to take deep breaths, get out of bed to chair and ambulate with tolerable pain control. Pt with hx of chronic neuropathy on bilateral feet worst on left foot. Pt reports taking Lyrica 100 mg TID, Baclofen 10 mg po at bedtime and Percocet every 4h as needed for pain. Per Istop pt on Lyrica and Percocet.    PAST MEDICAL & SURGICAL HISTORY:  COPD (chronic obstructive pulmonary disease)    Diabetes    PVD (peripheral vascular disease)    Chronic atrial fibrillation    GERD (gastroesophageal reflux disease)    MDD (major depressive disorder)    BPH (benign prostatic hyperplasia)    FAMILY HISTORY:    Social History:   [x] Denies ETOH use, illicit drug use and smoking    Allergies    morphine (Unknown)  penicillin (Unknown)  shellfish (Unknown)    Intolerances    MEDICATIONS  (STANDING):  apixaban 5 milliGRAM(s) Oral every 12 hours  atorvastatin 10 milliGRAM(s) Oral at bedtime  cefuroxime   Tablet 500 milliGRAM(s) Oral every 12 hours  digoxin     Tablet 250 MICROGram(s) Oral daily  finasteride 5 milliGRAM(s) Oral daily  insulin lispro (ADMELOG) corrective regimen sliding scale   SubCutaneous three times a day before meals  insulin lispro (ADMELOG) corrective regimen sliding scale   SubCutaneous at bedtime  metoprolol tartrate 25 milliGRAM(s) Oral two times a day  metroNIDAZOLE    Tablet 500 milliGRAM(s) Oral every 8 hours  pantoprazole    Tablet 40 milliGRAM(s) Oral before breakfast  senna 2 Tablet(s) Oral at bedtime  tamsulosin 0.4 milliGRAM(s) Oral at bedtime    MEDICATIONS  (PRN):  aluminum hydroxide/magnesium hydroxide/simethicone Suspension 30 milliLiter(s) Oral every 4 hours PRN Dyspepsia  baclofen 5 milliGRAM(s) Oral every 12 hours PRN Musculoskeletal Pain  melatonin 3 milliGRAM(s) Oral at bedtime PRN Insomnia  ondansetron Injectable 4 milliGRAM(s) IV Push every 8 hours PRN Nausea and/or Vomiting  oxycodone    5 mG/acetaminophen 325 mG 1 Tablet(s) Oral every 4 hours PRN Severe Pain (7 - 10)  polyethylene glycol 3350 17 Gram(s) Oral two times a day PRN Constipation  sodium chloride 0.9% lock flush 10 milliLiter(s) IV Push every 1 hour PRN Pre/post blood products, medications, blood draw, and to maintain line patency    Vital Signs Last 24 Hrs  T(C): 36.4 (30 Dec 2022 05:09), Max: 36.9 (29 Dec 2022 20:47)  T(F): 97.5 (30 Dec 2022 05:09), Max: 98.4 (29 Dec 2022 20:47)  HR: 106 (30 Dec 2022 05:09) (101 - 107)  BP: 115/68 (30 Dec 2022 05:09) (107/69 - 123/68)  BP(mean): 79 (29 Dec 2022 12:50) (79 - 79)  RR: 17 (30 Dec 2022 05:09) (17 - 18)  SpO2: 95% (30 Dec 2022 05:09) (95% - 98%)    Parameters below as of 30 Dec 2022 05:09  Patient On (Oxygen Delivery Method): room air    LABS: Reviewed.                        9.6    6.75  )-----------( 417      ( 30 Dec 2022 06:34 )             29.4     12-30    135  |  95<L>  |  11  ----------------------------<  129<H>  4.5   |  33<H>  |  0.40<L>    Ca    8.6      30 Dec 2022 06:34    TPro  6.9  /  Alb  1.9<L>  /  TBili  0.2  /  DBili  x   /  AST  27  /  ALT  29  /  AlkPhos  126<H>  12-30      LIVER FUNCTIONS - ( 30 Dec 2022 06:34 )  Alb: 1.9 g/dL / Pro: 6.9 g/dL / ALK PHOS: 126 U/L / ALT: 29 U/L DA / AST: 27 U/L / GGT: x           CAPILLARY BLOOD GLUCOSE    POCT Blood Glucose.: 187 mg/dL (30 Dec 2022 11:21)  POCT Blood Glucose.: 125 mg/dL (30 Dec 2022 09:22)  POCT Blood Glucose.: 129 mg/dL (29 Dec 2022 21:08)  POCT Blood Glucose.: 126 mg/dL (29 Dec 2022 16:31)    COVID-19 PCR: Detected (29 Dec 2022 23:00)  COVID-19 PCR: NotDetec (20 Dec 2022 10:35)  COVID-19 PCR: NotDetec (13 Dec 2022 11:25)  COVID-19 PCR: NotDetec (08 Dec 2022 12:13)    Radiology: Reviewed.   ACC: [id number] EXAM:  XR CHEST PORTABLE ROUTINE 1V                          PROCEDURE DATE:  12/25/2022      INTERPRETATION:  Portable chest radiograph    CLINICAL INFORMATION: ICU follow-up.    TECHNIQUE:  Portable  AP chest radiograph.    COMPARISON: 12/22/2022 chest x-ray .    FINDINGS:  CATHETERS AND TUBES: None    PULMONARY: Increasing RIGHT lower lobe diffuse airspace disease.  Remaining lung segments grossly clear..   No pneumothorax.    HEART/VASCULAR: The heart and mediastinum size and configuration are   within normal limits.    BONES: Visualized osseous structures are intact.    IMPRESSION:   Increasing RIGHT lower zone diffuse airspace   disease/consolidation...    --- End of Report ---    FREDDY QUINTANILLA MD; Attending Radiologist  This document has been electronically signed. Dec 26 2022 11:12AM  ACC: [id number] EXAM:  CT ABDOMEN AND PELVIS                          PROCEDURE DATE:  12/22/2022      INTERPRETATION:  CLINICAL INFORMATION: Abdominal pain    COMPARISON: Abdominal CT dated 12/8/2022    CONTRAST/COMPLICATIONS:  IV Contrast: NONE  Oral Contrast: NONE  Complications: None reported at time of study completion    PROCEDURE:  CT of the Abdomen and Pelvis was performed.  Sagittal and coronal reformats were performed.    FINDINGS: Evaluation of the abdomen and pelvis is limited by lack of IV   contrast.  LOWER CHEST: New airspace opacifications in the right lower lung zone for   which clinical correlation with pneumonia is recommended.    LIVER: Stable small hypodense area in the left hepatic lobe. Please see   recent abdominalMR dated 12/12/2022 for further characterization.  BILE DUCTS: Dilatation of the common bile duct is again noted.  GALLBLADDER: Stable cholecystectomy clips.  SPLEEN: Within normal limits.  PANCREAS: The pancreatic parenchyma appears unremarkable. Mild dilatation   of the main pancreatic duct in the pancreatic head is again noted.  ADRENALS: Within normal limits.  KIDNEYS/URETERS: Small hypodense lesion in the left kidney, likely   representing a cyst. The right kidney appears unremarkable. No evidence   for a calculus in the kidneys or ureters. No hydronephrosis.    BLADDER: Underdistended.  REPRODUCTIVE ORGANS: The prostate and seminal vesicles appear grossly   unremarkable.    BOWEL: No bowel obstruction or grossly thickened bowel wall. Colonic   diverticulosis without evidence for diverticulitis. Moderate amount of   stool in the rectum and distal sigmoid colon.. Appendix not visualized.   No secondary signs for acute appendicitis.  PERITONEUM: No free air or ascites.  VESSELS: Calcified atherosclerotic disease.  RETROPERITONEUM/LYMPH NODES: No lymphadenopathy.  ABDOMINAL WALL: Small fat-containing ventral hernia.  BONES: Degenerative spondylosis.    IMPRESSION: No bowel obstruction or grossly thickened bowel wall. Colonic   diverticulosis without evidence for diverticulitis. Moderate amount of   stool in the rectum and distal sigmoid colon.. Appendix not visualized.   No secondary signs for acute appendicitis.    Dilatation of the common bile duct and main pancreatic duct again noted..   Please see recent abdominal MR dated 12/12/2022. If clinically indicated,   endoscopic ultrasound may be pursued to rule out a small obstructive   ampullary tumor or pancreatic head mass.    New airspace opacifications in the right lower lung zone for which   clinical correlation with pneumonia is recommended.    --- End of Report ---    CARIDAD WILLIAM MD; Attending Radiologist  This document has been electronically signed. Dec 22 2022 11:23AM  ACC: [id number] EXAM:  US ABDOMEN COMPLETE                          PROCEDURE DATE:  12/22/2022      INTERPRETATION:  CLINICAL INFORMATION: Abdominal pain and vomiting.    COMPARISON: CT and MRI dated 12/8/2022 and 12/12/2022.    TECHNIQUE: Sonography of the abdomen. Technically difficult and limited   study due to patient's body habitus.    FINDINGS:  Liver: Increased echogenicity. No evidence of a focal hepatic mass   lesion. Normal portal and hepatic venous flow.  Bile ducts: Dilated. Common bile duct measures 1.5 cm.  Gallbladder: Cholecystectomy.  Pancreas: Visualized portions are within normal limits.  Spleen: Not visualized.  Right kidney: 8.6 cm. No hydronephrosis.  Left kidney: 10 cm. No hydronephrosis.  Ascites: None.  Aorta andIVC: Visualized portions are within normal limits.    IMPRESSION:  Biliary dilatation in this patient with prior cholecystectomy. 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  Hepatic steatosis.  Nonvisualized spleen.    --- End of Report ---    MAGNO SARMIENTO MD; Attending Radiologist  This document has been electronically signed. Dec 22 2022 10:23AM    ORT Score -   Family Hx of substance abuse	Female	      Male  Alcohol 	                                           1                     3  Illegal drugs	                                   2                     3  Rx drugs                                           4 	                  4  Personal Hx of substance abuse		  Alcohol 	                                          3	                  3  Illegal drugs                                     4	                  4  Rx drugs                                            5 	                  5  Age between 16- 45 years	           1                     1  hx preadolescent sexual abuse	   3 	                  0  Psychological disease		  ADD, OCD, bipolar, schizophrenia   2	          2  Depression                                           1 	          1  Total: 0    a score of 3 or lower indicates low risk for opioid abuse		  a score of 4-7 indicates moderate risk for opioid abuse		  a score of 8 or higher indicates high risk for opioid abuse  	  Starts but scores <7 months / refuses to start 1   Untestable (cannot start because unwell, drowsy, inattentive) 2    REVIEW OF SYSTEMS:  CONSTITUTIONAL: No fever or fatigue  HEENT:  No difficulty hearing, no change in vision  NECK: No pain or stiffness  RESPIRATORY: No cough, wheezing, chills or hemoptysis; No shortness of breath  CARDIOVASCULAR: No chest pain, palpitations, dizziness, or leg swelling  GASTROINTESTINAL: No loss of appetite, decreased PO intake. No abdominal or epigastric pain. No nausea, vomiting; No diarrhea or constipation.   GENITOURINARY: No dysuria, frequency, hematuria, retention or incontinence  MUSCULOSKELETAL: No joint pain or swelling; No muscle, back, or extremity pain, no upper or lower motor strength weakness, no saddle anesthesia, bowel/bladder incontinence, no falls   NEURO: No headaches, + hx of chronic neuropathy on bilateral feet  ENDOCRINE: No polyuria, polydipsia, heat or cold intolerance; No hair loss  PSYCHIATRIC: No depression, anxiety or difficulty sleeping    PHYSICAL EXAM:  GENERAL:  Alert & Oriented X4, cooperative, NAD, Good concentration. Speech is clear.   RESPIRATORY: Respirations even and unlabored. Clear to auscultation bilaterally; No rales, rhonchi, wheezing, or rubs. On 2L NC  CARDIOVASCULAR: Normal S1/S2, regular rate and rhythm; No murmurs, rubs, or gallops. No JVD.   GASTROINTESTINAL:  Soft, + mild tenderness, Nondistended; Bowel sounds present  PERIPHERAL VASCULAR:  Extremities warm without edema. 2+ Peripheral Pulses, No cyanosis, No calf tenderness  MUSCULOSKELETAL: Motor Strength 5/5 B/L upper and lower extremities; moves all extremities equally against gravity; ROM intact; negative SLR; No tenderness on palpation of all joints.   SKIN: Warm, dry, intact. No rashes, lesions, scars or wounds.     Risk factors associated with adverse outcomes related to opioid treatment  [ ]  Concurrent benzodiazepine use  [ ]  History/ Active substance use or alcohol use disorder  [ ] Psychiatric co-morbidity  [ ] Sleep apnea  [x] COPD  [ ] BMI> 35  [ ] Liver dysfunction  [ ] Renal dysfunction  [ ] CHF  [ ] Smoker  [x]  Age > 60 years    [x]  [place]  Reviewed and Copied to Chart. See below.    Plan of care and goal oriented pain management treatment options were discussed with patient and /or primary care giver; all questions and concerns were addressed and care was aligned with patient's wishes.    Educated patient on goal oriented pain management treatment options

## 2022-12-31 LAB
ANION GAP SERPL CALC-SCNC: 7 MMOL/L — SIGNIFICANT CHANGE UP (ref 5–17)
BUN SERPL-MCNC: 10 MG/DL — SIGNIFICANT CHANGE UP (ref 7–18)
CALCIUM SERPL-MCNC: 9.5 MG/DL — SIGNIFICANT CHANGE UP (ref 8.4–10.5)
CHLORIDE SERPL-SCNC: 98 MMOL/L — SIGNIFICANT CHANGE UP (ref 96–108)
CO2 SERPL-SCNC: 33 MMOL/L — HIGH (ref 22–31)
CREAT SERPL-MCNC: 0.45 MG/DL — LOW (ref 0.5–1.3)
EGFR: 115 ML/MIN/1.73M2 — SIGNIFICANT CHANGE UP
GLUCOSE BLDC GLUCOMTR-MCNC: 104 MG/DL — HIGH (ref 70–99)
GLUCOSE BLDC GLUCOMTR-MCNC: 115 MG/DL — HIGH (ref 70–99)
GLUCOSE BLDC GLUCOMTR-MCNC: 116 MG/DL — HIGH (ref 70–99)
GLUCOSE BLDC GLUCOMTR-MCNC: 116 MG/DL — HIGH (ref 70–99)
GLUCOSE SERPL-MCNC: 121 MG/DL — HIGH (ref 70–99)
HCT VFR BLD CALC: 34.4 % — LOW (ref 39–50)
HGB BLD-MCNC: 10.9 G/DL — LOW (ref 13–17)
MCHC RBC-ENTMCNC: 30.6 PG — SIGNIFICANT CHANGE UP (ref 27–34)
MCHC RBC-ENTMCNC: 31.7 GM/DL — LOW (ref 32–36)
MCV RBC AUTO: 96.6 FL — SIGNIFICANT CHANGE UP (ref 80–100)
NRBC # BLD: 0 /100 WBCS — SIGNIFICANT CHANGE UP (ref 0–0)
PLATELET # BLD AUTO: 514 K/UL — HIGH (ref 150–400)
POTASSIUM SERPL-MCNC: 4.2 MMOL/L — SIGNIFICANT CHANGE UP (ref 3.5–5.3)
POTASSIUM SERPL-SCNC: 4.2 MMOL/L — SIGNIFICANT CHANGE UP (ref 3.5–5.3)
RBC # BLD: 3.56 M/UL — LOW (ref 4.2–5.8)
RBC # FLD: 15.6 % — HIGH (ref 10.3–14.5)
SODIUM SERPL-SCNC: 138 MMOL/L — SIGNIFICANT CHANGE UP (ref 135–145)
WBC # BLD: 6.87 K/UL — SIGNIFICANT CHANGE UP (ref 3.8–10.5)
WBC # FLD AUTO: 6.87 K/UL — SIGNIFICANT CHANGE UP (ref 3.8–10.5)

## 2022-12-31 RX ADMIN — APIXABAN 5 MILLIGRAM(S): 2.5 TABLET, FILM COATED ORAL at 18:47

## 2022-12-31 RX ADMIN — OXYCODONE AND ACETAMINOPHEN 1 TABLET(S): 5; 325 TABLET ORAL at 22:39

## 2022-12-31 RX ADMIN — POLYETHYLENE GLYCOL 3350 17 GRAM(S): 17 POWDER, FOR SOLUTION ORAL at 15:33

## 2022-12-31 RX ADMIN — Medication 500 MILLIGRAM(S): at 22:28

## 2022-12-31 RX ADMIN — LIDOCAINE 1 PATCH: 4 CREAM TOPICAL at 22:29

## 2022-12-31 RX ADMIN — Medication 500 MILLIGRAM(S): at 15:16

## 2022-12-31 RX ADMIN — Medication 250 MICROGRAM(S): at 05:50

## 2022-12-31 RX ADMIN — FINASTERIDE 5 MILLIGRAM(S): 5 TABLET, FILM COATED ORAL at 13:06

## 2022-12-31 RX ADMIN — OXYCODONE AND ACETAMINOPHEN 1 TABLET(S): 5; 325 TABLET ORAL at 07:30

## 2022-12-31 RX ADMIN — OXYCODONE AND ACETAMINOPHEN 1 TABLET(S): 5; 325 TABLET ORAL at 06:48

## 2022-12-31 RX ADMIN — OXYCODONE AND ACETAMINOPHEN 1 TABLET(S): 5; 325 TABLET ORAL at 16:00

## 2022-12-31 RX ADMIN — OXYCODONE AND ACETAMINOPHEN 1 TABLET(S): 5; 325 TABLET ORAL at 15:32

## 2022-12-31 RX ADMIN — PANTOPRAZOLE SODIUM 40 MILLIGRAM(S): 20 TABLET, DELAYED RELEASE ORAL at 05:49

## 2022-12-31 RX ADMIN — ONDANSETRON 4 MILLIGRAM(S): 8 TABLET, FILM COATED ORAL at 07:37

## 2022-12-31 RX ADMIN — OXYCODONE AND ACETAMINOPHEN 1 TABLET(S): 5; 325 TABLET ORAL at 23:48

## 2022-12-31 RX ADMIN — LIDOCAINE 1 PATCH: 4 CREAM TOPICAL at 13:06

## 2022-12-31 RX ADMIN — ATORVASTATIN CALCIUM 10 MILLIGRAM(S): 80 TABLET, FILM COATED ORAL at 22:29

## 2022-12-31 RX ADMIN — TAMSULOSIN HYDROCHLORIDE 0.4 MILLIGRAM(S): 0.4 CAPSULE ORAL at 22:29

## 2022-12-31 RX ADMIN — Medication 500 MILLIGRAM(S): at 05:49

## 2022-12-31 RX ADMIN — Medication 25 MILLIGRAM(S): at 18:47

## 2022-12-31 RX ADMIN — Medication 25 MILLIGRAM(S): at 05:49

## 2022-12-31 RX ADMIN — Medication 500 MILLIGRAM(S): at 18:47

## 2022-12-31 RX ADMIN — APIXABAN 5 MILLIGRAM(S): 2.5 TABLET, FILM COATED ORAL at 05:50

## 2022-12-31 RX ADMIN — OXYCODONE AND ACETAMINOPHEN 1 TABLET(S): 5; 325 TABLET ORAL at 01:31

## 2022-12-31 RX ADMIN — OXYCODONE AND ACETAMINOPHEN 1 TABLET(S): 5; 325 TABLET ORAL at 00:20

## 2022-12-31 RX ADMIN — SENNA PLUS 2 TABLET(S): 8.6 TABLET ORAL at 22:29

## 2022-12-31 NOTE — PROGRESS NOTE ADULT - SUBJECTIVE AND OBJECTIVE BOX
[   ] ICU                                          [   ] CCU                                      [  x ] Medical Floor      Patient is a 67 year old male with abdominal pain. GI consulted to evaluate.        Patient is a 67 year old male, with past medical history significant for DM, HTN, HLD, AF on Eliquis, PVD, presents with c/o vomiting and right sided abdominal pain. Patient recently had cholecystectomy and ERCP with sphincterotomy prior to discharge. patient presented with c/o sharp right sided abdominal pain 8/10 persistent with nausea, vomiting and melena. Patient had repeat ERCP found to have bleeding from sphincterotomy site. CBD stended and clips place to control bleeding. No hematemesis, hematochezia, chest pain, SOB, cough, hematuria or dysuria reported.    Patient appears comfortable. No new complaints reported, No abdominal pain, N/V, hematemesis, hematochezia, melena, fever, chills, chest pain, SOB, cough or diarrhea reported.       PAIN MANAGEMENT:  Pain Scale:                 /10  Pain Location:         PAST MEDICAL HISTORY  COPD (chronic obstructive pulmonary disease)    Diabetes    PVD (peripheral vascular disease)    Chronic atrial fibrillation    GERD (gastroesophageal reflux disease)    MDD (major depressive disorder)    BPH (benign prostatic hyperplasia)        PAST SURGICAL HISTORY  Cholecystectomy    Allergies    morphine (Unknown)  penicillin (Unknown)  shellfish (Unknown)    Intolerances  None       SOCIAL HISTORY  Advanced Directives:       [ X ] Full Code       [  ] DNR  Marital Status:         [  ] M      [ X ] S      [  ] D       [  ] W  Children:       [ X ] Yes      [  ] No  Occupation:        [  ] Employed       [  X] Unemployed       [  ] Retired  Diet:       [ X ] Regular       [  ] PEG feeding          [  ] NG tube feeding  Drug Use:           [ X ] Patient denied          [  ] Yes  Alcohol:           [ X ] No             [  ] Yes (socially)         [  ] Yes (chronic)  Tobacco:           [  ] Yes           [ X ] No      FAMILY HISTORY  [ X ] Heart Disease            [ X ] Diabetes             [ X ] HTN             [  ] Colon Cancer             [  ] Stomach Cancer              [  ] Pancreatic Cancer          VITALS  Vital Signs Last 24 Hrs  T(C): 36.9 (31 Dec 2022 05:35), Max: 36.9 (31 Dec 2022 05:35)  T(F): 98.4 (31 Dec 2022 05:35), Max: 98.4 (31 Dec 2022 05:35)  HR: 83 (31 Dec 2022 08:00) (83 - 106)  BP: 109/73 (31 Dec 2022 08:00) (109/73 - 118/77)  BP(mean): 82 (31 Dec 2022 08:00) (82 - 82)  RR: 20 (31 Dec 2022 05:35) (17 - 20)  SpO2: 96% (31 Dec 2022 08:00) (95% - 97%)    Parameters below as of 31 Dec 2022 08:00  Patient On (Oxygen Delivery Method): nasal cannula  O2 Flow (L/min): 2         MEDICATIONS  (STANDING):  apixaban 5 milliGRAM(s) Oral every 12 hours  atorvastatin 10 milliGRAM(s) Oral at bedtime  cefuroxime   Tablet 500 milliGRAM(s) Oral every 12 hours  digoxin     Tablet 250 MICROGram(s) Oral daily  finasteride 5 milliGRAM(s) Oral daily  insulin lispro (ADMELOG) corrective regimen sliding scale   SubCutaneous at bedtime  insulin lispro (ADMELOG) corrective regimen sliding scale   SubCutaneous three times a day before meals  lidocaine   4% Patch 1 Patch Transdermal daily  metoprolol tartrate 25 milliGRAM(s) Oral two times a day  metroNIDAZOLE    Tablet 500 milliGRAM(s) Oral every 8 hours  pantoprazole    Tablet 40 milliGRAM(s) Oral before breakfast  senna 2 Tablet(s) Oral at bedtime  tamsulosin 0.4 milliGRAM(s) Oral at bedtime    MEDICATIONS  (PRN):  acetaminophen     Tablet .. 650 milliGRAM(s) Oral every 6 hours PRN Moderate Pain (4 - 6)  aluminum hydroxide/magnesium hydroxide/simethicone Suspension 30 milliLiter(s) Oral every 4 hours PRN Dyspepsia  baclofen 5 milliGRAM(s) Oral every 12 hours PRN Musculoskeletal Pain  melatonin 3 milliGRAM(s) Oral at bedtime PRN Insomnia  ondansetron Injectable 4 milliGRAM(s) IV Push every 8 hours PRN Nausea and/or Vomiting  oxycodone    5 mG/acetaminophen 325 mG 1 Tablet(s) Oral every 4 hours PRN Severe Pain (7 - 10)  polyethylene glycol 3350 17 Gram(s) Oral two times a day PRN Constipation  sodium chloride 0.9% lock flush 10 milliLiter(s) IV Push every 1 hour PRN Pre/post blood products, medications, blood draw, and to maintain line patency                            10.9   6.87  )-----------( 514      ( 31 Dec 2022 07:45 )             34.4       12-31    138  |  98  |  10  ----------------------------<  121<H>  4.2   |  33<H>  |  0.45<L>    Ca    9.5      31 Dec 2022 07:45    TPro  6.9  /  Alb  1.9<L>  /  TBili  0.2  /  DBili  x   /  AST  27  /  ALT  29  /  AlkPhos  126<H>  12-30

## 2022-12-31 NOTE — PROGRESS NOTE ADULT - PROBLEM SELECTOR PLAN 7
- Patient is from [facility]  - repeat blood cultures 12/25 NGTD   -need Ceftin and Flagyl x 5 days total per ID  - JANINE planned  12/30, but cancelled due to COVID + (12/29).    - will likely return back to [facility] once optimized. will need quarantine per facility. til 1/6/23 x 8 more days.

## 2022-12-31 NOTE — PROGRESS NOTE ADULT - SUBJECTIVE AND OBJECTIVE BOX
PATIENT SEEN AND EXAMINED ON :- 12/31/22  DATE OF SERVICE:  12/31/22           Interim events noted,Labs ,Radiological studies and Cardiology tests reviewed .       HOSPITAL COURSE: HPI:  Patient is a 67 year old male, vaccinated, from [facility] with PMHx of DM, HTN, HLD, AF on Eliquis, PVD, presents with c/o vomiting and right sided abdominal pain. patient states yesterday after lunch he had a sharp right sided abdominal pain 8/10 persistent with nausea and vomiting. He also endorsed chills and rigors, unsure if he spiked a fever. He denies any changes in bowel habits, chest pain, shortness of breath or palpitations. Patient had two episodes of melena in ED. He was discharged yesterday from hospital. He had choledocholithiasis,  CT A/P showed: Cholecystectomy with dilated CBD up to 1.7 cm abrupt cut off of the distal CBD and  mild pancreatic duct dilatation. 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 He underwent  ERCP on 12/16 biliary sludge removal with biliary sphincterotomy and balloon extraction. He was discharged after symptom improvement and out patient gastric emptying study.     In ED:   Vitals: BP: 81/59mmHg  HR:  118  RR: 97% RA   WBC 14.7 k  elevated LFT, lactate   s/p  Rocephin 1LNS and zofran  (22 Dec 2022 12:00)      INTERIM EVENTS:Patient seen at bedside ,interim events noted.      PMH -reviewed admission note, no change since admission  HEART FAILURE: Acute[ ]Chronic[ ] Systolic[ ] Diastolic[ ] Combined Systolic and Diastolic[ ]  CAD[ ] CABG[ ] PCI[ ]  DEVICES[ ] PPM[ ] ICD[ ] ILR[ ]  ATRIAL FIBRILLATION[ ] Paroxysmal[ ] Permanent[ ] CHADS2-[  ]  DILCIA[ ] CKD1[ ] CKD2[ ] CKD3[ ] CKD4[ ] ESRD[ ]  COPD[ ] HTN[ ]   DM[ ] Type1[ ] Type 2[ ]   CVA[ ] Paresis[ ]    AMBULATION: Assisted[ ] Cane/walker[ ] Independent[ ]    MEDICATIONS  (STANDING):  apixaban 5 milliGRAM(s) Oral every 12 hours  atorvastatin 10 milliGRAM(s) Oral at bedtime  cefuroxime   Tablet 500 milliGRAM(s) Oral every 12 hours  digoxin     Tablet 250 MICROGram(s) Oral daily  finasteride 5 milliGRAM(s) Oral daily  insulin lispro (ADMELOG) corrective regimen sliding scale   SubCutaneous at bedtime  insulin lispro (ADMELOG) corrective regimen sliding scale   SubCutaneous three times a day before meals  lidocaine   4% Patch 1 Patch Transdermal daily  metoprolol tartrate 25 milliGRAM(s) Oral two times a day  metroNIDAZOLE    Tablet 500 milliGRAM(s) Oral every 8 hours  pantoprazole    Tablet 40 milliGRAM(s) Oral before breakfast  senna 2 Tablet(s) Oral at bedtime  tamsulosin 0.4 milliGRAM(s) Oral at bedtime    MEDICATIONS  (PRN):  acetaminophen     Tablet .. 650 milliGRAM(s) Oral every 6 hours PRN Moderate Pain (4 - 6)  aluminum hydroxide/magnesium hydroxide/simethicone Suspension 30 milliLiter(s) Oral every 4 hours PRN Dyspepsia  baclofen 5 milliGRAM(s) Oral every 12 hours PRN Musculoskeletal Pain  melatonin 3 milliGRAM(s) Oral at bedtime PRN Insomnia  ondansetron Injectable 4 milliGRAM(s) IV Push every 8 hours PRN Nausea and/or Vomiting  oxycodone    5 mG/acetaminophen 325 mG 1 Tablet(s) Oral every 4 hours PRN Severe Pain (7 - 10)  polyethylene glycol 3350 17 Gram(s) Oral two times a day PRN Constipation  sodium chloride 0.9% lock flush 10 milliLiter(s) IV Push every 1 hour PRN Pre/post blood products, medications, blood draw, and to maintain line patency            REVIEW OF SYSTEMS:  Constitutional: [ ] fever, [ ]weight loss,  [ ]fatigue [ ]weight gain  Eyes: [ ] visual changes  Respiratory: [ ]shortness of breath;  [ ] cough, [ ]wheezing, [ ]chills, [ ]hemoptysis  Cardiovascular: [ ] chest pain, [ ]palpitations, [ ]dizziness,  [ ]leg swelling[ ]orthopnea[ ]PND  Gastrointestinal: [ ] abdominal pain, [ ]nausea, [ ]vomiting,  [ ]diarrhea [ ]Constipation [ ]Melena  Genitourinary: [ ] dysuria, [ ] hematuria [ ]Cardoza  Neurologic: [ ] headaches [ ] tremors[ ]weakness [ ]Paralysis Right[ ] Left[ ]  Skin: [ ] itching, [ ]burning, [ ] rashes  Endocrine: [ ] heat or cold intolerance  Musculoskeletal: [ ] joint pain or swelling; [ ] muscle, back, or extremity pain  Psychiatric: [ ] depression, [ ]anxiety, [ ]mood swings, or [ ]difficulty sleeping  Hematologic: [ ] easy bruising, [ ] bleeding gums    [ ] All remaining systems negative except as per above.   [ ]Unable to obtain.  [x] No change in ROS since admission      Vital Signs Last 24 Hrs  T(C): 37 (31 Dec 2022 20:55), Max: 37.1 (31 Dec 2022 14:57)  T(F): 98.6 (31 Dec 2022 20:55), Max: 98.8 (31 Dec 2022 14:57)  HR: 79 (31 Dec 2022 20:55) (75 - 98)  BP: 125/84 (31 Dec 2022 20:55) (109/73 - 125/84)  BP(mean): 82 (31 Dec 2022 08:00) (82 - 82)  RR: 16 (31 Dec 2022 20:55) (16 - 20)  SpO2: 98% (31 Dec 2022 20:55) (95% - 98%)    Parameters below as of 31 Dec 2022 20:55  Patient On (Oxygen Delivery Method): nasal cannula  O2 Flow (L/min): 2    I&O's Summary      PHYSICAL EXAM:  General: No acute distress BMI-  HEENT: EOMI, PERRL  Neck: Supple, [ ] JVD  Lungs: Equal air entry bilaterally; [ ] rales [ ] wheezing [ ] rhonchi  Heart: Regular rate and rhythm; [x ] murmur   2/6 [ x] systolic [ ] diastolic [ ] radiation[ ] rubs [ ]  gallops  Abdomen: Nontender, bowel sounds present  Extremities: No clubbing, cyanosis, [ ] edema [ ]Pulses  equal and intact  Nervous system:  Alert & Oriented X3, no focal deficits  Psychiatric: Normal affect  Skin: No rashes or lesions    LABS:  12-31    138  |  98  |  10  ----------------------------<  121<H>  4.2   |  33<H>  |  0.45<L>    Ca    9.5      31 Dec 2022 07:45    TPro  6.9  /  Alb  1.9<L>  /  TBili  0.2  /  DBili  x   /  AST  27  /  ALT  29  /  AlkPhos  126<H>  12-30    Creatinine Trend: 0.45<--, 0.40<--, 0.39<--, 0.56<--, 0.51<--, 0.47<--                        10.9   6.87  )-----------( 514      ( 31 Dec 2022 07:45 )             34.4

## 2022-12-31 NOTE — PROGRESS NOTE ADULT - ASSESSMENT
67 year old male from [facility] with past medical history of DM, HTN, HLD, AF on Eliquis, PVD, presents with c/o 8/10 sharp right sided abdominal pain with nausea, vomiting, and diarrhea.  Pt recently had cholecystectomy and ERCP with sphincterotomy 12/16/22. TIM Kim consulted, s/p  repeat ERCP 12/23/22 found to have bleeding from spincterotomy site. CBD stented and clips place to control bleeding. Admitted to ICU for septic shock, found positive variable coccibacilli bacteremia, and aerococcus UTI. Hospital course complicated by failed TOV 12/24 repeat TOV will be done today 12/29. Hypotension and tachycardia resolved. Continue with digoxin 250 mcg for rate control. TIM Villanueva and COLLIN Novoa following . COLLIN Nava consulted started on meropenem and vancomycin, repeat blood cx 12/25 NGTD.  Downgraded to medicine 12/25 .TTE not successful 12/27 . Midline infiltrated/removed, new peripheral line inserted.     Scheduled for JANINE 12/30, but noted positive for COVID 19 12/29. Cancelled by dept. diet resumed. will need to reschedule.   Pt will need 8 more days of quarantine for DC back to [facility]. (until 1/6/23).

## 2023-01-01 LAB
ANION GAP SERPL CALC-SCNC: 2 MMOL/L — LOW (ref 5–17)
ANION GAP SERPL CALC-SCNC: 8 MMOL/L — SIGNIFICANT CHANGE UP (ref 5–17)
BUN SERPL-MCNC: 10 MG/DL — SIGNIFICANT CHANGE UP (ref 7–18)
BUN SERPL-MCNC: 10 MG/DL — SIGNIFICANT CHANGE UP (ref 7–18)
CALCIUM SERPL-MCNC: 9.1 MG/DL — SIGNIFICANT CHANGE UP (ref 8.4–10.5)
CALCIUM SERPL-MCNC: 9.7 MG/DL — SIGNIFICANT CHANGE UP (ref 8.4–10.5)
CHLORIDE SERPL-SCNC: 95 MMOL/L — LOW (ref 96–108)
CHLORIDE SERPL-SCNC: 97 MMOL/L — SIGNIFICANT CHANGE UP (ref 96–108)
CO2 SERPL-SCNC: 29 MMOL/L — SIGNIFICANT CHANGE UP (ref 22–31)
CO2 SERPL-SCNC: 34 MMOL/L — HIGH (ref 22–31)
CREAT SERPL-MCNC: 0.44 MG/DL — LOW (ref 0.5–1.3)
CREAT SERPL-MCNC: 0.55 MG/DL — SIGNIFICANT CHANGE UP (ref 0.5–1.3)
EGFR: 108 ML/MIN/1.73M2 — SIGNIFICANT CHANGE UP
EGFR: 115 ML/MIN/1.73M2 — SIGNIFICANT CHANGE UP
GLUCOSE BLDC GLUCOMTR-MCNC: 107 MG/DL — HIGH (ref 70–99)
GLUCOSE BLDC GLUCOMTR-MCNC: 111 MG/DL — HIGH (ref 70–99)
GLUCOSE BLDC GLUCOMTR-MCNC: 115 MG/DL — HIGH (ref 70–99)
GLUCOSE BLDC GLUCOMTR-MCNC: 119 MG/DL — HIGH (ref 70–99)
GLUCOSE SERPL-MCNC: 112 MG/DL — HIGH (ref 70–99)
GLUCOSE SERPL-MCNC: 154 MG/DL — HIGH (ref 70–99)
HCT VFR BLD CALC: 34.3 % — LOW (ref 39–50)
HGB BLD-MCNC: 11 G/DL — LOW (ref 13–17)
MCHC RBC-ENTMCNC: 31 PG — SIGNIFICANT CHANGE UP (ref 27–34)
MCHC RBC-ENTMCNC: 32.1 GM/DL — SIGNIFICANT CHANGE UP (ref 32–36)
MCV RBC AUTO: 96.6 FL — SIGNIFICANT CHANGE UP (ref 80–100)
NRBC # BLD: 0 /100 WBCS — SIGNIFICANT CHANGE UP (ref 0–0)
PLATELET # BLD AUTO: 542 K/UL — HIGH (ref 150–400)
POTASSIUM SERPL-MCNC: 5.1 MMOL/L — SIGNIFICANT CHANGE UP (ref 3.5–5.3)
POTASSIUM SERPL-MCNC: 6.1 MMOL/L — HIGH (ref 3.5–5.3)
POTASSIUM SERPL-SCNC: 5.1 MMOL/L — SIGNIFICANT CHANGE UP (ref 3.5–5.3)
POTASSIUM SERPL-SCNC: 6.1 MMOL/L — HIGH (ref 3.5–5.3)
RBC # BLD: 3.55 M/UL — LOW (ref 4.2–5.8)
RBC # FLD: 15.8 % — HIGH (ref 10.3–14.5)
SODIUM SERPL-SCNC: 132 MMOL/L — LOW (ref 135–145)
SODIUM SERPL-SCNC: 133 MMOL/L — LOW (ref 135–145)
WBC # BLD: 5.77 K/UL — SIGNIFICANT CHANGE UP (ref 3.8–10.5)
WBC # FLD AUTO: 5.77 K/UL — SIGNIFICANT CHANGE UP (ref 3.8–10.5)

## 2023-01-01 RX ORDER — POLYETHYLENE GLYCOL 3350 17 G/17G
17 POWDER, FOR SOLUTION ORAL
Refills: 0 | Status: COMPLETED | OUTPATIENT
Start: 2023-01-01 | End: 2023-01-04

## 2023-01-01 RX ORDER — OXYCODONE AND ACETAMINOPHEN 5; 325 MG/1; MG/1
1 TABLET ORAL EVERY 4 HOURS
Refills: 0 | Status: DISCONTINUED | OUTPATIENT
Start: 2023-01-01 | End: 2023-01-06

## 2023-01-01 RX ADMIN — Medication 500 MILLIGRAM(S): at 17:37

## 2023-01-01 RX ADMIN — PANTOPRAZOLE SODIUM 40 MILLIGRAM(S): 20 TABLET, DELAYED RELEASE ORAL at 06:46

## 2023-01-01 RX ADMIN — FINASTERIDE 5 MILLIGRAM(S): 5 TABLET, FILM COATED ORAL at 11:59

## 2023-01-01 RX ADMIN — LIDOCAINE 1 PATCH: 4 CREAM TOPICAL at 01:51

## 2023-01-01 RX ADMIN — OXYCODONE AND ACETAMINOPHEN 1 TABLET(S): 5; 325 TABLET ORAL at 08:24

## 2023-01-01 RX ADMIN — OXYCODONE AND ACETAMINOPHEN 1 TABLET(S): 5; 325 TABLET ORAL at 02:57

## 2023-01-01 RX ADMIN — OXYCODONE AND ACETAMINOPHEN 1 TABLET(S): 5; 325 TABLET ORAL at 21:44

## 2023-01-01 RX ADMIN — Medication 500 MILLIGRAM(S): at 06:45

## 2023-01-01 RX ADMIN — OXYCODONE AND ACETAMINOPHEN 1 TABLET(S): 5; 325 TABLET ORAL at 17:37

## 2023-01-01 RX ADMIN — APIXABAN 5 MILLIGRAM(S): 2.5 TABLET, FILM COATED ORAL at 06:46

## 2023-01-01 RX ADMIN — OXYCODONE AND ACETAMINOPHEN 1 TABLET(S): 5; 325 TABLET ORAL at 09:01

## 2023-01-01 RX ADMIN — Medication 10 MILLIGRAM(S): at 21:44

## 2023-01-01 RX ADMIN — Medication 25 MILLIGRAM(S): at 17:37

## 2023-01-01 RX ADMIN — TAMSULOSIN HYDROCHLORIDE 0.4 MILLIGRAM(S): 0.4 CAPSULE ORAL at 21:44

## 2023-01-01 RX ADMIN — ATORVASTATIN CALCIUM 10 MILLIGRAM(S): 80 TABLET, FILM COATED ORAL at 21:44

## 2023-01-01 RX ADMIN — APIXABAN 5 MILLIGRAM(S): 2.5 TABLET, FILM COATED ORAL at 17:37

## 2023-01-01 RX ADMIN — OXYCODONE AND ACETAMINOPHEN 1 TABLET(S): 5; 325 TABLET ORAL at 03:47

## 2023-01-01 RX ADMIN — POLYETHYLENE GLYCOL 3350 17 GRAM(S): 17 POWDER, FOR SOLUTION ORAL at 11:59

## 2023-01-01 RX ADMIN — Medication 500 MILLIGRAM(S): at 21:44

## 2023-01-01 RX ADMIN — Medication 500 MILLIGRAM(S): at 06:46

## 2023-01-01 RX ADMIN — SENNA PLUS 2 TABLET(S): 8.6 TABLET ORAL at 21:44

## 2023-01-01 RX ADMIN — POLYETHYLENE GLYCOL 3350 17 GRAM(S): 17 POWDER, FOR SOLUTION ORAL at 17:38

## 2023-01-01 RX ADMIN — OXYCODONE AND ACETAMINOPHEN 1 TABLET(S): 5; 325 TABLET ORAL at 22:55

## 2023-01-01 RX ADMIN — OXYCODONE AND ACETAMINOPHEN 1 TABLET(S): 5; 325 TABLET ORAL at 18:01

## 2023-01-01 RX ADMIN — Medication 250 MICROGRAM(S): at 06:46

## 2023-01-01 NOTE — CHART NOTE - NSCHARTNOTEFT_GEN_A_CORE
EVENT: Pt reporting no BM X 3 days    BRIEF HPI: 67 year old male from [facility] with PMH of DM, HTN, HLD, AF on Eliquis, PVD, presents with c/o 8/10 sharp right sided abdominal pain with nausea, vomiting, and diarrhea.  Pt recently had cholecystectomy and ERCP with sphincterotomy 12/16/22. GI Judy consulted, s/p  repeat ERCP 12/23/22 found to have bleeding from sphincterotomy site. CBD stented and clips place to control bleeding. Admitted to ICU for septic shock, found positive variable coccobacilli bacteremia, and Aerococcus UTI. Hospital course complicated by failed TOV 12/24 repeat TOV will be done today 12/29. Hypotension and tachycardia resolved. Continue with digoxin 250 mcg for rate control. TIM Villanueva and COLLIN Novoa following . COLLIN Nava consulted started on meropenem and vancomycin, repeat blood cx 12/25 NGTD.  Downgraded to medicine 12/25 .TTE not successful 12/27 . Midline infiltrated/removed, new peripheral line inserted.    Scheduled for JANINE 12/30, but noted positive for COVID 19 12/29. Cancelled by dept. diet resumed. will need to reschedule. Pt will need 8 more days of quarantine for DC back to [facility]. (until 1/6/23).    Vital Signs Last 24 Hrs  T(C): 36.4 (01 Jan 2023 17:26), Max: 37 (31 Dec 2022 20:55)  T(F): 97.6 (01 Jan 2023 17:26), Max: 98.6 (31 Dec 2022 20:55)  HR: 111 (01 Jan 2023 17:26) (79 - 111)  BP: 118/76 (01 Jan 2023 17:26) (107/71 - 125/84)  BP(mean): --  RR: 18 (01 Jan 2023 17:26) (16 - 20)  SpO2: 91% (01 Jan 2023 17:26) (91% - 98%)    Parameters below as of 01 Jan 2023 17:26  Patient On (Oxygen Delivery Method): room air,Took off NC    PROBLEM: Constipation probably due to meds vs inactivity  PLAN:  1. Bisacodyl 5 ced GRAM(s) Oral at bedtime added  2. Cont polyethylene glycol 3350 17 Gram(s) Oral two times a day  3. Senna 2 Tablet(s) Oral 7 - 10)    FOLLOW UP: EVENT: Pt reporting no BM X 3 days    BRIEF HPI: 67 year old male from [facility] with PMH of DM, HTN, HLD, AF on Eliquis, PVD, presents with c/o 8/10 sharp right sided abdominal pain with nausea, vomiting, and diarrhea.  Pt recently had cholecystectomy and ERCP with sphincterotomy 12/16/22. GI Judy consulted, s/p  repeat ERCP 12/23/22 found to have bleeding from sphincterotomy site. CBD stented and clips place to control bleeding. Admitted to ICU for septic shock, found positive variable coccobacilli bacteremia, and Aerococcus UTI. Hospital course complicated by failed TOV 12/24 repeat TOV will be done today 12/29. Hypotension and tachycardia resolved. Continue with digoxin 250 mcg for rate control. TIM Villanueva and COLLIN Novoa following . COLLIN Novoa consulted started on meropenem and vancomycin, repeat blood cx 12/25 NGTD.  Downgraded to medicine 12/25 .TTE not successful 12/27 . Midline infiltrated/removed, new peripheral line inserted.    Scheduled for JANINE 12/30, but noted positive for COVID 19 12/29. Cancelled by dept. diet resumed. will need to reschedule. Pt will need 8 more days of quarantine for DC back to [facility]. (until 1/6/23).    Vital Signs Last 24 Hrs  T(C): 36.4 (01 Jan 2023 17:26), Max: 37 (31 Dec 2022 20:55)  T(F): 97.6 (01 Jan 2023 17:26), Max: 98.6 (31 Dec 2022 20:55)  HR: 111 (01 Jan 2023 17:26) (79 - 111)  BP: 118/76 (01 Jan 2023 17:26) (107/71 - 125/84)  BP(mean): --  RR: 18 (01 Jan 2023 17:26) (16 - 20)  SpO2: 91% (01 Jan 2023 17:26) (91% - 98%)    Parameters below as of 01 Jan 2023 17:26  Patient On (Oxygen Delivery Method): room air, Took off NC    PROBLEM: Constipation probably due to meds vs inactivity  PLAN:  1. Bisacodyl 10 ced GRAM(s) Oral once now and PRN  2. Cont polyethylene glycol 3350 17 Gram(s) Oral two times a day  3. Cont senna 2 Tablet(s) Oral at bedtime    FOLLOW UP: effectiveness of med

## 2023-01-01 NOTE — PROGRESS NOTE ADULT - SUBJECTIVE AND OBJECTIVE BOX
-Avoid wet to dry  -Bath 2-3 times a week maximum   -Lock in moisture with Vaseline, Eucerin, Aquaphor, eczema lotion without fragrances or dyes   -Avoid detergents, soaps with fragrances or dyes   -If you notice he's fussy, fever, crusting then alert physician      GI PROGRESS NOTE    Patient is a 67y old  Male who presents with a chief complaint of Vomiting     (14 Dec 2022 14:38)      HPI:  67 year old male, vaccinated, from [facility] with PMHx of DM, HTN, HLD, AF on eliquis, PVD, presents with vomiting. Patient states that last night he ate a burger, and started to vomit shortly after. Patient states he is unable to eat since then due to feeling nauseous and continues to vomit each time he tries to eat. Patient denies any recent sick contact.  Patient also denies any associated abdominal pain, fever, chills, SOB, chest pain, diarrhea, hematemesis or melena.     In the ED:  Afebrile, hemodynamically stable  No leukocytosis  Bili WNL, , ,   CT A/P showed: Cholecystectomy +Dilated CBD up to 1.7 cm abrupt cut off of the distal CBD and  mild pancreatic duct dilatation.      (08 Dec 2022 19:05)      GI HPI:  Resting in bed.  Continues to experience pain.  Tolerating PO diet.    ______________________________________________________________________  PMH/PSH:  PAST MEDICAL & SURGICAL HISTORY:  COPD (chronic obstructive pulmonary disease)      Diabetes      PVD (peripheral vascular disease)      Chronic atrial fibrillation      GERD (gastroesophageal reflux disease)      MDD (major depressive disorder)      BPH (benign prostatic hyperplasia)        ______________________________________________________________________  MEDS:  MEDICATIONS  (STANDING):  atorvastatin 10 milliGRAM(s) Oral at bedtime  baclofen 5 milliGRAM(s) Oral every 12 hours  dextrose 5%. 1000 milliLiter(s) (100 mL/Hr) IV Continuous <Continuous>  dextrose 5%. 1000 milliLiter(s) (50 mL/Hr) IV Continuous <Continuous>  dextrose 50% Injectable 25 Gram(s) IV Push once  dextrose 50% Injectable 12.5 Gram(s) IV Push once  dextrose 50% Injectable 25 Gram(s) IV Push once  famotidine    Tablet 20 milliGRAM(s) Oral two times a day  glucagon  Injectable 1 milliGRAM(s) IntraMuscular once  indomethacin Suppository 100 milliGRAM(s) Rectal once  insulin lispro (ADMELOG) corrective regimen sliding scale   SubCutaneous three times a day before meals  metoprolol tartrate 25 milliGRAM(s) Oral two times a day  NIFEdipine XL 60 milliGRAM(s) Oral daily  oxycodone    5 mG/acetaminophen 325 mG 1 Tablet(s) Oral every 4 hours  polyethylene glycol 3350 17 Gram(s) Oral daily  pregabalin 100 milliGRAM(s) Oral three times a day  senna 2 Tablet(s) Oral at bedtime  sodium chloride 0.9%. 1000 milliLiter(s) (80 mL/Hr) IV Continuous <Continuous>  tamsulosin 0.4 milliGRAM(s) Oral at bedtime    MEDICATIONS  (PRN):  dextrose Oral Gel 15 Gram(s) Oral once PRN Blood Glucose LESS THAN 70 milliGRAM(s)/deciliter  ondansetron    Tablet 4 milliGRAM(s) Oral every 6 hours PRN Nausea    ______________________________________________________________________  ALL:   Allergies    morphine (Unknown)  penicillin (Unknown)  shellfish (Unknown)    Intolerances      ______________________________________________________________________  SH: Social History:  denies any tobacco or alcohol use (08 Dec 2022 19:05)    ______________________________________________________________________  FH:  FAMILY HISTORY:    ______________________________________________________________________  ROS:    CONSTITUTIONAL:  No weight loss, fever, chills, weakness or fatigue.    HEENT:  Eyes:  No visual loss, blurred vision, double vision or yellow sclerae. Ears, Nose, Throat:  No hearing loss, sneezing, congestion, runny nose or sore throat.    SKIN:  No rash or itching.    CARDIOVASCULAR:  No chest pain, chest pressure or chest discomfort. No palpitations or edema.    RESPIRATORY:  No shortness of breath, cough or sputum.    GASTROINTESTINAL:  SEE HPI    GENITOURINARY:  No dysuria, hematuria, urinary frequency    NEUROLOGICAL:  No headache, dizziness, syncope, paralysis, ataxia, numbness or tingling in the extremities. No change in bowel or bladder control.    MUSCULOSKELETAL:  No muscle, back pain, joint pain or stiffness.    HEMATOLOGIC:  No anemia, bleeding or bruising.    LYMPHATICS:  No enlarged nodes. No history of splenectomy.    PSYCHIATRIC:  No history of depression or anxiety.    ENDOCRINOLOGIC:  No reports of sweating, cold or heat intolerance. No polyuria or polydipsia.    ALLERGIES:  No history of asthma, hives, eczema or rhinitis.  ______________________________________________________________________  PHYSICAL EXAM:  T(C): 37.2 (12-15-22 @ 13:00), Max: 37.2 (12-15-22 @ 13:00)  HR: 82 (12-15-22 @ 13:00)  BP: 119/62 (12-15-22 @ 13:00)  RR: 18 (12-15-22 @ 13:00)  SpO2: 93% (12-15-22 @ 13:00)  Wt(kg): --      GEN: NAD   CVS- S1 S2  ABD: soft nontender, non distended, bowel sounds+  LUNGS: clear to auscultation  NEURO:  AAO x 3  Extremities: no cyanosis, no calf tenderness, no edema, no clubbing      ______________________________________________________________________  LABS:                        14.0   6.94  )-----------( 150      ( 14 Dec 2022 06:10 )             41.8     12-14    134<L>  |  94<L>  |  11  ----------------------------<  120<H>  3.8   |  32<H>  |  0.52    Ca    8.8      14 Dec 2022 06:10    TPro  7.6  /  Alb  1.9<L>  /  TBili  0.7  /  DBili  x   /  AST  110<H>  /  ALT  71<H>  /  AlkPhos  255<H>  12-14    LIVER FUNCTIONS - ( 14 Dec 2022 06:10 )  Alb: 1.9 g/dL / Pro: 7.6 g/dL / ALK PHOS: 255 U/L / ALT: 71 U/L DA / AST: 110 U/L / GGT: x           ______________________________________________________________________  IMAGING:    < from: MR MRCP w/wo IV Cont (12.12.22 @ 16:03) >  IMPRESSION:  1.  Intrahepatic and extrahepatic biliary dilatation with tapering of the   common bile duct at the ampulla suggesting ampullary stenosis. Decreased   signal at the level of the ampulla may represent a small amount of   sludge/debris.  2.  Diffuse hepatic steatosis with focal hepatic steatosis within the   LEFT lobe.    < end of copied text >   ADVANCED ENDOSCOPY PROGRESS NOTE    Patient is a 67y old  Male who presents with a chief complaint of Vomiting     (14 Dec 2022 14:38)      HPI:  67 year old male, vaccinated, from [facility] with PMHx of DM, HTN, HLD, AF on eliquis, PVD, presents with vomiting. Patient states that last night he ate a burger, and started to vomit shortly after. Patient states he is unable to eat since then due to feeling nauseous and continues to vomit each time he tries to eat. Patient denies any recent sick contact.  Patient also denies any associated abdominal pain, fever, chills, SOB, chest pain, diarrhea, hematemesis or melena.     In the ED:  Afebrile, hemodynamically stable  No leukocytosis  Bili WNL, , ,   CT A/P showed: Cholecystectomy +Dilated CBD up to 1.7 cm abrupt cut off of the distal CBD and  mild pancreatic duct dilatation.      (08 Dec 2022 19:05)      GI HPI:  Resting in bed.  Continues to experience pain.  Tolerating PO diet.    ______________________________________________________________________  PMH/PSH:  PAST MEDICAL & SURGICAL HISTORY:  COPD (chronic obstructive pulmonary disease)      Diabetes      PVD (peripheral vascular disease)      Chronic atrial fibrillation      GERD (gastroesophageal reflux disease)      MDD (major depressive disorder)      BPH (benign prostatic hyperplasia)        ______________________________________________________________________  MEDS:  MEDICATIONS  (STANDING):  atorvastatin 10 milliGRAM(s) Oral at bedtime  baclofen 5 milliGRAM(s) Oral every 12 hours  dextrose 5%. 1000 milliLiter(s) (100 mL/Hr) IV Continuous <Continuous>  dextrose 5%. 1000 milliLiter(s) (50 mL/Hr) IV Continuous <Continuous>  dextrose 50% Injectable 25 Gram(s) IV Push once  dextrose 50% Injectable 12.5 Gram(s) IV Push once  dextrose 50% Injectable 25 Gram(s) IV Push once  famotidine    Tablet 20 milliGRAM(s) Oral two times a day  glucagon  Injectable 1 milliGRAM(s) IntraMuscular once  indomethacin Suppository 100 milliGRAM(s) Rectal once  insulin lispro (ADMELOG) corrective regimen sliding scale   SubCutaneous three times a day before meals  metoprolol tartrate 25 milliGRAM(s) Oral two times a day  NIFEdipine XL 60 milliGRAM(s) Oral daily  oxycodone    5 mG/acetaminophen 325 mG 1 Tablet(s) Oral every 4 hours  polyethylene glycol 3350 17 Gram(s) Oral daily  pregabalin 100 milliGRAM(s) Oral three times a day  senna 2 Tablet(s) Oral at bedtime  sodium chloride 0.9%. 1000 milliLiter(s) (80 mL/Hr) IV Continuous <Continuous>  tamsulosin 0.4 milliGRAM(s) Oral at bedtime    MEDICATIONS  (PRN):  dextrose Oral Gel 15 Gram(s) Oral once PRN Blood Glucose LESS THAN 70 milliGRAM(s)/deciliter  ondansetron    Tablet 4 milliGRAM(s) Oral every 6 hours PRN Nausea    ______________________________________________________________________  ALL:   Allergies    morphine (Unknown)  penicillin (Unknown)  shellfish (Unknown)    Intolerances      ______________________________________________________________________  SH: Social History:  denies any tobacco or alcohol use (08 Dec 2022 19:05)    ______________________________________________________________________  FH:  FAMILY HISTORY:    ______________________________________________________________________  ROS:    CONSTITUTIONAL:  No weight loss, fever, chills, weakness or fatigue.    HEENT:  Eyes:  No visual loss, blurred vision, double vision or yellow sclerae. Ears, Nose, Throat:  No hearing loss, sneezing, congestion, runny nose or sore throat.    SKIN:  No rash or itching.    CARDIOVASCULAR:  No chest pain, chest pressure or chest discomfort. No palpitations or edema.    RESPIRATORY:  No shortness of breath, cough or sputum.    GASTROINTESTINAL:  SEE HPI    GENITOURINARY:  No dysuria, hematuria, urinary frequency    NEUROLOGICAL:  No headache, dizziness, syncope, paralysis, ataxia, numbness or tingling in the extremities. No change in bowel or bladder control.    MUSCULOSKELETAL:  No muscle, back pain, joint pain or stiffness.    HEMATOLOGIC:  No anemia, bleeding or bruising.    LYMPHATICS:  No enlarged nodes. No history of splenectomy.    PSYCHIATRIC:  No history of depression or anxiety.    ENDOCRINOLOGIC:  No reports of sweating, cold or heat intolerance. No polyuria or polydipsia.    ALLERGIES:  No history of asthma, hives, eczema or rhinitis.  ______________________________________________________________________  PHYSICAL EXAM:  T(C): 37.2 (12-15-22 @ 13:00), Max: 37.2 (12-15-22 @ 13:00)  HR: 82 (12-15-22 @ 13:00)  BP: 119/62 (12-15-22 @ 13:00)  RR: 18 (12-15-22 @ 13:00)  SpO2: 93% (12-15-22 @ 13:00)  Wt(kg): --      GEN: NAD   CVS- S1 S2  ABD: soft nontender, non distended, bowel sounds+  LUNGS: clear to auscultation  NEURO:  AAO x 3  Extremities: no cyanosis, no calf tenderness, no edema, no clubbing      ______________________________________________________________________  LABS:                        14.0   6.94  )-----------( 150      ( 14 Dec 2022 06:10 )             41.8     12-14    134<L>  |  94<L>  |  11  ----------------------------<  120<H>  3.8   |  32<H>  |  0.52    Ca    8.8      14 Dec 2022 06:10    TPro  7.6  /  Alb  1.9<L>  /  TBili  0.7  /  DBili  x   /  AST  110<H>  /  ALT  71<H>  /  AlkPhos  255<H>  12-14    LIVER FUNCTIONS - ( 14 Dec 2022 06:10 )  Alb: 1.9 g/dL / Pro: 7.6 g/dL / ALK PHOS: 255 U/L / ALT: 71 U/L DA / AST: 110 U/L / GGT: x           ______________________________________________________________________  IMAGING:    < from: MR MRCP w/wo IV Cont (12.12.22 @ 16:03) >  IMPRESSION:  1.  Intrahepatic and extrahepatic biliary dilatation with tapering of the   common bile duct at the ampulla suggesting ampullary stenosis. Decreased   signal at the level of the ampulla may represent a small amount of   sludge/debris.  2.  Diffuse hepatic steatosis with focal hepatic steatosis within the   LEFT lobe.    < end of copied text >

## 2023-01-01 NOTE — PROGRESS NOTE ADULT - PROBLEM SELECTOR PLAN 7
- Patient is from [facility]  - repeat blood cultures 12/25 NGTD   -need Ceftin and Flagyl x 5 days total per ID  - JANINE planned  12/30, but cancelled due to COVID + (12/29).    - will likely return back to [facility] once optimized. will need quarantine per facility. til 1/6/23 x 8 more days.

## 2023-01-01 NOTE — PROGRESS NOTE ADULT - SUBJECTIVE AND OBJECTIVE BOX
[   ] ICU                                          [   ] CCU                                      [  X ] Medical Floor      Patient is a 67 year old male with abdominal pain. GI consulted to evaluate.        Patient is a 67 year old male, with past medical history significant for DM, HTN, HLD, AF on Eliquis, PVD, presents with c/o vomiting and right sided abdominal pain. Patient recently had cholecystectomy and ERCP with sphincterotomy prior to discharge. patient presented with c/o sharp right sided abdominal pain 8/10 persistent with nausea, vomiting and melena. Patient had repeat ERCP found to have bleeding from sphincterotomy site. CBD stended and clips place to control bleeding. No hematemesis, hematochezia, chest pain, SOB, cough, hematuria or dysuria reported.    Patient appears comfortable. No new complaints reported, No abdominal pain, N/V, hematemesis, hematochezia, melena, fever, chills, chest pain, SOB, cough or diarrhea reported.       PAIN MANAGEMENT:  Pain Scale:                 /10  Pain Location:         PAST MEDICAL HISTORY  COPD (chronic obstructive pulmonary disease)    Diabetes    PVD (peripheral vascular disease)    Chronic atrial fibrillation    GERD (gastroesophageal reflux disease)    MDD (major depressive disorder)    BPH (benign prostatic hyperplasia)        PAST SURGICAL HISTORY  Cholecystectomy    Allergies    morphine (Unknown)  penicillin (Unknown)  shellfish (Unknown)    Intolerances  None       SOCIAL HISTORY  Advanced Directives:       [ X ] Full Code       [  ] DNR  Marital Status:         [  ] M      [ X ] S      [  ] D       [  ] W  Children:       [ X ] Yes      [  ] No  Occupation:        [  ] Employed       [  X] Unemployed       [  ] Retired  Diet:       [ X ] Regular       [  ] PEG feeding          [  ] NG tube feeding  Drug Use:           [ X ] Patient denied          [  ] Yes  Alcohol:           [ X ] No             [  ] Yes (socially)         [  ] Yes (chronic)  Tobacco:           [  ] Yes           [ X ] No      FAMILY HISTORY  [ X ] Heart Disease            [ X ] Diabetes             [ X ] HTN             [  ] Colon Cancer             [  ] Stomach Cancer              [  ] Pancreatic Cancer        VITALS  Vital Signs Last 24 Hrs  T(C): 36.3 (01 Jan 2023 06:31), Max: 37.1 (31 Dec 2022 14:57)  T(F): 97.4 (01 Jan 2023 06:31), Max: 98.8 (31 Dec 2022 14:57)  HR: 92 (01 Jan 2023 06:31) (75 - 98)  BP: 107/71 (01 Jan 2023 06:31) (107/71 - 125/84)   RR: 18 (01 Jan 2023 06:31) (16 - 18)  SpO2: 94% (01 Jan 2023 06:31) (94% - 98%)    Parameters below as of 01 Jan 2023 06:31  Patient On (Oxygen Delivery Method): nasal cannula  O2 Flow (L/min): 2       MEDICATIONS  (STANDING):  apixaban 5 milliGRAM(s) Oral every 12 hours  atorvastatin 10 milliGRAM(s) Oral at bedtime  cefuroxime   Tablet 500 milliGRAM(s) Oral every 12 hours  digoxin     Tablet 250 MICROGram(s) Oral daily  finasteride 5 milliGRAM(s) Oral daily  insulin lispro (ADMELOG) corrective regimen sliding scale   SubCutaneous three times a day before meals  insulin lispro (ADMELOG) corrective regimen sliding scale   SubCutaneous at bedtime  lidocaine   4% Patch 1 Patch Transdermal daily  metoprolol tartrate 25 milliGRAM(s) Oral two times a day  metroNIDAZOLE    Tablet 500 milliGRAM(s) Oral every 8 hours  pantoprazole    Tablet 40 milliGRAM(s) Oral before breakfast  polyethylene glycol 3350 17 Gram(s) Oral two times a day  senna 2 Tablet(s) Oral at bedtime  tamsulosin 0.4 milliGRAM(s) Oral at bedtime    MEDICATIONS  (PRN):  acetaminophen     Tablet .. 650 milliGRAM(s) Oral every 6 hours PRN Moderate Pain (4 - 6)  aluminum hydroxide/magnesium hydroxide/simethicone Suspension 30 milliLiter(s) Oral every 4 hours PRN Dyspepsia  baclofen 5 milliGRAM(s) Oral every 12 hours PRN Musculoskeletal Pain  melatonin 3 milliGRAM(s) Oral at bedtime PRN Insomnia  ondansetron Injectable 4 milliGRAM(s) IV Push every 8 hours PRN Nausea and/or Vomiting  oxycodone    5 mG/acetaminophen 325 mG 1 Tablet(s) Oral every 4 hours PRN Severe Pain (7 - 10)  sodium chloride 0.9% lock flush 10 milliLiter(s) IV Push every 1 hour PRN Pre/post blood products, medications, blood draw, and to maintain line patency                            11.0   5.77  )-----------( 542      ( 01 Jan 2023 05:05 )             34.3       01-01    132<L>  |  95<L>  |  10  ----------------------------<  154<H>  5.1   |  29  |  0.55    Ca    9.1      01 Jan 2023 12:16

## 2023-01-01 NOTE — CHART NOTE - NSCHARTNOTEFT_GEN_A_CORE
RN informed me that pt having difficulty voiding this AM.  Trying in urinal in bed, nursing staff unable to assist pt to sit up or stand up and try.    Hx of urinary retention requiring yuan, failing trial of void in past but has been voiding successfully in past few days.  Bladder scan showed greater than 400 cc.  Pt straight cath'd by nursing staff with 500cc clear yellow urine drained.    1) Urinary retention: On flomax/finesteride. Suspect component of body positioning, would try to sit patient up when voiding if able to.  If pt does not void in 6 hrs, check bladder scan, and if still retaining then will likely place Yuan.  Will also give some additional bowel regimen as any constipation will exacerbate urinary retention.    Olman Smith NP

## 2023-01-01 NOTE — PROGRESS NOTE ADULT - SUBJECTIVE AND OBJECTIVE BOX
PATIENT SEEN AND EXAMINED ON :- 1/1/23  DATE OF SERVICE:  1/1/23           Interim events noted,Labs ,Radiological studies and Cardiology tests reviewed .       HOSPITAL COURSE: HPI:  Patient is a 67 year old male, vaccinated, from [facility] with PMHx of DM, HTN, HLD, AF on Eliquis, PVD, presents with c/o vomiting and right sided abdominal pain. patient states yesterday after lunch he had a sharp right sided abdominal pain 8/10 persistent with nausea and vomiting. He also endorsed chills and rigors, unsure if he spiked a fever. He denies any changes in bowel habits, chest pain, shortness of breath or palpitations. Patient had two episodes of melena in ED. He was discharged yesterday from hospital. He had choledocholithiasis,  CT A/P showed: Cholecystectomy with dilated CBD up to 1.7 cm abrupt cut off of the distal CBD and  mild pancreatic duct dilatation. 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 He underwent  ERCP on 12/16 biliary sludge removal with biliary sphincterotomy and balloon extraction. He was discharged after symptom improvement and out patient gastric emptying study.     In ED:   Vitals: BP: 81/59mmHg  HR:  118  RR: 97% RA   WBC 14.7 k  elevated LFT, lactate   s/p  Rocephin 1LNS and zofran  (22 Dec 2022 12:00)      INTERIM EVENTS:Patient seen at bedside ,interim events noted.      PMH -reviewed admission note, no change since admission  HEART FAILURE: Acute[ ]Chronic[ ] Systolic[ ] Diastolic[ ] Combined Systolic and Diastolic[ ]  CAD[ ] CABG[ ] PCI[ ]  DEVICES[ ] PPM[ ] ICD[ ] ILR[ ]  ATRIAL FIBRILLATION[ ] Paroxysmal[ ] Permanent[ ] CHADS2-[  ]  DILCIA[ ] CKD1[ ] CKD2[ ] CKD3[ ] CKD4[ ] ESRD[ ]  COPD[ ] HTN[ ]   DM[ ] Type1[ ] Type 2[ ]   CVA[ ] Paresis[ ]    AMBULATION: Assisted[ ] Cane/walker[ ] Independent[ ]    MEDICATIONS  (STANDING):  apixaban 5 milliGRAM(s) Oral every 12 hours  atorvastatin 10 milliGRAM(s) Oral at bedtime  bisacodyl 10 milliGRAM(s) Oral once  cefuroxime   Tablet 500 milliGRAM(s) Oral every 12 hours  digoxin     Tablet 250 MICROGram(s) Oral daily  finasteride 5 milliGRAM(s) Oral daily  insulin lispro (ADMELOG) corrective regimen sliding scale   SubCutaneous three times a day before meals  insulin lispro (ADMELOG) corrective regimen sliding scale   SubCutaneous at bedtime  lidocaine   4% Patch 1 Patch Transdermal daily  metoprolol tartrate 25 milliGRAM(s) Oral two times a day  metroNIDAZOLE    Tablet 500 milliGRAM(s) Oral every 8 hours  pantoprazole    Tablet 40 milliGRAM(s) Oral before breakfast  polyethylene glycol 3350 17 Gram(s) Oral two times a day  senna 2 Tablet(s) Oral at bedtime  tamsulosin 0.4 milliGRAM(s) Oral at bedtime    MEDICATIONS  (PRN):  acetaminophen     Tablet .. 650 milliGRAM(s) Oral every 6 hours PRN Moderate Pain (4 - 6)  aluminum hydroxide/magnesium hydroxide/simethicone Suspension 30 milliLiter(s) Oral every 4 hours PRN Dyspepsia  baclofen 5 milliGRAM(s) Oral every 12 hours PRN Musculoskeletal Pain  melatonin 3 milliGRAM(s) Oral at bedtime PRN Insomnia  ondansetron Injectable 4 milliGRAM(s) IV Push every 8 hours PRN Nausea and/or Vomiting  oxycodone    5 mG/acetaminophen 325 mG 1 Tablet(s) Oral every 4 hours PRN Severe Pain (7 - 10)  sodium chloride 0.9% lock flush 10 milliLiter(s) IV Push every 1 hour PRN Pre/post blood products, medications, blood draw, and to maintain line patency            REVIEW OF SYSTEMS:  Constitutional: [ ] fever, [ ]weight loss,  [ ]fatigue [ ]weight gain  Eyes: [ ] visual changes  Respiratory: [ ]shortness of breath;  [ ] cough, [ ]wheezing, [ ]chills, [ ]hemoptysis  Cardiovascular: [ ] chest pain, [ ]palpitations, [ ]dizziness,  [ ]leg swelling[ ]orthopnea[ ]PND  Gastrointestinal: [ ] abdominal pain, [ ]nausea, [ ]vomiting,  [ ]diarrhea [ ]Constipation [ ]Melena  Genitourinary: [ ] dysuria, [ ] hematuria [ ]Cardoza  Neurologic: [ ] headaches [ ] tremors[ ]weakness [ ]Paralysis Right[ ] Left[ ]  Skin: [ ] itching, [ ]burning, [ ] rashes  Endocrine: [ ] heat or cold intolerance  Musculoskeletal: [ ] joint pain or swelling; [ ] muscle, back, or extremity pain  Psychiatric: [ ] depression, [ ]anxiety, [ ]mood swings, or [ ]difficulty sleeping  Hematologic: [ ] easy bruising, [ ] bleeding gums    [ ] All remaining systems negative except as per above.   [ ]Unable to obtain.  [x] No change in ROS since admission      Vital Signs Last 24 Hrs  T(C): 36.4 (01 Jan 2023 17:26), Max: 36.4 (01 Jan 2023 17:26)  T(F): 97.6 (01 Jan 2023 17:26), Max: 97.6 (01 Jan 2023 17:26)  HR: 111 (01 Jan 2023 17:26) (92 - 111)  BP: 118/76 (01 Jan 2023 17:26) (107/71 - 124/82)  BP(mean): --  RR: 18 (01 Jan 2023 17:26) (18 - 20)  SpO2: 91% (01 Jan 2023 17:26) (91% - 95%)    Parameters below as of 01 Jan 2023 17:26  Patient On (Oxygen Delivery Method): room air,Took off NC      I&O's Summary    01 Jan 2023 07:01  -  01 Jan 2023 21:38  --------------------------------------------------------  IN: 0 mL / OUT: 500 mL / NET: -500 mL        PHYSICAL EXAM:  General: No acute distress BMI-  HEENT: EOMI, PERRL  Neck: Supple, [ ] JVD  Lungs: Equal air entry bilaterally; [ ] rales [ ] wheezing [ ] rhonchi  Heart: Regular rate and rhythm; [x ] murmur   2/6 [ x] systolic [ ] diastolic [ ] radiation[ ] rubs [ ]  gallops  Abdomen: Nontender, bowel sounds present  Extremities: No clubbing, cyanosis, [ ] edema [ ]Pulses  equal and intact  Nervous system:  Alert & Oriented X3, no focal deficits  Psychiatric: Normal affect  Skin: No rashes or lesions    LABS:  01-01    132<L>  |  95<L>  |  10  ----------------------------<  154<H>  5.1   |  29  |  0.55    Ca    9.1      01 Jan 2023 12:16      Creatinine Trend: 0.55<--, 0.44<--, 0.45<--, 0.40<--, 0.39<--, 0.56<--                        11.0   5.77  )-----------( 542      ( 01 Jan 2023 05:05 )             34.3

## 2023-01-01 NOTE — PROGRESS NOTE ADULT - ASSESSMENT
67 year old male from [facility] with past medical history of DM, HTN, HLD, AF on Eliquis, PVD, presents with c/o 8/10 sharp right sided abdominal pain with nausea, vomiting, and diarrhea.  Pt recently had cholecystectomy and ERCP with sphincterotomy 12/16/22. TIM Kim consulted, s/p  repeat ERCP 12/23/22 found to have bleeding from spincterotomy site. CBD stented and clips place to control bleeding. Admitted to ICU for septic shock, found positive variable coccibacilli bacteremia, and aerococcus UTI. Hospital course complicated by failed TOV 12/24 repeat TOV will be done today 12/29. Hypotension and tachycardia resolved. Continue with digoxin 250 mcg for rate control. TIM Villanueva and COLLIN Novoa following . COLLIN Nava consulted started on meropenem and vancomycin, repeat blood cx 12/25 NGTD.  Downgraded to medicine 12/25 .TTE not successful 12/27 . Midline infiltrated/removed, new peripheral line inserted.     Scheduled for JANINE 12/30, but noted positive for COVID 19 12/29. Cancelled by dept. diet resumed. will need to reschedule.   Pt will need 8 more days of quarantine for DC back to [facility]. (until 1/6/23).

## 2023-01-02 DIAGNOSIS — K57.90 DIVERTICULOSIS OF INTESTINE, PART UNSPECIFIED, WITHOUT PERFORATION OR ABSCESS WITHOUT BLEEDING: ICD-10-CM

## 2023-01-02 DIAGNOSIS — F11.20 OPIOID DEPENDENCE, UNCOMPLICATED: ICD-10-CM

## 2023-01-02 DIAGNOSIS — U07.1 COVID-19: ICD-10-CM

## 2023-01-02 LAB
ALBUMIN SERPL ELPH-MCNC: 2.5 G/DL — LOW (ref 3.5–5)
ALP SERPL-CCNC: 133 U/L — HIGH (ref 40–120)
ALT FLD-CCNC: 29 U/L DA — SIGNIFICANT CHANGE UP (ref 10–60)
ANION GAP SERPL CALC-SCNC: 10 MMOL/L — SIGNIFICANT CHANGE UP (ref 5–17)
AST SERPL-CCNC: 33 U/L — SIGNIFICANT CHANGE UP (ref 10–40)
BILIRUB SERPL-MCNC: 0.4 MG/DL — SIGNIFICANT CHANGE UP (ref 0.2–1.2)
BUN SERPL-MCNC: 12 MG/DL — SIGNIFICANT CHANGE UP (ref 7–18)
CALCIUM SERPL-MCNC: 9.5 MG/DL — SIGNIFICANT CHANGE UP (ref 8.4–10.5)
CHLORIDE SERPL-SCNC: 94 MMOL/L — LOW (ref 96–108)
CO2 SERPL-SCNC: 31 MMOL/L — SIGNIFICANT CHANGE UP (ref 22–31)
CREAT SERPL-MCNC: 0.63 MG/DL — SIGNIFICANT CHANGE UP (ref 0.5–1.3)
EGFR: 104 ML/MIN/1.73M2 — SIGNIFICANT CHANGE UP
GLUCOSE BLDC GLUCOMTR-MCNC: 120 MG/DL — HIGH (ref 70–99)
GLUCOSE BLDC GLUCOMTR-MCNC: 130 MG/DL — HIGH (ref 70–99)
GLUCOSE BLDC GLUCOMTR-MCNC: 140 MG/DL — HIGH (ref 70–99)
GLUCOSE BLDC GLUCOMTR-MCNC: 150 MG/DL — HIGH (ref 70–99)
GLUCOSE SERPL-MCNC: 132 MG/DL — HIGH (ref 70–99)
HCT VFR BLD CALC: 36.8 % — LOW (ref 39–50)
HGB BLD-MCNC: 11.7 G/DL — LOW (ref 13–17)
MCHC RBC-ENTMCNC: 31.1 PG — SIGNIFICANT CHANGE UP (ref 27–34)
MCHC RBC-ENTMCNC: 31.8 GM/DL — LOW (ref 32–36)
MCV RBC AUTO: 97.9 FL — SIGNIFICANT CHANGE UP (ref 80–100)
NRBC # BLD: 0 /100 WBCS — SIGNIFICANT CHANGE UP (ref 0–0)
PLATELET # BLD AUTO: 668 K/UL — HIGH (ref 150–400)
POTASSIUM SERPL-MCNC: 4.2 MMOL/L — SIGNIFICANT CHANGE UP (ref 3.5–5.3)
POTASSIUM SERPL-SCNC: 4.2 MMOL/L — SIGNIFICANT CHANGE UP (ref 3.5–5.3)
PROT SERPL-MCNC: 8.3 G/DL — SIGNIFICANT CHANGE UP (ref 6–8.3)
RBC # BLD: 3.76 M/UL — LOW (ref 4.2–5.8)
RBC # FLD: 15.7 % — HIGH (ref 10.3–14.5)
SODIUM SERPL-SCNC: 135 MMOL/L — SIGNIFICANT CHANGE UP (ref 135–145)
WBC # BLD: 6 K/UL — SIGNIFICANT CHANGE UP (ref 3.8–10.5)
WBC # FLD AUTO: 6 K/UL — SIGNIFICANT CHANGE UP (ref 3.8–10.5)

## 2023-01-02 PROCEDURE — 99232 SBSQ HOSP IP/OBS MODERATE 35: CPT

## 2023-01-02 RX ORDER — TAMSULOSIN HYDROCHLORIDE 0.4 MG/1
0.8 CAPSULE ORAL AT BEDTIME
Refills: 0 | Status: DISCONTINUED | OUTPATIENT
Start: 2023-01-02 | End: 2023-01-06

## 2023-01-02 RX ORDER — TAMSULOSIN HYDROCHLORIDE 0.4 MG/1
CAPSULE ORAL
Refills: 0 | Status: DISCONTINUED | OUTPATIENT
Start: 2023-01-02 | End: 2023-01-06

## 2023-01-02 RX ORDER — TAMSULOSIN HYDROCHLORIDE 0.4 MG/1
0.8 CAPSULE ORAL ONCE
Refills: 0 | Status: COMPLETED | OUTPATIENT
Start: 2023-01-02 | End: 2023-01-02

## 2023-01-02 RX ORDER — LACTULOSE 10 G/15ML
20 SOLUTION ORAL ONCE
Refills: 0 | Status: COMPLETED | OUTPATIENT
Start: 2023-01-02 | End: 2023-01-02

## 2023-01-02 RX ADMIN — POLYETHYLENE GLYCOL 3350 17 GRAM(S): 17 POWDER, FOR SOLUTION ORAL at 06:32

## 2023-01-02 RX ADMIN — Medication 25 MILLIGRAM(S): at 06:32

## 2023-01-02 RX ADMIN — Medication 500 MILLIGRAM(S): at 17:40

## 2023-01-02 RX ADMIN — TAMSULOSIN HYDROCHLORIDE 0.8 MILLIGRAM(S): 0.4 CAPSULE ORAL at 21:47

## 2023-01-02 RX ADMIN — Medication 500 MILLIGRAM(S): at 06:30

## 2023-01-02 RX ADMIN — OXYCODONE AND ACETAMINOPHEN 1 TABLET(S): 5; 325 TABLET ORAL at 17:40

## 2023-01-02 RX ADMIN — FINASTERIDE 5 MILLIGRAM(S): 5 TABLET, FILM COATED ORAL at 13:12

## 2023-01-02 RX ADMIN — OXYCODONE AND ACETAMINOPHEN 1 TABLET(S): 5; 325 TABLET ORAL at 04:04

## 2023-01-02 RX ADMIN — Medication 250 MICROGRAM(S): at 06:30

## 2023-01-02 RX ADMIN — SENNA PLUS 2 TABLET(S): 8.6 TABLET ORAL at 21:32

## 2023-01-02 RX ADMIN — Medication 500 MILLIGRAM(S): at 06:29

## 2023-01-02 RX ADMIN — ATORVASTATIN CALCIUM 10 MILLIGRAM(S): 80 TABLET, FILM COATED ORAL at 21:32

## 2023-01-02 RX ADMIN — Medication 25 MILLIGRAM(S): at 17:41

## 2023-01-02 RX ADMIN — OXYCODONE AND ACETAMINOPHEN 1 TABLET(S): 5; 325 TABLET ORAL at 21:46

## 2023-01-02 RX ADMIN — OXYCODONE AND ACETAMINOPHEN 1 TABLET(S): 5; 325 TABLET ORAL at 09:33

## 2023-01-02 RX ADMIN — LACTULOSE 20 GRAM(S): 10 SOLUTION ORAL at 13:11

## 2023-01-02 RX ADMIN — Medication 3 MILLIGRAM(S): at 21:31

## 2023-01-02 RX ADMIN — Medication 100 MILLIGRAM(S): at 21:34

## 2023-01-02 RX ADMIN — TAMSULOSIN HYDROCHLORIDE 0.8 MILLIGRAM(S): 0.4 CAPSULE ORAL at 06:33

## 2023-01-02 RX ADMIN — Medication 100 MILLIGRAM(S): at 17:25

## 2023-01-02 RX ADMIN — Medication 500 MILLIGRAM(S): at 21:32

## 2023-01-02 RX ADMIN — PANTOPRAZOLE SODIUM 40 MILLIGRAM(S): 20 TABLET, DELAYED RELEASE ORAL at 06:29

## 2023-01-02 RX ADMIN — OXYCODONE AND ACETAMINOPHEN 1 TABLET(S): 5; 325 TABLET ORAL at 02:15

## 2023-01-02 RX ADMIN — OXYCODONE AND ACETAMINOPHEN 1 TABLET(S): 5; 325 TABLET ORAL at 18:22

## 2023-01-02 RX ADMIN — OXYCODONE AND ACETAMINOPHEN 1 TABLET(S): 5; 325 TABLET ORAL at 09:03

## 2023-01-02 RX ADMIN — APIXABAN 5 MILLIGRAM(S): 2.5 TABLET, FILM COATED ORAL at 06:29

## 2023-01-02 RX ADMIN — POLYETHYLENE GLYCOL 3350 17 GRAM(S): 17 POWDER, FOR SOLUTION ORAL at 17:40

## 2023-01-02 RX ADMIN — OXYCODONE AND ACETAMINOPHEN 1 TABLET(S): 5; 325 TABLET ORAL at 22:46

## 2023-01-02 RX ADMIN — Medication 500 MILLIGRAM(S): at 13:11

## 2023-01-02 RX ADMIN — APIXABAN 5 MILLIGRAM(S): 2.5 TABLET, FILM COATED ORAL at 17:40

## 2023-01-02 NOTE — PROGRESS NOTE ADULT - PROBLEM SELECTOR PLAN 2
Please send the letter to the patient with the following.       Here are your results for your recent labs.  Your kidney functions are mildly decreased and unsure if this is your baseline as we do not have any previous labs. I would recommend to continue to stay hydrated and avoid daily NSAIDs (aleve, aspirin or Advil).   Your hemoglobin is mildly decreased. In reviewing your labs, your vitamin B12 is mildly decreased. Can you please follow up to further discuss treatment options.   Please call or message me if you have questions or concerns.      -s/p meropenem    - US Abdomen: Biliary dilatation and hepatic steatosis  - CT A/P: dilation of the common bile duct and main pancreatic duct again noted.. New RLL opacity  - repeat ERCP on 12/23 performed, found with bleeding to sphincterotomy site, 2 clips and 1 stent placed in common bile duct to facilitate tamponade the bleeding and facilitate drainage.   - RUQ pain, continue with percocet  - GI Golyan following

## 2023-01-02 NOTE — PROGRESS NOTE ADULT - SUBJECTIVE AND OBJECTIVE BOX
[   ] ICU                                          [   ] CCU                                      [  X ] Medical Floor      Patient is a 67 year old male with abdominal pain. GI consulted to evaluate.        Patient is a 67 year old male, with past medical history significant for DM, HTN, HLD, AF on Eliquis, PVD, presents with c/o vomiting and right sided abdominal pain. Patient recently had cholecystectomy and ERCP with sphincterotomy prior to discharge. patient presented with c/o sharp right sided abdominal pain 8/10 persistent with nausea, vomiting and melena. Patient had repeat ERCP found to have bleeding from sphincterotomy site. CBD stended and clips place to control bleeding. No hematemesis, hematochezia, chest pain, SOB, cough, hematuria or dysuria reported.    Patient appears comfortable. No new complaints reported, No abdominal pain, N/V, hematemesis, hematochezia, melena, fever, chills, chest pain, SOB, cough or diarrhea reported.       PAIN MANAGEMENT:  Pain Scale:                 /10  Pain Location:         PAST MEDICAL HISTORY  COPD (chronic obstructive pulmonary disease)    Diabetes    PVD (peripheral vascular disease)    Chronic atrial fibrillation    GERD (gastroesophageal reflux disease)    MDD (major depressive disorder)    BPH (benign prostatic hyperplasia)        PAST SURGICAL HISTORY  Cholecystectomy    Allergies    morphine (Unknown)  penicillin (Unknown)  shellfish (Unknown)    Intolerances  None       SOCIAL HISTORY  Advanced Directives:       [ X ] Full Code       [  ] DNR  Marital Status:         [  ] M      [ X ] S      [  ] D       [  ] W  Children:       [ X ] Yes      [  ] No  Occupation:        [  ] Employed       [  X] Unemployed       [  ] Retired  Diet:       [ X ] Regular       [  ] PEG feeding          [  ] NG tube feeding  Drug Use:           [ X ] Patient denied          [  ] Yes  Alcohol:           [ X ] No             [  ] Yes (socially)         [  ] Yes (chronic)  Tobacco:           [  ] Yes           [ X ] No      FAMILY HISTORY  [ X ] Heart Disease            [ X ] Diabetes             [ X ] HTN             [  ] Colon Cancer             [  ] Stomach Cancer              [  ] Pancreatic Cancer    VITALS  Vital Signs Last 24 Hrs  T(C): 36.5 (02 Jan 2023 05:19), Max: 36.5 (02 Jan 2023 05:19)  T(F): 97.7 (02 Jan 2023 05:19), Max: 97.7 (02 Jan 2023 05:19)  HR: 114 (02 Jan 2023 05:19) (88 - 114)  BP: 131/77 (02 Jan 2023 05:19) (116/79 - 131/77)   RR: 18 (02 Jan 2023 05:19) (18 - 18)  SpO2: 91% (02 Jan 2023 05:19) (91% - 95%)  Parameters below as of 02 Jan 2023 05:19  Patient On (Oxygen Delivery Method): nasal cannula  O2 Flow (L/min): 2       MEDICATIONS  (STANDING):  apixaban 5 milliGRAM(s) Oral every 12 hours  atorvastatin 10 milliGRAM(s) Oral at bedtime  cefuroxime   Tablet 500 milliGRAM(s) Oral every 12 hours  digoxin     Tablet 250 MICROGram(s) Oral daily  finasteride 5 milliGRAM(s) Oral daily  insulin lispro (ADMELOG) corrective regimen sliding scale   SubCutaneous three times a day before meals  insulin lispro (ADMELOG) corrective regimen sliding scale   SubCutaneous at bedtime  lidocaine   4% Patch 1 Patch Transdermal daily  metoprolol tartrate 25 milliGRAM(s) Oral two times a day  metroNIDAZOLE    Tablet 500 milliGRAM(s) Oral every 8 hours  pantoprazole    Tablet 40 milliGRAM(s) Oral before breakfast  polyethylene glycol 3350 17 Gram(s) Oral two times a day  senna 2 Tablet(s) Oral at bedtime  tamsulosin      tamsulosin 0.8 milliGRAM(s) Oral at bedtime    MEDICATIONS  (PRN):  acetaminophen     Tablet .. 650 milliGRAM(s) Oral every 6 hours PRN Moderate Pain (4 - 6)  aluminum hydroxide/magnesium hydroxide/simethicone Suspension 30 milliLiter(s) Oral every 4 hours PRN Dyspepsia  baclofen 5 milliGRAM(s) Oral every 12 hours PRN Musculoskeletal Pain  melatonin 3 milliGRAM(s) Oral at bedtime PRN Insomnia  ondansetron Injectable 4 milliGRAM(s) IV Push every 8 hours PRN Nausea and/or Vomiting  oxycodone    5 mG/acetaminophen 325 mG 1 Tablet(s) Oral every 4 hours PRN Severe Pain (7 - 10)  sodium chloride 0.9% lock flush 10 milliLiter(s) IV Push every 1 hour PRN Pre/post blood products, medications, blood draw, and to maintain line patency                            11.7   6.00  )-----------( 668      ( 02 Jan 2023 06:08 )             36.8       01-02    135  |  94<L>  |  12  ----------------------------<  132<H>  4.2   |  31  |  0.63    Ca    9.5      02 Jan 2023 06:08    TPro  8.3  /  Alb  2.5<L>  /  TBili  0.4  /  DBili  x   /  AST  33  /  ALT  29  /  AlkPhos  133<H>  01-02

## 2023-01-02 NOTE — PROGRESS NOTE ADULT - PROBLEM SELECTOR PLAN 1
Pt with acute right sided abdominal pain which is somatic and visceral in nature due to cholangitis. + biliary dilatation on US of abdomen. CTAP demonstrates- No bowel obstruction or grossly thickened bowel wall. Colonic diverticulosis without evidence for diverticulitis. Moderate amount of stool in the rectum and distal sigmoid colon.  Hx of ERCP on 12/23, found to have bleeding to sphincterotomy site, 2 clips and 1 stent placed in common bile duct to facilitate tamponade the bleeding and facilitate drainage. Pt also with chronic leg pain, which is neuropathic in nature d/t diabetic neuropathy. Pt is opioid dependent on Percocet 5-325mg 6 tabs/ day PRN, and lyrica 100mg PO TID. + COVID 12/29.   High risk medications reviewed. Avoid polypharmacy. Avoid IV opioids. Avoid NSAIDs and benzodiazepines. Non-pharmacological sleep aides initiated. Non-opioid medications and non-pharmacological pain management measures initiated.  Opioid pain recommendations   - Continue Percocet 5 mg/325 mg PO q4h PRN severe pain (home dose). Monitor for sedation/ respiratory depression.   Non-opioid pain recommendations   - Start Lyrica 100mg po q 8 hours (home dose per Istop). Monitor renal function.   - Discontinue lidoderm patch.  - Continue baclofen 5mg PO q12h PRN muscle spasms (home dose).   Bowel Regimen  - Continue Miralax 17G PO BID per primary team.   - Continue Senna 2 tablets at bedtime for constipation  Mild pain   - Non-pharmacological pain treatment recommendations  - Warm/ Cool packs PRN   - Repositioning, imagery, relaxation, distraction.  - Physical therapy OOB if no contraindications   Recommendations discussed with primary team and RN.

## 2023-01-02 NOTE — PROGRESS NOTE ADULT - PROBLEM SELECTOR PLAN 7
- Patient is from [facility]  - repeat blood cultures 12/25 NGTD   -need Ceftin and Flagyl x 5 days total per ID  - JANINE planned  12/30, but cancelled due to COVID + (12/29).    - will likely return back to [facility] once optimized. will need quarantine per facility. til 1/6/23 x 8 more days.

## 2023-01-02 NOTE — PROGRESS NOTE ADULT - ASSESSMENT
Recurrent Cholangitis  Bacteremia - possibly a contaminant, repeat blood cultures are negative  Fevers - resolved  Leukocytosis - normalized  COVID - 19 infection      Plan - Continue ceftin 500mgs po bid and Flagyl 500mgs po TID both for 1 more day

## 2023-01-02 NOTE — PROGRESS NOTE ADULT - ASSESSMENT
Confidential Drug Utilization Report  Search Terms: Zeyad Rodríguez, 1955Search Date: 12/30/2022 12:21:15 PM  The Drug Utilization Report below displays all of the controlled substance prescriptions, if any, that your patient has filled in the last twelve months. The information displayed on this report is compiled from pharmacy submissions to the Department, and accurately reflects the information as submitted by the pharmacies.    This report was requested by: Alice Velazquez | Reference #: [id number]    You have not added a MARY number. Keeping your MARY number(s) up to date on the My MARY # page will enable the separation of your prescriptions from others in the search results.    Others' Prescriptions  Patient Name: Zeyad RodríguezBirth Date: 1955  Address: [place], [place] [address]Mgu: Male  Rx Written	Rx Dispensed	Drug	Quantity	Days Supply	Prescriber Name  12/01/2022	12/01/2022	oxycodone-acetaminophen 5-325 mg tab	30	5	Donnie Fletcher  Prescriber Mary # [id number]  Payment Method Insurance  [facility] [employer] [id number]  11/22/2022	11/24/2022	pregabalin 100 mg capsule	90	30	Raphael Lassiter MD  Prescriber Mary # [id number]  Payment Method Insurance  [facility] [employer] [id number]  11/15/2022	11/15/2022	oxycodone-acetaminophen 5-325 mg tab	90	15	Herminia Steele  Prescriber Mary # [id number]  Payment Method Insurance  [facility] [employer] [id number]  11/09/2022	11/10/2022	pregabalin 100 mg capsule	45	15	Donnie Fletcher  Prescriber Mary # [id number]  Payment Method Insurance  [facility] [employer] [id number]  11/01/2022	11/01/2022	oxycodone-acetaminophen 5-325 mg tab	90	15	Raphael Lassiter MD  Prescriber Mary # [id number]  Payment Method Insurance  [facility] [employer] [id number]  10/18/2022	10/18/2022	oxycodone-acetaminophen 5-325 mg tab	60	10	Herminia Steele  Prescriber Mary # [id number]  Payment Method Insurance  [facility] [employer] [id number]  10/18/2022	10/18/2022	pregabalin 100 mg capsule	60	20	Herminia Steele  Prescriber Mary # [id number]  Payment Method Insurance  [facility] [employer] [id number]  09/22/2022	09/23/2022	oxycodone-acetaminophen 5-325 mg tab	90	15	Herminia Steele  Prescriber Mary # [id number]  Payment Method Insurance  [facility] [employer] [id number]  09/19/2022	09/20/2022	pregabalin 100 mg capsule	90	30	Brooke Tipton MD  Prescriber Mary # [id number]  Payment Method Insurance  [facility] [employer] [id number]  09/06/2022	09/06/2022	oxycodone-acetaminophen 5-325 mg tab	90	15	Herminia Steele  Prescriber Mary # [id number]  Payment Method Insurance  [facility] [employer] [id number]  08/25/2022	08/27/2022	oxycodone-acetaminophen 5-325 mg tab	60	10	AvHerminia abel  Prescriber Mary # [id number]  Payment Method Insurance  [facility] [employer] [id number]  08/23/2022	08/23/2022	oxycodone-acetaminophen 5-325 mg tab	30	5	Herminia Steele  Prescriber Mary # [id number]  Payment Method Insurance  [facility] [employer] [id number]  08/18/2022	08/18/2022	pregabalin 100 mg capsule	90	30	SravaniRaphael crow MD  Prescriber Mary # [id number]  Payment Method Insurance  [facility] [employer] [id number]  08/10/2022	08/10/2022	pregabalin 100 mg capsule	30	10	Herminia Steele  Prescriber Mary # [id number]  Payment Method Insurance  [facility] [employer] [id number]  08/02/2022	08/02/2022	oxycodone-acetaminophen 5-325 mg tab	120	20	SravaniRaphael crow MD  Prescriber Mary # [id number]  Payment Method Insurance  [facility] [employer] [id number]  07/29/2022	07/29/2022	pregabalin 100 mg capsule	30	10	Herminia Steele  Prescriber Mary # [id number]  Payment Method Insurance  [facility] [employer] [id number]  07/19/2022	07/19/2022	oxycodone-acetaminophen 5-325 mg tab	90	15	SravaniRaphael crow MD  Prescriber Mary # [id number]  Payment Method Insurance  [facility] [employer] [id number]  07/11/2022	07/11/2022	pregabalin 100 mg capsule	60	20	Brooke Tipton MD  Prescriber Mary # [id number]  Payment Method Insurance  [facility] [employer] [id number]  07/09/2022	07/09/2022	oxycodone-acetaminophen 5-325 mg tab	40	6	Brooke Tipton MD  Prescriber Mary # [id number]  Payment Method Insurance  [facility] [employer] [id number]  06/30/2022	06/30/2022	oxycodone-acetaminophen 5-325 mg tab	45	8	Donnie Fletcher  Prescriber Mary # [id number]  Payment Method Insurance  [facility] [employer] [id number]  06/24/2022	06/24/2022	pregabalin 100 mg capsule	45	15	Donnie Fletcher  Prescriber Mary # [id number]  Payment Method Insurance  [facility] [employer] [id number]  06/21/2022	06/21/2022	oxycodone-acetaminophen 5-325 mg tab	45	15	Donnie Fletcher  Prescriber Mary # [id number]  Payment Method Insurance  [facility] [employer] [id number]  06/08/2022	06/08/2022	pregabalin 100 mg capsule	45	15	Donnie Fletcher  Prescriber Mary # [id number]  Payment Method Insurance  [facility] [employer] [id number]  06/07/2022	06/07/2022	oxycodone-acetaminophen 5-325 mg tab	90	15	SravaniRaphael crow MD  Prescriber Mary # [id number]  Payment Method Insurance  [facility] [employer] [id number]  05/26/2022	05/26/2022	oxycodone-acetaminophen 5-325 mg tab	60	10	Donnie Fletcher  Prescriber Mary # [id number]  Payment Method Insurance  [facility] [employer] [id number]  05/03/2022	05/11/2022	oxycodone-acetaminophen 5-325 mg tab	90	15	SravaniRaphael crow MD  Prescriber Mary # [id number]  Payment Method Insurance  [facility] [employer] [id number]  05/10/2022	05/10/2022	pregabalin 100 mg capsule	90	30	SravaniRaphael crow MD  Prescriber Mary # [id number]  Payment Method Insurance  [facility] [employer] [id number]  05/04/2022	05/05/2022	oxycodone-acetaminophen 5-325 mg tab	45	8	Donnie Fletcher  Prescriber Mary # [id number]  Payment Method Insurance  [facility] [employer] [id number]  04/28/2022	04/28/2022	oxycodone-acetaminophen 5-325 mg tab	30	5	Donnie Fletcher  Prescriber Mary # [id number]  Payment Method Insurance  [facility] [employer] [id number]  04/27/2022	04/27/2022	pregabalin 100 mg capsule	45	15	Donnie Fletcher  Prescriber Mary # [id number]  Payment Method Insurance  [facility] [employer] [id number]  04/12/2022	04/12/2022	oxycodone-acetaminophen 5-325 mg tab	90	15	SravaniRaphael ragsdale MD  Prescriber Mary # [id number]  Payment Method Insurance  [facility] [employer] [id number]  04/12/2022	04/12/2022	pregabalin 100 mg capsule	45	15	SravaniRaphael crow MD  Prescriber Mary # [id number]  Payment Method Insurance  [facility] [employer] [id number]  03/17/2022	03/27/2022	oxycodone-acetaminophen 5-325 mg tab	90	15	SravaniRaphael crow MD  Prescriber Mary # [id number]  Payment Method Other  [facility] [employer] [id number]  03/16/2022	03/19/2022	oxycodone-acetaminophen 5-325 mg tab	60	10	Donnie Fletcher  Prescriber Mary # [id number]  Payment Method Other  [facility] [employer] [id number]  03/05/2022	03/06/2022	oxycodone-acetaminophen 5-325 mg tab	60	10	SravaniRaphael crow MD  Prescriber Mary # [id number]  Payment Method Other  [facility] [employer] [id number]  03/06/2022	03/06/2022	pregabalin 100 mg capsule	15	5	Ramirez Martinez DMD  Prescriber Mary # [id number]  Payment Method Other  [facility] [employer] [id number]  02/23/2022	02/24/2022	oxycodone-acetaminophen 5-325 mg tab	45	8	Donnie Fletcher  Prescriber Mary # [id number]  Payment Method Other  [facility] [employer] [id number]  02/21/2022	02/21/2022	pregabalin 100 mg capsule	45	15	Donnie Fletcher  Prescriber Mary # [id number]  Payment Method Other  Dispens[facility] [employer] [id number]  02/17/2022	02/18/2022	oxycodone-acetaminophen 5-325 mg tab	30	5	Donnie Fletcher  Prescriber Mary # [id number]  Payment Method Other  [facility] [employer] [id number]  02/08/2022	02/08/2022	oxycodone-acetaminophen 5-325 mg tab	60	10	Donnie Fletcher  Prescriber Mary # [id number]  Payment Method Other  [facility]pens[facility] [employer] [id number]  01/24/2022	01/24/2022	oxycodone-acetaminophen 5-325 mg tab	30	5	Donnie Fletcher  Prescriber Mary # [id number]  Payment Method Other  Dispens[facility] [employer] [id number]  12/17/2021	01/22/2022	pregabalin 100 mg capsule	45	15	Donnie Fletcher  Prescriber Mary # [id number]  Payment Method Other  Dispenser [employer] [id number]  01/13/2022	01/14/2022	oxycodone-acetaminophen 5-325 mg tab	60	10	Raphael Lassiter MD  Prescriber Mary # [id number]  Payment Method Other  Dispens[facility] [employer] [id number]  01/03/2022	01/03/2022	oxycodone-acetaminophen 5-325 mg tab	45	8	oDnnie Fletcher  Prescriber Mray # [id number]  Payment Method Other  [facility] [employer] [id number]  * - Drugs marked with an asterisk are compound drugs. If the compound drug is made up of more than one controlled substance, then each controlled substance will be a separate row in the table.

## 2023-01-02 NOTE — PROGRESS NOTE ADULT - ASSESSMENT
67 year old male from [facility] with past medical history of DM, HTN, HLD, AF on Eliquis, PVD, presents with c/o 8/10 sharp right sided abdominal pain with nausea, vomiting, and diarrhea.  Pt recently had cholecystectomy and ERCP with sphincterotomy 12/16/22. TIM Kim consulted, s/p  repeat ERCP 12/23/22 found to have bleeding from spincterotomy site. CBD stented and clips place to control bleeding. Admitted to ICU for septic shock, found positive variable coccibacilli bacteremia, and aerococcus UTI. Hospital course complicated by failed TOV 12/24 repeat TOV will be done today 12/29. Hypotension and tachycardia resolved. Continue with digoxin 250 mcg for rate control. TIM Villanueva and COLLIN Novao following . COLLIN Nava consulted started on meropenem and vancomycin, repeat blood cx 12/25 NGTD.  Downgraded to medicine 12/25 .TTE not successful 12/27 . Midline infiltrated/removed, new peripheral line inserted.     Scheduled for JANINE 12/30, but noted positive for COVID 19 12/29. Cancelled by dept. diet resumed. will need to reschedule.   Pt will need 8 more days of quarantine for DC back to [facility]. (until 1/6/23).

## 2023-01-02 NOTE — PROGRESS NOTE ADULT - SUBJECTIVE AND OBJECTIVE BOX
Source of information: IRENE MALHOTRA, Chart review  Patient language: English  : n/a    HPI:  Patient is a 67 year old male, vaccinated, from [facility] with PMHx of DM, HTN, HLD, AF on Eliquis, PVD, presents with c/o vomiting and right sided abdominal pain. patient states yesterday after lunch he had a sharp right sided abdominal pain 8/10 persistent with nausea and vomiting. He also endorsed chills and rigors, unsure if he spiked a fever. He denies any changes in bowel habits, chest pain, shortness of breath or palpitations. Patient had two episodes of melena in ED. He was discharged yesterday from hospital. He had choledocholithiasis,  CT A/P showed: Cholecystectomy with dilated CBD up to 1.7 cm abrupt cut off of the distal CBD and  mild pancreatic duct dilatation. 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 He underwent  ERCP on 12/16 biliary sludge removal with biliary sphincterotomy and balloon extraction. He was discharged after symptom improvement and out patient gastric emptying study.     In ED:   Vitals: BP: 81/59mmHg  HR:  118  RR: 97% RA   WBC 14.7 k  elevated LFT, lactate   s/p  Rocephin 1LNS and zofran  (22 Dec 2022 12:00)    CTAP demonstrates- No bowel obstruction or grossly thickened bowel wall. Colonic diverticulosis without evidence for diverticulitis. Moderate amount of stool in the rectum and distal sigmoid colon.. Appendix not visualized. No secondary signs for acute appendicitis.  Dilatation of the common bile duct and main pancreatic duct again noted.. Please see recent abdominal MR dated 12/12/2022. If clinically indicated, endoscopic ultrasound may be pursued to rule out a small obstructive ampullary tumor or pancreatic head mass.  New airspace opacifications in the right lower lung zone for which clinical correlation with pneumonia is recommended.    US of abdomen demonstrates- Biliary dilatation in this patient with prior cholecystectomy. 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 Nonvisualized spleen.    Dx with cholangitis. Hx of ERCP on 12/23, found to have bleeding to sphincterotomy site, 2 clips and 1 stent placed in common bile duct to facilitate tamponade the bleeding and facilitate drainage. Pain consulted for abdominal pain and opioid dependence on 12/30. Pt on Percocet 5-325mg 6 tabs/ day PRN, and lyrica 100mg PO TID per istop review.   Pt seen and examined at bedside. Pt laying in bed. Airborne/ Contact precautions maintained, + COVID on 12/29. Pt reports b/l foot pain, left> right, chronic rating PAIN SCORE:  10/10 and not tolerable SCALE USED: (1-10 VNRS). Pain is not adequately managed on current pain regimen. Pt describes pain as constant, burning, radiating throughout b/l feet, alleviated by chronic pain medications lyrica and percocet, exacerbated by movement and palpation. Denies abdominal pain today. Pt tolerating PO diet. Pt denies lethargy, chest pain, SOB, nausea, vomiting and constipation. Reports hx constipation, last BM 1/2. + urinary/ bowel incontinence. + frequent productive cough with yellow sputum. Patient stated goal for pain control: to be able to take deep breaths, get out of bed to chair with tolerable pain control.     PAST MEDICAL & SURGICAL HISTORY:  COPD (chronic obstructive pulmonary disease)      Diabetes      PVD (peripheral vascular disease)      Chronic atrial fibrillation      GERD (gastroesophageal reflux disease)      MDD (major depressive disorder)      BPH (benign prostatic hyperplasia)          FAMILY HISTORY:      Social History:   [X]Denies ETOH use, illicit drug use, and smoking     Allergies    morphine (Unknown)  penicillin (Unknown)  shellfish (Unknown)    MEDICATIONS  (STANDING):  apixaban 5 milliGRAM(s) Oral every 12 hours  atorvastatin 10 milliGRAM(s) Oral at bedtime  cefuroxime   Tablet 500 milliGRAM(s) Oral every 12 hours  digoxin     Tablet 250 MICROGram(s) Oral daily  finasteride 5 milliGRAM(s) Oral daily  insulin lispro (ADMELOG) corrective regimen sliding scale   SubCutaneous three times a day before meals  insulin lispro (ADMELOG) corrective regimen sliding scale   SubCutaneous at bedtime  lidocaine   4% Patch 1 Patch Transdermal daily  metoprolol tartrate 25 milliGRAM(s) Oral two times a day  metroNIDAZOLE    Tablet 500 milliGRAM(s) Oral every 8 hours  pantoprazole    Tablet 40 milliGRAM(s) Oral before breakfast  polyethylene glycol 3350 17 Gram(s) Oral two times a day  senna 2 Tablet(s) Oral at bedtime  tamsulosin      tamsulosin 0.8 milliGRAM(s) Oral at bedtime    MEDICATIONS  (PRN):  acetaminophen     Tablet .. 650 milliGRAM(s) Oral every 6 hours PRN Moderate Pain (4 - 6)  aluminum hydroxide/magnesium hydroxide/simethicone Suspension 30 milliLiter(s) Oral every 4 hours PRN Dyspepsia  baclofen 5 milliGRAM(s) Oral every 12 hours PRN Musculoskeletal Pain  melatonin 3 milliGRAM(s) Oral at bedtime PRN Insomnia  ondansetron Injectable 4 milliGRAM(s) IV Push every 8 hours PRN Nausea and/or Vomiting  oxycodone    5 mG/acetaminophen 325 mG 1 Tablet(s) Oral every 4 hours PRN Severe Pain (7 - 10)  sodium chloride 0.9% lock flush 10 milliLiter(s) IV Push every 1 hour PRN Pre/post blood products, medications, blood draw, and to maintain line patency      Vital Signs Last 24 Hrs  T(C): 36.5 (02 Jan 2023 05:19), Max: 36.5 (02 Jan 2023 05:19)  T(F): 97.7 (02 Jan 2023 05:19), Max: 97.7 (02 Jan 2023 05:19)  HR: 114 (02 Jan 2023 05:19) (88 - 114)  BP: 131/77 (02 Jan 2023 05:19) (116/79 - 131/77)  BP(mean): --  RR: 18 (02 Jan 2023 05:19) (18 - 18)  SpO2: 91% (02 Jan 2023 05:19) (91% - 95%)    Parameters below as of 02 Jan 2023 05:19  Patient On (Oxygen Delivery Method): nasal cannula  O2 Flow (L/min): 2    COVID-19 PCR: Detected (29 Dec 2022 23:00)  COVID-19 PCR: NotDetec (20 Dec 2022 10:35)  COVID-19 PCR: NotDetec (13 Dec 2022 11:25)  COVID-19 PCR: NotDetec (08 Dec 2022 12:13)    LABS: Reviewed                          11.7   6.00  )-----------( 668      ( 02 Jan 2023 06:08 )             36.8     01-02    135  |  94<L>  |  12  ----------------------------<  132<H>  4.2   |  31  |  0.63    Ca    9.5      02 Jan 2023 06:08    TPro  8.3  /  Alb  2.5<L>  /  TBili  0.4  /  DBili  x   /  AST  33  /  ALT  29  /  AlkPhos  133<H>  01-02      LIVER FUNCTIONS - ( 02 Jan 2023 06:08 )  Alb: 2.5 g/dL / Pro: 8.3 g/dL / ALK PHOS: 133 U/L / ALT: 29 U/L DA / AST: 33 U/L / GGT: x             CAPILLARY BLOOD GLUCOSE      POCT Blood Glucose.: 150 mg/dL (02 Jan 2023 11:17)  POCT Blood Glucose.: 130 mg/dL (02 Jan 2023 07:40)  POCT Blood Glucose.: 107 mg/dL (01 Jan 2023 21:22)  POCT Blood Glucose.: 119 mg/dL (01 Jan 2023 17:18)    COVID-19 PCR: Detected (29 Dec 2022 23:00)  COVID-19 PCR: NotDetec (20 Dec 2022 10:35)  COVID-19 PCR: NotDetec (13 Dec 2022 11:25)  COVID-19 PCR: NotDetec (08 Dec 2022 12:13)    Radiology: Reviewed  < from: US Abdomen Complete (US Abdomen Complete .) (12.22.22 @ 09:48) >    ACC: [id number] EXAM:  US ABDOMEN COMPLETE                          PROCEDURE DATE:  12/22/2022          INTERPRETATION:  CLINICAL INFORMATION: Abdominal pain and vomiting.    COMPARISON: CT and MRI dated 12/8/2022 and 12/12/2022.    TECHNIQUE: Sonography of the abdomen. Technically difficult and limited   study due to patient's body habitus.    FINDINGS:  Liver: Increased echogenicity. No evidence of a focal hepatic mass   lesion. Normal portal and hepatic venous flow.  Bile ducts: Dilated. Common bile duct measures 1.5 cm.  Gallbladder: Cholecystectomy.  Pancreas: Visualized portions are within normal limits.  Spleen: Not visualized.  Right kidney: 8.6 cm. No hydronephrosis.  Left kidney: 10 cm. No hydronephrosis.  Ascites: None.  Aorta andIVC: Visualized portions are within normal limits.    IMPRESSION:  Biliary dilatation in this patient with prior cholecystectomy. 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  Hepatic steatosis.  Nonvisualized spleen.        --- End of Report ---            MAGNO SARMIENTO MD; Attending Radiologist  This document has been electronically signed. Dec 22 2022 10:23AM    < end of copied text >    < from: CT Abdomen and Pelvis No Cont (12.22.22 @ 10:37) >  ACC: [id number] EXAM:  CT ABDOMEN AND PELVIS                          PROCEDURE DATE:  12/22/2022          INTERPRETATION:  CLINICAL INFORMATION: Abdominal pain    COMPARISON: Abdominal CT dated 12/8/2022    CONTRAST/COMPLICATIONS:  IV Contrast: NONE  Oral Contrast: NONE  Complications: None reported at time of study completion    PROCEDURE:  CT of the Abdomen and Pelvis was performed.  Sagittal and coronal reformats were performed.    FINDINGS: Evaluation of the abdomen and pelvis is limited by lack of IV   contrast.  LOWER CHEST: New airspace opacifications in the right lower lung zone for   which clinical correlation with pneumonia is recommended.    LIVER: Stable small hypodense area in the left hepatic lobe. Please see   recent abdominalMR dated 12/12/2022 for further characterization.  BILE DUCTS: Dilatation of the common bile duct is again noted.  GALLBLADDER: Stable cholecystectomy clips.  SPLEEN: Within normal limits.  PANCREAS: The pancreatic parenchyma appears unremarkable. Mild dilatation   of the main pancreatic duct in the pancreatic head is again noted.  ADRENALS: Within normal limits.  KIDNEYS/URETERS: Small hypodense lesion in the left kidney, likely   representing a cyst. The right kidney appears unremarkable. No evidence   for a calculus in the kidneys or ureters. No hydronephrosis.    BLADDER: Underdistended.  REPRODUCTIVE ORGANS: The prostate and seminal vesicles appear grossly   unremarkable.    BOWEL: No bowel obstruction or grossly thickened bowel wall. Colonic   diverticulosis without evidence for diverticulitis. Moderate amount of   stool in the rectum and distal sigmoid colon.. Appendix not visualized.   No secondary signs for acute appendicitis.  PERITONEUM: No free air or ascites.  VESSELS: Calcified atherosclerotic disease.  RETROPERITONEUM/LYMPH NODES: No lymphadenopathy.  ABDOMINAL WALL: Small fat-containing ventral hernia.  BONES: Degenerative spondylosis.    IMPRESSION: No bowel obstruction or grossly thickened bowel wall. Colonic   diverticulosis without evidence for diverticulitis. Moderate amount of   stool in the rectum and distal sigmoid colon.. Appendix not visualized.   No secondary signs for acute appendicitis.    Dilatation of the common bile duct and main pancreatic duct again noted..   Please see recent abdominal MR dated 12/12/2022. If clinically indicated,   endoscopic ultrasound may be pursued to rule out a small obstructive   ampullary tumor or pancreatic head mass.    New airspace opacifications in the right lower lung zone for which   clinical correlation with pneumonia is recommended.    --- End of Report ---            CARIDAD WILLIAM MD; Attending Radiologist  This document has been electronically signed. Dec 22 2022 11:23AM    < end of copied text >    < from: Xray Chest 1 View- PORTABLE-Routine (Xray Chest 1 View- PORTABLE-Routine in AM.) (12.25.22 @ 09:41) >    ACC: [id number] EXAM:  XR CHEST PORTABLE ROUTINE 1V                          PROCEDURE DATE:  12/25/2022          INTERPRETATION:  Portable chest radiograph    CLINICAL INFORMATION: ICU follow-up.    TECHNIQUE:  Portable  AP chest radiograph.    COMPARISON: 12/22/2022 chest x-ray .    FINDINGS:  CATHETERS AND TUBES: None    PULMONARY: Increasing RIGHT lower lobe diffuse airspace disease.  Remaining lung segments grossly clear..   No pneumothorax.    HEART/VASCULAR: The heart and mediastinum size and configuration are   within normal limits.    BONES: Visualized osseous structures are intact.    IMPRESSION:   Increasing RIGHT lower zone diffuse airspace   disease/consolidation...    --- End of Report ---            FREDDY QUINTANILLA MD; Attending Radiologist  This document has been electronically signed. Dec 26 2022 11:12AM    < end of copied text >      ORT Score -   Family Hx of substance abuse	Female	      Male  Alcohol 	                                           1                     3  Illegal drugs	                                   2                     3  Rx drugs                                           4 	                  4  Personal Hx of substance abuse		  Alcohol 	                                          3	                  3  Illegal drugs                                     4	                  4  Rx drugs                                            5 	                  5  Age between 16- 45 years	           1                     1  hx preadolescent sexual abuse	   3 	                  0  Psychological disease		  ADD, OCD, bipolar, schizophrenia   2	          2  Depression                                           1 	          1  Total: 1    a score of 3 or lower indicates low risk for opioid abuse		  a score of 4-7 indicates moderate risk for opioid abuse		  a score of 8 or higher indicates high risk for opioid abuse    REVIEW OF SYSTEMS:  CONSTITUTIONAL: No fever + fatigue  HEENT:  + difficulty hearing, no change in vision  RESPIRATORY: + frequent productive cough with yellow sputum No wheezing, chills or hemoptysis; No shortness of breath + hx COPD  CARDIOVASCULAR: No chest pain, palpitations, dizziness, or leg swelling  GASTROINTESTINAL: No loss of appetite, decreased PO intake. No abdominal or epigastric pain. No nausea, vomiting; No diarrhea + hx constipation. + fecal incontinence   GENITOURINARY: No dysuria, frequency, hematuria, retention + incontinence  MUSCULOSKELETAL: + chronic foot pain, no joint swelling; + lower motor strength weakness, no saddle anesthesia, + bowel/bladder incontinence, no falls   NEURO: No headaches, + chronic numbness/tingling b/l LE Left> right   PSYCHIATRIC: + depression     PHYSICAL EXAM:  GENERAL:  Alert & Oriented X4, cooperative, NAD, Good concentration. Speech is clear.   RESPIRATORY: Respirations even and unlabored. + diminished to auscultation bilaterally; No rales, rhonchi, wheezing, or rubs. O2 2L NC in place.   CARDIOVASCULAR: Normal S1/S2, regular rate and rhythm; No murmurs, rubs, or gallops. No JVD.   GASTROINTESTINAL:  Soft, Nontender, Nondistended; Bowel sounds present  PERIPHERAL VASCULAR:  Extremities warm without edema. 2+ Peripheral Pulses, No cyanosis, No calf tenderness  MUSCULOSKELETAL: Motor Strength 4/5 B/L upper and 2/5 b/l lower extremities; + decreased b/l LE ROM; + b/l feet tenderness on palpation   SKIN: Warm, dry, intact. No rashes, lesions, scars or wounds.     Risk factors associated with adverse outcomes related to opioid treatment  [ ]  Concurrent benzodiazepine use  [ ]  History/ Active substance use or alcohol use disorder  [X ] Psychiatric co-morbidity  [ ] Sleep apnea  [X ] COPD  [ ] BMI> 35  [ ] Liver dysfunction  [ ] Renal dysfunction  [ ] CHF  [ ] Smoker  [X ]  Age > 60 years    [X ]  [place]  Reviewed and Copied to Chart. See below.    Plan of care and goal oriented pain management treatment options were discussed with patient and /or primary care giver; all questions and concerns were addressed and care was aligned with patient's wishes.    Educated patient on goal oriented pain management treatment options     01-02-23 @ 15:18

## 2023-01-02 NOTE — PROGRESS NOTE ADULT - SUBJECTIVE AND OBJECTIVE BOX
PATIENT SEEN AND EXAMINED ON :- 1/2/23  DATE OF SERVICE:   1/2/23          Interim events noted,Labs ,Radiological studies and Cardiology tests reviewed        HOSPITAL COURSE: HPI:  Patient is a 67 year old male, vaccinated, from [facility] with PMHx of DM, HTN, HLD, AF on Eliquis, PVD, presents with c/o vomiting and right sided abdominal pain. patient states yesterday after lunch he had a sharp right sided abdominal pain 8/10 persistent with nausea and vomiting. He also endorsed chills and rigors, unsure if he spiked a fever. He denies any changes in bowel habits, chest pain, shortness of breath or palpitations. Patient had two episodes of melena in ED. He was discharged yesterday from hospital. He had choledocholithiasis,  CT A/P showed: Cholecystectomy with dilated CBD up to 1.7 cm abrupt cut off of the distal CBD and  mild pancreatic duct dilatation. 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 He underwent  ERCP on 12/16 biliary sludge removal with biliary sphincterotomy and balloon extraction. He was discharged after symptom improvement and out patient gastric emptying study.     In ED:   Vitals: BP: 81/59mmHg  HR:  118  RR: 97% RA   WBC 14.7 k  elevated LFT, lactate   s/p  Rocephin 1LNS and zofran  (22 Dec 2022 12:00)      INTERIM EVENTS:Patient seen at bedside ,interim events noted.      PMH -reviewed admission note, no change since admission  HEART FAILURE: Acute[ ]Chronic[ ] Systolic[ ] Diastolic[ ] Combined Systolic and Diastolic[ ]  CAD[ ] CABG[ ] PCI[ ]  DEVICES[ ] PPM[ ] ICD[ ] ILR[ ]  ATRIAL FIBRILLATION[ ] Paroxysmal[ ] Permanent[ ] CHADS2-[  ]  DILCIA[ ] CKD1[ ] CKD2[ ] CKD3[ ] CKD4[ ] ESRD[ ]  COPD[ ] HTN[ ]   DM[ ] Type1[ ] Type 2[ ]   CVA[ ] Paresis[ ]    AMBULATION: Assisted[ ] Cane/walker[ ] Independent[ ]    MEDICATIONS  (STANDING):  apixaban 5 milliGRAM(s) Oral every 12 hours  atorvastatin 10 milliGRAM(s) Oral at bedtime  cefuroxime   Tablet 500 milliGRAM(s) Oral every 12 hours  digoxin     Tablet 250 MICROGram(s) Oral daily  finasteride 5 milliGRAM(s) Oral daily  insulin lispro (ADMELOG) corrective regimen sliding scale   SubCutaneous three times a day before meals  insulin lispro (ADMELOG) corrective regimen sliding scale   SubCutaneous at bedtime  metoprolol tartrate 25 milliGRAM(s) Oral two times a day  metroNIDAZOLE    Tablet 500 milliGRAM(s) Oral every 8 hours  pantoprazole    Tablet 40 milliGRAM(s) Oral before breakfast  polyethylene glycol 3350 17 Gram(s) Oral two times a day  pregabalin 100 milliGRAM(s) Oral every 8 hours  senna 2 Tablet(s) Oral at bedtime  tamsulosin      tamsulosin 0.8 milliGRAM(s) Oral at bedtime    MEDICATIONS  (PRN):  aluminum hydroxide/magnesium hydroxide/simethicone Suspension 30 milliLiter(s) Oral every 4 hours PRN Dyspepsia  baclofen 5 milliGRAM(s) Oral every 12 hours PRN Musculoskeletal Pain  melatonin 3 milliGRAM(s) Oral at bedtime PRN Insomnia  ondansetron Injectable 4 milliGRAM(s) IV Push every 8 hours PRN Nausea and/or Vomiting  oxycodone    5 mG/acetaminophen 325 mG 1 Tablet(s) Oral every 4 hours PRN Severe Pain (7 - 10)  sodium chloride 0.9% lock flush 10 milliLiter(s) IV Push every 1 hour PRN Pre/post blood products, medications, blood draw, and to maintain line patency            REVIEW OF SYSTEMS:  Constitutional: [ ] fever, [ ]weight loss,  [ ]fatigue [ ]weight gain  Eyes: [ ] visual changes  Respiratory: [ ]shortness of breath;  [ ] cough, [ ]wheezing, [ ]chills, [ ]hemoptysis  Cardiovascular: [ ] chest pain, [ ]palpitations, [ ]dizziness,  [ ]leg swelling[ ]orthopnea[ ]PND  Gastrointestinal: [ ] abdominal pain, [ ]nausea, [ ]vomiting,  [ ]diarrhea [ ]Constipation [ ]Melena  Genitourinary: [ ] dysuria, [ ] hematuria [ ][place]ardoza  Neurologic: [ ] headaches [ ] tremors[ ]weakness [ ]Paralysis Right[ ] Left[ ]  Skin: [ ] itching, [ ]burning, [ ] rashes  Endocrine: [ ] heat or cold intolerance  Musculoskeletal: [ ] joint pain or swelling; [ ] muscle, back, or extremity pain  Psychiatric: [ ] depression, [ ]anxiety, [ ]mood swings, or [ ]difficulty sleeping  Hematologic: [ ] easy bruising, [ ] bleeding gums    [ ] All remaining systems negative except as per above.   [ ]Unable to obtain.  [x] No change in ROS since admission      Vital Signs Last 24 Hrs  T(C): 36.5 (02 Jan 2023 05:19), Max: 36.5 (02 Jan 2023 05:19)  T(F): 97.7 (02 Jan 2023 05:19), Max: 97.7 (02 Jan 2023 05:19)  HR: 106 (02 Jan 2023 17:42) (88 - 114)  BP: 105/72 (02 Jan 2023 17:42) (105/72 - 131/77)  BP(mean): --  RR: 18 (02 Jan 2023 17:42) (18 - 18)  SpO2: 96% (02 Jan 2023 17:42) (91% - 96%)    Parameters below as of 02 Jan 2023 05:19  Patient On (Oxygen Delivery Method): nasal cannula  O2 Flow (L/min): 2    I&O's Summary    01 Jan 2023 07:01  -  02 Jan 2023 07:00  --------------------------------------------------------  IN: 0 mL / OUT: 770 mL / NET: -770 mL        PHYSICAL EXAM:  General: No acute distress BMI-  HEENT: EOMI, PERRL  Neck: Supple, [ ] JVD  Lungs: Equal air entry bilaterally; [ ] rales [ ] wheezing [ ] rhonchi  Heart: Regular rate and rhythm; [x ] murmur   2/6 [ x] systolic [ ] diastolic [ ] radiation[ ] rubs [ ]  gallops  Abdomen: Nontender, bowel sounds present  Extremities: No clubbing, cyanosis, [ ] edema [ ]Pulses  equal and intact  Nervous system:  Alert & Oriented X3, no focal deficits  Psychiatric: Normal affect  Skin: No rashes or lesions    LABS:  01-02    135  |  94<L>  |  12  ----------------------------<  132<H>  4.2   |  31  |  0.63    Ca    9.5      02 Jan 2023 06:08    TPro  8.3  /  Alb  2.5<L>  /  TBili  0.4  /  DBili  x   /  AST  33  /  ALT  29  /  AlkPhos  133<H>  01-02    Creatinine Trend: 0.63<--, 0.55<--, 0.44<--, 0.45<--, 0.40<--, 0.39<--                        11.7   6.00  )-----------( 668      ( 02 Jan 2023 06:08 )             36.8

## 2023-01-02 NOTE — PROGRESS NOTE ADULT - SUBJECTIVE AND OBJECTIVE BOX
67y Male is under our care for     REVIEW OF SYSTEMS:  [  ] Not able to elicit      MEDS:  cefuroxime   Tablet 500 milliGRAM(s) Oral every 12 hours  metroNIDAZOLE    Tablet 500 milliGRAM(s) Oral every 8 hours    ALLERGIES: Allergies    morphine (Unknown)  penicillin (Unknown)  shellfish (Unknown)    Intolerances        VITALS:  Vital Signs Last 24 Hrs  T(C): 36.5 (02 Jan 2023 05:19), Max: 36.5 (02 Jan 2023 05:19)  T(F): 97.7 (02 Jan 2023 05:19), Max: 97.7 (02 Jan 2023 05:19)  HR: 114 (02 Jan 2023 05:19) (88 - 114)  BP: 131/77 (02 Jan 2023 05:19) (116/79 - 131/77)  BP(mean): --  RR: 18 (02 Jan 2023 05:19) (18 - 20)  SpO2: 91% (02 Jan 2023 05:19) (91% - 95%)    Parameters below as of 02 Jan 2023 05:19  Patient On (Oxygen Delivery Method): nasal cannula  O2 Flow (L/min): 2        PHYSICAL EXAM:      LABS/DIAGNOSTIC TESTS:                        11.7   6.00  )-----------( 668      ( 02 Jan 2023 06:08 )             36.8     WBC Count: 6.00 K/uL (01-02 @ 06:08)  WBC Count: 5.77 K/uL (01-01 @ 05:05)  WBC Count: 6.87 K/uL (12-31 @ 07:45)  WBC Count: 6.75 K/uL (12-30 @ 06:34)  WBC Count: 4.65 K/uL (12-29 @ 07:10)    01-02    135  |  94<L>  |  12  ----------------------------<  132<H>  4.2   |  31  |  0.63    Ca    9.5      02 Jan 2023 06:08    TPro  8.3  /  Alb  2.5<L>  /  TBili  0.4  /  DBili  x   /  AST  33  /  ALT  29  /  AlkPhos  133<H>  01-02      CULTURES:   .Blood Blood-Peripheral  12-25 @ 06:45   No Growth Final  --  --      .Blood Blood-Peripheral  12-25 @ 05:59   No Growth Final  --  --      .Stool Feces  12-22 @ 20:30   No enteric pathogens isolated.  (Stool culture examined for Salmonella,  Shigella, Campylobacter, Aeromonas, Plesiomonas,  Vibrio, E.coli O157 and Yersinia)  --  --      .Stool Feces  12-22 @ 20:20   No Protozoa seen by trichrome stain  No Helminths or Protozoa seen in formalin concentrate  performed by iodine stain  (routine O+P not evaluated for Microsporidia,  Cryptosporidia or Cyclospora)  One negative sample does not necessarily rule  out the presence of a parasitic infection.  --  --      .Blood Blood  12-22 @ 14:41   Growth in aerobic bottle: Gram Variable coccobacili Sent to  [facility]  for Identification  .  ***Blood Panel PCR results on this specimen are available  approximately 3 hours after the Gram stain result.***  Gram stain, PCR, and/or culture results may not always  correspond due to difference in methodologies.  ************************************************************  This PCR assay was performed by multiplex PCR. This  Assay tests for 66 bacterial and resistance gene targets.  Please refer to the [facility] test directory  at [email or web address] for details.  --  Blood Culture PCR      .Blood Blood  12-22 @ 14:36   No Growth Final  --  --      Catheterized Catheterized  12-22 @ 09:20   >100,000 CFU/ml Aerococcus species  "Aerococcus spp. are predictably susceptible to penicillin,  ampicillin, tetracycline, and vancomycin.  All isolates are  resistant to sulfonamides"  --  --      Clean Catch Clean Catch (Midstream)  12-09 @ 02:46   >100,000 CFU/ml Aerococcus species  "Aerococcus spp. are predictably susceptible to penicillin,  ampicillin, tetracycline, and vancomycin.  All isolates are  resistant to sulfonamides"  --  --        RADIOLOGY:  no new studies 67y Male is under our care for cholangitis and COVID-19 infection.  Patient was seen laying comfortably in bed with no acute distress on 2L nasal canula.  Patient denies any nausea or vomiting however complains of LLQ tenderness as he feels impacted, RN made aware.  Patient remains afebrile and WBC count is WNL.    REVIEW OF SYSTEMS:  [  ] Not able to elicit  General: no fevers no malaise  Chest: no cough no sob  GI: no nvd, abd pain +  : no urinary sxs   Skin: no rashes  Musculoskeletal: no trauma no LBP  Neuro: no ha's no dizziness     MEDS:  cefuroxime   Tablet 500 milliGRAM(s) Oral every 12 hours  metroNIDAZOLE    Tablet 500 milliGRAM(s) Oral every 8 hours    ALLERGIES: Allergies    morphine (Unknown)  penicillin (Unknown)  shellfish (Unknown)    Intolerances        VITALS:  Vital Signs Last 24 Hrs  T(C): 36.5 (02 Jan 2023 05:19), Max: 36.5 (02 Jan 2023 05:19)  T(F): 97.7 (02 Jan 2023 05:19), Max: 97.7 (02 Jan 2023 05:19)  HR: 114 (02 Jan 2023 05:19) (88 - 114)  BP: 131/77 (02 Jan 2023 05:19) (116/79 - 131/77)  BP(mean): --  RR: 18 (02 Jan 2023 05:19) (18 - 20)  SpO2: 91% (02 Jan 2023 05:19) (91% - 95%)    Parameters below as of 02 Jan 2023 05:19  Patient On (Oxygen Delivery Method): nasal cannula  O2 Flow (L/min): 2        PHYSICAL EXAM:  HEENT: n/a  Neck: supple no LN's   Respiratory: lungs clear no rales  Cardiovascular: S1 S2 reg no murmurs  Gastrointestinal: +BS with soft, nondistended abdomen; LLQ tenderness +  Extremities: no edema  Skin: no rashes  Ortho: n/a  Neuro: AAO x 3      LABS/DIAGNOSTIC TESTS:                        11.7   6.00  )-----------( 668      ( 02 Jan 2023 06:08 )             36.8     WBC Count: 6.00 K/uL (01-02 @ 06:08)  WBC Count: 5.77 K/uL (01-01 @ 05:05)  WBC Count: 6.87 K/uL (12-31 @ 07:45)  WBC Count: 6.75 K/uL (12-30 @ 06:34)  WBC Count: 4.65 K/uL (12-29 @ 07:10)    01-02    135  |  94<L>  |  12  ----------------------------<  132<H>  4.2   |  31  |  0.63    Ca    9.5      02 Jan 2023 06:08    TPro  8.3  /  Alb  2.5<L>  /  TBili  0.4  /  DBili  x   /  AST  33  /  ALT  29  /  AlkPhos  133<H>  01-02      CULTURES:   .Blood Blood-Peripheral  12-25 @ 06:45   No Growth Final  --  --      .Blood Blood-Peripheral  12-25 @ 05:59   No Growth Final  --  --      .Stool Feces  12-22 @ 20:30   No enteric pathogens isolated.  (Stool culture examined for Salmonella,  Shigella, Campylobacter, Aeromonas, Plesiomonas,  Vibrio, E.coli O157 and Yersinia)  --  --      .Stool Feces  12-22 @ 20:20   No Protozoa seen by trichrome stain  No Helminths or Protozoa seen in formalin concentrate  performed by iodine stain  (routine O+P not evaluated for Microsporidia,  Cryptosporidia or Cyclospora)  One negative sample does not necessarily rule  out the presence of a parasitic infection.  --  --      .Blood Blood  12-22 @ 14:41   Growth in aerobic bottle: Gram Variable coccobacili Sent to  [facility]  for Identification  .  ***Blood Panel PCR results on this specimen are available  approximately 3 hours after the Gram stain result.***  Gram stain, PCR, and/or culture results may not always  correspond due to difference in methodologies.  ************************************************************  This PCR assay was performed by multiplex PCR. This  Assay tests for 66 bacterial and resistance gene targets.  Please refer to the [facility] test directory  at [email or web address] for details.  --  Blood Culture PCR      .Blood Blood  12-22 @ 14:36   No Growth Final  --  --      Catheterized Catheterized  12-22 @ 09:20   >100,000 CFU/ml Aerococcus species  "Aerococcus spp. are predictably susceptible to penicillin,  ampicillin, tetracycline, and vancomycin.  All isolates are  resistant to sulfonamides"  --  --      Clean Catch Clean Catch (Midstream)  12-09 @ 02:46   >100,000 CFU/ml Aerococcus species  "Aerococcus spp. are predictably susceptible to penicillin,  ampicillin, tetracycline, and vancomycin.  All isolates are  resistant to sulfonamides"  --  --        RADIOLOGY:  no new studies

## 2023-01-03 LAB
GLUCOSE BLDC GLUCOMTR-MCNC: 123 MG/DL — HIGH (ref 70–99)
GLUCOSE BLDC GLUCOMTR-MCNC: 125 MG/DL — HIGH (ref 70–99)
GLUCOSE BLDC GLUCOMTR-MCNC: 150 MG/DL — HIGH (ref 70–99)
GLUCOSE BLDC GLUCOMTR-MCNC: 170 MG/DL — HIGH (ref 70–99)

## 2023-01-03 PROCEDURE — 99232 SBSQ HOSP IP/OBS MODERATE 35: CPT

## 2023-01-03 RX ADMIN — Medication 500 MILLIGRAM(S): at 14:57

## 2023-01-03 RX ADMIN — OXYCODONE AND ACETAMINOPHEN 1 TABLET(S): 5; 325 TABLET ORAL at 03:57

## 2023-01-03 RX ADMIN — OXYCODONE AND ACETAMINOPHEN 1 TABLET(S): 5; 325 TABLET ORAL at 23:06

## 2023-01-03 RX ADMIN — Medication 5 MILLIGRAM(S): at 05:40

## 2023-01-03 RX ADMIN — Medication 250 MICROGRAM(S): at 05:40

## 2023-01-03 RX ADMIN — Medication 500 MILLIGRAM(S): at 05:40

## 2023-01-03 RX ADMIN — Medication 100 MILLIGRAM(S): at 14:58

## 2023-01-03 RX ADMIN — OXYCODONE AND ACETAMINOPHEN 1 TABLET(S): 5; 325 TABLET ORAL at 02:57

## 2023-01-03 RX ADMIN — OXYCODONE AND ACETAMINOPHEN 1 TABLET(S): 5; 325 TABLET ORAL at 09:53

## 2023-01-03 RX ADMIN — Medication 25 MILLIGRAM(S): at 05:43

## 2023-01-03 RX ADMIN — OXYCODONE AND ACETAMINOPHEN 1 TABLET(S): 5; 325 TABLET ORAL at 22:18

## 2023-01-03 RX ADMIN — FINASTERIDE 5 MILLIGRAM(S): 5 TABLET, FILM COATED ORAL at 12:25

## 2023-01-03 RX ADMIN — PANTOPRAZOLE SODIUM 40 MILLIGRAM(S): 20 TABLET, DELAYED RELEASE ORAL at 05:42

## 2023-01-03 RX ADMIN — Medication 100 MILLIGRAM(S): at 05:44

## 2023-01-03 RX ADMIN — TAMSULOSIN HYDROCHLORIDE 0.8 MILLIGRAM(S): 0.4 CAPSULE ORAL at 22:10

## 2023-01-03 RX ADMIN — APIXABAN 5 MILLIGRAM(S): 2.5 TABLET, FILM COATED ORAL at 05:41

## 2023-01-03 RX ADMIN — ATORVASTATIN CALCIUM 10 MILLIGRAM(S): 80 TABLET, FILM COATED ORAL at 22:10

## 2023-01-03 RX ADMIN — Medication 1: at 12:25

## 2023-01-03 RX ADMIN — APIXABAN 5 MILLIGRAM(S): 2.5 TABLET, FILM COATED ORAL at 18:16

## 2023-01-03 RX ADMIN — SENNA PLUS 2 TABLET(S): 8.6 TABLET ORAL at 22:09

## 2023-01-03 RX ADMIN — POLYETHYLENE GLYCOL 3350 17 GRAM(S): 17 POWDER, FOR SOLUTION ORAL at 18:16

## 2023-01-03 RX ADMIN — OXYCODONE AND ACETAMINOPHEN 1 TABLET(S): 5; 325 TABLET ORAL at 10:27

## 2023-01-03 NOTE — PROGRESS NOTE ADULT - SUBJECTIVE AND OBJECTIVE BOX
67y Male is under our care for     REVIEW OF SYSTEMS:  [  ] Not able to elicit      MEDS:  metroNIDAZOLE    Tablet 500 milliGRAM(s) Oral every 8 hours    ALLERGIES: Allergies    morphine (Unknown)  penicillin (Unknown)  shellfish (Unknown)    Intolerances        VITALS:  Vital Signs Last 24 Hrs  T(C): 36.4 (03 Jan 2023 06:12), Max: 36.7 (02 Jan 2023 20:15)  T(F): 97.5 (03 Jan 2023 06:12), Max: 98.1 (02 Jan 2023 20:15)  HR: 102 (03 Jan 2023 06:12) (96 - 106)  BP: 104/69 (03 Jan 2023 06:12) (104/69 - 129/73)  BP(mean): --  RR: 18 (03 Jan 2023 06:12) (18 - 18)  SpO2: 91% (03 Jan 2023 06:12) (91% - 96%)    Parameters below as of 03 Jan 2023 06:12  Patient On (Oxygen Delivery Method): nasal cannula  O2 Flow (L/min): 2        PHYSICAL EXAM:      LABS/DIAGNOSTIC TESTS:                        11.7   6.00  )-----------( 668      ( 02 Jan 2023 06:08 )             36.8     WBC Count: 6.00 K/uL (01-02 @ 06:08)  WBC Count: 5.77 K/uL (01-01 @ 05:05)  WBC Count: 6.87 K/uL (12-31 @ 07:45)  WBC Count: 6.75 K/uL (12-30 @ 06:34)    01-02    135  |  94<L>  |  12  ----------------------------<  132<H>  4.2   |  31  |  0.63    Ca    9.5      02 Jan 2023 06:08    TPro  8.3  /  Alb  2.5<L>  /  TBili  0.4  /  DBili  x   /  AST  33  /  ALT  29  /  AlkPhos  133<H>  01-02      CULTURES:   .Blood Blood-Peripheral  12-25 @ 06:45   No Growth Final  --  --      .Blood Blood-Peripheral  12-25 @ 05:59   No Growth Final  --  --      .Stool Feces  12-22 @ 20:30   No enteric pathogens isolated.  (Stool culture examined for Salmonella,  Shigella, Campylobacter, Aeromonas, Plesiomonas,  Vibrio, E.coli O157 and Yersinia)  --  --      .Stool Feces  12-22 @ 20:20   No Protozoa seen by trichrome stain  No Helminths or Protozoa seen in formalin concentrate  performed by iodine stain  (routine O+P not evaluated for Microsporidia,  Cryptosporidia or Cyclospora)  One negative sample does not necessarily rule  out the presence of a parasitic infection.  --  --      .Blood Blood  12-22 @ 14:41   Growth in aerobic bottle: Gram Variable coccobacili Sent to  [facility]  for Identification  .  ***Blood Panel PCR results on this specimen are available  approximately 3 hours after the Gram stain result.***  Gram stain, PCR, and/or culture results may not always  correspond due to difference in methodologies.  ************************************************************  This PCR assay was performed by multiplex PCR. This  Assay tests for 66 bacterial and resistance gene targets.  Please refer to the [facility] test directory  at [email or web address] for details.  --  Blood Culture PCR      .Blood Blood  12-22 @ 14:36   No Growth Final  --  --      Catheterized Catheterized  12-22 @ 09:20   >100,000 CFU/ml Aerococcus species  "Aerococcus spp. are predictably susceptible to penicillin,  ampicillin, tetracycline, and vancomycin.  All isolates are  resistant to sulfonamides"  --  --      Clean Catch Clean Catch (Midstream)  12-09 @ 02:46   >100,000 CFU/ml Aerococcus species  "Aerococcus spp. are predictably susceptible to penicillin,  ampicillin, tetracycline, and vancomycin.  All isolates are  resistant to sulfonamides"  --  --        RADIOLOGY:  no new studies 67y Male is under our care for cholangitis, bacteremia and COVID-19 infection.  Patient was seen laying comfortably in bed with no acute distress.  Patient reports improvement in cough and denies any abdominal pain at this time.    REVIEW OF SYSTEMS:  [  ] Not able to elicit  General: no fevers no malaise  Chest: mild cough + no sob  GI: no nvd  : no urinary sxs   Skin: no rashes  Musculoskeletal: no trauma no LBP  Neuro: no ha's no dizziness     MEDS:  metroNIDAZOLE    Tablet 500 milliGRAM(s) Oral every 8 hours    ALLERGIES: Allergies    morphine (Unknown)  penicillin (Unknown)  shellfish (Unknown)    Intolerances        VITALS:  Vital Signs Last 24 Hrs  T(C): 36.4 (03 Jan 2023 06:12), Max: 36.7 (02 Jan 2023 20:15)  T(F): 97.5 (03 Jan 2023 06:12), Max: 98.1 (02 Jan 2023 20:15)  HR: 102 (03 Jan 2023 06:12) (96 - 106)  BP: 104/69 (03 Jan 2023 06:12) (104/69 - 129/73)  BP(mean): --  RR: 18 (03 Jan 2023 06:12) (18 - 18)  SpO2: 91% (03 Jan 2023 06:12) (91% - 96%)    Parameters below as of 03 Jan 2023 06:12  Patient On (Oxygen Delivery Method): nasal cannula  O2 Flow (L/min): 2        PHYSICAL EXAM:  HEENT: n/a  Neck: supple no LN's   Respiratory: lungs clear no rales  Cardiovascular: S1 S2 reg no murmurs  Gastrointestinal: +BS with soft, nondistended abdomen; nontender  Extremities: no edema  Skin: no rashes  Ortho: n/a  Neuro: AAO x 3      LABS/DIAGNOSTIC TESTS:                        11.7   6.00  )-----------( 668      ( 02 Jan 2023 06:08 )             36.8     WBC Count: 6.00 K/uL (01-02 @ 06:08)  WBC Count: 5.77 K/uL (01-01 @ 05:05)  WBC Count: 6.87 K/uL (12-31 @ 07:45)  WBC Count: 6.75 K/uL (12-30 @ 06:34)    01-02    135  |  94<L>  |  12  ----------------------------<  132<H>  4.2   |  31  |  0.63    Ca    9.5      02 Jan 2023 06:08    TPro  8.3  /  Alb  2.5<L>  /  TBili  0.4  /  DBili  x   /  AST  33  /  ALT  29  /  AlkPhos  133<H>  01-02      CULTURES:   .Blood Blood-Peripheral  12-25 @ 06:45   No Growth Final  --  --      .Blood Blood-Peripheral  12-25 @ 05:59   No Growth Final  --  --      .Stool Feces  12-22 @ 20:30   No enteric pathogens isolated.  (Stool culture examined for Salmonella,  Shigella, Campylobacter, Aeromonas, Plesiomonas,  Vibrio, E.coli O157 and Yersinia)  --  --      .Stool Feces  12-22 @ 20:20   No Protozoa seen by trichrome stain  No Helminths or Protozoa seen in formalin concentrate  performed by iodine stain  (routine O+P not evaluated for Microsporidia,  Cryptosporidia or Cyclospora)  One negative sample does not necessarily rule  out the presence of a parasitic infection.  --  --      .Blood Blood  12-22 @ 14:41   Growth in aerobic bottle: Gram Variable coccobacili Sent to  [facility]  for Identification  .  ***Blood Panel PCR results on this specimen are available  approximately 3 hours after the Gram stain result.***  Gram stain, PCR, and/or culture results may not always  correspond due to difference in methodologies.  ************************************************************  This PCR assay was performed by multiplex PCR. This  Assay tests for 66 bacterial and resistance gene targets.  Please refer to the [facility] test directory  at [email or web address] for details.  --  Blood Culture PCR      .Blood Blood  12-22 @ 14:36   No Growth Final  --  --      Catheterized Catheterized  12-22 @ 09:20   >100,000 CFU/ml Aerococcus species  "Aerococcus spp. are predictably susceptible to penicillin,  ampicillin, tetracycline, and vancomycin.  All isolates are  resistant to sulfonamides"  --  --      Clean Catch Clean Catch (Midstream)  12-09 @ 02:46   >100,000 CFU/ml Aerococcus species  "Aerococcus spp. are predictably susceptible to penicillin,  ampicillin, tetracycline, and vancomycin.  All isolates are  resistant to sulfonamides"  --  --        RADIOLOGY:  no new studies

## 2023-01-03 NOTE — PROGRESS NOTE ADULT - PROBLEM SELECTOR PLAN 2
-s/p meropenem    - US Abdomen: Biliary dilatation and hepatic steatosis  - CT A/P: dilation of the common bile duct and main pancreatic duct again noted.. New RLL opacity  - repeat ERCP on 12/23 performed, found with bleeding to sphincterotomy site, 2 clips and 1 stent placed in common bile duct to facilitate tamponade the bleeding and facilitate drainage.   - RUQ pain resolved  - GI Kay following

## 2023-01-03 NOTE — PROGRESS NOTE ADULT - SUBJECTIVE AND OBJECTIVE BOX
NP Note discussed with  Primary Attending    Patient is a 67y old  Male who presents with a chief complaint of Abdominal pain (02 Jan 2023 15:12).  Pt. seen and examined, reports left foot neuropathy pain, otherwise OK, denies abdominal pain, N/V, afebrile.       INTERVAL HPI/OVERNIGHT EVENTS: no new complaints    MEDICATIONS  (STANDING):  apixaban 5 milliGRAM(s) Oral every 12 hours  atorvastatin 10 milliGRAM(s) Oral at bedtime  digoxin     Tablet 250 MICROGram(s) Oral daily  finasteride 5 milliGRAM(s) Oral daily  insulin lispro (ADMELOG) corrective regimen sliding scale   SubCutaneous three times a day before meals  insulin lispro (ADMELOG) corrective regimen sliding scale   SubCutaneous at bedtime  metoprolol tartrate 25 milliGRAM(s) Oral two times a day  metroNIDAZOLE    Tablet 500 milliGRAM(s) Oral every 8 hours  pantoprazole    Tablet 40 milliGRAM(s) Oral before breakfast  polyethylene glycol 3350 17 Gram(s) Oral two times a day  pregabalin 100 milliGRAM(s) Oral every 8 hours  senna 2 Tablet(s) Oral at bedtime  tamsulosin      tamsulosin 0.8 milliGRAM(s) Oral at bedtime    MEDICATIONS  (PRN):  aluminum hydroxide/magnesium hydroxide/simethicone Suspension 30 milliLiter(s) Oral every 4 hours PRN Dyspepsia  baclofen 5 milliGRAM(s) Oral every 12 hours PRN Musculoskeletal Pain  melatonin 3 milliGRAM(s) Oral at bedtime PRN Insomnia  ondansetron Injectable 4 milliGRAM(s) IV Push every 8 hours PRN Nausea and/or Vomiting  oxycodone    5 mG/acetaminophen 325 mG 1 Tablet(s) Oral every 4 hours PRN Severe Pain (7 - 10)  sodium chloride 0.9% lock flush 10 milliLiter(s) IV Push every 1 hour PRN Pre/post blood products, medications, blood draw, and to maintain line patency      __________________________________________________  REVIEW OF SYSTEMS:    CONSTITUTIONAL: No fever,   EYES: no acute visual disturbances  NECK: No pain or stiffness  RESPIRATORY: No cough; No shortness of breath  CARDIOVASCULAR: No chest pain, no palpitations  GASTROINTESTINAL: No pain. No nausea or vomiting; No diarrhea   NEUROLOGICAL: No headache or numbness, no tremors  MUSCULOSKELETAL: No joint pain, no muscle pain  GENITOURINARY: no dysuria, no frequency, no hesitancy  PSYCHIATRY: no depression , no anxiety  ALL OTHER  ROS negative        Vital Signs Last 24 Hrs  T(C): 36.4 (03 Jan 2023 06:12), Max: 36.7 (02 Jan 2023 20:15)  T(F): 97.5 (03 Jan 2023 06:12), Max: 98.1 (02 Jan 2023 20:15)  HR: 102 (03 Jan 2023 06:12) (96 - 106)  BP: 104/69 (03 Jan 2023 06:12) (104/69 - 129/73)  BP(mean): --  RR: 18 (03 Jan 2023 06:12) (18 - 18)  SpO2: 91% (03 Jan 2023 06:12) (91% - 96%)    Parameters below as of 03 Jan 2023 06:12  Patient On (Oxygen Delivery Method): nasal cannula  O2 Flow (L/min): 2      ________________________________________________  PHYSICAL EXAM:  well developed, pleasant  GENERAL: NAD  HEENT: Normocephalic;  conjunctivae and sclerae clear; moist mucous membranes;   NECK : supple  CHEST/LUNG: Clear to auscultation bilaterally with good air entry   HEART: S1 S2  regular; no murmurs, gallops or rubs  ABDOMEN: Soft, Nontender, Nondistended; Bowel sounds present  EXTREMITIES: Left foot neuropathic pain, no cyanosis; no edema; no calf tenderness  SKIN: warm and dry; no rash  NERVOUS SYSTEM:  Awake and alert; Oriented  to place, person and time ; no new deficits    _________________________________________________  LABS:                        11.7   6.00  )-----------( 668      ( 02 Jan 2023 06:08 )             36.8     01-02    135  |  94<L>  |  12  ----------------------------<  132<H>  4.2   |  31  |  0.63    Ca    9.5      02 Jan 2023 06:08    TPro  8.3  /  Alb  2.5<L>  /  TBili  0.4  /  DBili  x   /  AST  33  /  ALT  29  /  AlkPhos  133<H>  01-02        CAPILLARY BLOOD GLUCOSE      POCT Blood Glucose.: 123 mg/dL (03 Jan 2023 07:41)  POCT Blood Glucose.: 140 mg/dL (02 Jan 2023 21:48)  POCT Blood Glucose.: 120 mg/dL (02 Jan 2023 16:50)  POCT Blood Glucose.: 150 mg/dL (02 Jan 2023 11:17)    RADIOLOGY & ADDITIONAL TESTS:    < from: Xray Chest 1 View- PORTABLE-Routine (Xray Chest 1 View- PORTABLE-Routine in AM.) (12.25.22 @ 09:41) >    ACC: [id number] EXAM:  XR CHEST PORTABLE ROUTINE 1V                          PROCEDURE DATE:  12/25/2022          INTERPRETATION:  Portable chest radiograph    CLINICAL INFORMATION: ICU follow-up.    TECHNIQUE:  Portable  AP chest radiograph.    COMPARISON: 12/22/2022 chest x-ray .    FINDINGS:  CATHETERS AND TUBES: None    PULMONARY: Increasing RIGHT lower lobe diffuse airspace disease.  Remaining lung segments grossly clear..   No pneumothorax.    HEART/VASCULAR: The heart and mediastinum size and configuration are   within normal limits.    BONES: Visualized osseous structures are intact.    IMPRESSION:   Increasing RIGHT lower zone diffuse airspace   disease/consolidation...    --- End of Report ---    < end of copied text >    < from: CT Abdomen and Pelvis No Cont (12.22.22 @ 10:37) >    ACC: [id number] EXAM:  CT ABDOMEN AND PELVIS                          PROCEDURE DATE:  12/22/2022          INTERPRETATION:  CLINICAL INFORMATION: Abdominal pain    COMPARISON: Abdominal CT dated 12/8/2022    CONTRAST/COMPLICATIONS:  IV Contrast: NONE  Oral Contrast: NONE  Complications: None reported at time of study completion    PROCEDURE:  CT of the Abdomen and Pelvis was performed.  Sagittal and coronal reformats were performed.    FINDINGS: Evaluation of the abdomen and pelvis is limited by lack of IV   contrast.  LOWER CHEST: New airspace opacifications in the right lower lung zone for   which clinical correlation with pneumonia is recommended.    LIVER: Stable small hypodense area in the left hepatic lobe. Please see   recent abdominalMR dated 12/12/2022 for further characterization.  BILE DUCTS: Dilatation of the common bile duct is again noted.  GALLBLADDER: Stable cholecystectomy clips.  SPLEEN: Within normal limits.  PANCREAS: The pancreatic parenchyma appears unremarkable. Mild dilatation   of the main pancreatic duct in the pancreatic head is again noted.  ADRENALS: Within normal limits.  KIDNEYS/URETERS: Small hypodense lesion in the left kidney, likely   representing a cyst. The right kidney appears unremarkable. No evidence   for a calculus in the kidneys or ureters. No hydronephrosis.    BLADDER: Underdistended.  REPRODUCTIVE ORGANS: The prostate and seminal vesicles appear grossly   unremarkable.    BOWEL: No bowel obstruction or grossly thickened bowel wall. Colonic   diverticulosis without evidence for diverticulitis. Moderate amount of   stool in the rectum and distal sigmoid colon.. Appendix not visualized.   No secondary signs for acute appendicitis.  PERITONEUM: No free air or ascites.  VESSELS: Calcified atherosclerotic disease.  RETROPERITONEUM/LYMPH NODES: No lymphadenopathy.  ABDOMINAL WALL: Small fat-containing ventral hernia.  BONES: Degenerative spondylosis.    IMPRESSION: No bowel obstruction or grossly thickened bowel wall. Colonic   diverticulosis without evidence for diverticulitis. Moderate amount of   stool in the rectum and distal sigmoid colon.. Appendix not visualized.   No secondary signs for acute appendicitis.    Dilatation of the common bile duct and main pancreatic duct again noted..   Please see recent abdominal MR dated 12/12/2022. If clinically indicated,   endoscopic ultrasound may be pursued to rule out a small obstructive   ampullary tumor or pancreatic head mass.    New airspace opacifications in the right lower lung zone for which   clinical correlation with pneumonia is recommended.    --- End of Report ---    < end of copied text >    < from: US Abdomen Complete (US Abdomen Complete .) (12.22.22 @ 09:48) >    ACC: [id number] EXAM:  US ABDOMEN COMPLETE                          PROCEDURE DATE:  12/22/2022          INTERPRETATION:  CLINICAL INFORMATION: Abdominal pain and vomiting.    COMPARISON: CT and MRI dated 12/8/2022 and 12/12/2022.    TECHNIQUE: Sonography of the abdomen. Technically difficult and limited   study due to patient's body habitus.    FINDINGS:  Liver: Increased echogenicity. No evidence of a focal hepatic mass   lesion. Normal portal and hepatic venous flow.  Bile ducts: Dilated. Common bile duct measures 1.5 cm.  Gallbladder: Cholecystectomy.  Pancreas: Visualized portions are within normal limits.  Spleen: Not visualized.  Right kidney: 8.6 cm. No hydronephrosis.  Left kidney: 10 cm. No hydronephrosis.  Ascites: None.  Aorta andIVC: Visualized portions are within normal limits.    IMPRESSION:  Biliary dilatation in this patient with prior cholecystectomy. 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  Hepatic steatosis.  Nonvisualized spleen.        --- End of Report ---    < end of copied text >      Imaging Personally Reviewed:  YES/NO    Consultant(s) Notes Reviewed:   YES/ No    Care Discussed with Consultants :     Plan of care was discussed with patient and /or primary care giver; all questions and concerns were addressed and care was aligned with patient's wishes.

## 2023-01-03 NOTE — PROGRESS NOTE ADULT - ASSESSMENT
67 year old male from [facility] with past medical history of DM, HTN, HLD, AF on Eliquis, PVD, presents with c/o 8/10 sharp right sided abdominal pain with nausea, vomiting, and diarrhea.  Pt recently had cholecystectomy and ERCP with sphincterotomy 12/16/22. TIM Kim consulted, s/p  repeat ERCP 12/23/22 found to have bleeding from spincterotomy site. CBD stented and clips place to control bleeding. Admitted to ICU for septic shock, found positive variable coccibacilli bacteremia, and aerococcus UTI. Hospital course complicated by failed TOV 12/24 repeat TOV will be done today 12/29. Hypotension and tachycardia resolved. Continue with digoxin 250 mcg for rate control. TIM Villanueva and COLLIN Novoa following . COLLIN Nava consulted started on meropenem and vancomycin, repeat blood cx 12/25 NGTD.  Downgraded to medicine 12/25 .TTE not successful 12/27 . Midline infiltrated/removed, new peripheral line inserted.     Scheduled for JANINE 12/30, but noted positive for COVID 19 12/29. Cancelled by dept. diet resumed. will need to reschedule.   Pt will need 8 more days of quarantine for DC back to [facility]. (until 1/6/23).

## 2023-01-03 NOTE — PROGRESS NOTE ADULT - PROBLEM SELECTOR PLAN 1
septic shock on admission-RESOLVED  - admitted to ICU downgraded to medicine   - Repeat blood cx 12/25 no growth   - s/p cefepime, vancomycin, and meropenem   - IV access needed for anesthesia purpose   - Comp ceftin and flagyl   - ID Elijah following  -TTE unsuccessful, plan for JANINE 12/30 but cancelled due to COVID +

## 2023-01-03 NOTE — PROGRESS NOTE ADULT - ASSESSMENT
Recurrent Cholangitis  Bacteremia - possibly a contaminant, repeat blood cultures are negative  Fevers - resolved  Leukocytosis - normalized  COVID - 19 infection      Plan - Can DC antibiotics today     Recurrent Cholangitis  Bacteremia - possibly a contaminant, repeat blood cultures are negative  Fevers - resolved  Leukocytosis - normalized  COVID - 19 infection      Plan - Can DC antibiotics today  Will not need JANINE as bacteremia is likely a contaminant  Reconsult prn

## 2023-01-03 NOTE — PROGRESS NOTE ADULT - PROBLEM SELECTOR PLAN 1
Pt with acute right sided abdominal pain which is somatic and visceral in nature due to cholangitis. + biliary dilatation on US of abdomen. CTAP demonstrates- No bowel obstruction or grossly thickened bowel wall. Colonic diverticulosis without evidence for diverticulitis. Moderate amount of stool in the rectum and distal sigmoid colon.  Hx of ERCP on 12/23, found to have bleeding to sphincterotomy site, 2 clips and 1 stent placed in common bile duct to facilitate tamponade the bleeding and facilitate drainage. Pt also with chronic leg pain, which is neuropathic in nature d/t diabetic neuropathy. Pt is opioid dependent on Percocet 5-325mg 6 tabs/ day PRN, and lyrica 100mg PO TID. + COVID 12/29.   High risk medications reviewed. Avoid polypharmacy. Avoid IV opioids. Avoid NSAIDs and benzodiazepines. Non-pharmacological sleep aides initiated. Non-opioid medications and non-pharmacological pain management measures initiated.  Opioid pain recommendations   - Continue Percocet 5 mg/325 mg PO q4h PRN severe pain (home dose). Monitor for sedation/ respiratory depression.   Non-opioid pain recommendations   - Continue Lyrica 100mg po q 8 hours (home dose per Istop). Monitor renal function.   - Discontinue lidoderm patch.  - Continue baclofen 5mg PO q12h PRN muscle spasms (home dose).   Bowel Regimen  - Continue Miralax 17G PO BID per primary team.   - Continue Senna 2 tablets at bedtime for constipation  Mild pain   - Non-pharmacological pain treatment recommendations  - Warm/ Cool packs PRN   - Repositioning, imagery, relaxation, distraction.  - Physical therapy OOB if no contraindications   Recommendations discussed with primary team and RN.

## 2023-01-03 NOTE — PROGRESS NOTE ADULT - SUBJECTIVE AND OBJECTIVE BOX
[   ] ICU                                          [   ] CCU                                      [ X  ] Medical Floor      Patient is a 67 year old male with abdominal pain. GI consulted to evaluate.        Patient is a 67 year old male, with past medical history significant for DM, HTN, HLD, AF on Eliquis, PVD, presents with c/o vomiting and right sided abdominal pain. Patient recently had cholecystectomy and ERCP with sphincterotomy prior to discharge. patient presented with c/o sharp right sided abdominal pain 8/10 persistent with nausea, vomiting and melena. Patient had repeat ERCP found to have bleeding from sphincterotomy site. CBD stended and clips place to control bleeding. No hematemesis, hematochezia, chest pain, SOB, cough, hematuria or dysuria reported.    Patient appears comfortable. No new complaints reported, No abdominal pain, N/V, hematemesis, hematochezia, melena, fever, chills, chest pain, SOB, cough or diarrhea reported.       PAIN MANAGEMENT:  Pain Scale:                 /10  Pain Location:         PAST MEDICAL HISTORY  COPD (chronic obstructive pulmonary disease)    Diabetes    PVD (peripheral vascular disease)    Chronic atrial fibrillation    GERD (gastroesophageal reflux disease)    MDD (major depressive disorder)    BPH (benign prostatic hyperplasia)        PAST SURGICAL HISTORY  Cholecystectomy    Allergies    morphine (Unknown)  penicillin (Unknown)  shellfish (Unknown)    Intolerances  None       SOCIAL HISTORY  Advanced Directives:       [ X ] Full Code       [  ] DNR  Marital Status:         [  ] M      [ X ] S      [  ] D       [  ] W  Children:       [ X ] Yes      [  ] No  Occupation:        [  ] Employed       [  X] Unemployed       [  ] Retired  Diet:       [ X ] Regular       [  ] PEG feeding          [  ] NG tube feeding  Drug Use:           [ X ] Patient denied          [  ] Yes  Alcohol:           [ X ] No             [  ] Yes (socially)         [  ] Yes (chronic)  Tobacco:           [  ] Yes           [ X ] No      FAMILY HISTORY  [ X ] Heart Disease            [ X ] Diabetes             [ X ] HTN             [  ] Colon Cancer             [  ] Stomach Cancer              [  ] Pancreatic Cancer        VITALS  Vital Signs Last 24 Hrs  T(C): 36.6 (03 Jan 2023 13:39), Max: 36.7 (02 Jan 2023 20:15)  T(F): 97.8 (03 Jan 2023 13:39), Max: 98.1 (02 Jan 2023 20:15)  HR: 100 (03 Jan 2023 13:39) (96 - 106)  BP: 108/71 (03 Jan 2023 13:39) (104/69 - 129/73)   RR: 17 (03 Jan 2023 13:39) (17 - 18)  SpO2: 93% (03 Jan 2023 13:39) (91% - 96%)  Parameters below as of 03 Jan 2023 13:39  Patient On (Oxygen Delivery Method): nasal cannula  O2 Flow (L/min): 3       MEDICATIONS  (STANDING):  apixaban 5 milliGRAM(s) Oral every 12 hours  atorvastatin 10 milliGRAM(s) Oral at bedtime  digoxin     Tablet 250 MICROGram(s) Oral daily  finasteride 5 milliGRAM(s) Oral daily  insulin lispro (ADMELOG) corrective regimen sliding scale   SubCutaneous three times a day before meals  insulin lispro (ADMELOG) corrective regimen sliding scale   SubCutaneous at bedtime  metoprolol tartrate 25 milliGRAM(s) Oral two times a day  metroNIDAZOLE    Tablet 500 milliGRAM(s) Oral every 8 hours  pantoprazole    Tablet 40 milliGRAM(s) Oral before breakfast  polyethylene glycol 3350 17 Gram(s) Oral two times a day  pregabalin 100 milliGRAM(s) Oral every 8 hours  senna 2 Tablet(s) Oral at bedtime  tamsulosin      tamsulosin 0.8 milliGRAM(s) Oral at bedtime    MEDICATIONS  (PRN):  aluminum hydroxide/magnesium hydroxide/simethicone Suspension 30 milliLiter(s) Oral every 4 hours PRN Dyspepsia  baclofen 5 milliGRAM(s) Oral every 12 hours PRN Musculoskeletal Pain  melatonin 3 milliGRAM(s) Oral at bedtime PRN Insomnia  ondansetron Injectable 4 milliGRAM(s) IV Push every 8 hours PRN Nausea and/or Vomiting  oxycodone    5 mG/acetaminophen 325 mG 1 Tablet(s) Oral every 4 hours PRN Severe Pain (7 - 10)  sodium chloride 0.9% lock flush 10 milliLiter(s) IV Push every 1 hour PRN Pre/post blood products, medications, blood draw, and to maintain line patency                            11.7   6.00  )-----------( 668      ( 02 Jan 2023 06:08 )             36.8       01-02    135  |  94<L>  |  12  ----------------------------<  132<H>  4.2   |  31  |  0.63    Ca    9.5      02 Jan 2023 06:08    TPro  8.3  /  Alb  2.5<L>  /  TBili  0.4  /  DBili  x   /  AST  33  /  ALT  29  /  AlkPhos  133<H>  01-02

## 2023-01-03 NOTE — PROGRESS NOTE ADULT - PROBLEM SELECTOR PLAN 3
-Pt takes Metoprolol 25mg BID  - Continue w/ Digoxin 250 mcg daily (started 12/27)  - Cont metoprolol.   - on Eliquis  - Cardio Dr. Sharma follows

## 2023-01-03 NOTE — PROGRESS NOTE ADULT - ASSESSMENT
Confidential Drug Utilization Report  Search Terms: Zeyad Rodríguez, 1955Search Date: 12/30/2022 12:21:15 PM  The Drug Utilization Report below displays all of the controlled substance prescriptions, if any, that your patient has filled in the last twelve months. The information displayed on this report is compiled from pharmacy submissions to the Department, and accurately reflects the information as submitted by the pharmacies.    This report was requested by: Alice Velazquez | Reference #: [id number]    You have not added a MARY number. Keeping your MARY number(s) up to date on the My MARY # page will enable the separation of your prescriptions from others in the search results.    Others' Prescriptions  Patient Name: Zeyad RodríguezBirth Date: 1955  Address: [place], [place] [address]Rvr: Male  Rx Written	Rx Dispensed	Drug	Quantity	Days Supply	Prescriber Name  12/01/2022	12/01/2022	oxycodone-acetaminophen 5-325 mg tab	30	5	Donnie Fletcher  Prescriber Mary # [id number]  Payment Method Insurance  [facility] [employer] [id number]  11/22/2022	11/24/2022	pregabalin 100 mg capsule	90	30	Raphael Lassiter MD  Prescriber Mary # [id number]  Payment Method Insurance  [facility] [employer] [id number]  11/15/2022	11/15/2022	oxycodone-acetaminophen 5-325 mg tab	90	15	Herminia Steele  Prescriber Mary # [id number]  Payment Method Insurance  [facility] [employer] [id number]  11/09/2022	11/10/2022	pregabalin 100 mg capsule	45	15	Donnie Fletcher  Prescriber Mary # [id number]  Payment Method Insurance  [facility] [employer] [id number]  11/01/2022	11/01/2022	oxycodone-acetaminophen 5-325 mg tab	90	15	Raphael Lassiter MD  Prescriber Mary # [id number]  Payment Method Insurance  [facility] [employer] [id number]  10/18/2022	10/18/2022	oxycodone-acetaminophen 5-325 mg tab	60	10	Herminia Steele  Prescriber Mary # [id number]  Payment Method Insurance  [facility] [employer] [id number]  10/18/2022	10/18/2022	pregabalin 100 mg capsule	60	20	Herminia Steele  Prescriber Mary # [id number]  Payment Method Insurance  [facility] [employer] [id number]  09/22/2022	09/23/2022	oxycodone-acetaminophen 5-325 mg tab	90	15	Herminia Steele  Prescriber Mary # [id number]  Payment Method Insurance  [facility] [employer] [id number]  09/19/2022	09/20/2022	pregabalin 100 mg capsule	90	30	Brooke Tipton MD  Prescriber Mary # [id number]  Payment Method Insurance  [facility] [employer] [id number]  09/06/2022	09/06/2022	oxycodone-acetaminophen 5-325 mg tab	90	15	Herminia Steele  Prescriber Mary # [id number]  Payment Method Insurance  [facility] [employer] [id number]  08/25/2022	08/27/2022	oxycodone-acetaminophen 5-325 mg tab	60	10	AvHerminia abel  Prescriber Mary # [id number]  Payment Method Insurance  [facility] [employer] [id number]  08/23/2022	08/23/2022	oxycodone-acetaminophen 5-325 mg tab	30	5	Herminia Steele  Prescriber Mary # [id number]  Payment Method Insurance  [facility] [employer] [id number]  08/18/2022	08/18/2022	pregabalin 100 mg capsule	90	30	SravaniRaphael crow MD  Prescriber Mary # [id number]  Payment Method Insurance  [facility] [employer] [id number]  08/10/2022	08/10/2022	pregabalin 100 mg capsule	30	10	Herminia Steele  Prescriber Mary # [id number]  Payment Method Insurance  [facility] [employer] [id number]  08/02/2022	08/02/2022	oxycodone-acetaminophen 5-325 mg tab	120	20	SravaniRaphael crow MD  Prescriber Mary # [id number]  Payment Method Insurance  [facility] [employer] [id number]  07/29/2022	07/29/2022	pregabalin 100 mg capsule	30	10	Herminia Steele  Prescriber Mary # [id number]  Payment Method Insurance  [facility] [employer] [id number]  07/19/2022	07/19/2022	oxycodone-acetaminophen 5-325 mg tab	90	15	SravaniRaphael crow MD  Prescriber Mary # [id number]  Payment Method Insurance  [facility] [employer] [id number]  07/11/2022	07/11/2022	pregabalin 100 mg capsule	60	20	Brooke Tipton MD  Prescriber Mary # [id number]  Payment Method Insurance  [facility] [employer] [id number]  07/09/2022	07/09/2022	oxycodone-acetaminophen 5-325 mg tab	40	6	Brooke Tipton MD  Prescriber Mary # [id number]  Payment Method Insurance  [facility] [employer] [id number]  06/30/2022	06/30/2022	oxycodone-acetaminophen 5-325 mg tab	45	8	Donnie Fletcher  Prescriber Mary # [id number]  Payment Method Insurance  [facility] [employer] [id number]  06/24/2022	06/24/2022	pregabalin 100 mg capsule	45	15	Donnie Fletcher  Prescriber Mary # [id number]  Payment Method Insurance  [facility] [employer] [id number]  06/21/2022	06/21/2022	oxycodone-acetaminophen 5-325 mg tab	45	15	Donnie Fletcher  Prescriber Mary # [id number]  Payment Method Insurance  [facility] [employer] [id number]  06/08/2022	06/08/2022	pregabalin 100 mg capsule	45	15	Donnie Fletcher  Prescriber Mary # [id number]  Payment Method Insurance  [facility] [employer] [id number]  06/07/2022	06/07/2022	oxycodone-acetaminophen 5-325 mg tab	90	15	SravaniRaphael crow MD  Prescriber Mary # [id number]  Payment Method Insurance  [facility] [employer] [id number]  05/26/2022	05/26/2022	oxycodone-acetaminophen 5-325 mg tab	60	10	Donnie Fletcher  Prescriber Mary # [id number]  Payment Method Insurance  [facility] [employer] [id number]  05/03/2022	05/11/2022	oxycodone-acetaminophen 5-325 mg tab	90	15	SravaniRaphael crow MD  Prescriber Mary # [id number]  Payment Method Insurance  [facility] [employer] [id number]  05/10/2022	05/10/2022	pregabalin 100 mg capsule	90	30	SravaniRaphael crow MD  Prescriber Mary # [id number]  Payment Method Insurance  [facility] [employer] [id number]  05/04/2022	05/05/2022	oxycodone-acetaminophen 5-325 mg tab	45	8	Donnie Fletcher  Prescriber Mary # [id number]  Payment Method Insurance  [facility] [employer] [id number]  04/28/2022	04/28/2022	oxycodone-acetaminophen 5-325 mg tab	30	5	Donnie Fletcher  Prescriber Mary # [id number]  Payment Method Insurance  [facility] [employer] [id number]  04/27/2022	04/27/2022	pregabalin 100 mg capsule	45	15	Donnie Fletcher  Prescriber Mary # [id number]  Payment Method Insurance  [facility] [employer] [id number]  04/12/2022	04/12/2022	oxycodone-acetaminophen 5-325 mg tab	90	15	SravaniRaphael ragsdale MD  Prescriber Mary # [id number]  Payment Method Insurance  [facility] [employer] [id number]  04/12/2022	04/12/2022	pregabalin 100 mg capsule	45	15	SravaniRaphael crow MD  Prescriber Mary # [id number]  Payment Method Insurance  [facility] [employer] [id number]  03/17/2022	03/27/2022	oxycodone-acetaminophen 5-325 mg tab	90	15	SravaniRaphael crow MD  Prescriber Mary # [id number]  Payment Method Other  [facility] [employer] [id number]  03/16/2022	03/19/2022	oxycodone-acetaminophen 5-325 mg tab	60	10	Donnie Fletcher  Prescriber Amry # [id number]  Payment Method Other  [facility] [employer] [id number]  03/05/2022	03/06/2022	oxycodone-acetaminophen 5-325 mg tab	60	10	SravaniRaphael crow MD  Prescriber Mary # [id number]  Payment Method Other  [facility] [employer] [id number]  03/06/2022	03/06/2022	pregabalin 100 mg capsule	15	5	Ramirez Martinez DMD  Prescriber Mary # [id number]  Payment Method Other  [facility] [employer] [id number]  02/23/2022	02/24/2022	oxycodone-acetaminophen 5-325 mg tab	45	8	Donnie Fletcher  Prescriber Mary # [id number]  Payment Method Other  [facility] [employer] [id number]  02/21/2022	02/21/2022	pregabalin 100 mg capsule	45	15	Donnie Fletcher  Prescriber Mary # [id number]  Payment Method Other  [facility] [employer] [id number]  02/17/2022	02/18/2022	oxycodone-acetaminophen 5-325 mg tab	30	5	Donnie Fletcher  Prescriber Mary # [id number]  Payment Method Other  [facility] [employer] [id number]  02/08/2022	02/08/2022	oxycodone-acetaminophen 5-325 mg tab	60	10	Donnie Fletcher  Prescriber Mary # [id number]  Payment Method Other  [facility] [employer] [id number]  01/24/2022	01/24/2022	oxycodone-acetaminophen 5-325 mg tab	30	5	Donnie Fletcher  Prescriber Mary # [id number]  Payment Method Other  [facility] [employer] [id number]  12/17/2021	01/22/2022	pregabalin 100 mg capsule	45	15	Donnie Fletcher  Prescriber Mary # [id number]  Payment Method Other  [facility] [employer] [id number]  01/13/2022	01/14/2022	oxycodone-acetaminophen 5-325 mg tab	60	10	Raphael Lassiter MD  Prescriber Mary # [id number]  Payment Method Other  [facility] [employer] [id number]  01/03/2022	01/03/2022	oxycodone-acetaminophen 5-325 mg tab	45	8	Donnie Fletcher  Prescriber Mary # [id number]  Payment Method Other  [facility] [employer] [id number]  * - Drugs marked with an asterisk are compound drugs. If the compound drug is made up of more than one controlled substance, then each controlled substance will be a separate row in the table.

## 2023-01-03 NOTE — PROGRESS NOTE ADULT - PROBLEM SELECTOR PLAN 7
- Patient is from [facility]  - repeat blood cultures 12/25 NGTD   - Comp Ceftin and Flagyl   - JANINE planned  12/30, but cancelled due to COVID + (12/29).    - will likely return back to [facility] once optimized. will need quarantine per facility. til 1/6/23 x 8 more days.

## 2023-01-03 NOTE — PROGRESS NOTE ADULT - SUBJECTIVE AND OBJECTIVE BOX
Source of information: IRENE MALHOTRA, Chart review  Patient language: English  : n/a    HPI:  Patient is a 67 year old male, vaccinated, from [facility] with PMHx of DM, HTN, HLD, AF on Eliquis, PVD, presents with c/o vomiting and right sided abdominal pain. patient states yesterday after lunch he had a sharp right sided abdominal pain 8/10 persistent with nausea and vomiting. He also endorsed chills and rigors, unsure if he spiked a fever. He denies any changes in bowel habits, chest pain, shortness of breath or palpitations. Patient had two episodes of melena in ED. He was discharged yesterday from hospital. He had choledocholithiasis,  CT A/P showed: Cholecystectomy with dilated CBD up to 1.7 cm abrupt cut off of the distal CBD and  mild pancreatic duct dilatation. 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 He underwent  ERCP on 12/16 biliary sludge removal with biliary sphincterotomy and balloon extraction. He was discharged after symptom improvement and out patient gastric emptying study.     In ED:   Vitals: BP: 81/59mmHg  HR:  118  RR: 97% RA   WBC 14.7 k  elevated LFT, lactate   s/p  Rocephin 1LNS and zofran  (22 Dec 2022 12:00)    CTAP demonstrates- No bowel obstruction or grossly thickened bowel wall. Colonic diverticulosis without evidence for diverticulitis. Moderate amount of stool in the rectum and distal sigmoid colon.. Appendix not visualized. No secondary signs for acute appendicitis.  Dilatation of the common bile duct and main pancreatic duct again noted.. Please see recent abdominal MR dated 12/12/2022. If clinically indicated, endoscopic ultrasound may be pursued to rule out a small obstructive ampullary tumor or pancreatic head mass.  New airspace opacifications in the right lower lung zone for which clinical correlation with pneumonia is recommended.    US of abdomen demonstrates- Biliary dilatation in this patient with prior cholecystectomy. 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 Nonvisualized spleen.    Dx with cholangitis. Hx of ERCP on 12/23, found to have bleeding to sphincterotomy site, 2 clips and 1 stent placed in common bile duct to facilitate tamponade the bleeding and facilitate drainage. Pain consulted for abdominal pain and opioid dependence on 12/30. Pt on Percocet 5-325mg 6 tabs/ day PRN, and lyrica 100mg PO TID per istop review.   Pt seen and examined at bedside. Pt laying in bed. Airborne/ Contact precautions maintained, + COVID on 12/29. Pt reports b/l foot pain, left> right, chronic rating PAIN SCORE:  8/10 and tolerable with current pain regimen SCALE USED: (1-10 VNRS). Pain is adequately managed on current pain regimen. Pt describes pain as constant, burning, radiating throughout b/l feet, alleviated by chronic pain medications lyrica and percocet, exacerbated by movement and palpation. Reports mild abdominal discomfort today, denies pain. Pt tolerating PO diet. Pt denies chest pain, SOB, nausea, vomiting. Reports lethargy and hx constipation, last BM 1/3. + urinary/ bowel incontinence. + frequent productive cough with yellow sputum. Patient stated goal for pain control: to be able to take deep breaths, get out of bed to chair with tolerable pain control.     PAST MEDICAL & SURGICAL HISTORY:  COPD (chronic obstructive pulmonary disease)      Diabetes      PVD (peripheral vascular disease)      Chronic atrial fibrillation      GERD (gastroesophageal reflux disease)      MDD (major depressive disorder)      BPH (benign prostatic hyperplasia)          FAMILY HISTORY:      Social History:   [X]Denies ETOH use, illicit drug use, and smoking     Allergies    morphine (Unknown)  penicillin (Unknown)  shellfish (Unknown)    MEDICATIONS  (STANDING):  apixaban 5 milliGRAM(s) Oral every 12 hours  atorvastatin 10 milliGRAM(s) Oral at bedtime  digoxin     Tablet 250 MICROGram(s) Oral daily  finasteride 5 milliGRAM(s) Oral daily  insulin lispro (ADMELOG) corrective regimen sliding scale   SubCutaneous three times a day before meals  insulin lispro (ADMELOG) corrective regimen sliding scale   SubCutaneous at bedtime  metoprolol tartrate 25 milliGRAM(s) Oral two times a day  metroNIDAZOLE    Tablet 500 milliGRAM(s) Oral every 8 hours  pantoprazole    Tablet 40 milliGRAM(s) Oral before breakfast  polyethylene glycol 3350 17 Gram(s) Oral two times a day  pregabalin 100 milliGRAM(s) Oral every 8 hours  senna 2 Tablet(s) Oral at bedtime  tamsulosin      tamsulosin 0.8 milliGRAM(s) Oral at bedtime    MEDICATIONS  (PRN):  aluminum hydroxide/magnesium hydroxide/simethicone Suspension 30 milliLiter(s) Oral every 4 hours PRN Dyspepsia  baclofen 5 milliGRAM(s) Oral every 12 hours PRN Musculoskeletal Pain  melatonin 3 milliGRAM(s) Oral at bedtime PRN Insomnia  ondansetron Injectable 4 milliGRAM(s) IV Push every 8 hours PRN Nausea and/or Vomiting  oxycodone    5 mG/acetaminophen 325 mG 1 Tablet(s) Oral every 4 hours PRN Severe Pain (7 - 10)  sodium chloride 0.9% lock flush 10 milliLiter(s) IV Push every 1 hour PRN Pre/post blood products, medications, blood draw, and to maintain line patency      Vital Signs Last 24 Hrs  T(C): 36.4 (03 Jan 2023 06:12), Max: 36.7 (02 Jan 2023 20:15)  T(F): 97.5 (03 Jan 2023 06:12), Max: 98.1 (02 Jan 2023 20:15)  HR: 102 (03 Jan 2023 06:12) (96 - 106)  BP: 104/69 (03 Jan 2023 06:12) (104/69 - 129/73)  BP(mean): --  RR: 18 (03 Jan 2023 06:12) (18 - 18)  SpO2: 91% (03 Jan 2023 06:12) (91% - 96%)    Parameters below as of 03 Jan 2023 06:12  Patient On (Oxygen Delivery Method): nasal cannula  O2 Flow (L/min): 2    COVID-19 PCR: Detected (29 Dec 2022 23:00)  COVID-19 PCR: NotDetec (20 Dec 2022 10:35)  COVID-19 PCR: NotDetec (13 Dec 2022 11:25)  COVID-19 PCR: NotDetec (08 Dec 2022 12:13)    LABS: Reviewed                          11.7   6.00  )-----------( 668      ( 02 Jan 2023 06:08 )             36.8     01-02    135  |  94<L>  |  12  ----------------------------<  132<H>  4.2   |  31  |  0.63    Ca    9.5      02 Jan 2023 06:08    TPro  8.3  /  Alb  2.5<L>  /  TBili  0.4  /  DBili  x   /  AST  33  /  ALT  29  /  AlkPhos  133<H>  01-02      LIVER FUNCTIONS - ( 02 Jan 2023 06:08 )  Alb: 2.5 g/dL / Pro: 8.3 g/dL / ALK PHOS: 133 U/L / ALT: 29 U/L DA / AST: 33 U/L / GGT: x             CAPILLARY BLOOD GLUCOSE      POCT Blood Glucose.: 170 mg/dL (03 Jan 2023 11:43)  POCT Blood Glucose.: 123 mg/dL (03 Jan 2023 07:41)  POCT Blood Glucose.: 140 mg/dL (02 Jan 2023 21:48)  POCT Blood Glucose.: 120 mg/dL (02 Jan 2023 16:50)    Radiology: Reviewed  < from: US Abdomen Complete (US Abdomen Complete .) (12.22.22 @ 09:48) >    ACC: [id number] EXAM:  US ABDOMEN COMPLETE                          PROCEDURE DATE:  12/22/2022          INTERPRETATION:  CLINICAL INFORMATION: Abdominal pain and vomiting.    COMPARISON: CT and MRI dated 12/8/2022 and 12/12/2022.    TECHNIQUE: Sonography of the abdomen. Technically difficult and limited   study due to patient's body habitus.    FINDINGS:  Liver: Increased echogenicity. No evidence of a focal hepatic mass   lesion. Normal portal and hepatic venous flow.  Bile ducts: Dilated. Common bile duct measures 1.5 cm.  Gallbladder: Cholecystectomy.  Pancreas: Visualized portions are within normal limits.  Spleen: Not visualized.  Right kidney: 8.6 cm. No hydronephrosis.  Left kidney: 10 cm. No hydronephrosis.  Ascites: None.  Aorta andIVC: Visualized portions are within normal limits.    IMPRESSION:  Biliary dilatation in this patient with prior cholecystectomy. 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  Hepatic steatosis.  Nonvisualized spleen.        --- End of Report ---            MAGNO SARMIENTO MD; Attending Radiologist  This document has been electronically signed. Dec 22 2022 10:23AM    < end of copied text >    < from: CT Abdomen and Pelvis No Cont (12.22.22 @ 10:37) >  ACC: [id number] EXAM:  CT ABDOMEN AND PELVIS                          PROCEDURE DATE:  12/22/2022          INTERPRETATION:  CLINICAL INFORMATION: Abdominal pain    COMPARISON: Abdominal CT dated 12/8/2022    CONTRAST/COMPLICATIONS:  IV Contrast: NONE  Oral Contrast: NONE  Complications: None reported at time of study completion    PROCEDURE:  CT of the Abdomen and Pelvis was performed.  Sagittal and coronal reformats were performed.    FINDINGS: Evaluation of the abdomen and pelvis is limited by lack of IV   contrast.  LOWER CHEST: New airspace opacifications in the right lower lung zone for   which clinical correlation with pneumonia is recommended.    LIVER: Stable small hypodense area in the left hepatic lobe. Please see   recent abdominalMR dated 12/12/2022 for further characterization.  BILE DUCTS: Dilatation of the common bile duct is again noted.  GALLBLADDER: Stable cholecystectomy clips.  SPLEEN: Within normal limits.  PANCREAS: The pancreatic parenchyma appears unremarkable. Mild dilatation   of the main pancreatic duct in the pancreatic head is again noted.  ADRENALS: Within normal limits.  KIDNEYS/URETERS: Small hypodense lesion in the left kidney, likely   representing a cyst. The right kidney appears unremarkable. No evidence   for a calculus in the kidneys or ureters. No hydronephrosis.    BLADDER: Underdistended.  REPRODUCTIVE ORGANS: The prostate and seminal vesicles appear grossly   unremarkable.    BOWEL: No bowel obstruction or grossly thickened bowel wall. Colonic   diverticulosis without evidence for diverticulitis. Moderate amount of   stool in the rectum and distal sigmoid colon.. Appendix not visualized.   No secondary signs for acute appendicitis.  PERITONEUM: No free air or ascites.  VESSELS: Calcified atherosclerotic disease.  RETROPERITONEUM/LYMPH NODES: No lymphadenopathy.  ABDOMINAL WALL: Small fat-containing ventral hernia.  BONES: Degenerative spondylosis.    IMPRESSION: No bowel obstruction or grossly thickened bowel wall. Colonic   diverticulosis without evidence for diverticulitis. Moderate amount of   stool in the rectum and distal sigmoid colon.. Appendix not visualized.   No secondary signs for acute appendicitis.    Dilatation of the common bile duct and main pancreatic duct again noted..   Please see recent abdominal MR dated 12/12/2022. If clinically indicated,   endoscopic ultrasound may be pursued to rule out a small obstructive   ampullary tumor or pancreatic head mass.    New airspace opacifications in the right lower lung zone for which   clinical correlation with pneumonia is recommended.    --- End of Report ---            CARIDAD WILLIAM MD; Attending Radiologist  This document has been electronically signed. Dec 22 2022 11:23AM    < end of copied text >    < from: Xray Chest 1 View- PORTABLE-Routine (Xray Chest 1 View- PORTABLE-Routine in AM.) (12.25.22 @ 09:41) >    ACC: [id number] EXAM:  XR CHEST PORTABLE ROUTINE 1V                          PROCEDURE DATE:  12/25/2022          INTERPRETATION:  Portable chest radiograph    CLINICAL INFORMATION: ICU follow-up.    TECHNIQUE:  Portable  AP chest radiograph.    COMPARISON: 12/22/2022 chest x-ray .    FINDINGS:  CATHETERS AND TUBES: None    PULMONARY: Increasing RIGHT lower lobe diffuse airspace disease.  Remaining lung segments grossly clear..   No pneumothorax.    HEART/VASCULAR: The heart and mediastinum size and configuration are   within normal limits.    BONES: Visualized osseous structures are intact.    IMPRESSION:   Increasing RIGHT lower zone diffuse airspace   disease/consolidation...    --- End of Report ---            FREDDY QUINTANILLA MD; Attending Radiologist  This document has been electronically signed. Dec 26 2022 11:12AM    < end of copied text >      ORT Score -   Family Hx of substance abuse	Female	      Male  Alcohol 	                                           1                     3  Illegal drugs	                                   2                     3  Rx drugs                                           4 	                  4  Personal Hx of substance abuse		  Alcohol 	                                          3	                  3  Illegal drugs                                     4	                  4  Rx drugs                                            5 	                  5  Age between 16- 45 years	           1                     1  hx preadolescent sexual abuse	   3 	                  0  Psychological disease		  ADD, OCD, bipolar, schizophrenia   2	          2  Depression                                           1 	          1  Total: 1    a score of 3 or lower indicates low risk for opioid abuse		  a score of 4-7 indicates moderate risk for opioid abuse		  a score of 8 or higher indicates high risk for opioid abuse    REVIEW OF SYSTEMS:  CONSTITUTIONAL: No fever + fatigue  HEENT:  + difficulty hearing, no change in vision  RESPIRATORY: + frequent productive cough with yellow sputum No wheezing, chills or hemoptysis; No shortness of breath + hx COPD  CARDIOVASCULAR: No chest pain, palpitations, dizziness, or leg swelling  GASTROINTESTINAL: No loss of appetite, decreased PO intake. + mild abdominal discomfort. No nausea, vomiting; No diarrhea + hx constipation. + fecal incontinence   GENITOURINARY: + dysuria, No frequency, hematuria, retention + incontinence  MUSCULOSKELETAL: + chronic foot pain, no joint swelling; + lower motor strength weakness, no saddle anesthesia, + bowel/bladder incontinence, no falls   NEURO: No headaches, + chronic numbness/tingling b/l LE Left> right   PSYCHIATRIC: + depression     PHYSICAL EXAM:  GENERAL:  Alert & Oriented X4, cooperative, NAD, Good concentration. Speech is clear.   RESPIRATORY: Respirations even and unlabored. + diminished to auscultation bilaterally; No rales, rhonchi, wheezing, or rubs. O2 2L NC in place.   CARDIOVASCULAR: Normal S1/S2, regular rate and rhythm; No murmurs, rubs, or gallops. No JVD.   GASTROINTESTINAL:  Soft, Nontender, Nondistended; Bowel sounds present  PERIPHERAL VASCULAR:  Extremities warm without edema. 2+ Peripheral Pulses, No cyanosis, No calf tenderness  MUSCULOSKELETAL: Motor Strength 4/5 B/L upper and 2/5 b/l lower extremities; + decreased b/l LE ROM; + b/l feet tenderness on palpation   SKIN: Warm, dry, intact. No rashes, lesions, scars or wounds.     Risk factors associated with adverse outcomes related to opioid treatment  [ ]  Concurrent benzodiazepine use  [ ]  History/ Active substance use or alcohol use disorder  [X ] Psychiatric co-morbidity  [ ] Sleep apnea  [X ] COPD  [ ] BMI> 35  [ ] Liver dysfunction  [ ] Renal dysfunction  [ ] CHF  [ ] Smoker  [X ]  Age > 60 years    [X ]  [place]  Reviewed and Copied to Chart. See below.    Plan of care and goal oriented pain management treatment options were discussed with patient and /or primary care giver; all questions and concerns were addressed and care was aligned with patient's wishes.    Educated patient on goal oriented pain management treatment options     01-03-23 @ 12:55         Source of information: IRENE MALHOTRA, Chart review  Patient language: English  : n/a    HPI:  Patient is a 67 year old male, vaccinated, from [facility] with PMHx of DM, HTN, HLD, AF on Eliquis, PVD, presents with c/o vomiting and right sided abdominal pain. patient states yesterday after lunch he had a sharp right sided abdominal pain 8/10 persistent with nausea and vomiting. He also endorsed chills and rigors, unsure if he spiked a fever. He denies any changes in bowel habits, chest pain, shortness of breath or palpitations. Patient had two episodes of melena in ED. He was discharged yesterday from hospital. He had choledocholithiasis,  CT A/P showed: Cholecystectomy with dilated CBD up to 1.7 cm abrupt cut off of the distal CBD and  mild pancreatic duct dilatation. 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 He underwent  ERCP on 12/16 biliary sludge removal with biliary sphincterotomy and balloon extraction. He was discharged after symptom improvement and out patient gastric emptying study.     In ED:   Vitals: BP: 81/59mmHg  HR:  118  RR: 97% RA   WBC 14.7 k  elevated LFT, lactate   s/p  Rocephin 1LNS and zofran  (22 Dec 2022 12:00)    CTAP demonstrates- No bowel obstruction or grossly thickened bowel wall. Colonic diverticulosis without evidence for diverticulitis. Moderate amount of stool in the rectum and distal sigmoid colon.. Appendix not visualized. No secondary signs for acute appendicitis.  Dilatation of the common bile duct and main pancreatic duct again noted.. Please see recent abdominal MR dated 12/12/2022. If clinically indicated, endoscopic ultrasound may be pursued to rule out a small obstructive ampullary tumor or pancreatic head mass.  New airspace opacifications in the right lower lung zone for which clinical correlation with pneumonia is recommended.    US of abdomen demonstrates- Biliary dilatation in this patient with prior cholecystectomy. 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 Nonvisualized spleen.    Dx with cholangitis. Hx of ERCP on 12/23, found to have bleeding to sphincterotomy site, 2 clips and 1 stent placed in common bile duct to facilitate tamponade the bleeding and facilitate drainage. Pain consulted for abdominal pain and opioid dependence on 12/30. Pt on Percocet 5-325mg 6 tabs/ day PRN, and lyrica 100mg PO TID per istop review.   Pt seen and examined at bedside. Pt laying in bed. Airborne/ Contact precautions maintained, + COVID on 12/29. Pt reports b/l foot pain, left> right, chronic rating PAIN SCORE:  8/10 and tolerable with current pain regimen SCALE USED: (1-10 VNRS). Pain is adequately managed on current pain regimen. Pt describes pain as constant, burning, radiating throughout b/l feet, alleviated by chronic pain medications lyrica and percocet, exacerbated by movement and palpation. Reports mild abdominal discomfort today, denies pain. Pt tolerating PO diet. Pt denies chest pain, SOB, nausea, vomiting. Reports lethargy and hx constipation, last BM 1/3. + urinary/ bowel incontinence. + frequent productive cough with yellow sputum. Patient stated goal for pain control: to be able to take deep breaths, get out of bed to chair with tolerable pain control.     PAST MEDICAL & SURGICAL HISTORY:  COPD (chronic obstructive pulmonary disease)      Diabetes      PVD (peripheral vascular disease)      Chronic atrial fibrillation      GERD (gastroesophageal reflux disease)      MDD (major depressive disorder)      BPH (benign prostatic hyperplasia)          FAMILY HISTORY:      Social History:   [X]Denies ETOH use, illicit drug use, and smoking     Allergies    morphine (Unknown)  penicillin (Unknown)  shellfish (Unknown)    MEDICATIONS  (STANDING):  apixaban 5 milliGRAM(s) Oral every 12 hours  atorvastatin 10 milliGRAM(s) Oral at bedtime  digoxin     Tablet 250 MICROGram(s) Oral daily  finasteride 5 milliGRAM(s) Oral daily  insulin lispro (ADMELOG) corrective regimen sliding scale   SubCutaneous three times a day before meals  insulin lispro (ADMELOG) corrective regimen sliding scale   SubCutaneous at bedtime  metoprolol tartrate 25 milliGRAM(s) Oral two times a day  metroNIDAZOLE    Tablet 500 milliGRAM(s) Oral every 8 hours  pantoprazole    Tablet 40 milliGRAM(s) Oral before breakfast  polyethylene glycol 3350 17 Gram(s) Oral two times a day  pregabalin 100 milliGRAM(s) Oral every 8 hours  senna 2 Tablet(s) Oral at bedtime  tamsulosin      tamsulosin 0.8 milliGRAM(s) Oral at bedtime    MEDICATIONS  (PRN):  aluminum hydroxide/magnesium hydroxide/simethicone Suspension 30 milliLiter(s) Oral every 4 hours PRN Dyspepsia  baclofen 5 milliGRAM(s) Oral every 12 hours PRN Musculoskeletal Pain  melatonin 3 milliGRAM(s) Oral at bedtime PRN Insomnia  ondansetron Injectable 4 milliGRAM(s) IV Push every 8 hours PRN Nausea and/or Vomiting  oxycodone    5 mG/acetaminophen 325 mG 1 Tablet(s) Oral every 4 hours PRN Severe Pain (7 - 10)  sodium chloride 0.9% lock flush 10 milliLiter(s) IV Push every 1 hour PRN Pre/post blood products, medications, blood draw, and to maintain line patency      Vital Signs Last 24 Hrs  T(C): 36.4 (03 Jan 2023 06:12), Max: 36.7 (02 Jan 2023 20:15)  T(F): 97.5 (03 Jan 2023 06:12), Max: 98.1 (02 Jan 2023 20:15)  HR: 102 (03 Jan 2023 06:12) (96 - 106)  BP: 104/69 (03 Jan 2023 06:12) (104/69 - 129/73)  BP(mean): --  RR: 18 (03 Jan 2023 06:12) (18 - 18)  SpO2: 91% (03 Jan 2023 06:12) (91% - 96%)    Parameters below as of 03 Jan 2023 06:12  Patient On (Oxygen Delivery Method): nasal cannula  O2 Flow (L/min): 2    COVID-19 PCR: Detected (29 Dec 2022 23:00)  COVID-19 PCR: NotDetec (20 Dec 2022 10:35)  COVID-19 PCR: NotDetec (13 Dec 2022 11:25)  COVID-19 PCR: NotDetec (08 Dec 2022 12:13)    LABS: Reviewed                          11.7   6.00  )-----------( 668      ( 02 Jan 2023 06:08 )             36.8     01-02    135  |  94<L>  |  12  ----------------------------<  132<H>  4.2   |  31  |  0.63    Ca    9.5      02 Jan 2023 06:08    TPro  8.3  /  Alb  2.5<L>  /  TBili  0.4  /  DBili  x   /  AST  33  /  ALT  29  /  AlkPhos  133<H>  01-02      LIVER FUNCTIONS - ( 02 Jan 2023 06:08 )  Alb: 2.5 g/dL / Pro: 8.3 g/dL / ALK PHOS: 133 U/L / ALT: 29 U/L DA / AST: 33 U/L / GGT: x             CAPILLARY BLOOD GLUCOSE      POCT Blood Glucose.: 170 mg/dL (03 Jan 2023 11:43)  POCT Blood Glucose.: 123 mg/dL (03 Jan 2023 07:41)  POCT Blood Glucose.: 140 mg/dL (02 Jan 2023 21:48)  POCT Blood Glucose.: 120 mg/dL (02 Jan 2023 16:50)    Radiology: Reviewed  < from: US Abdomen Complete (US Abdomen Complete .) (12.22.22 @ 09:48) >    ACC: [id number] EXAM:  US ABDOMEN COMPLETE                          PROCEDURE DATE:  12/22/2022          INTERPRETATION:  CLINICAL INFORMATION: Abdominal pain and vomiting.    COMPARISON: CT and MRI dated 12/8/2022 and 12/12/2022.    TECHNIQUE: Sonography of the abdomen. Technically difficult and limited   study due to patient's body habitus.    FINDINGS:  Liver: Increased echogenicity. No evidence of a focal hepatic mass   lesion. Normal portal and hepatic venous flow.  Bile ducts: Dilated. Common bile duct measures 1.5 cm.  Gallbladder: Cholecystectomy.  Pancreas: Visualized portions are within normal limits.  Spleen: Not visualized.  Right kidney: 8.6 cm. No hydronephrosis.  Left kidney: 10 cm. No hydronephrosis.  Ascites: None.  Aorta andIVC: Visualized portions are within normal limits.    IMPRESSION:  Biliary dilatation in this patient with prior cholecystectomy. 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  Hepatic steatosis.  Nonvisualized spleen.        --- End of Report ---            MAGNO SARMIENTO MD; Attending Radiologist  This document has been electronically signed. Dec 22 2022 10:23AM    < end of copied text >    < from: CT Abdomen and Pelvis No Cont (12.22.22 @ 10:37) >  ACC: [id number] EXAM:  CT ABDOMEN AND PELVIS                          PROCEDURE DATE:  12/22/2022          INTERPRETATION:  CLINICAL INFORMATION: Abdominal pain    COMPARISON: Abdominal CT dated 12/8/2022    CONTRAST/COMPLICATIONS:  IV Contrast: NONE  Oral Contrast: NONE  Complications: None reported at time of study completion    PROCEDURE:  CT of the Abdomen and Pelvis was performed.  Sagittal and coronal reformats were performed.    FINDINGS: Evaluation of the abdomen and pelvis is limited by lack of IV   contrast.  LOWER CHEST: New airspace opacifications in the right lower lung zone for   which clinical correlation with pneumonia is recommended.    LIVER: Stable small hypodense area in the left hepatic lobe. Please see   recent abdominalMR dated 12/12/2022 for further characterization.  BILE DUCTS: Dilatation of the common bile duct is again noted.  GALLBLADDER: Stable cholecystectomy clips.  SPLEEN: Within normal limits.  PANCREAS: The pancreatic parenchyma appears unremarkable. Mild dilatation   of the main pancreatic duct in the pancreatic head is again noted.  ADRENALS: Within normal limits.  KIDNEYS/URETERS: Small hypodense lesion in the left kidney, likely   representing a cyst. The right kidney appears unremarkable. No evidence   for a calculus in the kidneys or ureters. No hydronephrosis.    BLADDER: Underdistended.  REPRODUCTIVE ORGANS: The prostate and seminal vesicles appear grossly   unremarkable.    BOWEL: No bowel obstruction or grossly thickened bowel wall. Colonic   diverticulosis without evidence for diverticulitis. Moderate amount of   stool in the rectum and distal sigmoid colon.. Appendix not visualized.   No secondary signs for acute appendicitis.  PERITONEUM: No free air or ascites.  VESSELS: Calcified atherosclerotic disease.  RETROPERITONEUM/LYMPH NODES: No lymphadenopathy.  ABDOMINAL WALL: Small fat-containing ventral hernia.  BONES: Degenerative spondylosis.    IMPRESSION: No bowel obstruction or grossly thickened bowel wall. Colonic   diverticulosis without evidence for diverticulitis. Moderate amount of   stool in the rectum and distal sigmoid colon.. Appendix not visualized.   No secondary signs for acute appendicitis.    Dilatation of the common bile duct and main pancreatic duct again noted..   Please see recent abdominal MR dated 12/12/2022. If clinically indicated,   endoscopic ultrasound may be pursued to rule out a small obstructive   ampullary tumor or pancreatic head mass.    New airspace opacifications in the right lower lung zone for which   clinical correlation with pneumonia is recommended.    --- End of Report ---            CARIDAD WILLIAM MD; Attending Radiologist  This document has been electronically signed. Dec 22 2022 11:23AM    < end of copied text >    < from: Xray Chest 1 View- PORTABLE-Routine (Xray Chest 1 View- PORTABLE-Routine in AM.) (12.25.22 @ 09:41) >    ACC: [id number] EXAM:  XR CHEST PORTABLE ROUTINE 1V                          PROCEDURE DATE:  12/25/2022          INTERPRETATION:  Portable chest radiograph    CLINICAL INFORMATION: ICU follow-up.    TECHNIQUE:  Portable  AP chest radiograph.    COMPARISON: 12/22/2022 chest x-ray .    FINDINGS:  CATHETERS AND TUBES: None    PULMONARY: Increasing RIGHT lower lobe diffuse airspace disease.  Remaining lung segments grossly clear..   No pneumothorax.    HEART/VASCULAR: The heart and mediastinum size and configuration are   within normal limits.    BONES: Visualized osseous structures are intact.    IMPRESSION:   Increasing RIGHT lower zone diffuse airspace   disease/consolidation...    --- End of Report ---            FREDDY QUINTANILLA MD; Attending Radiologist  This document has been electronically signed. Dec 26 2022 11:12AM    < end of copied text >      ORT Score -   Family Hx of substance abuse	Female	      Male  Alcohol 	                                           1                     3  Illegal drugs	                                   2                     3  Rx drugs                                           4 	                  4  Personal Hx of substance abuse		  Alcohol 	                                          3	                  3  Illegal drugs                                     4	                  4  Rx drugs                                            5 	                  5  Age between 16- 45 years	           1                     1  hx preadolescent sexual abuse	   3 	                  0  Psychological disease		  ADD, OCD, bipolar, schizophrenia   2	          2  Depression                                           1 	          1  Total: 1    a score of 3 or lower indicates low risk for opioid abuse		  a score of 4-7 indicates moderate risk for opioid abuse		  a score of 8 or higher indicates high risk for opioid abuse    REVIEW OF SYSTEMS:  CONSTITUTIONAL: No fever + fatigue  HEENT:  + difficulty hearing, no change in vision  RESPIRATORY: + frequent productive cough with yellow sputum No wheezing, chills or hemoptysis; No shortness of breath + hx COPD  CARDIOVASCULAR: No chest pain, palpitations, dizziness, or leg swelling  GASTROINTESTINAL: No loss of appetite, decreased PO intake. + mild abdominal discomfort. No nausea, vomiting; No diarrhea + hx constipation. + fecal incontinence   GENITOURINARY: + dysuria, No frequency, hematuria, retention + incontinence  MUSCULOSKELETAL: + chronic foot pain, no joint swelling; + lower motor strength weakness, no saddle anesthesia, + bowel/bladder incontinence, no falls   NEURO: No headaches, + chronic numbness/tingling b/l LE Left> right   PSYCHIATRIC: + depression     PHYSICAL EXAM:  GENERAL:  Alert & Oriented X4, cooperative, NAD, Good concentration. Speech is clear.   RESPIRATORY: Respirations even and unlabored. + diminished to auscultation bilaterally; No rales, rhonchi, wheezing, or rubs. O2 2L NC in place.   CARDIOVASCULAR: Normal S1/S2, + tachycardia; No murmurs, rubs, or gallops. No JVD.   GASTROINTESTINAL:  Soft, Nontender, Nondistended; Bowel sounds present  PERIPHERAL VASCULAR:  Extremities warm without edema. 2+ Peripheral Pulses, No cyanosis, No calf tenderness  MUSCULOSKELETAL: Motor Strength 4/5 B/L upper and 2/5 b/l lower extremities; + decreased b/l LE ROM; + b/l feet tenderness on palpation   SKIN: Warm, dry, intact. No rashes, lesions, scars or wounds.     Risk factors associated with adverse outcomes related to opioid treatment  [ ]  Concurrent benzodiazepine use  [ ]  History/ Active substance use or alcohol use disorder  [X ] Psychiatric co-morbidity  [ ] Sleep apnea  [X ] COPD  [ ] BMI> 35  [ ] Liver dysfunction  [ ] Renal dysfunction  [ ] CHF  [ ] Smoker  [X ]  Age > 60 years    [X ]  [place]  Reviewed and Copied to Chart. See below.    Plan of care and goal oriented pain management treatment options were discussed with patient and /or primary care giver; all questions and concerns were addressed and care was aligned with patient's wishes.    Educated patient on goal oriented pain management treatment options     01-03-23 @ 12:55

## 2023-01-04 LAB
ANION GAP SERPL CALC-SCNC: 7 MMOL/L — SIGNIFICANT CHANGE UP (ref 5–17)
BUN SERPL-MCNC: 16 MG/DL — SIGNIFICANT CHANGE UP (ref 7–18)
CALCIUM SERPL-MCNC: 8.6 MG/DL — SIGNIFICANT CHANGE UP (ref 8.4–10.5)
CHLORIDE SERPL-SCNC: 96 MMOL/L — SIGNIFICANT CHANGE UP (ref 96–108)
CO2 SERPL-SCNC: 32 MMOL/L — HIGH (ref 22–31)
CREAT SERPL-MCNC: 0.67 MG/DL — SIGNIFICANT CHANGE UP (ref 0.5–1.3)
EGFR: 102 ML/MIN/1.73M2 — SIGNIFICANT CHANGE UP
GLUCOSE BLDC GLUCOMTR-MCNC: 104 MG/DL — HIGH (ref 70–99)
GLUCOSE BLDC GLUCOMTR-MCNC: 110 MG/DL — HIGH (ref 70–99)
GLUCOSE BLDC GLUCOMTR-MCNC: 141 MG/DL — HIGH (ref 70–99)
GLUCOSE BLDC GLUCOMTR-MCNC: 98 MG/DL — SIGNIFICANT CHANGE UP (ref 70–99)
GLUCOSE SERPL-MCNC: 109 MG/DL — HIGH (ref 70–99)
HCT VFR BLD CALC: 33.9 % — LOW (ref 39–50)
HGB BLD-MCNC: 10.8 G/DL — LOW (ref 13–17)
MCHC RBC-ENTMCNC: 30.8 PG — SIGNIFICANT CHANGE UP (ref 27–34)
MCHC RBC-ENTMCNC: 31.9 GM/DL — LOW (ref 32–36)
MCV RBC AUTO: 96.6 FL — SIGNIFICANT CHANGE UP (ref 80–100)
NRBC # BLD: 0 /100 WBCS — SIGNIFICANT CHANGE UP (ref 0–0)
PLATELET # BLD AUTO: 536 K/UL — HIGH (ref 150–400)
POTASSIUM SERPL-MCNC: 4.1 MMOL/L — SIGNIFICANT CHANGE UP (ref 3.5–5.3)
POTASSIUM SERPL-SCNC: 4.1 MMOL/L — SIGNIFICANT CHANGE UP (ref 3.5–5.3)
RBC # BLD: 3.51 M/UL — LOW (ref 4.2–5.8)
RBC # FLD: 15.4 % — HIGH (ref 10.3–14.5)
SODIUM SERPL-SCNC: 135 MMOL/L — SIGNIFICANT CHANGE UP (ref 135–145)
WBC # BLD: 7.64 K/UL — SIGNIFICANT CHANGE UP (ref 3.8–10.5)
WBC # FLD AUTO: 7.64 K/UL — SIGNIFICANT CHANGE UP (ref 3.8–10.5)

## 2023-01-04 PROCEDURE — 99232 SBSQ HOSP IP/OBS MODERATE 35: CPT

## 2023-01-04 PROCEDURE — 71045 X-RAY EXAM CHEST 1 VIEW: CPT | Mod: 26

## 2023-01-04 RX ADMIN — OXYCODONE AND ACETAMINOPHEN 1 TABLET(S): 5; 325 TABLET ORAL at 21:02

## 2023-01-04 RX ADMIN — OXYCODONE AND ACETAMINOPHEN 1 TABLET(S): 5; 325 TABLET ORAL at 11:10

## 2023-01-04 RX ADMIN — Medication 100 MILLIGRAM(S): at 14:48

## 2023-01-04 RX ADMIN — SENNA PLUS 2 TABLET(S): 8.6 TABLET ORAL at 21:50

## 2023-01-04 RX ADMIN — Medication 250 MICROGRAM(S): at 05:03

## 2023-01-04 RX ADMIN — APIXABAN 5 MILLIGRAM(S): 2.5 TABLET, FILM COATED ORAL at 05:03

## 2023-01-04 RX ADMIN — OXYCODONE AND ACETAMINOPHEN 1 TABLET(S): 5; 325 TABLET ORAL at 20:18

## 2023-01-04 RX ADMIN — POLYETHYLENE GLYCOL 3350 17 GRAM(S): 17 POWDER, FOR SOLUTION ORAL at 05:03

## 2023-01-04 RX ADMIN — OXYCODONE AND ACETAMINOPHEN 1 TABLET(S): 5; 325 TABLET ORAL at 04:43

## 2023-01-04 RX ADMIN — Medication 100 MILLIGRAM(S): at 21:50

## 2023-01-04 RX ADMIN — ATORVASTATIN CALCIUM 10 MILLIGRAM(S): 80 TABLET, FILM COATED ORAL at 21:50

## 2023-01-04 RX ADMIN — OXYCODONE AND ACETAMINOPHEN 1 TABLET(S): 5; 325 TABLET ORAL at 05:50

## 2023-01-04 RX ADMIN — Medication 100 MILLIGRAM(S): at 05:03

## 2023-01-04 RX ADMIN — TAMSULOSIN HYDROCHLORIDE 0.8 MILLIGRAM(S): 0.4 CAPSULE ORAL at 21:50

## 2023-01-04 RX ADMIN — PANTOPRAZOLE SODIUM 40 MILLIGRAM(S): 20 TABLET, DELAYED RELEASE ORAL at 05:03

## 2023-01-04 RX ADMIN — APIXABAN 5 MILLIGRAM(S): 2.5 TABLET, FILM COATED ORAL at 18:18

## 2023-01-04 RX ADMIN — FINASTERIDE 5 MILLIGRAM(S): 5 TABLET, FILM COATED ORAL at 11:21

## 2023-01-04 RX ADMIN — OXYCODONE AND ACETAMINOPHEN 1 TABLET(S): 5; 325 TABLET ORAL at 10:29

## 2023-01-04 NOTE — PROGRESS NOTE ADULT - ASSESSMENT
67 year old male from [facility] with past medical history of DM, HTN, HLD, AF on Eliquis, PVD, presents with c/o 8/10 sharp right sided abdominal pain with nausea, vomiting, and diarrhea.  Pt recently had cholecystectomy and ERCP with sphincterotomy 12/16/22. TIM Kim consulted, s/p  repeat ERCP 12/23/22 found to have bleeding from spincterotomy site. CBD stented and clips place to control bleeding. Admitted to ICU for septic shock, found positive variable coccibacilli bacteremia, and aerococcus UTI. Hospital course complicated by failed TOV 12/24 repeat TOV will be done today 12/29. Hypotension and tachycardia resolved. Continue with digoxin 250 mcg for rate control. TIM Villanueva and COLLIN oNvoa following . COLLIN Nava consulted started on meropenem and vancomycin, repeat blood cx 12/25 NGTD.  Downgraded to medicine 12/25 .TTE not successful 12/27 . Midline infiltrated/removed, new peripheral line inserted.     Scheduled for JANINE 12/30, but noted positive for COVID 19 12/29. Cancelled by dept. diet resumed. will need to reschedule.   Pt will need to quarantine  until 1/6/23 before discharge back to [facility]    1/4-Pt. has no IV access at this time with failed attempts by RN to replace.  Pt. requested no further sticks as he will be discharged 1/6 and requires no IV fluids/Abx.

## 2023-01-04 NOTE — PROGRESS NOTE ADULT - PROBLEM SELECTOR PLAN 3
-Pt takes Metoprolol 25mg BID  - Continue w/ Digoxin 250 mcg daily (started 12/27)  - Cont metoprolol.   - on Eliquis  - Cardio Dr. Shrama follows

## 2023-01-04 NOTE — PROGRESS NOTE ADULT - PROBLEM SELECTOR PLAN 1
septic shock on admission-RESOLVED  - admitted to ICU downgraded to medicine   - Repeat blood cx 12/25 no growth   - s/p cefepime, vancomycin, and meropenem   - Comp ceftin and flagyl   - ID Elijah following  -TTE unsuccessful, plan for JANINE 12/30 but cancelled due to COVID +

## 2023-01-04 NOTE — PROGRESS NOTE ADULT - SUBJECTIVE AND OBJECTIVE BOX
PATIENT SEEN AND EXAMINED ON :- 1/4/23  DATE OF SERVICE:     1/4/23        Interim events noted,Labs ,Radiological studies and Cardiology tests reviewed .       HOSPITAL COURSE: HPI:  Patient is a 67 year old male, vaccinated, from [facility] with PMHx of DM, HTN, HLD, AF on Eliquis, PVD, presents with c/o vomiting and right sided abdominal pain. patient states yesterday after lunch he had a sharp right sided abdominal pain 8/10 persistent with nausea and vomiting. He also endorsed chills and rigors, unsure if he spiked a fever. He denies any changes in bowel habits, chest pain, shortness of breath or palpitations. Patient had two episodes of melena in ED. He was discharged yesterday from hospital. He had choledocholithiasis,  CT A/P showed: Cholecystectomy with dilated CBD up to 1.7 cm abrupt cut off of the distal CBD and  mild pancreatic duct dilatation. 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 He underwent  ERCP on 12/16 biliary sludge removal with biliary sphincterotomy and balloon extraction. He was discharged after symptom improvement and out patient gastric emptying study.     In ED:   Vitals: BP: 81/59mmHg  HR:  118  RR: 97% RA   WBC 14.7 k  elevated LFT, lactate   s/p  Rocephin 1LNS and zofran  (22 Dec 2022 12:00)      INTERIM EVENTS:Patient seen at bedside ,interim events noted.      PMH -reviewed admission note, no change since admission  HEART FAILURE: Acute[ ]Chronic[ ] Systolic[ ] Diastolic[ ] Combined Systolic and Diastolic[ ]  CAD[ ] CABG[ ] PCI[ ]  DEVICES[ ] PPM[ ] ICD[ ] ILR[ ]  ATRIAL FIBRILLATION[ ] Paroxysmal[ ] Permanent[ ] CHADS2-[  ]  DILCIA[ ] CKD1[ ] CKD2[ ] CKD3[ ] CKD4[ ] ESRD[ ]  COPD[ ] HTN[ ]   DM[ ] Type1[ ] Type 2[ ]   CVA[ ] Paresis[ ]    AMBULATION: Assisted[ ] Cane/walker[ ] Independent[ ]    MEDICATIONS  (STANDING):  apixaban 5 milliGRAM(s) Oral every 12 hours  atorvastatin 10 milliGRAM(s) Oral at bedtime  digoxin     Tablet 250 MICROGram(s) Oral daily  finasteride 5 milliGRAM(s) Oral daily  insulin lispro (ADMELOG) corrective regimen sliding scale   SubCutaneous three times a day before meals  insulin lispro (ADMELOG) corrective regimen sliding scale   SubCutaneous at bedtime  metoprolol tartrate 25 milliGRAM(s) Oral two times a day  pantoprazole    Tablet 40 milliGRAM(s) Oral before breakfast  pregabalin 100 milliGRAM(s) Oral every 8 hours  senna 2 Tablet(s) Oral at bedtime  tamsulosin      tamsulosin 0.8 milliGRAM(s) Oral at bedtime    MEDICATIONS  (PRN):  aluminum hydroxide/magnesium hydroxide/simethicone Suspension 30 milliLiter(s) Oral every 4 hours PRN Dyspepsia  baclofen 5 milliGRAM(s) Oral every 12 hours PRN Musculoskeletal Pain  melatonin 3 milliGRAM(s) Oral at bedtime PRN Insomnia  ondansetron Injectable 4 milliGRAM(s) IV Push every 8 hours PRN Nausea and/or Vomiting  oxycodone    5 mG/acetaminophen 325 mG 1 Tablet(s) Oral every 4 hours PRN Severe Pain (7 - 10)  sodium chloride 0.9% lock flush 10 milliLiter(s) IV Push every 1 hour PRN Pre/post blood products, medications, blood draw, and to maintain line patency            REVIEW OF SYSTEMS:  Constitutional: [ ] fever, [ ]weight loss,  [ ]fatigue [ ]weight gain  Eyes: [ ] visual changes  Respiratory: [ ]shortness of breath;  [ ] cough, [ ]wheezing, [ ]chills, [ ]hemoptysis  Cardiovascular: [ ] chest pain, [ ]palpitations, [ ]dizziness,  [ ]leg swelling[ ]orthopnea[ ]PND  Gastrointestinal: [ ] abdominal pain, [ ]nausea, [ ]vomiting,  [ ]diarrhea [ ]Constipation [ ]Melena  Genitourinary: [ ] dysuria, [ ] hematuria [ ]Cardoza  Neurologic: [ ] headaches [ ] tremors[ ]weakness [ ]Paralysis Right[ ] Left[ ]  Skin: [ ] itching, [ ]burning, [ ] rashes  Endocrine: [ ] heat or cold intolerance  Musculoskeletal: [ ] joint pain or swelling; [ ] muscle, back, or extremity pain  Psychiatric: [ ] depression, [ ]anxiety, [ ]mood swings, or [ ]difficulty sleeping  Hematologic: [ ] easy bruising, [ ] bleeding gums    [ ] All remaining systems negative except as per above.   [ ]Unable to obtain.  [x] No change in ROS since admission      Vital Signs Last 24 Hrs  T(C): 36.3 (04 Jan 2023 14:00), Max: 37 (03 Jan 2023 21:47)  T(F): 97.4 (04 Jan 2023 14:00), Max: 98.6 (03 Jan 2023 21:47)  HR: 104 (04 Jan 2023 18:00) (99 - 106)  BP: 107/65 (04 Jan 2023 18:00) (98/58 - 112/68)  BP(mean): --  RR: 18 (04 Jan 2023 18:00) (16 - 18)  SpO2: 99% (04 Jan 2023 18:00) (93% - 99%)    Parameters below as of 04 Jan 2023 18:00  Patient On (Oxygen Delivery Method): nasal cannula  O2 Flow (L/min): 2    I&O's Summary      PHYSICAL EXAM:  General: No acute distress BMI-  HEENT: EOMI, PERRL  Neck: Supple, [ ] JVD  Lungs: Equal air entry bilaterally; [ ] rales [ ] wheezing [ ] rhonchi  Heart: Regular rate and rhythm; [x ] murmur   2/6 [ x] systolic [ ] diastolic [ ] radiation[ ] rubs [ ]  gallops  Abdomen: Nontender, bowel sounds present  Extremities: No clubbing, cyanosis, [ ] edema [ ]Pulses  equal and intact  Nervous system:  Alert & Oriented X3, no focal deficits  Psychiatric: Normal affect  Skin: No rashes or lesions    LABS:  01-04    135  |  96  |  16  ----------------------------<  109<H>  4.1   |  32<H>  |  0.67    Ca    8.6      04 Jan 2023 06:25      Creatinine Trend: 0.67<--, 0.63<--, 0.55<--, 0.44<--, 0.45<--, 0.40<--                        10.8   7.64  )-----------( 536      ( 04 Jan 2023 06:25 )             33.9

## 2023-01-04 NOTE — PROGRESS NOTE ADULT - PROBLEM SELECTOR PLAN 7
- Patient is from [facility]  - repeat blood cultures 12/25 NGTD   - Comp Ceftin and Flagyl   - JANINE planned  12/30, but cancelled due to COVID + (12/29).    - will likely return back to [facility] once optimized. will need quarantine per facility. til 1/6/23

## 2023-01-04 NOTE — PROGRESS NOTE ADULT - SUBJECTIVE AND OBJECTIVE BOX
NP Note discussed with  Primary Attending    Patient is a 67y old  Male who presents with a chief complaint of Abdominal pain (04 Jan 2023 10:16)      INTERVAL HPI/OVERNIGHT EVENTS: no new complaints    MEDICATIONS  (STANDING):  apixaban 5 milliGRAM(s) Oral every 12 hours  atorvastatin 10 milliGRAM(s) Oral at bedtime  digoxin     Tablet 250 MICROGram(s) Oral daily  finasteride 5 milliGRAM(s) Oral daily  insulin lispro (ADMELOG) corrective regimen sliding scale   SubCutaneous three times a day before meals  insulin lispro (ADMELOG) corrective regimen sliding scale   SubCutaneous at bedtime  metoprolol tartrate 25 milliGRAM(s) Oral two times a day  pantoprazole    Tablet 40 milliGRAM(s) Oral before breakfast  pregabalin 100 milliGRAM(s) Oral every 8 hours  senna 2 Tablet(s) Oral at bedtime  tamsulosin      tamsulosin 0.8 milliGRAM(s) Oral at bedtime    MEDICATIONS  (PRN):  aluminum hydroxide/magnesium hydroxide/simethicone Suspension 30 milliLiter(s) Oral every 4 hours PRN Dyspepsia  baclofen 5 milliGRAM(s) Oral every 12 hours PRN Musculoskeletal Pain  melatonin 3 milliGRAM(s) Oral at bedtime PRN Insomnia  ondansetron Injectable 4 milliGRAM(s) IV Push every 8 hours PRN Nausea and/or Vomiting  oxycodone    5 mG/acetaminophen 325 mG 1 Tablet(s) Oral every 4 hours PRN Severe Pain (7 - 10)  sodium chloride 0.9% lock flush 10 milliLiter(s) IV Push every 1 hour PRN Pre/post blood products, medications, blood draw, and to maintain line patency      __________________________________________________  REVIEW OF SYSTEMS:    CONSTITUTIONAL: No fever,   EYES: no acute visual disturbances  NECK: No pain or stiffness  RESPIRATORY: No cough; No shortness of breath  CARDIOVASCULAR: No chest pain, no palpitations  GASTROINTESTINAL: No pain. No nausea or vomiting; No diarrhea   NEUROLOGICAL: No headache or numbness, no tremors  MUSCULOSKELETAL: No joint pain, no muscle pain  GENITOURINARY: no dysuria, no frequency, no hesitancy  PSYCHIATRY: no depression , no anxiety  ALL OTHER  ROS negative        Vital Signs Last 24 Hrs  T(C): 36.3 (04 Jan 2023 04:54), Max: 37 (03 Jan 2023 21:47)  T(F): 97.4 (04 Jan 2023 04:54), Max: 98.6 (03 Jan 2023 21:47)  HR: 105 (04 Jan 2023 04:54) (99 - 108)  BP: 98/58 (04 Jan 2023 04:54) (98/58 - 112/68)  BP(mean): --  RR: 17 (04 Jan 2023 04:54) (17 - 18)  SpO2: 95% (04 Jan 2023 04:54) (93% - 96%)    Parameters below as of 04 Jan 2023 04:54  Patient On (Oxygen Delivery Method): nasal cannula  O2 Flow (L/min): 2      ________________________________________________  PHYSICAL EXAM:  well developed, pleasant  GENERAL: NAD  HEENT: Normocephalic;  conjunctivae and sclerae clear; moist mucous membranes;   NECK : supple  CHEST/LUNG: Clear to auscultation bilaterally with good air entry   HEART: S1 S2  regular; no murmurs, gallops or rubs  ABDOMEN: Soft, Nontender, Nondistended; Bowel sounds present  EXTREMITIES: Left foot neuropathy pain, no cyanosis; no edema; no calf tenderness  SKIN: warm and dry; no rash  NERVOUS SYSTEM:  Awake and alert; Oriented  to place, person and time ; no new deficits    _________________________________________________  LABS:                        10.8   7.64  )-----------( 536      ( 04 Jan 2023 06:25 )             33.9     01-04    135  |  96  |  16  ----------------------------<  109<H>  4.1   |  32<H>  |  0.67    Ca    8.6      04 Jan 2023 06:25      CAPILLARY BLOOD GLUCOSE      POCT Blood Glucose.: 110 mg/dL (04 Jan 2023 08:16)  POCT Blood Glucose.: 150 mg/dL (03 Jan 2023 21:51)  POCT Blood Glucose.: 125 mg/dL (03 Jan 2023 17:21)  POCT Blood Glucose.: 170 mg/dL (03 Jan 2023 11:43)        RADIOLOGY & ADDITIONAL TESTS:    < from: Xray Chest 1 View- PORTABLE-Urgent (Xray Chest 1 View- PORTABLE-Urgent .) (01.04.23 @ 07:05) >    ACC: [id number] EXAM:  XR CHEST PORTABLE URGENT 1V                          PROCEDURE DATE:  01/04/2023          INTERPRETATION:  CLINICAL INDICATION: 67 years  Male with f/u CXR 12/25.    COMPARISON: 12/25/2022    AP view of the chest demonstrates improving right lower lobe infiltrate.   Left basilar discoid atelectasis. Elevated right hemidiaphragm is stable.   No pleural effusions. The mediastinum and chace cannot be assessed. The   pulmonary vasculature is normal. There is no pneumothorax.    Mild thoracic degenerative changes are present.    IMPRESSION:    Improving right lower lobe infiltrate. Left basilar discoid atelectasis.    --- End of Report ---    < end of copied text >    < from: CT Abdomen and Pelvis No Cont (12.22.22 @ 10:37) >  ACC: [id number] EXAM:  CT ABDOMEN AND PELVIS                          PROCEDURE DATE:  12/22/2022          INTERPRETATION:  CLINICAL INFORMATION: Abdominal pain    COMPARISON: Abdominal CT dated 12/8/2022    CONTRAST/COMPLICATIONS:  IV Contrast: NONE  Oral Contrast: NONE  Complications: None reported at time of study completion    PROCEDURE:  CT of the Abdomen and Pelvis was performed.  Sagittal and coronal reformats were performed.    FINDINGS: Evaluation of the abdomen and pelvis is limited by lack of IV   contrast.  LOWER CHEST: New airspace opacifications in the right lower lung zone for   which clinical correlation with pneumonia is recommended.    LIVER: Stable small hypodense area in the left hepatic lobe. Please see   recent abdominalMR dated 12/12/2022 for further characterization.  BILE DUCTS: Dilatation of the common bile duct is again noted.  GALLBLADDER: Stable cholecystectomy clips.  SPLEEN: Within normal limits.  PANCREAS: The pancreatic parenchyma appears unremarkable. Mild dilatation   of the main pancreatic duct in the pancreatic head is again noted.  ADRENALS: Within normal limits.  KIDNEYS/URETERS: Small hypodense lesion in the left kidney, likely   representing a cyst. The right kidney appears unremarkable. No evidence   for a calculus in the kidneys or ureters. No hydronephrosis.    BLADDER: Underdistended.  REPRODUCTIVE ORGANS: The prostate and seminal vesicles appear grossly   unremarkable.    BOWEL: No bowel obstruction or grossly thickened bowel wall. Colonic   diverticulosis without evidence for diverticulitis. Moderate amount of   stool in the rectum and distal sigmoid colon.. Appendix not visualized.   No secondary signs for acute appendicitis.  PERITONEUM: No free air or ascites.  VESSELS: Calcified atherosclerotic disease.  RETROPERITONEUM/LYMPH NODES: No lymphadenopathy.  ABDOMINAL WALL: Small fat-containing ventral hernia.  BONES: Degenerative spondylosis.    IMPRESSION: No bowel obstruction or grossly thickened bowel wall. Colonic   diverticulosis without evidence for diverticulitis. Moderate amount of   stool in the rectum and distal sigmoid colon.. Appendix not visualized.   No secondary signs for acute appendicitis.    Dilatation of the common bile duct and main pancreatic duct again noted..   Please see recent abdominal MR dated 12/12/2022. If clinically indicated,   endoscopic ultrasound may be pursued to rule out a small obstructive   ampullary tumor or pancreatic head mass.    New airspace opacifications in the right lower lung zone for which   clinical correlation with pneumonia is recommended.    < end of copied text >    Imaging Personally Reviewed:  YES/NO    Consultant(s) Notes Reviewed:   YES/ No    Care Discussed with Consultants :     Plan of care was discussed with patient and /or primary care giver; all questions and concerns were addressed and care was aligned with patient's wishes.

## 2023-01-04 NOTE — PROGRESS NOTE ADULT - ASSESSMENT
67 year old male from [facility] with past medical history of DM, HTN, HLD, AF on Eliquis, PVD, presents with c/o 8/10 sharp right sided abdominal pain with nausea, vomiting, and diarrhea.  Pt recently had cholecystectomy and ERCP with sphincterotomy 12/16/22. TIM Kim consulted, s/p  repeat ERCP 12/23/22 found to have bleeding from spincterotomy site. CBD stented and clips place to control bleeding. Admitted to ICU for septic shock, found positive variable coccibacilli bacteremia, and aerococcus UTI. Hospital course complicated by failed TOV 12/24 repeat TOV will be done today 12/29. Hypotension and tachycardia resolved. Continue with digoxin 250 mcg for rate control. TIM Villanueva and COLLIN Novoa following . COLLIN Nava consulted started on meropenem and vancomycin, repeat blood cx 12/25 NGTD.  Downgraded to medicine 12/25 .TTE not successful 12/27 . Midline infiltrated/removed, new peripheral line inserted.     Scheduled for JANINE 12/30, but noted positive for COVID 19 12/29. Cancelled by dept. diet resumed. will need to reschedule.   Pt will need to quarantine  until 1/6/23 before discharge back to [facility]    1/4-Pt. has no IV access at this time with failed attempts by RN to replace.  Pt. requested no further sticks as he will be discharged 1/6 and requires no IV fluids/Abx.

## 2023-01-04 NOTE — PROGRESS NOTE ADULT - SUBJECTIVE AND OBJECTIVE BOX
[   ] ICU                                          [   ] CCU                                      [ X  ] Medical Floor      Patient is a 67 year old male with abdominal pain. GI consulted to evaluate.        Patient is a 67 year old male, with past medical history significant for DM, HTN, HLD, AF on Eliquis, PVD, presents with c/o vomiting and right sided abdominal pain. Patient recently had cholecystectomy and ERCP with sphincterotomy prior to discharge. patient presented with c/o sharp right sided abdominal pain 8/10 persistent with nausea, vomiting and melena. Patient had repeat ERCP found to have bleeding from sphincterotomy site. CBD stended and clips place to control bleeding. No hematemesis, hematochezia, chest pain, SOB, cough, hematuria or dysuria reported.    Patient appears comfortable. No new complaints reported, No abdominal pain, N/V, hematemesis, hematochezia, melena, fever, chills, chest pain, SOB, cough or diarrhea reported.       PAIN MANAGEMENT:  Pain Scale:                 /10  Pain Location:         PAST MEDICAL HISTORY  COPD (chronic obstructive pulmonary disease)    Diabetes    PVD (peripheral vascular disease)    Chronic atrial fibrillation    GERD (gastroesophageal reflux disease)    MDD (major depressive disorder)    BPH (benign prostatic hyperplasia)        PAST SURGICAL HISTORY  Cholecystectomy    Allergies    morphine (Unknown)  penicillin (Unknown)  shellfish (Unknown)    Intolerances  None       SOCIAL HISTORY  Advanced Directives:       [ X ] Full Code       [  ] DNR  Marital Status:         [  ] M      [ X ] S      [  ] D       [  ] W  Children:       [ X ] Yes      [  ] No  Occupation:        [  ] Employed       [  X] Unemployed       [  ] Retired  Diet:       [ X ] Regular       [  ] PEG feeding          [  ] NG tube feeding  Drug Use:           [ X ] Patient denied          [  ] Yes  Alcohol:           [ X ] No             [  ] Yes (socially)         [  ] Yes (chronic)  Tobacco:           [  ] Yes           [ X ] No      FAMILY HISTORY  [ X ] Heart Disease            [ X ] Diabetes             [ X ] HTN             [  ] Colon Cancer             [  ] Stomach Cancer              [  ] Pancreatic Cancer      VITALS  Vital Signs Last 24 Hrs  T(C): 36.3 (04 Jan 2023 04:54), Max: 37 (03 Jan 2023 21:47)  T(F): 97.4 (04 Jan 2023 04:54), Max: 98.6 (03 Jan 2023 21:47)  HR: 105 (04 Jan 2023 04:54) (99 - 108)  BP: 98/58 (04 Jan 2023 04:54) (98/58 - 112/68)   RR: 17 (04 Jan 2023 04:54) (17 - 18)  SpO2: 95% (04 Jan 2023 04:54) (93% - 96%)  Parameters below as of 04 Jan 2023 04:54  Patient On (Oxygen Delivery Method): nasal cannula  O2 Flow (L/min): 2       MEDICATIONS  (STANDING):  apixaban 5 milliGRAM(s) Oral every 12 hours  atorvastatin 10 milliGRAM(s) Oral at bedtime  digoxin     Tablet 250 MICROGram(s) Oral daily  finasteride 5 milliGRAM(s) Oral daily  insulin lispro (ADMELOG) corrective regimen sliding scale   SubCutaneous three times a day before meals  insulin lispro (ADMELOG) corrective regimen sliding scale   SubCutaneous at bedtime  metoprolol tartrate 25 milliGRAM(s) Oral two times a day  pantoprazole    Tablet 40 milliGRAM(s) Oral before breakfast  pregabalin 100 milliGRAM(s) Oral every 8 hours  senna 2 Tablet(s) Oral at bedtime  tamsulosin      tamsulosin 0.8 milliGRAM(s) Oral at bedtime    MEDICATIONS  (PRN):  aluminum hydroxide/magnesium hydroxide/simethicone Suspension 30 milliLiter(s) Oral every 4 hours PRN Dyspepsia  baclofen 5 milliGRAM(s) Oral every 12 hours PRN Musculoskeletal Pain  melatonin 3 milliGRAM(s) Oral at bedtime PRN Insomnia  ondansetron Injectable 4 milliGRAM(s) IV Push every 8 hours PRN Nausea and/or Vomiting  oxycodone    5 mG/acetaminophen 325 mG 1 Tablet(s) Oral every 4 hours PRN Severe Pain (7 - 10)  sodium chloride 0.9% lock flush 10 milliLiter(s) IV Push every 1 hour PRN Pre/post blood products, medications, blood draw, and to maintain line patency                            10.8   7.64  )-----------( 536      ( 04 Jan 2023 06:25 )             33.9       01-04    135  |  96  |  16  ----------------------------<  109<H>  4.1   |  32<H>  |  0.67    Ca    8.6      04 Jan 2023 06:25

## 2023-01-04 NOTE — PROGRESS NOTE ADULT - PROBLEM SELECTOR PLAN 7
- Patient is from [facility]  - repeat blood cultures 12/25 NGTD   - Comp Ceftin and Flagyl   - JANINE planned  12/30, but cancelled due to COVID + (12/29).    - will likely return back to [facility] once optimized. will need quarantine per facility. til 1/6/23

## 2023-01-04 NOTE — PROGRESS NOTE ADULT - PROBLEM SELECTOR PLAN 4
- s/p Meropenem  - CXR 1/4-->Improving right lower lobe infiltrate. Left basilar discoid atelectasis.  - On N/C 2L (baseline)  - incentive spirometer

## 2023-01-05 DIAGNOSIS — R21 RASH AND OTHER NONSPECIFIC SKIN ERUPTION: ICD-10-CM

## 2023-01-05 LAB
GLUCOSE BLDC GLUCOMTR-MCNC: 111 MG/DL — HIGH (ref 70–99)
GLUCOSE BLDC GLUCOMTR-MCNC: 120 MG/DL — HIGH (ref 70–99)
GLUCOSE BLDC GLUCOMTR-MCNC: 151 MG/DL — HIGH (ref 70–99)
GLUCOSE BLDC GLUCOMTR-MCNC: 178 MG/DL — HIGH (ref 70–99)

## 2023-01-05 RX ORDER — HYDROCORTISONE 1 %
1 OINTMENT (GRAM) TOPICAL
Refills: 0 | Status: DISCONTINUED | OUTPATIENT
Start: 2023-01-05 | End: 2023-01-05

## 2023-01-05 RX ORDER — ZINC OXIDE 200 MG/G
1 OINTMENT TOPICAL
Refills: 0 | Status: DISCONTINUED | OUTPATIENT
Start: 2023-01-05 | End: 2023-01-05

## 2023-01-05 RX ORDER — ZINC OXIDE 200 MG/G
1 OINTMENT TOPICAL
Refills: 0 | Status: DISCONTINUED | OUTPATIENT
Start: 2023-01-05 | End: 2023-01-06

## 2023-01-05 RX ADMIN — Medication 250 MICROGRAM(S): at 05:50

## 2023-01-05 RX ADMIN — ATORVASTATIN CALCIUM 10 MILLIGRAM(S): 80 TABLET, FILM COATED ORAL at 21:00

## 2023-01-05 RX ADMIN — OXYCODONE AND ACETAMINOPHEN 1 TABLET(S): 5; 325 TABLET ORAL at 03:00

## 2023-01-05 RX ADMIN — Medication 100 MILLIGRAM(S): at 14:54

## 2023-01-05 RX ADMIN — APIXABAN 5 MILLIGRAM(S): 2.5 TABLET, FILM COATED ORAL at 17:49

## 2023-01-05 RX ADMIN — OXYCODONE AND ACETAMINOPHEN 1 TABLET(S): 5; 325 TABLET ORAL at 14:00

## 2023-01-05 RX ADMIN — TAMSULOSIN HYDROCHLORIDE 0.8 MILLIGRAM(S): 0.4 CAPSULE ORAL at 21:00

## 2023-01-05 RX ADMIN — ZINC OXIDE 1 APPLICATION(S): 200 OINTMENT TOPICAL at 18:09

## 2023-01-05 RX ADMIN — PANTOPRAZOLE SODIUM 40 MILLIGRAM(S): 20 TABLET, DELAYED RELEASE ORAL at 05:50

## 2023-01-05 RX ADMIN — OXYCODONE AND ACETAMINOPHEN 1 TABLET(S): 5; 325 TABLET ORAL at 22:45

## 2023-01-05 RX ADMIN — Medication 1 APPLICATION(S): at 05:49

## 2023-01-05 RX ADMIN — Medication 25 MILLIGRAM(S): at 17:47

## 2023-01-05 RX ADMIN — Medication 1: at 11:56

## 2023-01-05 RX ADMIN — APIXABAN 5 MILLIGRAM(S): 2.5 TABLET, FILM COATED ORAL at 05:49

## 2023-01-05 RX ADMIN — OXYCODONE AND ACETAMINOPHEN 1 TABLET(S): 5; 325 TABLET ORAL at 08:42

## 2023-01-05 RX ADMIN — OXYCODONE AND ACETAMINOPHEN 1 TABLET(S): 5; 325 TABLET ORAL at 18:07

## 2023-01-05 RX ADMIN — FINASTERIDE 5 MILLIGRAM(S): 5 TABLET, FILM COATED ORAL at 11:59

## 2023-01-05 RX ADMIN — Medication 25 MILLIGRAM(S): at 05:49

## 2023-01-05 RX ADMIN — Medication 100 MILLIGRAM(S): at 21:00

## 2023-01-05 RX ADMIN — OXYCODONE AND ACETAMINOPHEN 1 TABLET(S): 5; 325 TABLET ORAL at 02:48

## 2023-01-05 RX ADMIN — OXYCODONE AND ACETAMINOPHEN 1 TABLET(S): 5; 325 TABLET ORAL at 23:15

## 2023-01-05 RX ADMIN — Medication 100 MILLIGRAM(S): at 05:49

## 2023-01-05 RX ADMIN — Medication 1 APPLICATION(S): at 18:08

## 2023-01-05 RX ADMIN — Medication 3 MILLIGRAM(S): at 21:00

## 2023-01-05 RX ADMIN — SENNA PLUS 2 TABLET(S): 8.6 TABLET ORAL at 21:00

## 2023-01-05 RX ADMIN — OXYCODONE AND ACETAMINOPHEN 1 TABLET(S): 5; 325 TABLET ORAL at 09:15

## 2023-01-05 RX ADMIN — OXYCODONE AND ACETAMINOPHEN 1 TABLET(S): 5; 325 TABLET ORAL at 13:04

## 2023-01-05 NOTE — PROGRESS NOTE ADULT - TONSILS
no redness/no swelling

## 2023-01-05 NOTE — PROGRESS NOTE ADULT - PROBLEM SELECTOR PLAN 8
- Patient is from [facility]  - repeat blood cultures 12/25 NGTD   - Comp Ceftin and Flagyl   - JANINE planned  12/30, but cancelled due to COVID + (12/29).    - will likely return back to [facility] once optimized. will need quarantine per facility. til 1/6/23

## 2023-01-05 NOTE — CHART NOTE - NSCHARTNOTEFT_GEN_A_CORE
Admit Diagnosis: Sepsis    EVENT: Rash noted to upper back/neck    OBJECTIVE: Vital Signs Last 24 Hrs  T(C): 36.8 (04 Jan 2023 21:33), Max: 36.8 (04 Jan 2023 21:33)  T(F): 98.2 (04 Jan 2023 21:33), Max: 98.2 (04 Jan 2023 21:33)  HR: 96 (04 Jan 2023 21:33) (96 - 106)  BP: 120/71 (04 Jan 2023 21:33) (98/58 - 120/71)  RR: 18 (04 Jan 2023 21:33) (16 - 18)  SpO2: 93% (04 Jan 2023 21:33) (93% - 99%)    Patient On (Oxygen Delivery Method): nasal cannula  O2 Flow (L/min): 2      POCT Blood Glucose.: 104 mg/dL (04 Jan 2023 21:39)    HPI: 67 year old male from [facility] with past medical history of DM, HTN, HLD, AF on Eliquis, PVD, presents with c/o 8/10 sharp right sided abdominal pain with nausea, vomiting, and diarrhea.  Pt recently had cholecystectomy and ERCP with sphincterotomy 12/16/22. TIM Kim consulted, s/p  repeat ERCP 12/23/22 found to have bleeding from spincterotomy site. CBD stented and clips place to control bleeding. Admitted to ICU for septic shock, found positive variable coccibacilli bacteremia, and aerococcus UTI. Hospital course complicated by failed TOV 12/24 repeat TOV will be done today 12/29. Hypotension and tachycardia resolved. Continue with digoxin 250 mcg for rate control. TIM Villanueva and COLLIN Novoa following . COLLIN Nava consulted started on meropenem and vancomycin, repeat blood cx 12/25 NGTD.  Downgraded to medicine 12/25 .TTE not successful 12/27 . Midline infiltrated/removed, new peripheral line inserted. Scheduled for JANINE 12/30, but noted positive for COVID 19 12/29. Cancelled by dept. diet resumed. will need to reschedule.   Pt will need to quarantine until 1/6/23 before discharge back to [facility].    ROS:  CONSTITUTIONAL: No fever  EYES: No acute visual disturbances  NECK: No pain or stiffness  RESPIRATORY: No cough; No shortness of breath  CARDIOVASCULAR: No chest pain, no palpitations  GASTROINTESTINAL: No abdominal pain. No N/V/D  NEUROLOGICAL: No headache or numbness, no tremors  MUSCULOSKELETAL: No joint pain, no muscle pain  GENITOURINARY: No dysuria, no frequency, no hesitancy  PSYCHIATRY: No depression , no anxiety  ALL OTHER  ROS negative        ASSESSMENT: Maculopapular rash noted to upper back and neck x 24 hours per patient. Not associated with pruritus, swelling, or SOB. Not started on any new medications in past 24-48 hours. Patient c/o "burning" sensation to affected area.     PLAN: Hydrocortisone cream 0.1% BID x 7 days           Percocet Q4h PRN Admit Diagnosis: Sepsis    EVENT: Rash noted to upper back/neck    OBJECTIVE: Vital Signs Last 24 Hrs  T(C): 36.8 (04 Jan 2023 21:33), Max: 36.8 (04 Jan 2023 21:33)  T(F): 98.2 (04 Jan 2023 21:33), Max: 98.2 (04 Jan 2023 21:33)  HR: 96 (04 Jan 2023 21:33) (96 - 106)  BP: 120/71 (04 Jan 2023 21:33) (98/58 - 120/71)  RR: 18 (04 Jan 2023 21:33) (16 - 18)  SpO2: 93% (04 Jan 2023 21:33) (93% - 99%)    Patient On (Oxygen Delivery Method): nasal cannula  O2 Flow (L/min): 2      POCT Blood Glucose.: 104 mg/dL (04 Jan 2023 21:39)    HPI: 67 year old male from [facility] with past medical history of DM, HTN, HLD, AF on Eliquis, PVD, presents with c/o 8/10 sharp right sided abdominal pain with nausea, vomiting, and diarrhea.  Pt recently had cholecystectomy and ERCP with sphincterotomy 12/16/22. TIM Kim consulted, s/p  repeat ERCP 12/23/22 found to have bleeding from spincterotomy site. CBD stented and clips place to control bleeding. Admitted to ICU for septic shock, found positive variable coccibacilli bacteremia, and aerococcus UTI. Hospital course complicated by failed TOV 12/24 repeat TOV will be done today 12/29. Hypotension and tachycardia resolved. Continue with digoxin 250 mcg for rate control. TIM Villanueva and COLLIN Novoa following . COLLIN Nava consulted started on meropenem and vancomycin, repeat blood cx 12/25 NGTD.  Downgraded to medicine 12/25 .TTE not successful 12/27 . Midline infiltrated/removed, new peripheral line inserted. Scheduled for JANINE 12/30, but noted positive for COVID 19 12/29. Cancelled by dept. diet resumed. will need to reschedule. Pt will need to quarantine until 1/6/23 before discharge back to [facility].    ROS:  CONSTITUTIONAL: No fever  EYES: No acute visual disturbances  NECK: No pain or stiffness  RESPIRATORY: No cough; No shortness of breath  CARDIOVASCULAR: No chest pain, no palpitations  GASTROINTESTINAL: No abdominal pain. No N/V/D  NEUROLOGICAL: No headache or numbness, no tremors  MUSCULOSKELETAL: No joint pain, no muscle pain  GENITOURINARY: No dysuria, no frequency, no hesitancy  PSYCHIATRY: No depression , no anxiety  ALL OTHER  ROS negative        ASSESSMENT: Maculopapular rash noted to upper back and neck x 24 hours per patient. Not associated with pruritus, swelling, or SOB. Not started on any new medications in past 24-48 hours. Patient c/o "burning" sensation to affected area.     PLAN: Hydrocortisone cream 0.1% BID x 7 days           Percocet Q4h PRN

## 2023-01-05 NOTE — PROGRESS NOTE ADULT - SUBJECTIVE AND OBJECTIVE BOX
NP Note discussed with  Primary Attending    Patient is a 67y old  Male who presents with a chief complaint of Abdominal pain (04 Jan 2023 11:40)      INTERVAL HPI/OVERNIGHT EVENTS: no new complaints    MEDICATIONS  (STANDING):  apixaban 5 milliGRAM(s) Oral every 12 hours  atorvastatin 10 milliGRAM(s) Oral at bedtime  digoxin     Tablet 250 MICROGram(s) Oral daily  finasteride 5 milliGRAM(s) Oral daily  hydrocortisone 1% Cream 1 Application(s) Topical two times a day  insulin lispro (ADMELOG) corrective regimen sliding scale   SubCutaneous three times a day before meals  insulin lispro (ADMELOG) corrective regimen sliding scale   SubCutaneous at bedtime  metoprolol tartrate 25 milliGRAM(s) Oral two times a day  pantoprazole    Tablet 40 milliGRAM(s) Oral before breakfast  pregabalin 100 milliGRAM(s) Oral every 8 hours  senna 2 Tablet(s) Oral at bedtime  tamsulosin      tamsulosin 0.8 milliGRAM(s) Oral at bedtime    MEDICATIONS  (PRN):  aluminum hydroxide/magnesium hydroxide/simethicone Suspension 30 milliLiter(s) Oral every 4 hours PRN Dyspepsia  baclofen 5 milliGRAM(s) Oral every 12 hours PRN Musculoskeletal Pain  melatonin 3 milliGRAM(s) Oral at bedtime PRN Insomnia  ondansetron Injectable 4 milliGRAM(s) IV Push every 8 hours PRN Nausea and/or Vomiting  oxycodone    5 mG/acetaminophen 325 mG 1 Tablet(s) Oral every 4 hours PRN Severe Pain (7 - 10)  sodium chloride 0.9% lock flush 10 milliLiter(s) IV Push every 1 hour PRN Pre/post blood products, medications, blood draw, and to maintain line patency      __________________________________________________  REVIEW OF SYSTEMS:    CONSTITUTIONAL: No fever,   EYES: no acute visual disturbances  NECK: No pain or stiffness  RESPIRATORY: No cough; No shortness of breath  CARDIOVASCULAR: No chest pain, no palpitations  GASTROINTESTINAL: No pain. No nausea or vomiting; No diarrhea   NEUROLOGICAL: No headache or numbness, no tremors  MUSCULOSKELETAL: No joint pain, no muscle pain  GENITOURINARY: no dysuria, no frequency, no hesitancy  PSYCHIATRY: no depression , no anxiety  ALL OTHER  ROS negative        Vital Signs Last 24 Hrs  T(C): 37.1 (05 Jan 2023 05:10), Max: 37.1 (05 Jan 2023 05:10)  T(F): 98.8 (05 Jan 2023 05:10), Max: 98.8 (05 Jan 2023 05:10)  HR: 91 (05 Jan 2023 05:10) (91 - 106)  BP: 116/77 (05 Jan 2023 05:10) (105/71 - 120/71)  BP(mean): --  RR: 17 (05 Jan 2023 05:10) (16 - 18)  SpO2: 96% (05 Jan 2023 05:10) (93% - 99%)    Parameters below as of 05 Jan 2023 05:10  Patient On (Oxygen Delivery Method): nasal cannula  O2 Flow (L/min): 3      ________________________________________________  PHYSICAL EXAM:  well developed, pleasant  GENERAL: NAD  HEENT: Normocephalic;  conjunctivae and sclerae clear; moist mucous membranes;   NECK : supple  CHEST/LUNG: N/C 2L, Clear to auscultation bilaterally with good air entry   HEART: S1 S2  regular; no murmurs, gallops or rubs  ABDOMEN: Soft, Nontender, Nondistended; Bowel sounds present  EXTREMITIES: no cyanosis; no edema; no calf tenderness  SKIN: Red, maculopapular rash on back R>L, burning  NERVOUS SYSTEM:  Awake and alert; Oriented  to place, person and time ; no new deficits    _________________________________________________  LABS:                        10.8   7.64  )-----------( 536      ( 04 Jan 2023 06:25 )             33.9     01-04    135  |  96  |  16  ----------------------------<  109<H>  4.1   |  32<H>  |  0.67    Ca    8.6      04 Jan 2023 06:25          CAPILLARY BLOOD GLUCOSE      POCT Blood Glucose.: 120 mg/dL (05 Jan 2023 08:06)  POCT Blood Glucose.: 104 mg/dL (04 Jan 2023 21:39)  POCT Blood Glucose.: 98 mg/dL (04 Jan 2023 17:09)  POCT Blood Glucose.: 141 mg/dL (04 Jan 2023 11:58)    RADIOLOGY & ADDITIONAL TESTS:  < from: Xray Chest 1 View- PORTABLE-Urgent (Xray Chest 1 View- PORTABLE-Urgent .) (01.04.23 @ 07:05) >    ACC: [id number] EXAM:  XR CHEST PORTABLE URGENT 1V                          PROCEDURE DATE:  01/04/2023          INTERPRETATION:  CLINICAL INDICATION: 67 years  Male with f/u CXR 12/25.    COMPARISON: 12/25/2022    AP view of the chest demonstrates improving right lower lobe infiltrate.   Left basilar discoid atelectasis. Elevated right hemidiaphragm is stable.   No pleural effusions. The mediastinum and chace cannot be assessed. The   pulmonary vasculature is normal. There is no pneumothorax.    Mild thoracic degenerative changes are present.    IMPRESSION:    Improving right lower lobe infiltrate. Left basilar discoid atelectasis.    --- End of Report ---    < end of copied text >    < from: Xray Chest 1 View- PORTABLE-Routine (Xray Chest 1 View- PORTABLE-Routine in AM.) (12.25.22 @ 09:41) >  ACC: [id number] EXAM:  XR CHEST PORTABLE ROUTINE 1V                          PROCEDURE DATE:  12/25/2022          INTERPRETATION:  Portable chest radiograph    CLINICAL INFORMATION: ICU follow-up.    TECHNIQUE:  Portable  AP chest radiograph.    COMPARISON: 12/22/2022 chest x-ray .    FINDINGS:  CATHETERS AND TUBES: None    PULMONARY: Increasing RIGHT lower lobe diffuse airspace disease.  Remaining lung segments grossly clear..   No pneumothorax.    HEART/VASCULAR: The heart and mediastinum size and configuration are   within normal limits.    BONES: Visualized osseous structures are intact.    IMPRESSION:   Increasing RIGHT lower zone diffuse airspace   disease/consolidation...    < end of copied text >      Imaging Personally Reviewed:  YES/NO    Consultant(s) Notes Reviewed:   YES/ No    Care Discussed with Consultants :     Plan of care was discussed with patient and /or primary care giver; all questions and concerns were addressed and care was aligned with patient's wishes.

## 2023-01-05 NOTE — PROGRESS NOTE ADULT - SUBJECTIVE AND OBJECTIVE BOX
67y Male    Meds:    Allergies    morphine (Unknown)  penicillin (Unknown)  shellfish (Unknown)    Intolerances        VITALS:  Vital Signs Last 24 Hrs  T(C): 36.8 (05 Jan 2023 14:09), Max: 37.1 (05 Jan 2023 05:10)  T(F): 98.3 (05 Jan 2023 14:09), Max: 98.8 (05 Jan 2023 05:10)  HR: 77 (05 Jan 2023 14:09) (77 - 104)  BP: 97/57 (05 Jan 2023 14:09) (97/57 - 120/71)  BP(mean): --  RR: 18 (05 Jan 2023 14:09) (17 - 18)  SpO2: 96% (05 Jan 2023 14:09) (93% - 99%)    Parameters below as of 05 Jan 2023 14:09  Patient On (Oxygen Delivery Method): room air        LABS/DIAGNOSTIC TESTS:                          10.8   7.64  )-----------( 536      ( 04 Jan 2023 06:25 )             33.9         01-04    135  |  96  |  16  ----------------------------<  109<H>  4.1   |  32<H>  |  0.67    Ca    8.6      04 Jan 2023 06:25            CULTURES: .Blood Blood-Peripheral  12-25 @ 06:45   No Growth Final  --  --      .Blood Blood-Peripheral  12-25 @ 05:59   No Growth Final  --  --      .Stool Feces  12-22 @ 20:30   No enteric pathogens isolated.  (Stool culture examined for Salmonella,  Shigella, Campylobacter, Aeromonas, Plesiomonas,  Vibrio, E.coli O157 and Yersinia)  --  --      .Stool Feces  12-22 @ 20:20   No Protozoa seen by trichrome stain  No Helminths or Protozoa seen in formalin concentrate  performed by iodine stain  (routine O+P not evaluated for Microsporidia,  Cryptosporidia or Cyclospora)  One negative sample does not necessarily rule  out the presence of a parasitic infection.  --  --      .Blood Blood  12-22 @ 14:41   Growth in aerobic bottle: Gram Variable coccobacili Sent to  [facility]  for Identification  .  ***Blood Panel PCR results on this specimen are available  approximately 3 hours after the Gram stain result.***  Gram stain, PCR, and/or culture results may not always  correspond due to difference in methodologies.  ************************************************************  This PCR assay was performed by multiplex PCR. This  Assay tests for 66 bacterial and resistance gene targets.  Please refer to the [facility] test directory  at [email or web address] for details.  --  Blood Culture PCR      .Blood Blood  12-22 @ 14:36   No Growth Final  --  --      Catheterized Catheterized  12-22 @ 09:20   >100,000 CFU/ml Aerococcus species  "Aerococcus spp. are predictably susceptible to penicillin,  ampicillin, tetracycline, and vancomycin.  All isolates are  resistant to sulfonamides"  --  --      Clean Catch Clean Catch (Midstream)  12-09 @ 02:46   >100,000 CFU/ml Aerococcus species  "Aerococcus spp. are predictably susceptible to penicillin,  ampicillin, tetracycline, and vancomycin.  All isolates are  resistant to sulfonamides"  --  --            RADIOLOGY:      ROS:  [  ] UNABLE TO ELICIT 67y Male who developed a rash all over his back which he states is painful hence asked to re eval the patient, he has no fevers , chills or other complaints, he has completed his antibiotic course.     Meds:    Allergies    morphine (Unknown)  penicillin (Unknown)  shellfish (Unknown)    Intolerances        VITALS:  Vital Signs Last 24 Hrs  T(C): 36.8 (05 Jan 2023 14:09), Max: 37.1 (05 Jan 2023 05:10)  T(F): 98.3 (05 Jan 2023 14:09), Max: 98.8 (05 Jan 2023 05:10)  HR: 77 (05 Jan 2023 14:09) (77 - 104)  BP: 97/57 (05 Jan 2023 14:09) (97/57 - 120/71)  BP(mean): --  RR: 18 (05 Jan 2023 14:09) (17 - 18)  SpO2: 96% (05 Jan 2023 14:09) (93% - 99%)    Parameters below as of 05 Jan 2023 14:09  Patient On (Oxygen Delivery Method): room air        LABS/DIAGNOSTIC TESTS:                          10.8   7.64  )-----------( 536      ( 04 Jan 2023 06:25 )             33.9         01-04    135  |  96  |  16  ----------------------------<  109<H>  4.1   |  32<H>  |  0.67    Ca    8.6      04 Jan 2023 06:25            CULTURES: .Blood Blood-Peripheral  12-25 @ 06:45   No Growth Final  --  --      .Blood Blood-Peripheral  12-25 @ 05:59   No Growth Final  --  --      .Stool Feces  12-22 @ 20:30   No enteric pathogens isolated.  (Stool culture examined for Salmonella,  Shigella, Campylobacter, Aeromonas, Plesiomonas,  Vibrio, E.coli O157 and Yersinia)  --  --      .Stool Feces  12-22 @ 20:20   No Protozoa seen by trichrome stain  No Helminths or Protozoa seen in formalin concentrate  performed by iodine stain  (routine O+P not evaluated for Microsporidia,  Cryptosporidia or Cyclospora)  One negative sample does not necessarily rule  out the presence of a parasitic infection.  --  --      .Blood Blood  12-22 @ 14:41   Growth in aerobic bottle: Gram Variable coccobacili Sent to  [facility]  for Identification  .  ***Blood Panel PCR results on this specimen are available  approximately 3 hours after the Gram stain result.***  Gram stain, PCR, and/or culture results may not always  correspond due to difference in methodologies.  ************************************************************  This PCR assay was performed by multiplex PCR. This  Assay tests for 66 bacterial and resistance gene targets.  Please refer to the [facility] test directory  at [email or web address] for details.  --  Blood Culture PCR      .Blood Blood  12-22 @ 14:36   No Growth Final  --  --      Catheterized Catheterized  12-22 @ 09:20   >100,000 CFU/ml Aerococcus species  "Aerococcus spp. are predictably susceptible to penicillin,  ampicillin, tetracycline, and vancomycin.  All isolates are  resistant to sulfonamides"  --  --      Clean Catch Clean Catch (Midstream)  12-09 @ 02:46   >100,000 CFU/ml Aerococcus species  "Aerococcus spp. are predictably susceptible to penicillin,  ampicillin, tetracycline, and vancomycin.  All isolates are  resistant to sulfonamides"  --  --            RADIOLOGY:      ROS:  [  ] UNABLE TO ELICIT

## 2023-01-05 NOTE — PROGRESS NOTE ADULT - ASSESSMENT
67 year old male from [facility] with past medical history of DM, HTN, HLD, AF on Eliquis, PVD, presents with c/o 8/10 sharp right sided abdominal pain with nausea, vomiting, and diarrhea.  Pt recently had cholecystectomy and ERCP with sphincterotomy 12/16/22. TIM Kim consulted, s/p  repeat ERCP 12/23/22 found to have bleeding from spincterotomy site. CBD stented and clips place to control bleeding. Admitted to ICU for septic shock, found positive variable coccibacilli bacteremia, and aerococcus UTI. Hospital course complicated by failed TOV 12/24 repeat TOV will be done today 12/29. Hypotension and tachycardia resolved. Continue with digoxin 250 mcg for rate control. TIM Villanueva and COLLIN Novoa following . COLLIN Nava consulted started on meropenem and vancomycin, repeat blood cx 12/25 NGTD.  Downgraded to medicine 12/25 .TTE not successful 12/27 . Midline infiltrated/removed, new peripheral line inserted.     Scheduled for JANINE 12/30, but noted positive for COVID 19 12/29. Cancelled by dept. diet resumed. will need to reschedule.   Pt will need to quarantine  until 1/6/23 before discharge back to [facility]    1/4-Pt. has no IV access at this time with failed attempts by RN to replace.  Pt. requested no further sticks as he will be discharged 1/6 and requires no IV fluids/Abx.  1/5-Pt. with red rash on his back, burning.  Hydrocortisone was earlier prescribed with no relief of rash or burning.  COLLIN Novoa consulted, will f/u reccs.  Will discharge back to [facility] pending clearance of rash.

## 2023-01-05 NOTE — PROGRESS NOTE ADULT - SUBJECTIVE AND OBJECTIVE BOX
[   ] ICU                                          [   ] CCU                                      [ X  ] Medical Floor      Patient is a 67 year old male with abdominal pain. GI consulted to evaluate.        Patient is a 67 year old male, with past medical history significant for DM, HTN, HLD, AF on Eliquis, PVD, presents with c/o vomiting and right sided abdominal pain. Patient recently had cholecystectomy and ERCP with sphincterotomy prior to discharge. patient presented with c/o sharp right sided abdominal pain 8/10 persistent with nausea, vomiting and melena. Patient had repeat ERCP found to have bleeding from sphincterotomy site. CBD stended and clips place to control bleeding. No hematemesis, hematochezia, chest pain, SOB, cough, hematuria or dysuria reported.    Patient appears comfortable. No new complaints reported, No abdominal pain, N/V, hematemesis, hematochezia, melena, fever, chills, chest pain, SOB, cough or diarrhea reported.       PAIN MANAGEMENT:  Pain Scale:                 /10  Pain Location:         PAST MEDICAL HISTORY  COPD (chronic obstructive pulmonary disease)    Diabetes    PVD (peripheral vascular disease)    Chronic atrial fibrillation    GERD (gastroesophageal reflux disease)    MDD (major depressive disorder)    BPH (benign prostatic hyperplasia)        PAST SURGICAL HISTORY  Cholecystectomy    Allergies    morphine (Unknown)  penicillin (Unknown)  shellfish (Unknown)    Intolerances  None       SOCIAL HISTORY  Advanced Directives:       [ X ] Full Code       [  ] DNR  Marital Status:         [  ] M      [ X ] S      [  ] D       [  ] W  Children:       [ X ] Yes      [  ] No  Occupation:        [  ] Employed       [  X] Unemployed       [  ] Retired  Diet:       [ X ] Regular       [  ] PEG feeding          [  ] NG tube feeding  Drug Use:           [ X ] Patient denied          [  ] Yes  Alcohol:           [ X ] No             [  ] Yes (socially)         [  ] Yes (chronic)  Tobacco:           [  ] Yes           [ X ] No      FAMILY HISTORY  [ X ] Heart Disease            [ X ] Diabetes             [ X ] HTN             [  ] Colon Cancer             [  ] Stomach Cancer              [  ] Pancreatic Cancer      VITALS  Vital Signs Last 24 Hrs  T(C): 36.8 (05 Jan 2023 14:09), Max: 37.1 (05 Jan 2023 05:10)  T(F): 98.3 (05 Jan 2023 14:09), Max: 98.8 (05 Jan 2023 05:10)  HR: 79 (05 Jan 2023 17:54) (77 - 96)  BP: 111/80 (05 Jan 2023 17:54) (97/57 - 120/71)   RR: 18 (05 Jan 2023 14:09) (17 - 18)  SpO2: 96% (05 Jan 2023 14:09) (93% - 96%)  Parameters below as of 05 Jan 2023 14:09  Patient On (Oxygen Delivery Method): room air       MEDICATIONS  (STANDING):  apixaban 5 milliGRAM(s) Oral every 12 hours  atorvastatin 10 milliGRAM(s) Oral at bedtime  digoxin     Tablet 250 MICROGram(s) Oral daily  finasteride 5 milliGRAM(s) Oral daily  insulin lispro (ADMELOG) corrective regimen sliding scale   SubCutaneous three times a day before meals  insulin lispro (ADMELOG) corrective regimen sliding scale   SubCutaneous at bedtime  metoprolol tartrate 25 milliGRAM(s) Oral two times a day  pantoprazole    Tablet 40 milliGRAM(s) Oral before breakfast  pregabalin 100 milliGRAM(s) Oral every 8 hours  senna 2 Tablet(s) Oral at bedtime  tamsulosin      tamsulosin 0.8 milliGRAM(s) Oral at bedtime  zinc oxide 20% Ointment 1 Application(s) Topical two times a day    MEDICATIONS  (PRN):  aluminum hydroxide/magnesium hydroxide/simethicone Suspension 30 milliLiter(s) Oral every 4 hours PRN Dyspepsia  baclofen 5 milliGRAM(s) Oral every 12 hours PRN Musculoskeletal Pain  melatonin 3 milliGRAM(s) Oral at bedtime PRN Insomnia  ondansetron Injectable 4 milliGRAM(s) IV Push every 8 hours PRN Nausea and/or Vomiting  oxycodone    5 mG/acetaminophen 325 mG 1 Tablet(s) Oral every 4 hours PRN Severe Pain (7 - 10)  sodium chloride 0.9% lock flush 10 milliLiter(s) IV Push every 1 hour PRN Pre/post blood products, medications, blood draw, and to maintain line patency                            10.8   7.64  )-----------( 536      ( 04 Jan 2023 06:25 )             33.9       01-04    135  |  96  |  16  ----------------------------<  109<H>  4.1   |  32<H>  |  0.67    Ca    8.6      04 Jan 2023 06:25

## 2023-01-05 NOTE — PROGRESS NOTE ADULT - PROBLEM SELECTOR PLAN 4
-Pt takes Metoprolol 25mg BID  - Continue w/ Digoxin 250 mcg daily (started 12/27)  - Cont metoprolol.   - on Eliquis  - Cardio Dr. Sharma follows -Pt takes Metoprolol 25mg BID  - Continue w/ Digoxin 250 mcg daily (started 12/27)  - Cont metoprolol.   - on Eliquis

## 2023-01-05 NOTE — PROGRESS NOTE ADULT - PROBLEM SELECTOR PLAN 1
on entire back R>L, red, burning  -Hydrocortisone with no relief  -No triggering meds/foods noted  -ID Dr. Novoa consulted, will f/u reccs

## 2023-01-05 NOTE — PROGRESS NOTE ADULT - PHARYNX
no redness/no discharge

## 2023-01-05 NOTE — PROGRESS NOTE ADULT - NECK
symmetric/no tracheal deviation
supple
supple/symmetric
supple/symmetric/no tracheal deviation
supple/symmetric
supple/symmetric/no tracheal deviation
supple/symmetric
supple/symmetric/no tracheal deviation
supple/symmetric
supple/symmetric/no tracheal deviation
supple/symmetric/no tracheal deviation

## 2023-01-05 NOTE — PROGRESS NOTE ADULT - EYES
Pt is taking Oxybutynin every evening at 8am- pt has taken med for 2.5 wks, pt states she is not noticing any improvement. Days are better than night. Pt is up every 3-4 hrs at night. Please advise.
PERRL/EOMI/conjunctiva clear/non icteric sclera
PERRL/EOMI/conjunctiva clear
PERRL/EOMI/conjunctiva clear/non icteric sclera
PERRL/EOMI/conjunctiva clear
PERRL/EOMI/conjunctiva clear/non icteric sclera
PERRL/EOMI/conjunctiva clear
PERRL/EOMI/conjunctiva clear
PERRL/EOMI/conjunctiva clear/non icteric sclera
PERRL/EOMI/conjunctiva clear
PERRL/EOMI/conjunctiva clear/non icteric sclera

## 2023-01-05 NOTE — CHART NOTE - NSCHARTNOTESELECT_GEN_ALL_CORE
Event Note
Nutrition Services
Nutrition Services
Transfer to Medicine/Event Note
Urinary Retention/Event Note

## 2023-01-05 NOTE — PROGRESS NOTE ADULT - MOUTH
normal mouth and gums/moist

## 2023-01-05 NOTE — PROGRESS NOTE ADULT - TIME BILLING
- Review of records, telemetry, vital signs and daily labs.   - General and cardiovascular physical examination.  - Generation of cardiovascular treatment plan.  - Coordination of care.      Patient was seen and examined by me on 12/26/2022,interim events noted,labs and radiology studies reviewed.  Nasir Sharma MD,FACC.  [address] [place], [place] [id number].  [phone]
- Review of records, telemetry, vital signs and daily labs.   - General and cardiovascular physical examination.  - Generation of cardiovascular treatment plan.  - Coordination of care.      Patient was seen and examined by me on 12/25/2022,interim events noted,labs and radiology studies reviewed.  Nasir Sharma MD,FACC.  [address] [place], [place] [id number].  [phone]
- Review of records, telemetry, vital signs and daily labs.   - General and cardiovascular physical examination.  - Generation of cardiovascular treatment plan.  - Coordination of care.      Patient was seen and examined by me on 12/24/22,interim events noted,labs and radiology studies reviewed.  Nasir Sharma MD,FACC.  [address] [place], [place] [id number].  [phone]
- Review of records, telemetry, vital signs and daily labs.   - General and cardiovascular physical examination.  - Generation of cardiovascular treatment plan.  - Coordination of care.      Patient was seen and examined by me on 12/29/22,interim events noted,labs and radiology studies reviewed.  Nasir Sharma MD,FACC.  [address] [place], [place] [id number].  [phone]
- Review of records, telemetry, vital signs and daily labs.   - General and cardiovascular physical examination.  - Generation of cardiovascular treatment plan.  - Coordination of care.      Patient was seen and examined by me on 1/2/23 ,interim events noted,labs and radiology studies reviewed.  Nasir Sharma MD,FACC.  [address] [place], [place] [id number].  [phone]
- Review of records, telemetry, vital signs and daily labs.   - General and cardiovascular physical examination.  - Generation of cardiovascular treatment plan.  - Coordination of care.      Patient was seen and examined by me on 12/28/22,interim events noted,labs and radiology studies reviewed.  Nasir Sharma MD,FACC.  [address] [place], [place] [id number].  [phone]
- Review of records, telemetry, vital signs and daily labs.   - General and cardiovascular physical examination.  - Generation of cardiovascular treatment plan.  - Coordination of care.      Patient was seen and examined by me on 1/4/23,interim events noted,labs and radiology studies reviewed.  Nasir Sharma MD,FACC.  [address] [place], [place] [id number].  [phone]
- Review of records, telemetry, vital signs and daily labs.   - General and cardiovascular physical examination.  - Generation of cardiovascular treatment plan.  - Coordination of care.      Patient was seen and examined by me on 12/30/22,interim events noted,labs and radiology studies reviewed.  Nasir Sharma MD,FACC.  [address] [place], [place] [id number].  [phone]
- Review of records, telemetry, vital signs and daily labs.   - General and cardiovascular physical examination.  - Generation of cardiovascular treatment plan.  - Coordination of care.      Patient was seen and examined by me on 12/31/22,interim events noted,labs and radiology studies reviewed.  Nasir Sharma MD,FACC.  [address] [place], [place] [id number].  [phone]
- Review of records, telemetry, vital signs and daily labs.   - General and cardiovascular physical examination.  - Generation of cardiovascular treatment plan.  - Coordination of care.      Patient was seen and examined by me on 12/27/22,interim events noted,labs and radiology studies reviewed.  Nasir Sharma MD,FACC.  [address] [place], [place] [id number].  [phone]
- Review of records, telemetry, vital signs and daily labs.   - General and cardiovascular physical examination.  - Generation of cardiovascular treatment plan.  - Coordination of care.      Patient was seen and examined by me on 1/3/23,interim events noted,labs and radiology studies reviewed.  Nasir Sharma MD,FACC.  [address] [place], [place] [id number].  [phone]
- Review of records, telemetry, vital signs and daily labs.   - General and cardiovascular physical examination.  - Generation of cardiovascular treatment plan.  - Coordination of care.      Patient was seen and examined by me on 1/1/23 ,interim events noted,labs and radiology studies reviewed.  Nasir Sharma MD,FACC.  [address] [place], [place] [id number].  [phone]
- Review of records, telemetry, vital signs and daily labs.   - General and cardiovascular physical examination.  - Generation of cardiovascular treatment plan.  - Coordination of care.      Patient was seen and examined by me on 1/5/23,interim events noted,labs and radiology studies reviewed.  Nasir Sharma MD,FACC.  [address] [place], [place] [id number].  [phone]

## 2023-01-05 NOTE — PROGRESS NOTE ADULT - PROBLEM SELECTOR PLAN 3
-s/p meropenem    - US Abdomen: Biliary dilatation and hepatic steatosis  - CT A/P: dilation of the common bile duct and main pancreatic duct again noted.. New RLL opacity  - repeat ERCP on 12/23 performed, found with bleeding to sphincterotomy site, 2 clips and 1 stent placed in common bile duct to facilitate tamponade the bleeding and facilitate drainage.   - RUQ pain resolved  - GI Kay following -s/p meropenem    - US Abdomen: Biliary dilatation and hepatic steatosis  - CT A/P: dilation of the common bile duct and main pancreatic duct again noted.. New RLL opacity  - repeat ERCP on 12/23 performed, found with bleeding to sphincterotomy site, 2 clips and 1 stent placed in common bile duct to facilitate tamponade the bleeding and facilitate drainage.   - RUQ pain resolved  - GI f/u

## 2023-01-05 NOTE — CHART NOTE - NSCHARTNOTEFT_GEN_A_CORE
Assessment:   67yMalePatient is a 67y old  Male who presents with a chief complaint of Abdominal pain (05 Jan 2023 11:17)      Factors impacting intake: [ ] none [ ] nausea  [ ] vomiting [ ] diarrhea [ ] constipation  [ ]chewing problems [ ] swallowing issues  [x] other: visited patient in [address]. reports consuming meals , complaining about soft and bite size consistency. discussed with RN. needs To remain On it for now . suggest To change glucerna shakes To ensure enlive bid     Diet Presciption: soft and bite sized, glucerna tid   Intake: adequate     Current Weight: none noted  % Weight Change    Pertinent Medications: MEDICATIONS  (STANDING):  apixaban 5 milliGRAM(s) Oral every 12 hours  atorvastatin 10 milliGRAM(s) Oral at bedtime  digoxin     Tablet 250 MICROGram(s) Oral daily  finasteride 5 milliGRAM(s) Oral daily  hydrocortisone 1% Cream 1 Application(s) Topical two times a day  insulin lispro (ADMELOG) corrective regimen sliding scale   SubCutaneous three times a day before meals  insulin lispro (ADMELOG) corrective regimen sliding scale   SubCutaneous at bedtime  metoprolol tartrate 25 milliGRAM(s) Oral two times a day  pantoprazole    Tablet 40 milliGRAM(s) Oral before breakfast  pregabalin 100 milliGRAM(s) Oral every 8 hours  senna 2 Tablet(s) Oral at bedtime  tamsulosin      tamsulosin 0.8 milliGRAM(s) Oral at bedtime    MEDICATIONS  (PRN):  aluminum hydroxide/magnesium hydroxide/simethicone Suspension 30 milliLiter(s) Oral every 4 hours PRN Dyspepsia  baclofen 5 milliGRAM(s) Oral every 12 hours PRN Musculoskeletal Pain  melatonin 3 milliGRAM(s) Oral at bedtime PRN Insomnia  ondansetron Injectable 4 milliGRAM(s) IV Push every 8 hours PRN Nausea and/or Vomiting  oxycodone    5 mG/acetaminophen 325 mG 1 Tablet(s) Oral every 4 hours PRN Severe Pain (7 - 10)  sodium chloride 0.9% lock flush 10 milliLiter(s) IV Push every 1 hour PRN Pre/post blood products, medications, blood draw, and to maintain line patency    Pertinent Labs: 01-04 Na135 mmol/L Glu 109 mg/dL<H> K+ 4.1 mmol/L Cr  0.67 mg/dL BUN 16 mg/dL 01-02 Alb 2.5 g/dL<L>     CAPILLARY BLOOD GLUCOSE      POCT Blood Glucose.: 178 mg/dL (05 Jan 2023 11:23)  POCT Blood Glucose.: 120 mg/dL (05 Jan 2023 08:06)  POCT Blood Glucose.: 104 mg/dL (04 Jan 2023 21:39)    Skin: No pressure injury     Estimated Needs:   [ x] no change since previous assessment  [ ] recalculated:     Previous Nutrition Diagnosis:   [ ] Inadequate Energy Intake [ ]Inadequate Oral Intake [ ] Excessive Energy Intake   [ ] Underweight [ ] Increased Nutrient Needs [ ] Overweight/Obesity   [ ] Altered GI Function [ ] Unintended Weight Loss [ ] Food & Nutrition Related Knowledge Deficit [ ] Malnutrition [x] No nutrition diagnosis     Nutrition Diagnosis is [ ] ongoing  [ ] resolved [x ] not applicable     New Nutrition Diagnosis: [x ] not applicable       Interventions:   Recommend  [ ] Change Diet To:  [ ] Nutrition Supplement  [ ] Nutrition Support  [x] Other: provide soft and bite sized, suggest To change glucerna shakes To ensure enlive bid       Monitoring and Evaluation:   [ ] PO intake [ x ] Tolerance to diet prescription [ x ] weights [ x ] labs[ x ] follow up per protocol  [ ] other: Assessment:   67yMalePatient is a 67y old  Male who presents with a chief complaint of Abdominal pain (05 Jan 2023 11:17)      Factors impacting intake: [ ] none [ ] nausea  [ ] vomiting [ ] diarrhea [ ] constipation  [ ]chewing problems [ ] swallowing issues  [x] other: visited patient in [address]. reports consuming meals , complaining about soft and bite size consistency. discussed with RN. needs To remain On it for now . suggest To change glucerna shakes To ensure enlive bid     Diet Presciption: soft and bite sized, glucerna tid   Intake: adequate     Current Weight: none noted  % Weight Change    Pertinent Medications: MEDICATIONS  (STANDING):  apixaban 5 milliGRAM(s) Oral every 12 hours  atorvastatin 10 milliGRAM(s) Oral at bedtime  digoxin     Tablet 250 MICROGram(s) Oral daily  finasteride 5 milliGRAM(s) Oral daily  hydrocortisone 1% Cream 1 Application(s) Topical two times a day  insulin lispro (ADMELOG) corrective regimen sliding scale   SubCutaneous three times a day before meals  insulin lispro (ADMELOG) corrective regimen sliding scale   SubCutaneous at bedtime  metoprolol tartrate 25 milliGRAM(s) Oral two times a day  pantoprazole    Tablet 40 milliGRAM(s) Oral before breakfast  pregabalin 100 milliGRAM(s) Oral every 8 hours  senna 2 Tablet(s) Oral at bedtime  tamsulosin      tamsulosin 0.8 milliGRAM(s) Oral at bedtime    MEDICATIONS  (PRN):  aluminum hydroxide/magnesium hydroxide/simethicone Suspension 30 milliLiter(s) Oral every 4 hours PRN Dyspepsia  baclofen 5 milliGRAM(s) Oral every 12 hours PRN Musculoskeletal Pain  melatonin 3 milliGRAM(s) Oral at bedtime PRN Insomnia  ondansetron Injectable 4 milliGRAM(s) IV Push every 8 hours PRN Nausea and/or Vomiting  oxycodone    5 mG/acetaminophen 325 mG 1 Tablet(s) Oral every 4 hours PRN Severe Pain (7 - 10)  sodium chloride 0.9% lock flush 10 milliLiter(s) IV Push every 1 hour PRN Pre/post blood products, medications, blood draw, and to maintain line patency    Pertinent Labs: 01-04 Na135 mmol/L Glu 109 mg/dL<H> K+ 4.1 mmol/L Cr  0.67 mg/dL BUN 16 mg/dL 01-02 Alb 2.5 g/dL<L>     CAPILLARY BLOOD GLUCOSE      POCT Blood Glucose.: 178 mg/dL (05 Jan 2023 11:23)  POCT Blood Glucose.: 120 mg/dL (05 Jan 2023 08:06)  POCT Blood Glucose.: 104 mg/dL (04 Jan 2023 21:39)    Skin: No pressure injury     Estimated Needs:   [ x] no change since previous assessment  [ ] recalculated:     Previous Nutrition Diagnosis:   [ ] Inadequate Energy Intake [ ]Inadequate Oral Intake [ ] Excessive Energy Intake   [ ] Underweight [ ] Increased Nutrient Needs [ ] Overweight/Obesity   [ ] Altered GI Function [ ] Unintended Weight Loss [ ] Food & Nutrition Related Knowledge Deficit [ ] Malnutrition [x] No nutrition diagnosis     Nutrition Diagnosis is [ ] ongoing  [ ] resolved [x ] not applicable     New Nutrition Diagnosis: [x ] not applicable       Interventions:   Recommend  [ ] Change Diet To:  [ ] Nutrition Supplement  [ ] Nutrition Support  [x] Other: provide soft and bite sized, suggest To change glucerna shakes To ensure enlive bid , add multivitamin with minerals daily       Monitoring and Evaluation:   [ ] PO intake [ x ] Tolerance to diet prescription [ x ] weights [ x ] labs[ x ] follow up per protocol  [ ] other:

## 2023-01-06 ENCOUNTER — TRANSCRIPTION ENCOUNTER (OUTPATIENT)
Age: 68
End: 2023-01-06

## 2023-01-06 VITALS
SYSTOLIC BLOOD PRESSURE: 109 MMHG | TEMPERATURE: 98 F | DIASTOLIC BLOOD PRESSURE: 68 MMHG | HEART RATE: 83 BPM | OXYGEN SATURATION: 96 % | RESPIRATION RATE: 17 BRPM

## 2023-01-06 DIAGNOSIS — G62.9 POLYNEUROPATHY, UNSPECIFIED: ICD-10-CM

## 2023-01-06 DIAGNOSIS — M79.606 PAIN IN LEG, UNSPECIFIED: ICD-10-CM

## 2023-01-06 LAB
ANION GAP SERPL CALC-SCNC: 6 MMOL/L — SIGNIFICANT CHANGE UP (ref 5–17)
BUN SERPL-MCNC: 12 MG/DL — SIGNIFICANT CHANGE UP (ref 7–18)
CALCIUM SERPL-MCNC: 8.7 MG/DL — SIGNIFICANT CHANGE UP (ref 8.4–10.5)
CHLORIDE SERPL-SCNC: 100 MMOL/L — SIGNIFICANT CHANGE UP (ref 96–108)
CO2 SERPL-SCNC: 32 MMOL/L — HIGH (ref 22–31)
CREAT SERPL-MCNC: 0.48 MG/DL — LOW (ref 0.5–1.3)
EGFR: 112 ML/MIN/1.73M2 — SIGNIFICANT CHANGE UP
GLUCOSE BLDC GLUCOMTR-MCNC: 111 MG/DL — HIGH (ref 70–99)
GLUCOSE BLDC GLUCOMTR-MCNC: 127 MG/DL — HIGH (ref 70–99)
GLUCOSE BLDC GLUCOMTR-MCNC: 144 MG/DL — HIGH (ref 70–99)
GLUCOSE SERPL-MCNC: 108 MG/DL — HIGH (ref 70–99)
HCT VFR BLD CALC: 31.9 % — LOW (ref 39–50)
HGB BLD-MCNC: 9.9 G/DL — LOW (ref 13–17)
MCHC RBC-ENTMCNC: 30.5 PG — SIGNIFICANT CHANGE UP (ref 27–34)
MCHC RBC-ENTMCNC: 31 GM/DL — LOW (ref 32–36)
MCV RBC AUTO: 98.2 FL — SIGNIFICANT CHANGE UP (ref 80–100)
NRBC # BLD: 0 /100 WBCS — SIGNIFICANT CHANGE UP (ref 0–0)
PLATELET # BLD AUTO: 443 K/UL — HIGH (ref 150–400)
POTASSIUM SERPL-MCNC: 4.7 MMOL/L — SIGNIFICANT CHANGE UP (ref 3.5–5.3)
POTASSIUM SERPL-SCNC: 4.7 MMOL/L — SIGNIFICANT CHANGE UP (ref 3.5–5.3)
RBC # BLD: 3.25 M/UL — LOW (ref 4.2–5.8)
RBC # FLD: 15.6 % — HIGH (ref 10.3–14.5)
SARS-COV-2 RNA SPEC QL NAA+PROBE: DETECTED
SODIUM SERPL-SCNC: 138 MMOL/L — SIGNIFICANT CHANGE UP (ref 135–145)
WBC # BLD: 6.65 K/UL — SIGNIFICANT CHANGE UP (ref 3.8–10.5)
WBC # FLD AUTO: 6.65 K/UL — SIGNIFICANT CHANGE UP (ref 3.8–10.5)

## 2023-01-06 PROCEDURE — 87635 SARS-COV-2 COVID-19 AMP PRB: CPT

## 2023-01-06 PROCEDURE — 82247 BILIRUBIN TOTAL: CPT

## 2023-01-06 PROCEDURE — 93306 TTE W/DOPPLER COMPLETE: CPT

## 2023-01-06 PROCEDURE — 99232 SBSQ HOSP IP/OBS MODERATE 35: CPT

## 2023-01-06 PROCEDURE — 97163 PT EVAL HIGH COMPLEX 45 MIN: CPT

## 2023-01-06 PROCEDURE — 80048 BASIC METABOLIC PNL TOTAL CA: CPT

## 2023-01-06 PROCEDURE — 81001 URINALYSIS AUTO W/SCOPE: CPT

## 2023-01-06 PROCEDURE — 87507 IADNA-DNA/RNA PROBE TQ 12-25: CPT

## 2023-01-06 PROCEDURE — 80202 ASSAY OF VANCOMYCIN: CPT

## 2023-01-06 PROCEDURE — 87077 CULTURE AEROBIC IDENTIFY: CPT

## 2023-01-06 PROCEDURE — 80076 HEPATIC FUNCTION PANEL: CPT

## 2023-01-06 PROCEDURE — 85027 COMPLETE CBC AUTOMATED: CPT

## 2023-01-06 PROCEDURE — 84100 ASSAY OF PHOSPHORUS: CPT

## 2023-01-06 PROCEDURE — 83605 ASSAY OF LACTIC ACID: CPT

## 2023-01-06 PROCEDURE — 82962 GLUCOSE BLOOD TEST: CPT

## 2023-01-06 PROCEDURE — 97110 THERAPEUTIC EXERCISES: CPT

## 2023-01-06 PROCEDURE — 86900 BLOOD TYPING SEROLOGIC ABO: CPT

## 2023-01-06 PROCEDURE — 96374 THER/PROPH/DIAG INJ IV PUSH: CPT

## 2023-01-06 PROCEDURE — C1889: CPT

## 2023-01-06 PROCEDURE — 74176 CT ABD & PELVIS W/O CONTRAST: CPT | Mod: MA

## 2023-01-06 PROCEDURE — 87040 BLOOD CULTURE FOR BACTERIA: CPT

## 2023-01-06 PROCEDURE — 76000 FLUOROSCOPY <1 HR PHYS/QHP: CPT

## 2023-01-06 PROCEDURE — 87150 DNA/RNA AMPLIFIED PROBE: CPT

## 2023-01-06 PROCEDURE — 99285 EMERGENCY DEPT VISIT HI MDM: CPT

## 2023-01-06 PROCEDURE — 76700 US EXAM ABDOM COMPLETE: CPT

## 2023-01-06 PROCEDURE — 85730 THROMBOPLASTIN TIME PARTIAL: CPT

## 2023-01-06 PROCEDURE — 85025 COMPLETE CBC W/AUTO DIFF WBC: CPT

## 2023-01-06 PROCEDURE — 96375 TX/PRO/DX INJ NEW DRUG ADDON: CPT

## 2023-01-06 PROCEDURE — 93005 ELECTROCARDIOGRAM TRACING: CPT

## 2023-01-06 PROCEDURE — 82803 BLOOD GASES ANY COMBINATION: CPT

## 2023-01-06 PROCEDURE — 86850 RBC ANTIBODY SCREEN: CPT

## 2023-01-06 PROCEDURE — 85610 PROTHROMBIN TIME: CPT

## 2023-01-06 PROCEDURE — 86901 BLOOD TYPING SEROLOGIC RH(D): CPT

## 2023-01-06 PROCEDURE — 87177 OVA AND PARASITES SMEARS: CPT

## 2023-01-06 PROCEDURE — 87046 STOOL CULTR AEROBIC BACT EA: CPT

## 2023-01-06 PROCEDURE — 83735 ASSAY OF MAGNESIUM: CPT

## 2023-01-06 PROCEDURE — 71045 X-RAY EXAM CHEST 1 VIEW: CPT

## 2023-01-06 PROCEDURE — 83690 ASSAY OF LIPASE: CPT

## 2023-01-06 PROCEDURE — C1769: CPT

## 2023-01-06 PROCEDURE — 87045 FECES CULTURE AEROBIC BACT: CPT

## 2023-01-06 PROCEDURE — 82248 BILIRUBIN DIRECT: CPT

## 2023-01-06 PROCEDURE — 87637 SARSCOV2&INF A&B&RSV AMP PRB: CPT

## 2023-01-06 PROCEDURE — 87086 URINE CULTURE/COLONY COUNT: CPT

## 2023-01-06 PROCEDURE — 97530 THERAPEUTIC ACTIVITIES: CPT

## 2023-01-06 PROCEDURE — 80053 COMPREHEN METABOLIC PANEL: CPT

## 2023-01-06 PROCEDURE — 36415 COLL VENOUS BLD VENIPUNCTURE: CPT

## 2023-01-06 PROCEDURE — C1874: CPT

## 2023-01-06 RX ORDER — ZINC OXIDE 200 MG/G
1 OINTMENT TOPICAL
Qty: 1 | Refills: 0
Start: 2023-01-06 | End: 2023-01-12

## 2023-01-06 RX ORDER — DIGOXIN 250 MCG
1 TABLET ORAL
Qty: 30 | Refills: 0
Start: 2023-01-06 | End: 2023-02-04

## 2023-01-06 RX ORDER — FINASTERIDE 5 MG/1
1 TABLET, FILM COATED ORAL
Qty: 30 | Refills: 0
Start: 2023-01-06 | End: 2023-02-04

## 2023-01-06 RX ADMIN — Medication 250 MICROGRAM(S): at 05:17

## 2023-01-06 RX ADMIN — OXYCODONE AND ACETAMINOPHEN 1 TABLET(S): 5; 325 TABLET ORAL at 12:38

## 2023-01-06 RX ADMIN — Medication 100 MILLIGRAM(S): at 05:17

## 2023-01-06 RX ADMIN — OXYCODONE AND ACETAMINOPHEN 1 TABLET(S): 5; 325 TABLET ORAL at 05:17

## 2023-01-06 RX ADMIN — Medication 25 MILLIGRAM(S): at 05:17

## 2023-01-06 RX ADMIN — APIXABAN 5 MILLIGRAM(S): 2.5 TABLET, FILM COATED ORAL at 05:16

## 2023-01-06 RX ADMIN — Medication 100 MILLIGRAM(S): at 16:16

## 2023-01-06 RX ADMIN — OXYCODONE AND ACETAMINOPHEN 1 TABLET(S): 5; 325 TABLET ORAL at 07:19

## 2023-01-06 RX ADMIN — Medication 20 MILLIGRAM(S): at 16:14

## 2023-01-06 RX ADMIN — PANTOPRAZOLE SODIUM 40 MILLIGRAM(S): 20 TABLET, DELAYED RELEASE ORAL at 05:36

## 2023-01-06 RX ADMIN — ZINC OXIDE 1 APPLICATION(S): 200 OINTMENT TOPICAL at 05:37

## 2023-01-06 RX ADMIN — OXYCODONE AND ACETAMINOPHEN 1 TABLET(S): 5; 325 TABLET ORAL at 14:00

## 2023-01-06 RX ADMIN — FINASTERIDE 5 MILLIGRAM(S): 5 TABLET, FILM COATED ORAL at 11:15

## 2023-01-06 NOTE — PROGRESS NOTE ADULT - NEGATIVE NEUROLOGICAL SYMPTOMS
no syncope/no tremors/no vertigo/no loss of sensation/no difficulty walking/no headache
no syncope/no tremors/no vertigo/no loss of sensation/no difficulty walking/no headache
no headache/no confusion
no syncope/no tremors/no vertigo/no loss of sensation/no difficulty walking/no headache
no syncope/no tremors/no vertigo/no loss of sensation/no difficulty walking/no headache
no headache/no confusion
no syncope/no tremors/no vertigo/no loss of sensation/no difficulty walking/no headache

## 2023-01-06 NOTE — PROGRESS NOTE ADULT - PROVIDER SPECIALTY LIST ADULT
Critical Care
Cardiology
Cardiology
Critical Care
Gastroenterology
Infectious Disease
Internal Medicine
Pain Medicine
Infectious Disease
Pain Medicine
Cardiology
Critical Care
Gastroenterology
Infectious Disease
Cardiology
Gastroenterology
Cardiology
Internal Medicine
Pain Medicine
Gastroenterology
Internal Medicine
Gastroenterology
Gastroenterology
Internal Medicine
Cardiology
Gastroenterology
Internal Medicine
Gastroenterology
Gastroenterology
Internal Medicine
Gastroenterology
Internal Medicine
Internal Medicine
Gastroenterology

## 2023-01-06 NOTE — PROGRESS NOTE ADULT - PROBLEM SELECTOR PROBLEM 5
BPH (benign prostatic hyperplasia)
BPH (benign prostatic hyperplasia)
HCAP (healthcare-associated pneumonia)
Bacteremia
BPH (benign prostatic hyperplasia)
Bacteremia
BPH (benign prostatic hyperplasia)
BPH (benign prostatic hyperplasia)
Bacteremia
BPH (benign prostatic hyperplasia)

## 2023-01-06 NOTE — PROGRESS NOTE ADULT - NEGATIVE CARDIOVASCULAR SYMPTOMS
no chest pain/no palpitations/no orthopnea
no chest pain
no chest pain
no chest pain/no palpitations/no orthopnea
no chest pain/no palpitations/no peripheral edema
no chest pain/no palpitations/no orthopnea

## 2023-01-06 NOTE — PROGRESS NOTE ADULT - RESPIRATORY AND THORAX
details…
negative
negative
details…

## 2023-01-06 NOTE — PROGRESS NOTE ADULT - PROBLEM SELECTOR PROBLEM 6
Prophylactic measure
Paroxysmal atrial fibrillation
Prophylactic measure
Paroxysmal atrial fibrillation
Paroxysmal atrial fibrillation
Prophylactic measure
BPH (benign prostatic hyperplasia)
Prophylactic measure

## 2023-01-06 NOTE — PROGRESS NOTE ADULT - GENITOURINARY
details…
negative
details…
details…
negative
details…
details…
negative
negative
details…
negative
details…

## 2023-01-06 NOTE — PROGRESS NOTE ADULT - PROBLEM SELECTOR PROBLEM 1
Bacteremia
Abdominal pain
Abdominal pain
Bacteremia
Bacteremia
Abdominal pain
Bacteremia
Skin rash
Bacteremia

## 2023-01-06 NOTE — PROGRESS NOTE ADULT - SUBJECTIVE AND OBJECTIVE BOX
Source of information: IRENE MALHOTRA, Chart review  Patient language: English  : n/a    HPI:  Patient is a 67 year old male, vaccinated, from [facility] with PMHx of DM, HTN, HLD, AF on Eliquis, PVD, presents with c/o vomiting and right sided abdominal pain. patient states yesterday after lunch he had a sharp right sided abdominal pain 8/10 persistent with nausea and vomiting. He also endorsed chills and rigors, unsure if he spiked a fever. He denies any changes in bowel habits, chest pain, shortness of breath or palpitations. Patient had two episodes of melena in ED. He was discharged yesterday from hospital. He had choledocholithiasis,  CT A/P showed: Cholecystectomy with dilated CBD up to 1.7 cm abrupt cut off of the distal CBD and  mild pancreatic duct dilatation. 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 He underwent  ERCP on 12/16 biliary sludge removal with biliary sphincterotomy and balloon extraction. He was discharged after symptom improvement and out patient gastric emptying study.     In ED:   Vitals: BP: 81/59mmHg  HR:  118  RR: 97% RA   WBC 14.7 k  elevated LFT, lactate   s/p  Rocephin 1LNS and zofran  (22 Dec 2022 12:00)    CTAP demonstrates- No bowel obstruction or grossly thickened bowel wall. Colonic diverticulosis without evidence for diverticulitis. Moderate amount of stool in the rectum and distal sigmoid colon.. Appendix not visualized. No secondary signs for acute appendicitis.  Dilatation of the common bile duct and main pancreatic duct again noted.. Please see recent abdominal MR dated 12/12/2022. If clinically indicated, endoscopic ultrasound may be pursued to rule out a small obstructive ampullary tumor or pancreatic head mass.  New airspace opacifications in the right lower lung zone for which clinical correlation with pneumonia is recommended.    US of abdomen demonstrates- Biliary dilatation in this patient with prior cholecystectomy. 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 Nonvisualized spleen.    CXR 1/4- Improving right lower lobe infiltrate. Left basilar discoid atelectasis.    Dx with cholangitis. Hx of ERCP on 12/23, found to have bleeding to sphincterotomy site, 2 clips and 1 stent placed in common bile duct to facilitate tamponade the bleeding and facilitate drainage. Pain consulted for abdominal pain and opioid dependence on 12/30. Pt on Percocet 5-325mg 6 tabs/ day PRN, and lyrica 100mg PO TID per istop review.   Pt seen and examined at bedside this morning. Pt laying in bed. Airborne/ Contact precautions maintained, + COVID on 12/29. Pt reports generalized back pain, burning, and rash. Per medicine, hydrocortisone was prescribed 1/5 with no relief of rash or burning, ID Dr. Novoa consulted. Currently rating burning back pain 8/10, unrelieved by current pain regimen, exacerbated by repositioning and palpation. Pt also reports b/l foot pain, left> right, chronic rating PAIN SCORE:  8/10 and tolerable with current pain regimen SCALE USED: (1-10 VNRS). Pt describes pain as constant, burning, radiating throughout b/l feet, alleviated by chronic pain medications lyrica and percocet, exacerbated by movement and palpation. Denies abdominal pain. Pt tolerating PO diet. Pt denies chest pain, SOB, nausea, vomiting. Reports lethargy and hx constipation, last BM 1/5.  frequent productive cough with yellow sputum. Patient stated goal for pain control: to be able to take deep breaths, get out of bed to chair with tolerable pain control.     PAST MEDICAL & SURGICAL HISTORY:  COPD (chronic obstructive pulmonary disease)      Diabetes      PVD (peripheral vascular disease)      Chronic atrial fibrillation      GERD (gastroesophageal reflux disease)      MDD (major depressive disorder)      BPH (benign prostatic hyperplasia)          FAMILY HISTORY:      Social History:   [X]Denies ETOH use, illicit drug use, and smoking     Allergies    morphine (Unknown)  penicillin (Unknown)  shellfish (Unknown)    MEDICATIONS  (STANDING):  apixaban 5 milliGRAM(s) Oral every 12 hours  atorvastatin 10 milliGRAM(s) Oral at bedtime  digoxin     Tablet 250 MICROGram(s) Oral daily  finasteride 5 milliGRAM(s) Oral daily  insulin lispro (ADMELOG) corrective regimen sliding scale   SubCutaneous three times a day before meals  insulin lispro (ADMELOG) corrective regimen sliding scale   SubCutaneous at bedtime  metoprolol tartrate 25 milliGRAM(s) Oral two times a day  pantoprazole    Tablet 40 milliGRAM(s) Oral before breakfast  predniSONE   Tablet 20 milliGRAM(s) Oral daily  pregabalin 100 milliGRAM(s) Oral every 8 hours  senna 2 Tablet(s) Oral at bedtime  tamsulosin 0.8 milliGRAM(s) Oral at bedtime  tamsulosin      zinc oxide 20% Ointment 1 Application(s) Topical two times a day    MEDICATIONS  (PRN):  aluminum hydroxide/magnesium hydroxide/simethicone Suspension 30 milliLiter(s) Oral every 4 hours PRN Dyspepsia  baclofen 5 milliGRAM(s) Oral every 12 hours PRN Musculoskeletal Pain  melatonin 3 milliGRAM(s) Oral at bedtime PRN Insomnia  ondansetron Injectable 4 milliGRAM(s) IV Push every 8 hours PRN Nausea and/or Vomiting  oxycodone    5 mG/acetaminophen 325 mG 1 Tablet(s) Oral every 4 hours PRN Severe Pain (7 - 10)  sodium chloride 0.9% lock flush 10 milliLiter(s) IV Push every 1 hour PRN Pre/post blood products, medications, blood draw, and to maintain line patency      Vital Signs Last 24 Hrs  T(C): 37.1 (06 Jan 2023 05:01), Max: 37.1 (06 Jan 2023 05:01)  T(F): 98.7 (06 Jan 2023 05:01), Max: 98.7 (06 Jan 2023 05:01)  HR: 76 (06 Jan 2023 05:01) (62 - 79)  BP: 113/73 (06 Jan 2023 05:01) (97/57 - 113/73)  BP(mean): --  RR: 18 (06 Jan 2023 05:01) (17 - 18)  SpO2: 95% (06 Jan 2023 05:01) (95% - 96%)    Parameters below as of 06 Jan 2023 05:01  Patient On (Oxygen Delivery Method): nasal cannula  O2 Flow (L/min): 2    COVID-19 PCR: Detected (06 Jan 2023 05:45)  COVID-19 PCR: Detected (29 Dec 2022 23:00)  COVID-19 PCR: NotDetec (20 Dec 2022 10:35)  COVID-19 PCR: NotDetec (13 Dec 2022 11:25)  COVID-19 PCR: NotDetec (08 Dec 2022 12:13)    LABS: Reviewed                          9.9    6.65  )-----------( 443      ( 06 Jan 2023 06:26 )             31.9     01-06    138  |  100  |  12  ----------------------------<  108<H>  4.7   |  32<H>  |  0.48<L>    Ca    8.7      06 Jan 2023 06:26    CAPILLARY BLOOD GLUCOSE    POCT Blood Glucose.: 144 mg/dL (06 Jan 2023 11:32)  POCT Blood Glucose.: 111 mg/dL (06 Jan 2023 08:01)  POCT Blood Glucose.: 151 mg/dL (05 Jan 2023 21:01)  POCT Blood Glucose.: 111 mg/dL (05 Jan 2023 17:14)    Radiology: Reviewed  < from: Xray Chest 1 View- PORTABLE-Urgent (Xray Chest 1 View- PORTABLE-Urgent .) (01.04.23 @ 07:05) >    ACC: [id number] EXAM:  XR CHEST PORTABLE URGENT 1V                          PROCEDURE DATE:  01/04/2023          INTERPRETATION:  CLINICAL INDICATION: 67 years  Male with f/u CXR 12/25.    COMPARISON: 12/25/2022    AP view of the chest demonstrates improving right lower lobe infiltrate.   Left basilar discoid atelectasis. Elevated right hemidiaphragm is stable.   No pleural effusions. The mediastinum and chace cannot be assessed. The   pulmonary vasculature is normal. There is no pneumothorax.    Mild thoracic degenerative changes are present.    IMPRESSION:    Improving right lower lobe infiltrate. Left basilar discoid atelectasis.    --- End of Report ---            SUKUMAR PERDOMO MD; Attending Radiologist  This document has been electronically signed. Jan 4 2023  8:31AM    < end of copied text >    < from: US Abdomen Complete (US Abdomen Complete .) (12.22.22 @ 09:48) >    ACC: [id number] EXAM:  US ABDOMEN COMPLETE                          PROCEDURE DATE:  12/22/2022          INTERPRETATION:  CLINICAL INFORMATION: Abdominal pain and vomiting.    COMPARISON: CT and MRI dated 12/8/2022 and 12/12/2022.    TECHNIQUE: Sonography of the abdomen. Technically difficult and limited   study due to patient's body habitus.    FINDINGS:  Liver: Increased echogenicity. No evidence of a focal hepatic mass   lesion. Normal portal and hepatic venous flow.  Bile ducts: Dilated. Common bile duct measures 1.5 cm.  Gallbladder: Cholecystectomy.  Pancreas: Visualized portions are within normal limits.  Spleen: Not visualized.  Right kidney: 8.6 cm. No hydronephrosis.  Left kidney: 10 cm. No hydronephrosis.  Ascites: None.  Aorta andIVC: Visualized portions are within normal limits.    IMPRESSION:  Biliary dilatation in this patient with prior cholecystectomy. 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  Hepatic steatosis.  Nonvisualized spleen.        --- End of Report ---            MAGNO SARMIENTO MD; Attending Radiologist  This document has been electronically signed. Dec 22 2022 10:23AM    < end of copied text >    < from: CT Abdomen and Pelvis No Cont (12.22.22 @ 10:37) >  ACC: [id number] EXAM:  CT ABDOMEN AND PELVIS                          PROCEDURE DATE:  12/22/2022          INTERPRETATION:  CLINICAL INFORMATION: Abdominal pain    COMPARISON: Abdominal CT dated 12/8/2022    CONTRAST/COMPLICATIONS:  IV Contrast: NONE  Oral Contrast: NONE  Complications: None reported at time of study completion    PROCEDURE:  CT of the Abdomen and Pelvis was performed.  Sagittal and coronal reformats were performed.    FINDINGS: Evaluation of the abdomen and pelvis is limited by lack of IV   contrast.  LOWER CHEST: New airspace opacifications in the right lower lung zone for   which clinical correlation with pneumonia is recommended.    LIVER: Stable small hypodense area in the left hepatic lobe. Please see   recent abdominalMR dated 12/12/2022 for further characterization.  BILE DUCTS: Dilatation of the common bile duct is again noted.  GALLBLADDER: Stable cholecystectomy clips.  SPLEEN: Within normal limits.  PANCREAS: The pancreatic parenchyma appears unremarkable. Mild dilatation   of the main pancreatic duct in the pancreatic head is again noted.  ADRENALS: Within normal limits.  KIDNEYS/URETERS: Small hypodense lesion in the left kidney, likely   representing a cyst. The right kidney appears unremarkable. No evidence   for a calculus in the kidneys or ureters. No hydronephrosis.    BLADDER: Underdistended.  REPRODUCTIVE ORGANS: The prostate and seminal vesicles appear grossly   unremarkable.    BOWEL: No bowel obstruction or grossly thickened bowel wall. Colonic   diverticulosis without evidence for diverticulitis. Moderate amount of   stool in the rectum and distal sigmoid colon.. Appendix not visualized.   No secondary signs for acute appendicitis.  PERITONEUM: No free air or ascites.  VESSELS: Calcified atherosclerotic disease.  RETROPERITONEUM/LYMPH NODES: No lymphadenopathy.  ABDOMINAL WALL: Small fat-containing ventral hernia.  BONES: Degenerative spondylosis.    IMPRESSION: No bowel obstruction or grossly thickened bowel wall. Colonic   diverticulosis without evidence for diverticulitis. Moderate amount of   stool in the rectum and distal sigmoid colon.. Appendix not visualized.   No secondary signs for acute appendicitis.    Dilatation of the common bile duct and main pancreatic duct again noted..   Please see recent abdominal MR dated 12/12/2022. If clinically indicated,   endoscopic ultrasound may be pursued to rule out a small obstructive   ampullary tumor or pancreatic head mass.    New airspace opacifications in the right lower lung zone for which   clinical correlation with pneumonia is recommended.    --- End of Report ---            CARIDAD WILLIAM MD; Attending Radiologist  This document has been electronically signed. Dec 22 2022 11:23AM    < end of copied text >    < from: Xray Chest 1 View- PORTABLE-Routine (Xray Chest 1 View- PORTABLE-Routine in AM.) (12.25.22 @ 09:41) >    ACC: [id number] EXAM:  XR CHEST PORTABLE ROUTINE 1V                          PROCEDURE DATE:  12/25/2022          INTERPRETATION:  Portable chest radiograph    CLINICAL INFORMATION: ICU follow-up.    TECHNIQUE:  Portable  AP chest radiograph.    COMPARISON: 12/22/2022 chest x-ray .    FINDINGS:  CATHETERS AND TUBES: None    PULMONARY: Increasing RIGHT lower lobe diffuse airspace disease.  Remaining lung segments grossly clear..   No pneumothorax.    HEART/VASCULAR: The heart and mediastinum size and configuration are   within normal limits.    BONES: Visualized osseous structures are intact.    IMPRESSION:   Increasing RIGHT lower zone diffuse airspace   disease/consolidation...    --- End of Report ---            FREDDY QUINTANILLA MD; Attending Radiologist  This document has been electronically signed. Dec 26 2022 11:12AM    < end of copied text >      ORT Score -   Family Hx of substance abuse	Female	      Male  Alcohol 	                                           1                     3  Illegal drugs	                                   2                     3  Rx drugs                                           4 	                  4  Personal Hx of substance abuse		  Alcohol 	                                          3	                  3  Illegal drugs                                     4	                  4  Rx drugs                                            5 	                  5  Age between 16- 45 years	           1                     1  hx preadolescent sexual abuse	   3 	                  0  Psychological disease		  ADD, OCD, bipolar, schizophrenia   2	          2  Depression                                           1 	          1  Total: 1    a score of 3 or lower indicates low risk for opioid abuse		  a score of 4-7 indicates moderate risk for opioid abuse		  a score of 8 or higher indicates high risk for opioid abuse    REVIEW OF SYSTEMS:  CONSTITUTIONAL: No fever + fatigue  HEENT:  + difficulty hearing, no change in vision  RESPIRATORY: + frequent productive cough with yellow sputum No wheezing, chills or hemoptysis; No shortness of breath + hx COPD  CARDIOVASCULAR: No chest pain, palpitations, dizziness, or leg swelling  GASTROINTESTINAL: No loss of appetite, decreased PO intake. + mild abdominal discomfort. No nausea, vomiting; No diarrhea + hx constipation.    GENITOURINARY: + dysuria, No frequency, hematuria, retention + incontinence  MUSCULOSKELETAL: + chronic foot pain, no joint swelling; + lower motor strength weakness, no saddle anesthesia, +occasional bowel incontinence no bladder incontinence, no falls   NEURO: No headaches, + chronic numbness/tingling b/l LE Left> right   PSYCHIATRIC: + depression  SKIN: + upper back rash, burning      PHYSICAL EXAM:  GENERAL:  Alert & Oriented X4, cooperative, NAD, Good concentration. Speech is clear.   RESPIRATORY: Respirations even and unlabored. + diminished to auscultation bilaterally; No rales, rhonchi, wheezing, or rubs. O2 2L NC in place.   CARDIOVASCULAR: Normal S1/S2, regular rhythm and rate; No murmurs, rubs, or gallops. No JVD.   GASTROINTESTINAL:  Soft, Nontender, Nondistended; Bowel sounds present  PERIPHERAL VASCULAR:  Extremities warm without edema. 2+ Peripheral Pulses, No cyanosis, No calf tenderness  MUSCULOSKELETAL: Motor Strength 4/5 B/L upper and 2/5 b/l lower extremities; + decreased b/l LE ROM; + b/l feet tenderness on palpation   SKIN: + large macular red rash over upper and mid back. + rash tender to palpation. Warm, dry, intact. No lesions, scars or wounds.     Risk factors associated with adverse outcomes related to opioid treatment  [ ]  Concurrent benzodiazepine use  [ ]  History/ Active substance use or alcohol use disorder  [X ] Psychiatric co-morbidity  [ ] Sleep apnea  [X ] COPD  [ ] BMI> 35  [ ] Liver dysfunction  [ ] Renal dysfunction  [ ] CHF  [ ] Smoker  [X ]  Age > 60 years    [X ]  [place]  Reviewed and Copied to Chart. See below.    Plan of care and goal oriented pain management treatment options were discussed with patient and /or primary care giver; all questions and concerns were addressed and care was aligned with patient's wishes.    Educated patient on goal oriented pain management treatment options     01-06-23 @ 11:43

## 2023-01-06 NOTE — PROGRESS NOTE ADULT - ASSESSMENT
Confidential Drug Utilization Report  Search Terms: Zeyad Rodríguez, 1955Search Date: 12/30/2022 12:21:15 PM  The Drug Utilization Report below displays all of the controlled substance prescriptions, if any, that your patient has filled in the last twelve months. The information displayed on this report is compiled from pharmacy submissions to the Department, and accurately reflects the information as submitted by the pharmacies.    This report was requested by: Alice Velazquez | Reference #: [id number]    You have not added a MARY number. Keeping your MARY number(s) up to date on the My MARY # page will enable the separation of your prescriptions from others in the search results.    Others' Prescriptions  Patient Name: Zeyad RodríguezBirth Date: 1955  Address: [place], [place] [address]Zkn: Male  Rx Written	Rx Dispensed	Drug	Quantity	Days Supply	Prescriber Name  12/01/2022	12/01/2022	oxycodone-acetaminophen 5-325 mg tab	30	5	Donnie Fletcher  Prescriber Mary # [id number]  Payment Method Insurance  [facility] [employer] [id number]  11/22/2022	11/24/2022	pregabalin 100 mg capsule	90	30	Raphael Lassiter MD  Prescriber Mary # [id number]  Payment Method Insurance  [facility] [employer] [id number]  11/15/2022	11/15/2022	oxycodone-acetaminophen 5-325 mg tab	90	15	Herminia Steele  Prescriber Mary # [id number]  Payment Method Insurance  [facility] [employer] [id number]  11/09/2022	11/10/2022	pregabalin 100 mg capsule	45	15	Donnie Fletcher  Prescriber Mary # [id number]  Payment Method Insurance  [facility] [employer] [id number]  11/01/2022	11/01/2022	oxycodone-acetaminophen 5-325 mg tab	90	15	Raphael Lassiter MD  Prescriber Mary # [id number]  Payment Method Insurance  [facility] [employer] [id number]  10/18/2022	10/18/2022	oxycodone-acetaminophen 5-325 mg tab	60	10	Herminia Steele  Prescriber Mary # [id number]  Payment Method Insurance  [facility] [employer] [id number]  10/18/2022	10/18/2022	pregabalin 100 mg capsule	60	20	Herminia Steele  Prescriber Mary # [id number]  Payment Method Insurance  [facility] [employer] [id number]  09/22/2022	09/23/2022	oxycodone-acetaminophen 5-325 mg tab	90	15	Herminia Steele  Prescriber Mary # [id number]  Payment Method Insurance  [facility] [employer] [id number]  09/19/2022	09/20/2022	pregabalin 100 mg capsule	90	30	Brooke Tipton MD  Prescriber Mary # [id number]  Payment Method Insurance  [facility] [employer] [id number]  09/06/2022	09/06/2022	oxycodone-acetaminophen 5-325 mg tab	90	15	Herminia Steele  Prescriber Mary # [id number]  Payment Method Insurance  [facility] [employer] [id number]  08/25/2022	08/27/2022	oxycodone-acetaminophen 5-325 mg tab	60	10	AvHerminia abel  Prescriber Mary # [id number]  Payment Method Insurance  [facility] [employer] [id number]  08/23/2022	08/23/2022	oxycodone-acetaminophen 5-325 mg tab	30	5	Herminia Steele  Prescriber Mary # [id number]  Payment Method Insurance  [facility] [employer] [id number]  08/18/2022	08/18/2022	pregabalin 100 mg capsule	90	30	SravaniRaphael crow MD  Prescriber Mary # [id number]  Payment Method Insurance  [facility] [employer] [id number]  08/10/2022	08/10/2022	pregabalin 100 mg capsule	30	10	Herminia Steele  Prescriber Mary # [id number]  Payment Method Insurance  [facility] [employer] [id number]  08/02/2022	08/02/2022	oxycodone-acetaminophen 5-325 mg tab	120	20	SravaniRaphael crow MD  Prescriber Mary # [id number]  Payment Method Insurance  Disp[facility] [employer] [id number]  07/29/2022	07/29/2022	pregabalin 100 mg capsule	30	10	Herminia Steele  Prescriber Mary # [id number]  Payment Method Insurance  [facility] [employer] [id number]  07/19/2022	07/19/2022	oxycodone-acetaminophen 5-325 mg tab	90	15	SravaniRaphael crow MD  Prescriber Mary # [id number]  Payment Method Insurance  [facility] [employer] [id number]  07/11/2022	07/11/2022	pregabalin 100 mg capsule	60	20	Brooke Tipton MD  Prescriber Mary # [id number]  Payment Method Insurance  [facility] [employer] [id number]  07/09/2022	07/09/2022	oxycodone-acetaminophen 5-325 mg tab	40	6	Brooke Tipton MD  Prescriber Mary # [id number]  Payment Method Insurance  [facility] [employer] [id number]  06/30/2022	06/30/2022	oxycodone-acetaminophen 5-325 mg tab	45	8	Donnie Fletcher  Prescriber Mary # [id number]  Payment Method Insurance  [facility] [employer] [id number]  06/24/2022	06/24/2022	pregabalin 100 mg capsule	45	15	Donnie Fletcher  Prescriber Mary # [id number]  Payment Method Insurance  Disp[facility] [employer] [id number]  06/21/2022	06/21/2022	oxycodone-acetaminophen 5-325 mg tab	45	15	Donnie Fletcher  Prescriber Mary # [id number]  Payment Method Insurance  [facility] [employer] [id number]  06/08/2022	06/08/2022	pregabalin 100 mg capsule	45	15	Donnie Fletcher  Prescriber Mary # [id number]  Payment Method Insurance  [facility] [employer] [id number]  06/07/2022	06/07/2022	oxycodone-acetaminophen 5-325 mg tab	90	15	SravaniRaphael crow MD  Prescriber Mary # [id number]  Payment Method Insurance  Disp[facility] [employer] [id number]  05/26/2022	05/26/2022	oxycodone-acetaminophen 5-325 mg tab	60	10	Donnie Fletcher  Prescriber Mary # [id number]  Payment Method Insurance  Disp[facility] [employer] [id number]  05/03/2022	05/11/2022	oxycodone-acetaminophen 5-325 mg tab	90	15	SravaniRaphael crow MD  Prescriber Mary # [id number]  Payment Method Insurance  [facility] [employer] [id number]  05/10/2022	05/10/2022	pregabalin 100 mg capsule	90	30	SravaniRaphael crow MD  Prescriber Mary # [id number]  Payment Method Insurance  [facility] [employer] [id number]  05/04/2022	05/05/2022	oxycodone-acetaminophen 5-325 mg tab	45	8	Donnie Fletcher  Prescriber Mary # [id number]  Payment Method Insurance  [facility] [employer] [id number]  04/28/2022	04/28/2022	oxycodone-acetaminophen 5-325 mg tab	30	5	Donnie Fletcher  Prescriber Mary # [id number]  Payment Method Insurance  Dispens[facility] [employer] [id number]  04/27/2022	04/27/2022	pregabalin 100 mg capsule	45	15	Donnie Fletcher  Prescriber Mary # [id number]  Payment Method Insurance  Disp[facility] [employer] [id number]  04/12/2022	04/12/2022	oxycodone-acetaminophen 5-325 mg tab	90	15	SravaniRaphael ragsdale MD  Prescriber Mary # [id number]  Payment Method Insurance  Dispens[facility] [employer] [id number]  04/12/2022	04/12/2022	pregabalin 100 mg capsule	45	15	SravaniRaphael crow MD  Prescriber Mary # [id number]  Payment Method Insurance  Dispens[facility] [employer] [id number]  03/17/2022	03/27/2022	oxycodone-acetaminophen 5-325 mg tab	90	15	SravaniRaphael crow MD  Prescriber Mary # [id number]  Payment Method Other  Dispens[facility] [employer] [id number]  03/16/2022	03/19/2022	oxycodone-acetaminophen 5-325 mg tab	60	10	Donnie Fletcher  Prescriber Mary # [id number]  Payment Method Other  [facility] [employer] [id number]  03/05/2022	03/06/2022	oxycodone-acetaminophen 5-325 mg tab	60	10	SravaniRaphael crow MD  Prescriber Mary # [id number]  Payment Method Other  [facility] [employer] [id number]  03/06/2022	03/06/2022	pregabalin 100 mg capsule	15	5	Ramirez Martinez DMD  Prescriber Mary # [id number]  Payment Method Other  Dispens[facility] [employer] [id number]  02/23/2022	02/24/2022	oxycodone-acetaminophen 5-325 mg tab	45	8	Donnie Fletcher  Prescriber Mary # [id number]  Payment Method Other  [facility]pens[facility] [employer] [id number]  02/21/2022	02/21/2022	pregabalin 100 mg capsule	45	15	Donnie Fletcher  Prescriber Mary # [id number]  Payment Method Other  Dispens[facility] [employer] [id number]  02/17/2022	02/18/2022	oxycodone-acetaminophen 5-325 mg tab	30	5	Donnie Fletcher  Prescriber Mary # [id number]  Payment Method Other  [facility] [employer] [id number]  02/08/2022	02/08/2022	oxycodone-acetaminophen 5-325 mg tab	60	10	Donnie Fletcher  Prescriber Mary # [id number]  Payment Method Other  Dispens[facility] [employer] [id number]  01/24/2022	01/24/2022	oxycodone-acetaminophen 5-325 mg tab	30	5	Donnie Fletcher  Prescriber Mary # [id number]  Payment Method Other  Dispens[facility] [employer] [id number]  12/17/2021	01/22/2022	pregabalin 100 mg capsule	45	15	Donnie Fletcher  Prescriber Mary # [id number]  Payment Method Other  Dispens[facility] [employer] [id number]  01/13/2022	01/14/2022	oxycodone-acetaminophen 5-325 mg tab	60	10	Raphael Lassiter MD  Prescriber Mary # [id number]  Payment Method Other  Dispens[facility] [employer] [id number]  01/03/2022	01/03/2022	oxycodone-acetaminophen 5-325 mg tab	45	8	Donnie Fletcher  Prescriber Mary # [id number]  Payment Method Other  [facility] [employer] [id number]  * - Drugs marked with an asterisk are compound drugs. If the compound drug is made up of more than one controlled substance, then each controlled substance will be a separate row in the table.

## 2023-01-06 NOTE — DISCHARGE NOTE NURSING/CASE MANAGEMENT/SOCIAL WORK - PATIENT PORTAL LINK FT
You can access the FollowMyHealth Patient Portal offered by [facility] by registering at the following website: [email or web address]. By joining [facility]’s FollowMyHealth portal, you will also be able to view your health information using other applications (apps) compatible with our system.

## 2023-01-06 NOTE — PROGRESS NOTE ADULT - PROBLEM SELECTOR PROBLEM 7
Discharge planning issues
Opioid dependence
Discharge planning issues
Prophylactic measure
Discharge planning issues
Opioid dependence
Discharge planning issues
Opioid dependence
Discharge planning issues
Discharge planning issues

## 2023-01-06 NOTE — PROGRESS NOTE ADULT - NEGATIVE MUSCULOSKELETAL SYMPTOMS
no stiffness/no neck pain/no arm pain L/no arm pain R/no back pain/no leg pain L/no leg pain R

## 2023-01-06 NOTE — PROGRESS NOTE ADULT - CONSTITUTIONAL
well-groomed/no distress/cachectic
no distress
well-groomed/no distress/cachectic
well-groomed/no distress/cachectic
no distress
no distress
well-groomed/no distress/cachectic
well-groomed/no distress
well-groomed/no distress/cachectic
no distress
well-groomed/no distress/cachectic

## 2023-01-06 NOTE — PROGRESS NOTE ADULT - SKIN
no rashes
warm and dry/no rashes/no ulcers
no rashes
warm and dry/no rashes/no ulcers
warm and dry/no rashes/no ulcers
warm and dry/color normal/no rashes/no ulcers
warm and dry/no rashes/no ulcers

## 2023-01-06 NOTE — DISCHARGE NOTE NURSING/CASE MANAGEMENT/SOCIAL WORK - NSDCPEWEB_GEN_ALL_CORE
[facility] for Tobacco Control website --- [email or web address] --- [email or web address]

## 2023-01-06 NOTE — PROGRESS NOTE ADULT - OPHTHALMOLOGIC
details…
negative
details…

## 2023-01-06 NOTE — PROGRESS NOTE ADULT - RESPIRATORY
normal/clear to auscultation bilaterally/no wheezes/no rales/no rhonchi
no wheezes/no rales/no rhonchi/airway patent/breath sounds equal
normal/clear to auscultation bilaterally/no wheezes/no rales/no rhonchi
no wheezes/no rales/no rhonchi/airway patent/breath sounds equal
no wheezes/no rales/no rhonchi/airway patent/breath sounds equal
clear to auscultation bilaterally/no wheezes/no rales/no rhonchi
clear to auscultation bilaterally/no wheezes/no rales/no rhonchi
normal/clear to auscultation bilaterally/no wheezes/no rales/no rhonchi
no wheezes/no rales/no rhonchi/airway patent/breath sounds equal

## 2023-01-06 NOTE — PROGRESS NOTE ADULT - PROBLEM SELECTOR PLAN 1
Pt with acute right sided abdominal pain which is somatic and visceral in nature due to cholangitis. + biliary dilatation on US of abdomen. CTAP demonstrates- No bowel obstruction or grossly thickened bowel wall. Colonic diverticulosis without evidence for diverticulitis. Moderate amount of stool in the rectum and distal sigmoid colon.  Hx of ERCP on 12/23, found to have bleeding to sphincterotomy site, 2 clips and 1 stent placed in common bile duct to facilitate tamponade the bleeding and facilitate drainage. Pt also with chronic leg pain, which is neuropathic in nature d/t diabetic neuropathy. Pt is opioid dependent on Percocet 5-325mg 6 tabs/ day PRN, and lyrica 100mg PO TID. + COVID 12/29.  Pt also with generalized back pain a/w rash. Per medicine, hydrocortisone prescribed 1/5 with no relief of rash or burning, ID Dr. Novoa consulted.   High risk medications reviewed. Avoid polypharmacy. Avoid IV opioids. Avoid NSAIDs and benzodiazepines. Non-pharmacological sleep aides initiated. Non-opioid medications and non-pharmacological pain management measures initiated.  Opioid pain recommendations   - Continue Percocet 5 mg/325 mg PO q4h PRN severe pain (home dose). Monitor for sedation/ respiratory depression.   Non-opioid pain recommendations   - Continue Lyrica 100mg po q 8 hours (home dose per Istop). Monitor renal function.   - Continue baclofen 5mg PO q12h PRN muscle spasms (home dose).   Bowel Regimen  - Continue Miralax 17G PO BID per primary team.   - Continue Senna 2 tablets at bedtime for constipation  Mild pain   - Non-pharmacological pain treatment recommendations  - Warm/ Cool packs PRN   - Repositioning, imagery, relaxation, distraction.  - Physical therapy OOB if no contraindications   Recommendations discussed with primary team and RN.

## 2023-01-06 NOTE — PROGRESS NOTE ADULT - NEGATIVE GASTROINTESTINAL SYMPTOMS
no nausea/no vomiting/no diarrhea/no constipation/no hematochezia/no steatorrhea/no jaundice/no hiccoughs
no nausea/no vomiting/no diarrhea/no constipation/no hematochezia/no steatorrhea/no jaundice/no hiccoughs
no nausea/no vomiting/no diarrhea
no nausea/no vomiting/no diarrhea/no constipation/no hematochezia/no steatorrhea/no jaundice/no hiccoughs
no nausea/no vomiting/no diarrhea/no constipation/no hematochezia/no steatorrhea/no jaundice/no hiccoughs
no nausea/no vomiting/no diarrhea
no nausea/no vomiting/no diarrhea/no constipation/no hematochezia/no steatorrhea/no jaundice/no hiccoughs
no nausea/no vomiting/no diarrhea/no constipation/no hematochezia/no steatorrhea/no jaundice/no hiccoughs
no nausea/no vomiting/no diarrhea
no nausea/no vomiting/no diarrhea/no abdominal pain
no nausea/no vomiting/no diarrhea/no constipation/no hematochezia/no steatorrhea/no jaundice/no hiccoughs
no nausea/no vomiting/no diarrhea/no constipation/no hematochezia/no steatorrhea/no jaundice/no hiccoughs
no nausea/no vomiting/no diarrhea
no nausea/no vomiting/no diarrhea/no constipation/no hematochezia/no steatorrhea/no jaundice/no hiccoughs

## 2023-01-06 NOTE — DISCHARGE NOTE NURSING/CASE MANAGEMENT/SOCIAL WORK - NSDCPEEMAIL_GEN_ALL_CORE
[facility] for Tobacco Control email [email or web address]

## 2023-01-06 NOTE — PROGRESS NOTE ADULT - SUBJECTIVE AND OBJECTIVE BOX
[   ] ICU                                          [   ] CCU                                      [  X ] Medical Floor      Patient is a 67 year old male with abdominal pain. GI consulted to evaluate.        Patient is a 67 year old male, with past medical history significant for DM, HTN, HLD, AF on Eliquis, PVD, presents with c/o vomiting and right sided abdominal pain. Patient recently had cholecystectomy and ERCP with sphincterotomy prior to discharge. patient presented with c/o sharp right sided abdominal pain 8/10 persistent with nausea, vomiting and melena. Patient had repeat ERCP found to have bleeding from sphincterotomy site. CBD stended and clips place to control bleeding. No hematemesis, hematochezia, chest pain, SOB, cough, hematuria or dysuria reported.    Patient appears comfortable. No new complaints reported, No abdominal pain, N/V, hematemesis, hematochezia, melena, fever, chills, chest pain, SOB, cough or diarrhea reported.       PAIN MANAGEMENT:  Pain Scale:                 /10  Pain Location:         PAST MEDICAL HISTORY  COPD (chronic obstructive pulmonary disease)    Diabetes    PVD (peripheral vascular disease)    Chronic atrial fibrillation    GERD (gastroesophageal reflux disease)    MDD (major depressive disorder)    BPH (benign prostatic hyperplasia)        PAST SURGICAL HISTORY  Cholecystectomy    Allergies    morphine (Unknown)  penicillin (Unknown)  shellfish (Unknown)    Intolerances  None       SOCIAL HISTORY  Advanced Directives:       [ X ] Full Code       [  ] DNR  Marital Status:         [  ] M      [ X ] S      [  ] D       [  ] W  Children:       [ X ] Yes      [  ] No  Occupation:        [  ] Employed       [  X] Unemployed       [  ] Retired  Diet:       [ X ] Regular       [  ] PEG feeding          [  ] NG tube feeding  Drug Use:           [ X ] Patient denied          [  ] Yes  Alcohol:           [ X ] No             [  ] Yes (socially)         [  ] Yes (chronic)  Tobacco:           [  ] Yes           [ X ] No      FAMILY HISTORY  [ X ] Heart Disease            [ X ] Diabetes             [ X ] HTN             [  ] Colon Cancer             [  ] Stomach Cancer              [  ] Pancreatic Cancer        VITALS  Vital Signs Last 24 Hrs  T(C): 37.1 (06 Jan 2023 05:01), Max: 37.1 (06 Jan 2023 05:01)  T(F): 98.7 (06 Jan 2023 05:01), Max: 98.7 (06 Jan 2023 05:01)  HR: 76 (06 Jan 2023 05:01) (62 - 79)  BP: 113/73 (06 Jan 2023 05:01) (108/70 - 113/73)   RR: 18 (06 Jan 2023 05:01) (17 - 18)  SpO2: 95% (06 Jan 2023 05:01) (95% - 95%)  Parameters below as of 06 Jan 2023 05:01  Patient On (Oxygen Delivery Method): nasal cannula  O2 Flow (L/min): 2         MEDICATIONS  (STANDING):  apixaban 5 milliGRAM(s) Oral every 12 hours  atorvastatin 10 milliGRAM(s) Oral at bedtime  digoxin     Tablet 250 MICROGram(s) Oral daily  finasteride 5 milliGRAM(s) Oral daily  insulin lispro (ADMELOG) corrective regimen sliding scale   SubCutaneous three times a day before meals  insulin lispro (ADMELOG) corrective regimen sliding scale   SubCutaneous at bedtime  metoprolol tartrate 25 milliGRAM(s) Oral two times a day  pantoprazole    Tablet 40 milliGRAM(s) Oral before breakfast  predniSONE   Tablet 20 milliGRAM(s) Oral daily  pregabalin 100 milliGRAM(s) Oral every 8 hours  senna 2 Tablet(s) Oral at bedtime  tamsulosin 0.8 milliGRAM(s) Oral at bedtime  tamsulosin      zinc oxide 20% Ointment 1 Application(s) Topical two times a day    MEDICATIONS  (PRN):  aluminum hydroxide/magnesium hydroxide/simethicone Suspension 30 milliLiter(s) Oral every 4 hours PRN Dyspepsia  baclofen 5 milliGRAM(s) Oral every 12 hours PRN Musculoskeletal Pain  melatonin 3 milliGRAM(s) Oral at bedtime PRN Insomnia  ondansetron Injectable 4 milliGRAM(s) IV Push every 8 hours PRN Nausea and/or Vomiting  oxycodone    5 mG/acetaminophen 325 mG 1 Tablet(s) Oral every 4 hours PRN Severe Pain (7 - 10)  sodium chloride 0.9% lock flush 10 milliLiter(s) IV Push every 1 hour PRN Pre/post blood products, medications, blood draw, and to maintain line patency                            9.9    6.65  )-----------( 443      ( 06 Jan 2023 06:26 )             31.9       01-06    138  |  100  |  12  ----------------------------<  108<H>  4.7   |  32<H>  |  0.48<L>    Ca    8.7      06 Jan 2023 06:26

## 2023-01-06 NOTE — DISCHARGE NOTE NURSING/CASE MANAGEMENT/SOCIAL WORK - NSDCPEFALRISK_GEN_ALL_CORE
For information on Fall & Injury Prevention, visit: [email or web address] OR  [email or web address] OR  https://www.cdc.gov/steadi/patient.html

## 2023-01-06 NOTE — PROGRESS NOTE ADULT - PROBLEM SELECTOR PROBLEM 8
2019 novel coronavirus disease (COVID-19)
Discharge planning issues

## 2023-01-06 NOTE — PROGRESS NOTE ADULT - PROBLEM SELECTOR PROBLEM 2
Cholangitis
Diabetes
Cholangitis
Bacteremia
Cholangitis
Cholangitis
Diabetes
Cholangitis
Diabetes

## 2023-01-06 NOTE — PROGRESS NOTE ADULT - NEGATIVE GENERAL SYMPTOMS
no fever/no chills/no polyphagia/no polyuria/no polydipsia
no fever/no chills/no polyphagia/no polyuria/no polydipsia
no fever/no chills
no fever/no chills/no polyphagia/no polyuria/no polydipsia
no chills
no fever/no chills
no fever/no chills
no fever/no chills/no polyphagia/no polyuria/no polydipsia
no fever/no chills/no polyphagia/no polyuria/no polydipsia
no fever/no chills
no fever/no chills/no polyphagia/no polyuria/no polydipsia

## 2023-01-06 NOTE — PROGRESS NOTE ADULT - NEGATIVE RESPIRATORY AND THORAX SYMPTOMS
no wheezing/no dyspnea/no cough/no hemoptysis/no pleuritic chest pain
no dyspnea/no cough
no wheezing/no dyspnea/no cough/no hemoptysis/no pleuritic chest pain
no dyspnea/no cough
no dyspnea/no cough/no pleuritic chest pain
no wheezing/no dyspnea/no cough/no hemoptysis/no pleuritic chest pain

## 2023-01-06 NOTE — PROGRESS NOTE ADULT - NEGATIVE GENERAL GENITOURINARY SYMPTOMS
no hematuria/no renal colic/no flank pain L/no flank pain R/no urine discoloration/no dysuria

## 2023-01-06 NOTE — PROGRESS NOTE ADULT - PROBLEM SELECTOR PROBLEM 3
Paroxysmal atrial fibrillation
Chronic atrial fibrillation
Cholangitis
Chronic atrial fibrillation
Paroxysmal atrial fibrillation
Chronic atrial fibrillation
Paroxysmal atrial fibrillation

## 2023-01-10 ENCOUNTER — TRANSCRIPTION ENCOUNTER (OUTPATIENT)
Age: 68
End: 2023-01-10

## 2023-01-17 ENCOUNTER — TRANSCRIPTION ENCOUNTER (OUTPATIENT)
Age: 68
End: 2023-01-17

## 2023-01-21 LAB
CULTURE RESULTS: SIGNIFICANT CHANGE UP
ORGANISM # SPEC MICROSCOPIC CNT: SIGNIFICANT CHANGE UP
ORGANISM # SPEC MICROSCOPIC CNT: SIGNIFICANT CHANGE UP
SPECIMEN SOURCE: SIGNIFICANT CHANGE UP

## 2023-01-23 ENCOUNTER — TRANSCRIPTION ENCOUNTER (OUTPATIENT)
Age: 68
End: 2023-01-23

## 2023-01-30 ENCOUNTER — TRANSCRIPTION ENCOUNTER (OUTPATIENT)
Age: 68
End: 2023-01-30

## 2023-03-10 PROBLEM — Z00.00 ENCOUNTER FOR PREVENTIVE HEALTH EXAMINATION: Status: ACTIVE | Noted: 2023-03-10

## 2023-03-13 ENCOUNTER — APPOINTMENT (OUTPATIENT)
Dept: GASTROENTEROLOGY | Facility: CLINIC | Age: 68
End: 2023-03-13

## 2023-04-06 NOTE — PROGRESS NOTE ADULT - PROBLEM SELECTOR PROBLEM 6
Fax from [facility] came in needing to know the procedure being done for the use of Misoprostol (cytotec). Reordered medication with reason for procedure on script. IUD insertion.  
PVD (peripheral vascular disease)

## 2023-05-03 NOTE — PROGRESS NOTE ADULT - PROBLEM SELECTOR PLAN 8
DVT PPX:  Lovenox 55mg sq  GI PPX: Pepcid 20 po
Never   Vaping Use    Vaping Use: Never used   Substance and Sexual Activity    Alcohol use: Not Currently    Drug use: Yes     Types: Marijuana Judith Lesser)    Sexual activity: Not on file   Other Topics Concern    Not on file   Social History Narrative    Not on file     Social Determinants of Health     Financial Resource Strain: Low Risk     Difficulty of Paying Living Expenses: Not hard at all   Food Insecurity: No Food Insecurity    Worried About [address] in the Last Year: Never true    [address] in the Last Year: Never true   Transportation Needs: Unknown    Lack of Transportation (Medical): Not on file    Lack of Transportation (Non-Medical): No   Physical Activity: Not on file   Stress: Not on file   Social Connections: Not on file   Intimate Partner Violence: Not on file   Housing Stability: Unknown    Unable to Pay for Housing in the Last Year: Not on file    Number of Places Lived in the Last Year: Not on file    Unstable Housing in the Last Year: No         Current Outpatient Medications   Medication Sig    triamcinolone (KENALOG) 0.1 % cream Apply topically 2 times daily. No current facility-administered medications for this visit. Objective     Vitals:    05/03/23 1101 05/03/23 1107   BP: (!) 142/94 (!) 145/87   Site: Left Upper Arm Right Upper Arm   Position: Sitting Sitting   Cuff Size: Large Adult Large Adult   Pulse: 96    Resp: 14    Temp: 98.9 °F (37.2 °C)    TempSrc: Temporal    SpO2: 96%    Weight: 267 lb 12.8 oz (121.5 kg)    Height: 6' (1.829 m)       Physical Exam:  Constitutional: Appears well kempt. Alert/oriented x3. In no acute distress. Head: Normocephalic No trauma. No deformity. No bruits. Neck: Supple. ROM normal. No tenderness. No masses. Eyes: PERRLA. Conjunctivae normal. No discharge. Ears: External ears normal. TM normal. No discharge from ears. Nose: Nose normal. Nares patent. Throat: Clear. No exudates. No erythema.    Cardiac: Heart with normal
DVT PPX:  Lovenox 55mg sq  GI PPX: Pepcid 20 po
DVT PPX:  Eliquis  GI PPX: Pepcid
DVT PPX:  Lovenox  GI PPX: Pepcid
DVT PPX:  Lovenox 55mg sq  GI PPX: Pepcid 20 po
DVT PPX:  Eliquis  GI PPX: Pepcid
DVT PPX:  Lovenox  GI PPX: Pepcid
DVT PPX:  Lovenox 55mg sq  GI PPX: Pepcid 20 po
DVT PPX:  Lovenox 55mg sq  GI PPX: Pepcid 20 po
DVT PPX:  resume a/c tomorrow 12/17  GI PPX: Pepcid
DVT PPX:  Lovenox  GI PPX: Pepcid
DVT PPX:  Eliquis  GI PPX: Pepcid
DVT PPX:  Lovenox  GI PPX: Pepcid
DVT PPX:  Eliquis  GI PPX: Pepcid
DVT PPX:  Lovenox 55mg sq  GI PPX: Pepcid 20 po

## 2023-11-14 NOTE — PROGRESS NOTE ADULT - NEGATIVE MUSCULOSKELETAL SYMPTOMS
no muscle cramps/no muscle weakness/no stiffness/no neck pain/no back pain/no leg pain L
n/a
no muscle cramps/no muscle weakness/no stiffness/no neck pain/no back pain/no leg pain L

## 2024-03-04 RX ORDER — IPRATROPIUM BROMIDE 0.2 MG/ML
2.5 SOLUTION, NON-ORAL INHALATION
Qty: 0 | Refills: 0 | DISCHARGE

## 2024-03-04 RX ORDER — BACLOFEN 100 %
1 POWDER (GRAM) MISCELLANEOUS
Qty: 0 | Refills: 0 | DISCHARGE

## 2024-03-04 RX ORDER — APIXABAN 2.5 MG/1
1 TABLET, FILM COATED ORAL
Qty: 0 | Refills: 0 | DISCHARGE

## 2024-03-04 RX ORDER — FAMOTIDINE 10 MG/ML
1 INJECTION INTRAVENOUS
Qty: 0 | Refills: 0 | DISCHARGE

## 2024-03-04 RX ORDER — NIFEDIPINE 30 MG
1 TABLET, EXTENDED RELEASE 24 HR ORAL
Qty: 0 | Refills: 0 | DISCHARGE

## 2024-03-04 RX ORDER — HYDROCORTISONE 1 %
1 OINTMENT (GRAM) TOPICAL
Qty: 0 | Refills: 0 | DISCHARGE

## 2024-03-04 RX ORDER — FUROSEMIDE 40 MG
1 TABLET ORAL
Qty: 0 | Refills: 0 | DISCHARGE

## 2024-03-04 RX ORDER — METOPROLOL TARTRATE 50 MG
1 TABLET ORAL
Qty: 0 | Refills: 0 | DISCHARGE

## 2024-03-04 RX ORDER — THIAMINE MONONITRATE (VIT B1) 100 MG
1 TABLET ORAL
Qty: 0 | Refills: 0 | DISCHARGE

## 2024-03-04 RX ORDER — SENNA PLUS 8.6 MG/1
1 TABLET ORAL
Qty: 0 | Refills: 0 | DISCHARGE

## 2024-03-04 RX ORDER — TAMSULOSIN HYDROCHLORIDE 0.4 MG/1
1 CAPSULE ORAL
Qty: 0 | Refills: 0 | DISCHARGE

## 2024-03-04 RX ORDER — POLYETHYLENE GLYCOL 3350 17 G/17G
17 POWDER, FOR SOLUTION ORAL
Qty: 0 | Refills: 0 | DISCHARGE

## 2024-03-04 RX ORDER — MENTHOL AND CAPSAICIN .0375; 5 G/100G; G/100G
1 PATCH TOPICAL
Qty: 0 | Refills: 0 | DISCHARGE

## 2024-03-04 RX ORDER — OXYCODONE AND ACETAMINOPHEN 5; 325 MG/1; MG/1
1 TABLET ORAL
Qty: 0 | Refills: 0 | DISCHARGE

## 2024-03-04 RX ORDER — ATORVASTATIN CALCIUM 80 MG/1
1 TABLET, FILM COATED ORAL
Qty: 0 | Refills: 0 | DISCHARGE

## 2024-03-04 RX ORDER — CLOPIDOGREL BISULFATE 75 MG/1
1 TABLET, FILM COATED ORAL
Qty: 0 | Refills: 0 | DISCHARGE

## 2024-03-21 NOTE — ED ADULT NURSE NOTE - NS ED NURSE RECORD ANOTHER HT AND WT
Lab Results   Component Value Date    HGBA1C 5.4 03/12/2024     Decreased to 6 units of Lantus based on blood sugars as patient will be n.p.o. after midnight.  It appears that patient does not take mealtime insulin and Humalog discontinued  Continue SSI  Carb controlled diet   Yes

## 2024-06-20 ENCOUNTER — EMERGENCY (EMERGENCY)
Facility: HOSPITAL | Age: 69
LOS: 1 days | Discharge: HOME | End: 2024-06-20
Attending: STUDENT IN AN ORGANIZED HEALTH CARE EDUCATION/TRAINING PROGRAM
Payer: MEDICARE

## 2024-06-20 VITALS
OXYGEN SATURATION: 97 % | RESPIRATION RATE: 18 BRPM | DIASTOLIC BLOOD PRESSURE: 67 MMHG | HEART RATE: 119 BPM | TEMPERATURE: 98 F | SYSTOLIC BLOOD PRESSURE: 117 MMHG

## 2024-06-20 VITALS
SYSTOLIC BLOOD PRESSURE: 103 MMHG | OXYGEN SATURATION: 96 % | TEMPERATURE: 100 F | HEART RATE: 126 BPM | WEIGHT: 148.59 LBS | RESPIRATION RATE: 20 BRPM | DIASTOLIC BLOOD PRESSURE: 59 MMHG

## 2024-06-20 PROBLEM — I48.20 CHRONIC ATRIAL FIBRILLATION, UNSPECIFIED: Chronic | Status: ACTIVE | Noted: 2022-01-31

## 2024-06-20 PROBLEM — J44.9 CHRONIC OBSTRUCTIVE PULMONARY DISEASE, UNSPECIFIED: Chronic | Status: ACTIVE | Noted: 2022-01-31

## 2024-06-20 PROBLEM — I73.9 PERIPHERAL VASCULAR DISEASE, UNSPECIFIED: Chronic | Status: ACTIVE | Noted: 2022-01-31

## 2024-06-20 PROBLEM — K21.9 GASTRO-ESOPHAGEAL REFLUX DISEASE WITHOUT ESOPHAGITIS: Chronic | Status: ACTIVE | Noted: 2022-01-31

## 2024-06-20 PROBLEM — F32.9 MAJOR DEPRESSIVE DISORDER, SINGLE EPISODE, UNSPECIFIED: Chronic | Status: ACTIVE | Noted: 2022-01-31

## 2024-06-20 PROBLEM — E11.9 TYPE 2 DIABETES MELLITUS WITHOUT COMPLICATIONS: Chronic | Status: ACTIVE | Noted: 2022-01-31

## 2024-06-20 PROBLEM — N40.0 BENIGN PROSTATIC HYPERPLASIA WITHOUT LOWER URINARY TRACT SYMPTOMS: Chronic | Status: ACTIVE | Noted: 2022-01-31

## 2024-06-20 PROBLEM — K76.0 FATTY (CHANGE OF) LIVER, NOT ELSEWHERE CLASSIFIED: Chronic | Status: ACTIVE | Noted: 2023-01-02

## 2024-06-20 PROCEDURE — 74018 RADEX ABDOMEN 1 VIEW: CPT | Mod: 26

## 2024-06-20 PROCEDURE — 74018 RADEX ABDOMEN 1 VIEW: CPT

## 2024-06-20 PROCEDURE — 99284 EMERGENCY DEPT VISIT MOD MDM: CPT

## 2024-06-20 PROCEDURE — 99283 EMERGENCY DEPT VISIT LOW MDM: CPT | Mod: 25

## 2024-06-20 RX ORDER — DIPHENHYDRAMINE HYDROCHLORIDE AND LIDOCAINE HYDROCHLORIDE AND ALUMINUM HYDROXIDE AND MAGNESIUM HYDRO
30 KIT ONCE
Refills: 0 | Status: COMPLETED | OUTPATIENT
Start: 2024-06-20 | End: 2024-06-20

## 2024-06-20 RX ORDER — DIATRIZOATE MEGLUMINE 180 MG/ML
30 INJECTION, SOLUTION INTRAVESICAL ONCE
Refills: 0 | Status: COMPLETED | OUTPATIENT
Start: 2024-06-20 | End: 2024-06-20

## 2024-06-20 RX ADMIN — DIPHENHYDRAMINE HYDROCHLORIDE AND LIDOCAINE HYDROCHLORIDE AND ALUMINUM HYDROXIDE AND MAGNESIUM HYDRO 30 MILLILITER(S): KIT at 17:16

## 2024-06-20 NOTE — ED PROVIDER NOTE - PATIENT PORTAL LINK FT
You can access the FollowMyHealth Patient Portal offered by [facility] by registering at the following website: [email or web address]. By joining [employer]’s FollowMyHealth portal, you will also be able to view your health information using other applications (apps) compatible with our system.

## 2024-06-20 NOTE — ED PROVIDER NOTE - PROGRESS NOTE DETAILS
Received from Dr Ethan lowry XR results pending for confirmation of tube placement.  Results reviewed.  Pt well appearing  Patient advised regarding symptomatic/supportive care, importance of outpatient follow up, and symptoms to prompt ED return.

## 2024-06-20 NOTE — ED ADULT NURSE NOTE - NSFALLRISKINTERV_ED_ALL_ED

## 2024-06-20 NOTE — ED ADULT NURSE NOTE - OBJECTIVE STATEMENT
Pt presents to ED for dislodged PEG tube, site is clean and dry, no bleeding noted. Pt denies any pain.

## 2024-06-20 NOTE — ED PROVIDER NOTE - NSFOLLOWUPINSTRUCTIONS_ED_ALL_ED_FT
You were seen in the emergency department for PEG tube replacement. 16fr placed.      Please follow-up with your primary care doctor within the next 24-48 hours.    If you have any worsening symptoms, severe headache, chest pain, trouble breathing, please return to the emergency department.

## 2024-06-20 NOTE — ED PROVIDER NOTE - CLINICAL SUMMARY MEDICAL DECISION MAKING FREE TEXT BOX
69-year-old male presenting for PEG tube replacement.  Nontender abdomen.  PEG tube replaced.  Will obtain x-ray to verify placement.

## 2024-06-20 NOTE — ED PROVIDER NOTE - CARE PROVIDER_API CALL
Raphael Lassiter  Internal Medicine  [address] [address] [place] [id number]  Phone: [phone]  Fax: [phone]  Follow Up Time: 1-3 Days

## 2024-06-20 NOTE — ED PROVIDER NOTE - NSFOLLOWUPCLINICS_GEN_ALL_ED_FT
[facility]  Gastroenterology  [address] [place] [address]  Phone: [phone]  Fax: [phone]  Follow Up Time: 1-3 Days

## 2024-06-20 NOTE — ED PROVIDER NOTE - OBJECTIVE STATEMENT
69M, pmh of DM, HTN, HLD, sent to the emergency department for PEG tube replacement. patient denies any complaints.

## 2024-06-21 ENCOUNTER — EMERGENCY (EMERGENCY)
Facility: HOSPITAL | Age: 69
LOS: 1 days | Discharge: HOME | End: 2024-06-21
Attending: EMERGENCY MEDICINE
Payer: MEDICARE

## 2024-06-21 VITALS
SYSTOLIC BLOOD PRESSURE: 108 MMHG | DIASTOLIC BLOOD PRESSURE: 62 MMHG | HEART RATE: 100 BPM | TEMPERATURE: 98 F | RESPIRATION RATE: 19 BRPM | OXYGEN SATURATION: 96 %

## 2024-06-21 VITALS
SYSTOLIC BLOOD PRESSURE: 101 MMHG | RESPIRATION RATE: 19 BRPM | HEART RATE: 107 BPM | DIASTOLIC BLOOD PRESSURE: 58 MMHG | OXYGEN SATURATION: 95 % | TEMPERATURE: 97 F | WEIGHT: 148.59 LBS

## 2024-06-21 PROCEDURE — 43762 RPLC GTUBE NO REVJ TRC: CPT

## 2024-06-21 PROCEDURE — L8699: CPT

## 2024-06-21 PROCEDURE — 99283 EMERGENCY DEPT VISIT LOW MDM: CPT | Mod: 25

## 2024-06-21 RX ORDER — LIDOCAINE 4 G/100G
5 CREAM TOPICAL ONCE
Refills: 0 | Status: COMPLETED | OUTPATIENT
Start: 2024-06-21 | End: 2024-06-21

## 2024-06-21 RX ADMIN — Medication 1 APPLICATION(S): at 20:41

## 2024-06-21 RX ADMIN — LIDOCAINE 5 MILLILITER(S): 4 CREAM TOPICAL at 18:37

## 2024-06-21 NOTE — ED PROVIDER NOTE - OBJECTIVE STATEMENT
69 yr old male sent back from [facility] home due to inability to connect device to feeding. pt had g tube 16 fr placed 1 day ago. device not compatible with [facility] home equipment. pt also c/o excoriation of skin- burning sensation. states he can eat and drink. 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

## 2024-06-21 NOTE — ED PROVIDER NOTE - PATIENT PORTAL LINK FT
You can access the FollowMyHealth Patient Portal offered by [facility] by registering at the following website: [email or web address]. By joining [employer]’s FollowMyHealth portal, you will also be able to view your health information using other applications (apps) compatible with our system.

## 2024-06-21 NOTE — ED ADULT NURSE NOTE - OBJECTIVE STATEMENT
70 y/o male bed bound, Pt A &O x3, PT sent from NH for peg tube replacement. Pt denies pain, SOB, nausea, vomiting, dizziness, fever cough, chills.

## 2024-06-21 NOTE — ED PROVIDER NOTE - CPE EDP CARDIAC NORM
Spoke to patient. She said she is on her way to [facility] ER now   normal...

## 2024-06-21 NOTE — ED PROVIDER NOTE - NSFOLLOWUPINSTRUCTIONS_ED_ALL_ED_FT
changed 16 fr to 22 fr feeding tube due to incompatibility with [facility] home equipment and excoriation from gastric leakage around ostomy causing fungal rash? antifungal cream 2x/day, keep area dry and clean.  the larger tube should prevent excessive leaking of gastric contents.

## 2024-06-21 NOTE — ED PROCEDURE NOTE - CPROC ED POST RADIOGRAPHY1
[address] [facility]    Physical Therapy Daily Treatment Note  Date:  2018  Patient Name:  Chaka Vásquez    :  1967  MRN: [id number]  Restrictions/Precautions:    Medical/Treatment Diagnosis Information:  · Diagnosis: M17.11 (ICD-10-CM) - Patellofemoral arthritis of right knee  · Treatment Diagnosis: right knee pain - J87.237  Insurance/Certification information:  PT Insurance Information: workers comp   Physician Information:     Plan of care signed (Y/N):     Date of Patient follow up with Physician:     G-Code (if applicable):      Date G-Code Applied:    PT G-Codes  Functional Assessment Tool Used: lefs   Score: 44%  Functional Limitation: Mobility: Walking and moving around  Mobility: Walking and Moving Around Current Status (): At least 40 percent but less than 60 percent impaired, limited or restricted  Mobility: Walking and Moving Around Goal Status ():  At least 20 percent but less than 40 percent impaired, limited or restricted    Progress Note: [x]  Yes  []  No  Next due by: Visit #10       Latex Allergy:  [x]NO      []YES   Preferred Language for Healthcare:   [x]English       []other:     Visit # Insurance Allowable   4 Pending       Pain level:  4/10     SUBJECTIVE:  \"I am getting a little better thanks to the therapy\"     OBJECTIVE:   Observation:   Test measurements:      RESTRICTIONS/PRECAUTIONS:      Exercises/Interventions:     Therapeutic Ex Sets/sec Reps Notes   Seated belt stretches   6 x 20\"     Quad sets   25x    slr's (flex, abd)   25x 1.5#    saq   30x 2#    Clamshells   30x grey     Bridges   20x     Supine self itb: short, long   4 x 20\", 4 x 20\"    laq   30x 4#     Heel slides (with ball, without ball)   10 x 10\" each     slt brd   4x                Bike   8'                       Manual Intervention      Prom/stm   15' allowing for proper ROM for normal function with self care, mobility, lifting and ambulation. Modalities:    Cp/egs  Charges:  Timed Code Treatment Minutes: 43'    Total Treatment Minutes: 68'      [] EVAL  [x] KR(75320) x  1   [] IONTO  [] NMR (70326) x      [] VASO  [x] Manual (55311) x  1    [] Other:  [] TA x       [] Mech Traction (33364)  [] ES(attended) (54809)      [x] ES (un) (63220):     GOALS:        Long Term Goals: To be achieved in: 12 weeks  - The patient is expected to demonstrate less than 23% impairment, limitation or restriction in:  - walking and moving around (mobility)  -changing and maintaining body position  - carrying, moving and handling objects. - Patient will demonstrate increased passive and active ROM to full, symmetrical and painfree to allow for proper joint functioning as indicated by patients Functional Deficits. - Patient will demonstrate an increase in Strength to full and painfree to resistance testing to allow for proper functional mobility as indicated by patients Functional Deficits. - Patient will return to desired, higher level,  functional activities without increased symptoms or restriction.                        Progression Towards Functional goals:  [] Patient is progressing as expected towards functional goals listed. [] Progression is slowed due to complexities listed. [] Progression has been slowed due to co-morbidities.   [x] Plan just implemented, too soon to assess goals progression  [] Other:     ASSESSMENT:  See eval    Treatment/Activity Tolerance:  [x] Patient tolerated treatment well [] Patient limited by fatique  [] Patient limited by pain  [] Patient limited by other medical complications  [] Other:     Prognosis: [x] Good [] Fair  [] Poor    Patient Requires Follow-up: [x] Yes  [] No    PLAN:   [x] Continue per plan of care [] Alter current plan (see comments)  [] Plan of care initiated [] Hold pending MD visit [] Discharge    Electronically post procedure radiography not performed

## 2024-06-21 NOTE — ED PROVIDER NOTE - CLINICAL SUMMARY MEDICAL DECISION MAKING FREE TEXT BOX
69 yr old male sent back from [facility] home due to inability to connect device to feeding. pt had g tube 16 fr placed 1 day ago. device not compatible with [facility] home equipment. pt also c/o excoriation of skin- burning sensation. states he can eat and drink. 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 antifungal cream 2x/day, keep area dry and clean.  the larger tube should prevent excessive leaking of gastric contents. no need for xr to confirm as PE exams is optimal

## 2024-06-21 NOTE — ED ADULT NURSE NOTE - BIRTH SEX
[address] [address] [place] [id number] [phone] Patient: Alex Clifford MRN: [id number] UTM:[birth year] Visit Information Date & Time Provider Department Dept. Phone Encounter #  
 4/26/2018 10:30 AM Chris Boudreaux [facility] [address] [phone] [id number] Follow-up Instructions Return in about 3 months (around 7/26/2018). Upcoming Health Maintenance Date Due Hepatitis C Screening 1954 FOOT EXAM Q1 7/17/1963 EYE EXAM RETINAL OR DILATED Q1 7/17/1963 DTaP/Tdap/Td series (1 - Tdap) 7/17/1974 ZOSTER VACCINE AGE 60> 5/17/2013 HEMOGLOBIN A1C Q6M 5/17/2018 FOBT Q 1 YEAR AGE 50-75 7/26/2018 MICROALBUMIN Q1 11/17/2018 LIPID PANEL Q1 11/17/2018 Allergies as of 4/26/2018  Review Complete On: 4/26/2018 By: Vanesa Wright MD  
  
 Severity Noted Reaction Type Reactions Demerol [Meperidine]  10/25/2010    Hives Pcn [Penicillins]  10/25/2010    Hives Current Immunizations  Reviewed on 2/16/2018 Name Date Influenza Vaccine 10/1/2014 Influenza Vaccine (Quad) PF 11/17/2017, 10/5/2015  2:22 PM  
 Influenza Vaccine Whole 10/1/2010 Pneumococcal Polysaccharide (PPSV-23) 10/22/2014 10:00 AM  
  
 Not reviewed this visit You Were Diagnosed With   
  
 Codes Comments Essential hypertension    -  Primary ICD-10-CM: I10 
ICD-9-CM: 401.9 Vitals BP Pulse Height(growth percentile) Weight(growth percentile) SpO2 BMI  
 110/64 (BP 1 Location: Left arm, BP Patient Position: Sitting) 75 6' (1.829 m) 220 lb (99.8 kg) 93% 29.84 kg/m2 Smoking Status Never Smoker BMI and BSA Data Body Mass Index Body Surface Area  
 29.84 kg/m 2 2.25 m 2 Preferred Pharmacy Pharmacy Name Phone [phone] [facility] [phone] Your Updated Medication List  
  
   
This list is accurate as of 4/26/18 11:12 AM.  Always use your most recent med list.  
  
  
  
  
 Blood-Glucose Meter monitoring kit Commonly known as:  Domingo Villeda For once daily blood glucose checks  
  
 dilTIAZem 360 mg SR capsule Commonly known as:  [facility] Take 1 Cap by mouth daily. glipiZIDE 10 mg tablet Commonly known as:  Mary Gordon Take 2 Tabs by mouth two (2) times a day. Indications: 2 tabs 2 times daily  
  
 glucose blood VI test strips strip Commonly known as:  ONETOUCH ULTRA TEST Check blood glucose once daily JARDIANCE 25 mg tablet Generic drug:  empagliflozin Take  by mouth daily. Prescriptions Sent to Pharmacy Refills  
 dilTIAZem (TIAZAC) 360 mg SR capsule 3 Sig: Take 1 Cap by mouth daily. Class: Normal  
 Pharmacy: [facility] [address] [place] [address] #: [phone] Route: Oral  
  
Follow-up Instructions Return in about 3 months (around 7/26/2018). Patient Instructions DASH Diet: Care Instructions Your Care Instructions The DASH diet is an eating plan that can help lower your blood pressure. DASH stands for Dietary Approaches to Stop Hypertension. Hypertension is high blood pressure. The DASH diet focuses on eating foods that are high in calcium, potassium, and magnesium. These nutrients can lower blood pressure. The foods that are highest in these nutrients are fruits, vegetables, low-fat dairy products, nuts, seeds, and legumes. But taking calcium, potassium, and magnesium supplements instead of eating foods that are high in those nutrients does not have the same effect. The DASH diet also includes whole grains, fish, and poultry. The DASH diet is one of several lifestyle changes your doctor may recommend to lower your high blood pressure.  Your doctor may also want you to decrease the amount of sodium in your diet. Lowering sodium while following the DASH diet can lower blood pressure even further than just the DASH diet alone. Follow-up care is a key part of your treatment and safety. Be sure to make and go to all appointments, and call your doctor if you are having problems. It's also a good idea to know your test results and keep a list of the medicines you take. How can you care for yourself at home? Following the DASH diet · Eat 4 to 5 servings of fruit each day. A serving is 1 medium-sized piece of fruit, ½ cup chopped or canned fruit, 1/4 cup dried fruit, or 4 ounces (½ cup) of fruit juice. Choose fruit more often than fruit juice. · Eat 4 to 5 servings of vegetables each day. A serving is 1 cup of lettuce or raw leafy vegetables, ½ cup of chopped or cooked vegetables, or 4 ounces (½ cup) of vegetable juice. Choose vegetables more often than vegetable juice. · Get 2 to 3 servings of low-fat and fat-free dairy each day. A serving is 8 ounces of milk, 1 cup of yogurt, or 1 ½ ounces of cheese. · Eat 6 to 8 servings of grains each day. A serving is 1 slice of bread, 1 ounce of dry cereal, or ½ cup of cooked rice, pasta, or cooked cereal. Try to choose whole-grain products as much as possible. · Limit lean meat, poultry, and fish to 2 servings each day. A serving is 3 ounces, about the size of a deck of cards. · Eat 4 to 5 servings of nuts, seeds, and legumes (cooked dried beans, lentils, and split peas) each week. A serving is 1/3 cup of nuts, 2 tablespoons of seeds, or ½ cup of cooked beans or peas. · Limit fats and oils to 2 to 3 servings each day. A serving is 1 teaspoon of vegetable oil or 2 tablespoons of salad dressing. · Limit sweets and added sugars to 5 servings or less a week. A serving is 1 tablespoon jelly or jam, ½ cup sorbet, or 1 cup of lemonade. · Eat less than 2,300 milligrams (mg) of sodium a day.  If you limit your sodium to 1,500 mg a day, you can lower your blood pressure even more. Tips for success · Start small. Do not try to make dramatic changes to your diet all at once. You might feel that you are missing out on your favorite foods and then be more likely to not follow the plan. Make small changes, and stick with them. Once those changes become habit, add a few more changes. · Try some of the following: ¨ Make it a goal to eat a fruit or vegetable at every meal and at snacks. This will make it easy to get the recommended amount of fruits and vegetables each day. ¨ Try yogurt topped with fruit and nuts for a snack or healthy dessert. ¨ Add lettuce, tomato, cucumber, and onion to sandwiches. ¨ Combine a ready-made pizza crust with low-fat mozzarella cheese and lots of vegetable toppings. Try using tomatoes, squash, spinach, broccoli, carrots, cauliflower, and onions. ¨ Have a variety of cut-up vegetables with a low-fat dip as an appetizer instead of chips and dip. ¨ Sprinkle sunflower seeds or chopped almonds over salads. Or try adding chopped walnuts or almonds to cooked vegetables. ¨ Try some vegetarian meals using beans and peas. Add garbanzo or kidney beans to salads. Make burritos and tacos with mashed swan beans or black beans. Where can you learn more? Go to http://isra-hadley.info/. Enter F402 in the search box to learn more about \"DASH Diet: Care Instructions. \" Current as of: September 21, 2016 Content Version: 11.4 © 6179-2390 [employer]. Care instructions adapted under license by [employer] (which disclaims liability or warranty for this information). If you have questions about a medical condition or this instruction, always ask your healthcare professional. [address]  any warranty or liability for your use of this information. Introducing [facility]! Stephanie Gonzalez introduces [employer] patient portal. Now you can access parts of your medical record, email your doctor's office, and request medication refills online. 1. In your internet browser, go to [email or web address] 2. Click on the First Time User? Click Here link in the Sign In box. You will see the New Member Sign Up page. 3. Enter your [employer] Access Code exactly as it appears below. You will not need to use this code after youve completed the sign-up process. If you do not sign up before the expiration date, you must request a new code. · [employer] Access Code: [email or web address] Expires: 5/17/2018 11:55 AM 
 
4. Enter the last four digits of your Social Security Number (xxxx) and Date of Birth (mm/dd/yyyy) as indicated and click Submit. You will be taken to the next sign-up page. 5. Create a [employer] ID. This will be your [employer] login ID and cannot be changed, so think of one that is secure and easy to remember. 6. Create a [employer] password. You can change your password at any time. 7. Enter your Password Reset Question and Answer. This can be used at a later time if you forget your password. 8. Enter your e-mail address. You will receive e-mail notification when new information is available in [address] 9. Click Sign Up. You can now view and download portions of your medical record. 10. Click the Download Summary menu link to download a portable copy of your medical information. If you have questions, please visit the Frequently Asked Questions section of the [employer] website. Remember, [employer] is NOT to be used for urgent needs. For medical emergencies, dial 911. Now available from your iPhone and Android! Please provide this summary of care documentation to your next provider. Your primary care clinician is listed as LAUREN Cam. If you have any questions after today's visit, please call [phone]. Male

## 2024-08-29 NOTE — PROGRESS NOTE ADULT - ASSESSMENT
Patient is a 67 year old male, vaccinated, from [facility] with PMHx of DM, HTN, HLD, AF on Eliquis, PVD, presents with c/o vomiting and right sided abdominal pain. Admitted to ICU for hypovolemic vs septic shock     Assessment:  - Septic shock   - Cholangitis   - Melena   - Diverticulosis   - Afib   - HTN  - DM    Plan:  Neuro:  - avoid any sedating meds    CV:  # Septic shock vs hypovolemic shock   - BP 87/70, , 98% RA  - WBC 14.7 k, lactate 5.9, elevated LFTs  - 2 episodes of melena   - US Abdomen: Biliary dilatation and hepatic steatosis  - CT A/P: dilation of the common bile duct and main pancreatic duct again noted.. New RLL opacity  - NPO for possible ERCP  - hold eliquis  - start cefepime 1g q8  - f/u MRCP w/IV contrast   - f/u CXR  - f/u BCUX and UCX  - c/w IVF, given 1 L bolus   - trend lactate q2  - GI consulted Dr Villanueva/ Judy(ERCP)  - ID Dr. Novoa.    # Afib  - Hold home eliquis     # HTN  - on nifedipine and metoprolol, hold for shock   - low  BP: 81/59mmHg  S/P 1L NS bolus   - c/w IVF   monitor BP    Pulm:  # Aspiration PNA   - CT abd: New airspace opacifications in the right lower lung zone  - 98% RA  - Continue Cefepime   - ID Dr. Novoa following     ID:  # Septic shock    - BP 87/70, , 98% RA  - WBC 14.7 k, lactate 5.9, elevated LFTs  - 2 episodes of melena   - US Abdomen: Biliary dilatation and hepatic steatosis  - CT A/P: dilation of the common bile duct and main pancreatic duct again noted.. New RLL opacity  - NPO for possible ERCP  - hold eliquis  - start cefepime 1g q8  - f/u MRCP w/IV contrast   - f/u CXR  - f/u BCUX and UCX  - c/w IVF, given 1 L bolus   - trend lactate q2  - GI consulted Dr Villanueva/ Judy(ERCP)  - ID Dr. Novoa.      Nephro:  No active issues     GI:  # Cholangitis   - Fever, RUQ pain, jaundice, leukocytosis, Transaminitis   - US Abdomen: Biliary dilatation and hepatic steatosis  - CT A/P: dilation of the common bile duct and main pancreatic duct again noted.. New RLL opacity  - NPO for possible ERCP  - ERCP 12/16: biliary sphincterotomy and sludge removal    - Continue cefepime   - Follow Bili and T bili     # GI Bleed   - 2 episodes of melena  - H/H stable, 11.4/34.1  - likely post sphincterotomy bleed resulting in cholangitis as per GI   - CT: colonic diverticulosis   - Start on Protonix ggt   - Follow MRCP  - Continue IV fluids   - NPO   - Trend H/H Q6   - Transfuse for Hb<7     Heme:  - no indication for transfusion       Endo:  # DM:   - FS and ISS Q6 while NPO       Prophy:  - SCD for DVT ppx  - Protonix     Dispo:  - monitor in the ICU     Patient is a 67 year old male, vaccinated, from [facility] with PMHx of DM, HTN, HLD, AF on Eliquis, PVD, presents with c/o vomiting and right sided abdominal pain. Admitted to ICU for hypovolemic vs septic shock     Assessment:  - Septic shock   - Cholangitis   - Melena   - Diverticulosis   - Afib   - HTN  - DM    Plan:  Neuro:  - avoid any sedating meds    CV:  # Septic shock vs hypovolemic shock    - 2 episodes of melena   - US Abdomen: Biliary dilatation and hepatic steatosis  - CT A/P: dilation of the common bile duct and main pancreatic duct again noted.. New RLL opacity  - WBC trended down to 7.76  - c/w Meropenem   - repeat ERCP on 12/23 performed, 2 clips and 1 stent placed  - f/u BCUX and UCX  - GI consulted Dr Villanueva/ Judy(ERCP)  - ID Dr. Novoa.    # Afib  - Dr. Sharma consulted who recommended to continue holding Eliquis    # HTN  - on nifedipine and metoprolol, hold for shock   - low  BP: 81/59mmHg  S/P 1L NS bolus   - c/w IVF   monitor BP    Pulm:  # Aspiration PNA   - CT abd: New airspace opacifications in the right lower lung zone  - C/w Meropenem   - ID Dr. Novoa following     ID:  # Septic shock    - WBC trending down, on 12/23: 7.76   - Lactate trended down on 12/21 to 1.8   - f/u BCUX and UCX  - GI consulted Dr Villanueva/ Judy(ERCP)  - ID Dr. Novoa.      Nephro:  No active issues     GI:  # Cholangitis   - Fever, RUQ pain, jaundice, leukocytosis, Transaminitis   - US Abdomen: Biliary dilatation and hepatic steatosis  - CT A/P: dilation of the common bile duct and main pancreatic duct again noted.. New RLL opacity  - ERCP 12/16: biliary sphincterotomy and sludge removal    - repeat ERCP on 12/23 performed, 2 clips and 1 stent placed  - Continue Meropenem   - Follow Bili and T bili     # GI Bleed   - 2 episodes of melena  - H/H stable, 11.4/34.1  - likely post sphincterotomy bleed resulting in cholangitis as per GI   - CT: colonic diverticulosis   - Start on Protonix ggt   - Follow MRCP  - Continue IV fluids   - NPO   - Trend H/H Q6   - Transfuse for Hb<7     Heme:  - no indication for transfusion       Endo:  # DM:   - FS and ISS Q6 while NPO       Prophy:  - SCD for DVT ppx  - Protonix     Dispo:  - monitor in the ICU     Patient is a 67 year old male, vaccinated, from [facility] with PMHx of DM, HTN, HLD, AF on Eliquis, PVD, presents with c/o vomiting and right sided abdominal pain. Admitted to ICU for hypovolemic vs septic shock     Assessment:  - Septic shock   - Cholangitis   - Melena   - Diverticulosis   - Afib   - HTN  - DM    Plan:  Neuro:  - AAOx3    CV  # Septic shock vs hypovolemic shock    - 2 episodes of melena   - US Abdomen: Biliary dilatation and hepatic steatosis  - CT A/P: dilation of the common bile duct and main pancreatic duct again noted.. New RLL opacity  - WBC trended down to 7.76  - c/w Meropenem   - repeat ERCP on 12/23 performed, 2 clips and 1 stent placed  - f/u BCUX and UCX  - GI consulted Dr Villanueva/ Judy(ERCP)  - ID Dr. Novoa.    # Afib  - Dr. Sharma consulted who recommended to continue holding Eliquis    # HTN  - on admission low  BP: 81/59mmHg  S/P 1L NS bolus   - home medications: nifedipine and metoprolol, hold as blood pressure on the softer side   - c/w IVF   monitor BP    Pulm:  # Aspiration PNA   - CT abd: New airspace opacifications in the right lower lung zone  - C/w Meropenem   - ID Dr. Novoa following     ID:  # Septic shock    - WBC trending down, on 12/23: 7.76   - Lactate trended down on 12/21 to 1.8   - f/u BCUX and UCX  - GI consulted Dr. Kim(ERCP)  - ID Dr. Novoa.      Nephro:  No active issues     GI:  # Cholangitis   - Fever, RUQ pain, jaundice, leukocytosis, Transaminitis   - US Abdomen: Biliary dilatation and hepatic steatosis  - CT A/P: dilation of the common bile duct and main pancreatic duct again noted.. New RLL opacity  - ERCP 12/16: biliary sphincterotomy and sludge removal    - repeat ERCP on 12/23 performed, 2 clips and 1 stent placed  - Continue Meropenem   - Follow Bili and T bili     # GI Bleed   - 2 episodes of melena  - H/H stable, 11.4/34.1  - likely post sphincterotomy bleed resulting in cholangitis as per GI   - CT: colonic diverticulosis   - c/w Protonix  - Hb trended down , on 12/23: 9.9 ( no active bleeding)   - repeat CBC   - Transfuse for Hb<7   - started on clear diet , pt tolerating well, will advance as tolerated       Heme:  - no indication for transfusion       Endo:  # DM:   - ISS Low scale        Prophy:  - SCD for DVT ppx  - Protonix     Dispo:  - monitor in the ICU     5

## 2024-09-24 ENCOUNTER — APPOINTMENT (OUTPATIENT)
Dept: GASTROENTEROLOGY | Facility: CLINIC | Age: 69
End: 2024-09-24
